# Patient Record
Sex: MALE | Race: WHITE | NOT HISPANIC OR LATINO | Employment: OTHER | URBAN - METROPOLITAN AREA
[De-identification: names, ages, dates, MRNs, and addresses within clinical notes are randomized per-mention and may not be internally consistent; named-entity substitution may affect disease eponyms.]

---

## 2017-02-09 ENCOUNTER — OFFICE VISIT (OUTPATIENT)
Dept: DERMATOLOGY | Facility: CLINIC | Age: 77
End: 2017-02-09
Payer: MEDICARE

## 2017-02-09 VITALS — HEIGHT: 70 IN | BODY MASS INDEX: 27.2 KG/M2 | WEIGHT: 190 LBS

## 2017-02-09 DIAGNOSIS — L57.0 ACTINIC KERATOSES: ICD-10-CM

## 2017-02-09 DIAGNOSIS — A49.8 PSEUDOMONAS INFECTION: ICD-10-CM

## 2017-02-09 DIAGNOSIS — L03.019 CHRONIC PARONYCHIA OF FINGER, UNSPECIFIED LATERALITY: Primary | ICD-10-CM

## 2017-02-09 PROCEDURE — 17003 DESTRUCT PREMALG LES 2-14: CPT | Mod: S$GLB,,, | Performed by: DERMATOLOGY

## 2017-02-09 PROCEDURE — 17000 DESTRUCT PREMALG LESION: CPT | Mod: S$GLB,,, | Performed by: DERMATOLOGY

## 2017-02-09 PROCEDURE — 99999 PR PBB SHADOW E&M-EST. PATIENT-LVL III: CPT | Mod: PBBFAC,,, | Performed by: DERMATOLOGY

## 2017-02-09 PROCEDURE — 1126F AMNT PAIN NOTED NONE PRSNT: CPT | Mod: S$GLB,,, | Performed by: DERMATOLOGY

## 2017-02-09 PROCEDURE — 1160F RVW MEDS BY RX/DR IN RCRD: CPT | Mod: S$GLB,,, | Performed by: DERMATOLOGY

## 2017-02-09 PROCEDURE — 1159F MED LIST DOCD IN RCRD: CPT | Mod: S$GLB,,, | Performed by: DERMATOLOGY

## 2017-02-09 PROCEDURE — 99213 OFFICE O/P EST LOW 20 MIN: CPT | Mod: 25,S$GLB,, | Performed by: DERMATOLOGY

## 2017-02-09 PROCEDURE — 1157F ADVNC CARE PLAN IN RCRD: CPT | Mod: S$GLB,,, | Performed by: DERMATOLOGY

## 2017-02-09 RX ORDER — CICLOPIROX OLAMINE 7.7 MG/100ML
SUSPENSION TOPICAL
Qty: 60 ML | Refills: 1 | Status: SHIPPED | OUTPATIENT
Start: 2017-02-09 | End: 2017-02-27

## 2017-02-09 RX ORDER — CIPROFLOXACIN HYDROCHLORIDE 3 MG/ML
SOLUTION/ DROPS OPHTHALMIC
Qty: 10 ML | Refills: 0 | Status: SHIPPED | OUTPATIENT
Start: 2017-02-09 | End: 2017-02-27

## 2017-02-09 NOTE — PROGRESS NOTES
"  Subjective:       Patient ID:  Cedric Gupta Jr. is a 76 y.o. 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   Skin: Positive for wears hat. Negative for daily sunscreen use, activity-related sunscreen use and recent sunburn.        Objective:    Physical Exam   Constitutional: He appears well-developed and well-nourished. No distress.   Neurological: He is alert and oriented to person, place, and time. He is not disoriented.   Psychiatric: He has a normal mood and affect.   Skin:   Areas Examined (abnormalities noted in diagram):   Scalp / Hair Palpated and Inspected  Head / Face Inspection Performed  Neck Inspection Performed  Chest / Axilla Inspection Performed  Abdomen Inspection Performed  Back Inspection Performed  RUE Inspected  LUE Inspection Performed  Nails and Digits Inspection Performed                       Diagram Legend     Erythematous scaling macule/papule c/w actinic keratosis       Vascular papule c/w angioma      Pigmented verrucoid papule/plaque c/w seborrheic keratosis      Yellow umbilicated papule c/w sebaceous hyperplasia      Irregularly shaped tan macule c/w lentigo     1-2 mm smooth white papules consistent with Milia      Movable subcutaneous cyst with punctum c/w epidermal inclusion cyst      Subcutaneous movable cyst c/w pilar cyst      Firm pink to brown papule c/w dermatofibroma      Pedunculated fleshy papule(s) c/w skin tag(s)      Evenly pigmented macule c/w junctional nevus     Mildly variegated pigmented, slightly " irregular-bordered macule c/w mildly atypical nevus      Flesh colored to evenly pigmented papule c/w intradermal nevus       Pink pearly papule/plaque c/w basal cell carcinoma      Erythematous hyperkeratotic cursted plaque c/w SCC      Surgical scar with no sign of skin cancer recurrence      Open and closed comedones      Inflammatory papules and pustules      Verrucoid papule consistent consistent with wart     Erythematous eczematous patches and plaques     Dystrophic onycholytic nail with subungual debris c/w onychomycosis     Umbilicated papule    Erythematous-base heme-crusted tan verrucoid plaque consistent with inflamed seborrheic keratosis     Erythematous Silvery Scaling Plaque c/w Psoriasis     See annotation            NOV 2016:        Assessment / Plan:        Chronic paronychia of finger, unspecified laterality  Improved with topicals; continue to cover for both Pseudomonas and non dermtophyte molds  Gloves for all gardening    -     ciclopirox (LOPROX) 0.77 % Susp; Thin film to affected nails daily  Dispense: 60 mL; Refill: 1    Pseudomonas infection  -     ciprofloxacin HCl (CILOXAN) 0.3 % ophthalmic solution; 1-2 drops under affected nails BID  Dispense: 10 mL; Refill: 0    Actinic keratoses  Cryosurgery Procedure Note    Verbal consent from the patient is obtained and the patient is aware of the precancerous quality and need for treatment of these lesions. Liquid nitrogen cryosurgery is applied to the 2 actinic keratoses, as detailed in the physical exam, to produce a freeze injury. The patient is aware that blisters may form and is instructed on wound care with gentle cleansing and use of vaseline ointment to keep moist until healed. The patient is supplied a handout on cryosurgery and is instructed to call if lesions do not completely resolve.    Patient instructed in importance in daily sun protection of at least spf 30. Sun avoidance and topical protection discussed.   Recommend Elta MD  (physician office) COTZ sensitive (available online) for daily use on face and neck.  Patient encouraged to wear hat for all outdoor exposure.   Also discussed sun protective clothing.           Return in about 1 year (around 2/9/2018).

## 2017-02-09 NOTE — PATIENT INSTRUCTIONS

## 2017-02-27 ENCOUNTER — OFFICE VISIT (OUTPATIENT)
Dept: FAMILY MEDICINE | Facility: CLINIC | Age: 77
End: 2017-02-27
Payer: MEDICARE

## 2017-02-27 VITALS
HEART RATE: 72 BPM | OXYGEN SATURATION: 97 % | SYSTOLIC BLOOD PRESSURE: 110 MMHG | DIASTOLIC BLOOD PRESSURE: 62 MMHG | TEMPERATURE: 98 F | BODY MASS INDEX: 27.4 KG/M2 | RESPIRATION RATE: 17 BRPM | HEIGHT: 70 IN | WEIGHT: 191.38 LBS

## 2017-02-27 DIAGNOSIS — F32.A DEPRESSION, UNSPECIFIED DEPRESSION TYPE: Primary | ICD-10-CM

## 2017-02-27 PROCEDURE — 1157F ADVNC CARE PLAN IN RCRD: CPT | Mod: S$GLB,,, | Performed by: FAMILY MEDICINE

## 2017-02-27 PROCEDURE — 1159F MED LIST DOCD IN RCRD: CPT | Mod: S$GLB,,, | Performed by: FAMILY MEDICINE

## 2017-02-27 PROCEDURE — 90662 IIV NO PRSV INCREASED AG IM: CPT | Mod: S$GLB,,, | Performed by: FAMILY MEDICINE

## 2017-02-27 PROCEDURE — 99499 UNLISTED E&M SERVICE: CPT | Mod: S$GLB,,, | Performed by: FAMILY MEDICINE

## 2017-02-27 PROCEDURE — G0008 ADMIN INFLUENZA VIRUS VAC: HCPCS | Mod: S$GLB,,, | Performed by: FAMILY MEDICINE

## 2017-02-27 PROCEDURE — 1126F AMNT PAIN NOTED NONE PRSNT: CPT | Mod: S$GLB,,, | Performed by: FAMILY MEDICINE

## 2017-02-27 PROCEDURE — 1160F RVW MEDS BY RX/DR IN RCRD: CPT | Mod: S$GLB,,, | Performed by: FAMILY MEDICINE

## 2017-02-27 PROCEDURE — 99214 OFFICE O/P EST MOD 30 MIN: CPT | Mod: 25,S$GLB,, | Performed by: FAMILY MEDICINE

## 2017-02-27 RX ORDER — PAROXETINE HYDROCHLORIDE 20 MG/1
20 TABLET, FILM COATED ORAL EVERY MORNING
Qty: 30 TABLET | Refills: 1 | Status: SHIPPED | OUTPATIENT
Start: 2017-02-27 | End: 2017-04-07

## 2017-02-27 NOTE — PATIENT INSTRUCTIONS
[facility]   [place], [place]  [phone]     HERMILO Mccann  Psychiatric Nurse Practitioner  [address] [place]  [address] [phone]

## 2017-02-27 NOTE — MR AVS SNAPSHOT
[facility]  [address]  [facility] [address]  Phone: [phone]  Fax: [phone]                  Cedric Gupta Jr.   2017 7:30 AM   Office Visit    Description:  Male : 1940   Provider:  Tracy Stein MD   Department:  [facility]           Reason for Visit     Depression           Diagnoses this Visit        Comments    Depression, unspecified depression type    -  Primary            To Do List           Future Appointments        Provider Department Dept Phone    3/3/2017 10:35 AM LAB, [place] [facility] [phone]    3/20/2017 10:30 AM DAPHNIE Juarez OD [place] [facility] [phone]    2017 9:50 AM Tracy Stein MD [facility] [phone]      Goals (5 Years of Data)              8/11/15    BMI is less than 25   Not on track       These Medications        Disp Refills Start End    paroxetine (PAXIL) 20 MG tablet 30 tablet 1 2017    Take 1 tablet (20 mg total) by mouth every morning. - Oral    Pharmacy: [address] #: [phone]         [facility] On Call     [facility] On [facility] Nurse Care Line -  Assistance  Registered nurses in the [facility] Center provide clinical advisement, health education, appointment booking, and other advisory services.  Call for this free service at [phone].             Medications           Message regarding Medications     Verify the changes and/or additions to your medication regime listed below are the same as discussed with your clinician today.  If any of these changes or additions are incorrect, please notify your healthcare provider.        START taking these NEW medications        Refills    paroxetine (PAXIL) 20 MG tablet 1    Sig: Take 1 tablet (20 mg total) by mouth every morning.    Class: Normal    Route: Oral      STOP taking these  medications     ciprofloxacin HCl (CILOXAN) 0.3 % ophthalmic solution 1-2 drops under affected nails BID    ciclopirox (LOPROX) 0.77 % Susp Thin film to affected nails daily    aspirin (ECOTRIN) 81 MG EC tablet Take 81 mg by mouth once daily.           Verify that the below list of medications is an accurate representation of the medications you are currently taking.  If none reported, the list may be blank. If incorrect, please contact your healthcare provider. Carry this list with you in case of emergency.           Current Medications     paroxetine (PAXIL) 20 MG tablet Take 1 tablet (20 mg total) by mouth every morning.           Clinical Reference Information           Your Vitals Were     BP                   110/62 (BP Location: Left arm, Patient Position: Sitting, BP Method: Manual)           Blood Pressure          Most Recent Value    BP  110/62      Allergies as of 2/27/2017     Amantadine      Immunizations Administered on Date of Encounter - 2/27/2017     Name Date Dose VIS Date Route    Influenza - High Dose 2/27/2017 0.5 mL 8/7/2015 Intramuscular      Orders Placed During Today's Visit      Normal Orders This Visit    Influenza - High Dose (65+) (PF) (IM)       Instructions    [facility] [place]  [phone]     HERMILO Mccann  Psychiatric Nurse Practitioner  [address] [place] [address]  [address]  [phone]               Language Assistance Services     ATTENTION: Language assistance services are available, free of charge. Please call [phone].      ATENCIÓN: Si habla español, tiene a graham disposición servicios gratuitos de asistencia lingüística. Llame al [phone].     CHÚ Ý: N?u b?n nói Ti?ng Vi?t, có các d?ch v? h? tr? ngôn ng? mi?n phí dành cho b?n. G?i s? [phone].         [facility] complies with applicable Federal civil rights laws and does not discriminate on the basis of race, color, national origin,  age, disability, or sex.

## 2017-02-28 NOTE — PROGRESS NOTES
"Subjective:       Patient ID: Cedric Gupta Jr. is a 76 y.o. male.    Chief Complaint: Depression (periodically )    HPI   The patient is a 76-year-old who is here today to talk about depression.  He has had bouts of depression since 2000 but he is having a particularly bad spell for the past month or so.  He has been feeling sad and blue.  He has been tearful.  He has been excessively worrying about things in the past and current stressors (his divorce years ago, his daughter's situation, his financial situation etc.).  His mind continues to go over and over certain events.  He has been doing a lot of excessive worrying.  He has been isolating himself although he knows he needs to get out and be social.  He has not been sleeping as well as he normally does.  His appetite has been good and unchanged.  He does occasionally use marijuana and realizes he should stop using not        Review of Systems   Psychiatric/Behavioral: Positive for sleep disturbance. Negative for dysphoric mood, self-injury and suicidal ideas. The patient is nervous/anxious.        Objective:      Physical Exam   Constitutional: He appears well-developed and well-nourished.   Psychiatric: His speech is normal. Judgment and thought content normal. Cognition and memory are normal. He exhibits a depressed mood.   He is tearful today.     Blood pressure 110/62, pulse 72, temperature 98.4 °F (36.9 °C), temperature source Oral, resp. rate 17, height 5' 10" (1.778 m), weight 86.8 kg (191 lb 5.8 oz), SpO2 97 %.Body mass index is 27.46 kg/(m^2).        A/P:  1)  major depression.  Recurrent but new to me.  I'm going to start him on Paxil 20 mg once a day.  He is also going to pursue counseling.  He is going to stop his marijuana use.  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  He is also considering a roommate.  We did discuss being socially engaged which he will make sure he is doing.  If she develops any new or worsening symptoms, he will let " me know.  I will otherwise see him back in 6 weeks    Total visit time was 25 minutes  Greater than 50% of this time was spent couseling the patient or coordinating their care for his depressions

## 2017-03-01 ENCOUNTER — TELEPHONE (OUTPATIENT)
Dept: FAMILY MEDICINE | Facility: CLINIC | Age: 77
End: 2017-03-01

## 2017-03-01 NOTE — TELEPHONE ENCOUNTER
Spoke with patient he stated that he wants to try to stop taking Paxil. Patient stated that he is trying meditation that is working right now. So he wants to know if it will be ok if he stop taking it. Please advise

## 2017-03-01 NOTE — TELEPHONE ENCOUNTER
----- Message from Fatoumata Melton sent at 3/1/2017  9:16 AM CST -----  Contact: self  Patient wants to speak with a nurse regarding the Rx for PAXIL. Please call back at [phone] ([email or web address])

## 2017-03-01 NOTE — TELEPHONE ENCOUNTER
----- Message from Cassandra Farr sent at 3/1/2017  3:12 PM CST -----  Returning your call.  Please call patient at [phone].

## 2017-03-03 ENCOUNTER — LAB VISIT (OUTPATIENT)
Dept: LAB | Facility: HOSPITAL | Age: 77
End: 2017-03-03
Attending: UROLOGY
Payer: MEDICARE

## 2017-03-03 DIAGNOSIS — R97.20 ELEVATED PSA: ICD-10-CM

## 2017-03-03 LAB — COMPLEXED PSA SERPL-MCNC: 5.6 NG/ML

## 2017-03-03 PROCEDURE — 36415 COLL VENOUS BLD VENIPUNCTURE: CPT | Mod: PO

## 2017-03-03 PROCEDURE — 84153 ASSAY OF PSA TOTAL: CPT

## 2017-03-06 ENCOUNTER — TELEPHONE (OUTPATIENT)
Dept: FAMILY MEDICINE | Facility: CLINIC | Age: 77
End: 2017-03-06

## 2017-03-20 ENCOUNTER — OFFICE VISIT (OUTPATIENT)
Dept: OPTOMETRY | Facility: CLINIC | Age: 77
End: 2017-03-20
Payer: MEDICARE

## 2017-03-20 DIAGNOSIS — H52.13 MYOPIA WITH ASTIGMATISM, BILATERAL: ICD-10-CM

## 2017-03-20 DIAGNOSIS — Z13.5 GLAUCOMA SCREENING: ICD-10-CM

## 2017-03-20 DIAGNOSIS — H35.3130 BILATERAL NONEXUDATIVE AGE-RELATED MACULAR DEGENERATION: Primary | ICD-10-CM

## 2017-03-20 DIAGNOSIS — H25.13 NUCLEAR SCLEROSIS, BILATERAL: ICD-10-CM

## 2017-03-20 DIAGNOSIS — H43.813 POSTERIOR VITREOUS DETACHMENT, BILATERAL: ICD-10-CM

## 2017-03-20 DIAGNOSIS — H52.203 MYOPIA WITH ASTIGMATISM, BILATERAL: ICD-10-CM

## 2017-03-20 PROCEDURE — 99999 PR PBB SHADOW E&M-EST. PATIENT-LVL III: CPT | Mod: PBBFAC,,, | Performed by: OPTOMETRIST

## 2017-03-20 PROCEDURE — 92134 CPTRZ OPH DX IMG PST SGM RTA: CPT | Mod: S$GLB,,, | Performed by: OPTOMETRIST

## 2017-03-20 PROCEDURE — 92014 COMPRE OPH EXAM EST PT 1/>: CPT | Mod: S$GLB,,, | Performed by: OPTOMETRIST

## 2017-03-20 NOTE — MR AVS SNAPSHOT
[facility]  [address] [facility]  [facility] [address]  Phone: [phone]  Fax: [phone]                  Cedric Gupta Jr.   3/20/2017 10:30 AM   Office Visit    Description:  Male : 1940   Provider:  DAPHNIE Juarez, OD   Department:  [facility]           Reason for Visit     Annual Exam     Macular Degeneration           Diagnoses this Visit        Comments    Bilateral nonexudative age-related macular degeneration    -  Primary     Nuclear sclerosis, bilateral         Posterior vitreous detachment, bilateral         Glaucoma screening         Myopia with astigmatism, bilateral                To Do List           Future Appointments        Provider Department Dept Phone    2017 9:50 AM Tracy Stein MD [facility] [phone]      Goals (5 Years of Data)              8/11/15    BMI is less than 25   Not on track      Follow-Up and Disposition     Return for Retina consultation.      [facility] On Call     [facility] On Call Nurse Care Line - / Assistance  Registered nurses in the [facility] On Call Center provide clinical advisement, health education, appointment booking, and other advisory services.  Call for this free service at [phone].             Medications           Message regarding Medications     Verify the changes and/or additions to your medication regime listed below are the same as discussed with your clinician today.  If any of these changes or additions are incorrect, please notify your healthcare provider.             Verify that the below list of medications is an accurate representation of the medications you are currently taking.  If none reported, the list may be blank. If incorrect, please contact your healthcare provider. Carry this list with you in case of emergency.           Current Medications     paroxetine (PAXIL) 20 MG tablet Take 1 tablet (20 mg total) by mouth every morning.           Clinical Reference  Information           Allergies as of 3/20/2017     Amantadine      Immunizations Administered on Date of Encounter - 3/20/2017     None      Orders Placed During Today's Visit      Normal Orders This Visit    Ambulatory Referral to Ophthalmology     Posterior Segment OCT Retina-Both eyes       Instructions    FLASHES / FLOATERS / POSTERIOR VITREOUS DETACHMENT    Call the clinic if you have any further changes in symptoms.  Including:  Increased numbers of floaters or flashing lights, dimness or darkness that moves through or stays constant in your vision, or any pain in the eye (s).            Early Cataracts--not visually significant for surgery consultation.    What Are Cataracts?  A clear lens in the eye focuses light. This lets the eye see images sharply. With age, the lens slowly becomes cloudy. The cloudy lens is a cataract. A cataract scatters light and makes it hard for the eye to focus. Cataracts often form in both eyes. But one lens may cloud faster than the other.      The Aging of Your Lens    Your lens may cloud so slowly that you don`t notice any vision changes at first. But as the cataract gets worse, the eye has a harder time focusing. In early stages, glasses may help you see better. As the lens gets cloudier, your doctor may recommend surgery to restore your vision.  Using the Amsler Grid  If you are at risk for vision loss, you may be told to check your eyesight regularly using the Amsler grid. Below is the grid and instructions for using it.         The Amsler grid helps you track changes in your vision.    How to Use the Amsler Grid  1. Use the grid in a well-lighted area.  2. Wear glasses or contact lenses if you usually wear them.  3. Hold the grid at your normal reading distance (about 16 inches).  4. Cover your left eye.  5. With your right eye, look at the dot in the center of the grid.  6. While looking at the dot, notice if any of the lines look wavy, if any lines disappear, or if the  boxes change shape.  7. Write down on a piece of paper any vision changes from the last time you used the grid.  8. Now repeat the exercise, this time covering your right eye.  9. Call your doctor right away if you notice any vision changes.  How Often Should I Check My Vision?  Use the Amsler grid as often as your eye doctor suggests. Keep the grid where youll remember to use it. Call your eye doctor right away if you notice any changes with your eyesight. This includes if your vision improves.  © 1412-5796 [facility], [address] [place] [address], [place] [address]. All rights reserved. This information is not intended as a substitute for professional medical care. Always follow your healthcare professional's instructions.       Language Assistance Services     ATTENTION: Language assistance services are available, free of charge. Please call [phone].      ATENCIÓN: Si gerardo adams, tiene a graham disposición servicios gratuitos de asistencia lingüística. Llame al [phone].     CHÚ Ý: N?u b?n nói Ti?ng Vi?t, có các d?ch v? h? tr? ngôn ng? mi?n phí dành cho b?n. G?i s? [phone].         [facility] complies with applicable Federal civil rights laws and does not discriminate on the basis of race, color, national origin, age, disability, or sex.

## 2017-03-20 NOTE — PATIENT INSTRUCTIONS
FLASHES / FLOATERS / POSTERIOR VITREOUS DETACHMENT    Call the clinic if you have any further changes in symptoms.  Including:  Increased numbers of floaters or flashing lights, dimness or darkness that moves through or stays constant in your vision, or any pain in the eye (s).            Early Cataracts--not visually significant for surgery consultation.    What Are Cataracts?  A clear lens in the eye focuses light. This lets the eye see images sharply. With age, the lens slowly becomes cloudy. The cloudy lens is a cataract. A cataract scatters light and makes it hard for the eye to focus. Cataracts often form in both eyes. But one lens may cloud faster than the other.      The Aging of Your Lens    Your lens may cloud so slowly that you don`t notice any vision changes at first. But as the cataract gets worse, the eye has a harder time focusing. In early stages, glasses may help you see better. As the lens gets cloudier, your doctor may recommend surgery to restore your vision.  Using the Amsler Grid  If you are at risk for vision loss, you may be told to check your eyesight regularly using the Amsler grid. Below is the grid and instructions for using it.         The Amsler grid helps you track changes in your vision.    How to Use the Amsler Grid  1. Use the grid in a well-lighted area.  2. Wear glasses or contact lenses if you usually wear them.  3. Hold the grid at your normal reading distance (about 16 inches).  4. Cover your left eye.  5. With your right eye, look at the dot in the center of the grid.  6. While looking at the dot, notice if any of the lines look wavy, if any lines disappear, or if the boxes change shape.  7. Write down on a piece of paper any vision changes from the last time you used the grid.  8. Now repeat the exercise, this time covering your right eye.  9. Call your doctor right away if you notice any vision changes.  How Often Should I Check My Vision?  Use the Amsler grid as often as  your eye doctor suggests. Keep the grid where youll remember to use it. Call your eye doctor right away if you notice any changes with your eyesight. This includes if your vision improves.  © 3295-3675 [employer], [address] [place] [address], [place] [address]. All rights reserved. This information is not intended as a substitute for professional medical care. Always follow your healthcare professional's instructions.

## 2017-03-20 NOTE — PROGRESS NOTES
HPI     Annual Exam    Additional comments: DLE 9-15 (yuly)    ocular health exam            Macular Degeneration    Additional comments: dry OU            Comments   Agree above   Notes VA stable  Broken specs  No other new issues         Last edited by DAPHNIE Juarez, OD on 3/20/2017 11:09 AM. (History)            Assessment /Plan     For exam results, see Encounter Report.    Bilateral nonexudative age-related macular degeneration  -     Posterior Segment OCT Retina-Both eyes  -     Ambulatory Referral to Ophthalmology    Nuclear sclerosis, bilateral    Posterior vitreous detachment, bilateral    Glaucoma screening    Myopia with astigmatism, bilateral        1. Increased moderate drusen changes with some VA reducution  OCT today--drusen ou with probable PED--OD>OS  Home yi  Advised to resume regular areds vits, gave info sheet with suggestions  Retina consult  2. Vis sig, borderline ready for CE--eval following retina  3. RD precautions given, reviewed  4. Not suspect  5. Updated specs, hold until further consult    Discussed and educated patient on current findings /plan.  RTC retina eval, prn if any changes / issues

## 2017-03-24 ENCOUNTER — PATIENT OUTREACH (OUTPATIENT)
Dept: ADMINISTRATIVE | Facility: HOSPITAL | Age: 77
End: 2017-03-24

## 2017-03-24 NOTE — LETTER
March 30, 2017    Cedric Gupta Jr.  [address] [place] [address]             [facility]  [address]  [facility] [address]  Phone: [phone] Dear Mr. Gupta:    We have tried to reach you by mychart unsuccessfully.    [facility] is committed to your overall health.  To help you get the most out of each of your visits, we will review your information to make sure you are up to date on all of your recommended tests and/or procedures.       Dr. Tracy Stein has found that you may be due for your fasting cholesterol labs.     If you have had any of the above done at another facility, please bring the records or information with you so that your record at [facility] will be complete.     If you are currently taking medication, please bring it with you to your appointment for review.     Also, if you have any type of Advanced Directives, please bring them with you to your office visit so we may scan them into your chart.     If you have any questions or concerns, please don't hesitate to call.    Thank you for letting us care for you,  Vicki Melton LPN Clinical Care Coordinator  [facility]  [phone]

## 2017-03-24 NOTE — PROGRESS NOTES
PRE-VISIT CHART REVIEW    Appointment Scheduled on 4/7/17    Department stratifications & guidelines reviewed:yes    Target Chronic Diagnosis: None    Chronic Diagnosis Intervention Due: no    Goals Updated:N/A    Health Maintenance Due   Topic Date Due    Lipid Panel  09/22/2016       Advanced Directives:   65 years of age or older?  Yes  Directive on file?  no                                      Pre-visit patient communication: 03/24/2017 MyChart/Letter    Studies or screenings scheduled pre-visit: no

## 2017-04-07 ENCOUNTER — TELEPHONE (OUTPATIENT)
Dept: FAMILY MEDICINE | Facility: CLINIC | Age: 77
End: 2017-04-07

## 2017-04-07 ENCOUNTER — OFFICE VISIT (OUTPATIENT)
Dept: FAMILY MEDICINE | Facility: CLINIC | Age: 77
End: 2017-04-07
Payer: MEDICARE

## 2017-04-07 VITALS
BODY MASS INDEX: 26.64 KG/M2 | RESPIRATION RATE: 16 BRPM | WEIGHT: 186.06 LBS | TEMPERATURE: 98 F | DIASTOLIC BLOOD PRESSURE: 76 MMHG | HEART RATE: 60 BPM | HEIGHT: 70 IN | SYSTOLIC BLOOD PRESSURE: 120 MMHG

## 2017-04-07 DIAGNOSIS — M79.646 THUMB PAIN, UNSPECIFIED LATERALITY: Primary | ICD-10-CM

## 2017-04-07 DIAGNOSIS — F34.1 DYSTHYMIA: ICD-10-CM

## 2017-04-07 PROCEDURE — 99213 OFFICE O/P EST LOW 20 MIN: CPT | Mod: S$GLB,,, | Performed by: FAMILY MEDICINE

## 2017-04-07 PROCEDURE — 1160F RVW MEDS BY RX/DR IN RCRD: CPT | Mod: S$GLB,,, | Performed by: FAMILY MEDICINE

## 2017-04-07 PROCEDURE — 1125F AMNT PAIN NOTED PAIN PRSNT: CPT | Mod: S$GLB,,, | Performed by: FAMILY MEDICINE

## 2017-04-07 PROCEDURE — 99499 UNLISTED E&M SERVICE: CPT | Mod: S$GLB,,, | Performed by: FAMILY MEDICINE

## 2017-04-07 PROCEDURE — 1157F ADVNC CARE PLAN IN RCRD: CPT | Mod: S$GLB,,, | Performed by: FAMILY MEDICINE

## 2017-04-07 PROCEDURE — 1159F MED LIST DOCD IN RCRD: CPT | Mod: S$GLB,,, | Performed by: FAMILY MEDICINE

## 2017-04-07 RX ORDER — ASPIRIN 81 MG/1
81 TABLET ORAL DAILY
COMMUNITY
End: 2019-03-22

## 2017-04-07 RX ORDER — TRAMADOL HYDROCHLORIDE 50 MG/1
50 TABLET ORAL 2 TIMES DAILY PRN
Qty: 60 TABLET | Refills: 1 | Status: SHIPPED | OUTPATIENT
Start: 2017-04-07 | End: 2017-04-17

## 2017-04-07 RX ORDER — CICLOPIROX OLAMINE 7.7 MG/100ML
SUSPENSION TOPICAL
COMMUNITY
Start: 2017-04-04 | End: 2017-05-03

## 2017-04-07 NOTE — TELEPHONE ENCOUNTER
Hi!  I see that you ordered a PSA for fu  I discussed results with pt today  Where you planning a fu visit?

## 2017-04-07 NOTE — TELEPHONE ENCOUNTER
----- Message from Meena Cruz sent at 4/7/2017 11:19 AM CDT -----  Contact: self   Patient want tramadol refill send to [facility],any question please call back at [phone]    [address] KASEY RAMIREZ - [address]  [address]  [place] [address]  Phone: [phone] Fax: [phone]

## 2017-04-07 NOTE — TELEPHONE ENCOUNTER
Hi!  I saw pt today for fu  He has not had improvement in the nails or the paronychia with the treatment so far  Do you have anything further to recommend?

## 2017-04-07 NOTE — MR AVS SNAPSHOT
[facility]  [address]  [facility] [address]  Phone: [phone]  Fax: [phone]                  Cedric Gupta Jr.   2017 9:50 AM   Office Visit    Description:  Male : 1940   Provider:  Tracy Stein MD   Department:  [facility]           Reason for Visit     Follow-up     Arthritis           Diagnoses this Visit        Comments    Thumb pain, unspecified laterality    -  Primary            To Do List           Future Appointments        Provider Department Dept Phone    2017 1:30 PM MD [facility] [place] - Orth[facility]ics [phone]    2017 10:30 AM NANCIE Fry MD [place] - Ophthalmology [phone]    2017 10:10 AM Tracy Stein MD [facility] [phone]      Goals (5 Years of Data)              8/11/15    BMI is less than 25   Not on track      Follow-Up and Disposition     Return in about 4 months (around 2017).       These Medications        Disp Refills Start End    tramadol (ULTRAM) 50 mg tablet 60 tablet 1 2017    Take 1 tablet (50 mg total) by mouth 2 (two) times daily as needed for Pain. - Oral    Pharmacy: [address] #: [phone]         [facility]     [facility] -  Assistance  Unless otherwise directed by your provider, please contact [facility], our nurse care line that is available for  assistance.     Registered nurses in the [facility] provide: appointment scheduling, clinical advisement, health education, and other advisory services.  Call: [phone] (toll free)               Medications           Message regarding Medications     Verify the changes and/or additions to your medication regime listed below are the same as discussed with your clinician today.  If any of these changes or additions are incorrect,  "please notify your healthcare provider.        START taking these NEW medications        Refills    tramadol (ULTRAM) 50 mg tablet 1    Sig: Take 1 tablet (50 mg total) by mouth 2 (two) times daily as needed for Pain.    Class: Print    Route: Oral      STOP taking these medications     paroxetine (PAXIL) 20 MG tablet Take 1 tablet (20 mg total) by mouth every morning.           Verify that the below list of medications is an accurate representation of the medications you are currently taking.  If none reported, the list may be blank. If incorrect, please contact your healthcare provider. Carry this list with you in case of emergency.           Current Medications     aspirin (ECOTRIN) 81 MG EC tablet Take 81 mg by mouth as needed for Pain.    ciclopirox (LOPROX) 0.77 % Susp as needed.     tramadol (ULTRAM) 50 mg tablet Take 1 tablet (50 mg total) by mouth 2 (two) times daily as needed for Pain.           Clinical Reference Information           Your Vitals Were     BP Pulse Temp Resp Height Weight    120/76 60 97.9 °F (36.6 °C) (Oral) 16 5' 10" (1.778 m) 84.4 kg (186 lb 1.1 oz)    BMI                26.7 kg/m2          Blood Pressure          Most Recent Value    BP  120/76      Allergies as of 4/7/2017     Amantadine      Immunizations Administered on Date of Encounter - 4/7/2017     None      Orders Placed During Today's Visit      Normal Orders This Visit    Ambulatory referral to Orthopedics       Language Assistance Services     ATTENTION: Language assistance services are available, free of charge. Please call [phone].      ATENCIÓN: Si habla bryan, tiene a graham disposición servicios gratuitos de asistencia lingüística. Llame al [phone].     [facility] Ý: N?u b?n nói Ti?ng Vi?t, có các d?ch v? h? tr? ngôn ng? mi?n phí dành cho b?n. G?i s? [phone].         [facility] complies with applicable Federal civil rights laws and does not discriminate on the basis of race, color, " national origin, age, disability, or sex.

## 2017-04-09 NOTE — PROGRESS NOTES
Subjective:       Patient ID: Cedric Gupta Jr. is a 77 y.o. male.    Chief Complaint: Follow-up and Arthritis (hands)    HPI   The patient is a 77-year-old who is here today for follow-up.  Today we discussed the followin)  depression.  After his last visit, he decided that he did not want to start any medication for his depression.  He decided to start working on his depression himself.  He has been doing mediation and finds that to be helpful.  He has been doing more activities around his house and with his group of friends.  He has a friend that he has had for several years that is going to become his roommate soon.  In the process of getting a roommate, he has been busy picking up and in doing projects around the house.  He does not feel significantly depressed at this time.  He denies any SI or HI  2)  arthritis pains.  He does have some arthritis pains.  He is particularly bothered by arthritis in the base of his thumbs.  The arthritis is an irritation instead of a true pain.  He rates his arthritis as a 3 or 4 on a scale of 1-10.  His arthritis is there all the time but is worse with manual activities.  He has occasionally used ibuprofen which has helped.  Given his history of peptic ulcer disease, we did discuss the need to avoid NSAIDs which he will do in the future  3)  elevated PSA.  We did discuss his recent elevated PSA.  He was not aware of this result.  He does not have an appointment with his urologist  4)  nail abnormalities with surrounding skin irritation.  He continues to have trouble with his nails and the surrounding skin.  This has been present since [place].  He did meet with the dermatologist and tried a few treatment options which have not helped    Review of Systems   Constitutional: Negative for appetite change, chills, diaphoresis, fatigue, fever and unexpected weight change.   HENT: Negative for congestion, ear pain, postnasal drip, rhinorrhea, sinus pressure, sneezing, sore  throat and trouble swallowing.    Eyes: Negative for pain, discharge and visual disturbance.   Respiratory: Negative for cough, chest tightness, shortness of breath and wheezing.    Cardiovascular: Negative for chest pain, palpitations and leg swelling.   Gastrointestinal: Negative for abdominal distention, abdominal pain, blood in stool, constipation, diarrhea, nausea and vomiting.   Skin: Negative for rash.       Objective:      Physical Exam   Constitutional: He is oriented to person, place, and time. He appears well-developed and well-nourished. No distress.   HENT:   Head: Normocephalic and atraumatic.   Right Ear: Hearing, tympanic membrane, external ear and ear canal normal.   Left Ear: Hearing, tympanic membrane, external ear and ear canal normal.   Nose: Nose normal.   Mouth/Throat: Oropharynx is clear and moist and mucous membranes are normal. No oral lesions. No oropharyngeal exudate, posterior oropharyngeal edema or posterior oropharyngeal erythema.   Eyes: Conjunctivae, EOM and lids are normal. Pupils are equal, round, and reactive to light. No scleral icterus.   Neck: Normal range of motion. Neck supple. Carotid bruit is not present. No thyroid mass and no thyromegaly present.   Cardiovascular: Normal rate, regular rhythm and normal heart sounds.   No extrasystoles are present. PMI is not displaced.  Exam reveals no gallop.    No murmur heard.  Pulmonary/Chest: Effort normal and breath sounds normal. No accessory muscle usage. No respiratory distress.   Clear to auscultation bilaterally.   Abdominal: Soft. Normal appearance and bowel sounds are normal. He exhibits no abdominal bruit. There is no hepatosplenomegaly. There is no tenderness. There is no rebound.   Lymphadenopathy:        Head (right side): No submental and no submandibular adenopathy present.        Head (left side): No submental and no submandibular adenopathy present.        Right cervical: No superficial cervical, no deep cervical and  "no posterior cervical adenopathy present.       Left cervical: No superficial cervical, no deep cervical and no posterior cervical adenopathy present.        Right: No supraclavicular adenopathy present.        Left: No supraclavicular adenopathy present.   Neurological: He is alert and oriented to person, place, and time.   Skin: Skin is warm, dry and intact.   Psychiatric: He has a normal mood and affect.     Blood pressure 120/76, pulse 60, temperature 97.9 °F (36.6 °C), temperature source Oral, resp. rate 16, height 5' 10" (1.778 m), weight 84.4 kg (186 lb 1.1 oz).Body mass index is 26.7 kg/(m^2).        A/P:  1) dysthymia.  Improved.  He will continue with his mediation and activities keeping himself socially, mentally and physically engaged.  If he develops any new or worsening symptoms, he will let me know  2)  arthritis pain particularly in the first carpometacarpal joints.  Persistent but new to me.  We will refer to orthopedist to consider steroid injections.  I did give him a prescription for Ultram for sparing use as he needs to avoid NSAIDs given his age and prior history of peptic ulcer disease  3)  elevated PSA.  We will contact urology regarding a follow-up appointment  4)  persistent nail dystrophy with chronic paronychia.  Unchanged.  We will contact his dermatologist    "

## 2017-04-11 NOTE — TELEPHONE ENCOUNTER
Please notify pt:    I have discussed his case with the dermatologist     Treatment of this condition is slow and frustrating.   Could try repeat diflucan 200mg weekly for 8 weeks.  Are you interested in trying this or do you want to continue with the topical treatments?  Gardening and exposure to soil and trauma compounds issue.

## 2017-04-11 NOTE — TELEPHONE ENCOUNTER
----- Message from Micah Ontiveros sent at 4/11/2017  3:46 PM CDT -----  Contact: pt  Pt is returning call, call placed to pod no answer  Call Back#[phone]  Thanks

## 2017-04-11 NOTE — TELEPHONE ENCOUNTER
Pt decided that he wanted to discuss this more before starting anything. appt scheduled so pt can discuss.

## 2017-04-17 DIAGNOSIS — M79.646 THUMB PAIN, UNSPECIFIED LATERALITY: Primary | ICD-10-CM

## 2017-04-18 ENCOUNTER — HOSPITAL ENCOUNTER (OUTPATIENT)
Dept: RADIOLOGY | Facility: HOSPITAL | Age: 77
Discharge: HOME | End: 2017-04-18
Attending: ORTHOPAEDIC SURGERY
Payer: MEDICARE

## 2017-04-18 ENCOUNTER — OFFICE VISIT (OUTPATIENT)
Dept: ORTHOPEDICS | Facility: CLINIC | Age: 77
End: 2017-04-18
Payer: MEDICARE

## 2017-04-18 VITALS
DIASTOLIC BLOOD PRESSURE: 69 MMHG | HEART RATE: 59 BPM | WEIGHT: 186.06 LBS | HEIGHT: 70 IN | SYSTOLIC BLOOD PRESSURE: 123 MMHG | BODY MASS INDEX: 26.64 KG/M2

## 2017-04-18 DIAGNOSIS — M79.646 THUMB PAIN, UNSPECIFIED LATERALITY: ICD-10-CM

## 2017-04-18 DIAGNOSIS — M85.40 BONE CYST, SOLITARY: ICD-10-CM

## 2017-04-18 DIAGNOSIS — M18.0 ARTHRITIS OF CARPOMETACARPAL (CMC) JOINTS OF BOTH THUMBS: Primary | ICD-10-CM

## 2017-04-18 PROCEDURE — 99999 PR PBB SHADOW E&M-EST. PATIENT-LVL II: CPT | Mod: PBBFAC,,, | Performed by: ORTHOPAEDIC SURGERY

## 2017-04-18 PROCEDURE — 73140 X-RAY EXAM OF FINGER(S): CPT | Mod: 26,59,LT, | Performed by: RADIOLOGY

## 2017-04-18 PROCEDURE — 1160F RVW MEDS BY RX/DR IN RCRD: CPT | Mod: S$GLB,,, | Performed by: ORTHOPAEDIC SURGERY

## 2017-04-18 PROCEDURE — 73130 X-RAY EXAM OF HAND: CPT | Mod: 26,LT,, | Performed by: RADIOLOGY

## 2017-04-18 PROCEDURE — 1159F MED LIST DOCD IN RCRD: CPT | Mod: S$GLB,,, | Performed by: ORTHOPAEDIC SURGERY

## 2017-04-18 PROCEDURE — 99204 OFFICE O/P NEW MOD 45 MIN: CPT | Mod: S$GLB,,, | Performed by: ORTHOPAEDIC SURGERY

## 2017-04-18 PROCEDURE — 1126F AMNT PAIN NOTED NONE PRSNT: CPT | Mod: S$GLB,,, | Performed by: ORTHOPAEDIC SURGERY

## 2017-04-18 RX ORDER — NAPROXEN 500 MG/1
500 TABLET ORAL 2 TIMES DAILY WITH MEALS
Qty: 60 TABLET | Refills: 1 | Status: SHIPPED | OUTPATIENT
Start: 2017-04-18 | End: 2017-08-31

## 2017-04-18 RX ORDER — DICLOFENAC SODIUM 30 MG/G
GEL TOPICAL
Qty: 100 G | Refills: 0 | Status: SHIPPED | OUTPATIENT
Start: 2017-04-18 | End: 2017-04-27 | Stop reason: SDUPTHER

## 2017-04-18 NOTE — LETTER
April 21, 2017      Tracy Stein MD  [address] [address] [address]           [facility] [address] [facility] [address] [place] [address]  Phone: [phone]          Patient: Cedric Gupta Jr.   MR Number: [id number]   YOB: 1940   Date of Visit: 4/18/2017       Dear Dr. Tracy Stein:    Thank you for referring Cedric Gupta to me for evaluation. Attached you will find relevant portions of my assessment and plan of care.    If you have questions, please do not hesitate to call me. I look forward to following Cedric Gupta along with you.    Sincerely,    Fortunato Mead MD    Enclosure  CC:  No Recipients    If you would like to receive this communication electronically, please contact [email or web address] or [phone] to request more information on [employer] [facility] access.    For providers and/or their staff who would like to refer a patient to [facility], please contact us through our one-stop-shop provider referral line, [facility] , at [phone].    If you feel you have received this communication in error or would no longer like to receive these types of communications, please e-mail [email or web address]

## 2017-04-21 NOTE — PROGRESS NOTES
Subjective:          Chief Complaint: Cedric Gupta Jr. is a 77 y.o. male who had concerns including Pain of the Left Hand.    HPI Comments: Mr. Steele has bilateral thumb pain and left hand pain that has been present for some time now however has increased. He notes that the pain increases after activities, however he is able to all of what he needs and wants to do.    Pain: 0/10    Pain   This is a chronic problem. Associated symptoms include joint swelling.       Past Medical History:   Diagnosis Date    BPH (benign prostatic hypertrophy)     Cataract     OU    Elevated PSA     Epiretinal membrane     OS    Fatty liver     noted on usg    History of colon polyps     History of duodenal ulcer     x 2 8/13 and 8/14    History of nephrolithiasis 2008    passed stone    Hyperlipidemia     No current medications    Macular degeneration     dry -- OU    Osteoarthritis     Prediabetes     S/P colonoscopy     8/15; 8/20    Squamous cell carcinoma 11/08/2016    Right forearm       Past Surgical History:   Procedure Laterality Date    CHOLECYSTECTOMY      FRACTURE SURGERY      Right ankle and leg    ORCHIECTOMY      due to undescended testicle/ Left    PROSTATE BIOPSY  2014    TONSILLECTOMY         Family History   Problem Relation Age of Onset    Cataracts Mother     Glaucoma Mother     Lupus Mother     Cancer Father      kidney and lung cancer (shipyard worker)    Diabetes Maternal Aunt     Diabetes Maternal Uncle     Melanoma Neg Hx     Psoriasis Neg Hx     Eczema Neg Hx          Current Outpatient Prescriptions:     aspirin (ECOTRIN) 81 MG EC tablet, Take 81 mg by mouth as needed for Pain., Disp: , Rfl:     ciclopirox (LOPROX) 0.77 % Susp, as needed. , Disp: , Rfl:     diclofenac sodium (SOLARAZE) 3 % gel, Apply to bilateral hands up to QID; history of duodenal ulcers, Disp: 100 g, Rfl: 0    naproxen (EC NAPROSYN) 500 MG EC tablet, Take 1 tablet (500 mg total) by mouth 2 (two) times  daily with meals., Disp: 60 tablet, Rfl: 1    Review of patient's allergies indicates:   Allergen Reactions    Amantadine Other (See Comments)     Depression       Vitals:    04/18/17 1320   BP: 123/69   Pulse: (!) 59       Review of Systems   Musculoskeletal: Positive for joint pain, joint swelling and stiffness.   All other systems reviewed and are negative.                  Objective:        General: Cedric is well-developed, well-nourished, appears stated age, in no acute distress, alert and oriented to time, place and person.     General    Vitals reviewed.  Constitutional: He is oriented to person, place, and time. He appears well-developed and well-nourished. No distress.   HENT:   Head: Normocephalic and atraumatic.   Nose: Nose normal.   Eyes: Pupils are equal, round, and reactive to light.   Cardiovascular: Normal rate.    Pulmonary/Chest: Effort normal.   Neurological: He is alert and oriented to person, place, and time.   Psychiatric: He has a normal mood and affect. His behavior is normal. Judgment and thought content normal.             Right Hand/Wrist Exam     Inspection   Scars: Wrist - absent Hand -  absent  Effusion: Wrist - absent Hand -  absent  Bruising: Wrist - absent   Deformity: Wrist - deformity Hand -  deformity    Pain   Wrist - The patient exhibits pain of the CMC.    Tenderness   The patient is tender to palpation of the dorsal area and radial area.    Tests     Atrophy   Thenar:  negative  Hypothenar:  negative  Intrinsic:  negative  1st Dorsal Interosseous: negative    Other     Neuorologic Exam    Median Distribution: normal  Ulnar Distribution: normal  Radial Distribution: normal    Comments:  Mild CMC grind test on right ; negative on left  Deformity of right index finger DIP from previous injury; no pain      Left Hand/Wrist Exam     Inspection   Scars: Wrist - absent Hand -  absent  Effusion: Wrist - absent Hand -  absent  Bruising: Wrist - absent Hand -  absent  Deformity:  Wrist - absent Hand -  absent    Pain   Wrist - The patient exhibits pain of the CMC.    Tenderness   The patient is tender to palpation of the dorsal area.     Tests     Atrophy  Thenar:  Negative  Hypothenar:  negative  Intrinsic: negative  1st Dorsal Interosseous:  negative    Other     Sensory Exam  Median Distribution: normal  Ulnar Distribution: normal  Radial Distribution: normal          Muscle Strength   Right Upper Extremity   Wrist Extension: 5/5/5   Wrist Flexion: 5/5/5   : 5/5/5   Index Finger: 5/5  Middle Finger: 5/5  Ring Finger: 5/5  Little Finger: 5/5  Thumb - APB: 5/5  Thumb - FPL: 5/5  Pinch Mechanism: 5/5  Left Upper Extremity  Wrist Extension: 5/5/5   Wrist Flexion: 5/5/5   :  5/5/5   Index Finger: 5/5  Middle Finger: 5/5  Ring Finger: 5/5  Little Finger: 5/5  Thumb - APB: 5/5  Thumb - FPL: 5/5  Pinch Mechanism: 5/5    Vascular Exam       Capillary Refill  Right Hand: normal capillary refill  Left Hand: normal capillary refill        Current and previous radiographic studies and results were reviewed with the patient:     Radiocarpal joint space loss noted suggesting degenerative change. First metacarpal carpal joint space loss noted suggesting degenerative change. Interphalangeal joint space loss is noted diffusely.    A lucency is noted in the base of the proximal phalange of the third digit laterally near the metacarpal carpal articulation of 8 mm. This does not obviously have a sclerotic margin is      This could relate to subchondral geode formation, aneurysmal bone cyst, enchondroma, giant cell tumor or less entirely specific, and a second opinion was obtained. Followup for stability is suggested. No priors are available confirm long-term stability   Impression        1. Circular lucent lesion laterally at the base of the proximal phalange of the third digit possibly relating to a bone cyst, geode formation, enchondroma or giant cell tumor . Long-term followup for size stability is  suggested along with clinical correlation.    2. Interphalangeal joint space loss diffusely suggesting degenerative change.     3 views left thumb    No obvious fracture or dislocation is noted. First metacarpal carpal joint space loss is noted with osseous hypertrophy suggesting degenerative change.        Assessment:       Encounter Diagnoses   Name Primary?   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

## 2017-04-27 DIAGNOSIS — M18.0 ARTHRITIS OF CARPOMETACARPAL (CMC) JOINTS OF BOTH THUMBS: ICD-10-CM

## 2017-04-27 DIAGNOSIS — M79.646 THUMB PAIN, UNSPECIFIED LATERALITY: ICD-10-CM

## 2017-04-27 RX ORDER — DICLOFENAC SODIUM 30 MG/G
GEL TOPICAL
Qty: 100 G | Refills: 0 | Status: SHIPPED | OUTPATIENT
Start: 2017-04-27 | End: 2017-05-03

## 2017-05-01 ENCOUNTER — INITIAL CONSULT (OUTPATIENT)
Dept: OPHTHALMOLOGY | Facility: CLINIC | Age: 77
End: 2017-05-01
Payer: MEDICARE

## 2017-05-01 DIAGNOSIS — H35.3132 NONEXUDATIVE AGE-RELATED MACULAR DEGENERATION, BILATERAL, INTERMEDIATE DRY STAGE: ICD-10-CM

## 2017-05-01 DIAGNOSIS — H25.13 NS (NUCLEAR SCLEROSIS), BILATERAL: ICD-10-CM

## 2017-05-01 DIAGNOSIS — H35.3130 BILATERAL NONEXUDATIVE AGE-RELATED MACULAR DEGENERATION: Primary | ICD-10-CM

## 2017-05-01 PROBLEM — H25.10 NS (NUCLEAR SCLEROSIS): Status: ACTIVE | Noted: 2017-05-01

## 2017-05-01 PROCEDURE — 92014 COMPRE OPH EXAM EST PT 1/>: CPT | Mod: S$GLB,,, | Performed by: OPHTHALMOLOGY

## 2017-05-01 PROCEDURE — 92134 CPTRZ OPH DX IMG PST SGM RTA: CPT | Mod: S$GLB,,, | Performed by: OPHTHALMOLOGY

## 2017-05-01 PROCEDURE — 99999 PR PBB SHADOW E&M-EST. PATIENT-LVL II: CPT | Mod: PBBFAC,,, | Performed by: OPHTHALMOLOGY

## 2017-05-01 PROCEDURE — 92225 PR SPECIAL EYE EXAM, INITIAL: CPT | Mod: 50,S$GLB,, | Performed by: OPHTHALMOLOGY

## 2017-05-01 NOTE — MR AVS SNAPSHOT
[facility]  [address] [facility] [address]  [place] [address]  Phone: [phone]  Fax: [phone]                  Cedric Gupta Jr.   2017 10:30 AM   Initial consult    Description:  Male : 1940   Provider:  NANCIE Fry MD   Department:  [place] [facility]           Reason for Visit     Macular Degeneration           Diagnoses this Visit        Comments    Bilateral nonexudative age-related macular degeneration    -  Primary     Nonexudative age-related macular degeneration, bilateral, intermediate dry stage         NS (nuclear sclerosis), bilateral                To Do List           Future Appointments        Provider Department Dept Phone    5/3/2017 9:15 AM Joselo Green MD [facility] [phone]    2017 10:10 AM Tracy Stein MD [facility] [phone]      Goals (5 Years of Data)              8/11/15    BMI is less than 25   Not on track      Follow-Up and Disposition     Return in about 1 year (around 2018).      [facility]     [facility] Nurse Care Line -  Assistance  Unless otherwise directed by your provider, please contact [facility], our nurse care line that is available for  assistance.     Registered nurses in the [facility] provide: appointment scheduling, clinical advisement, health education, and other advisory services.  Call: [phone] (toll free)               Medications           Message regarding Medications     Verify the changes and/or additions to your medication regime listed below are the same as discussed with your clinician today.  If any of these changes or additions are incorrect, please notify your healthcare provider.             Verify that the below list of medications is an accurate representation of the medications you are currently taking.  If none reported, the list may be blank. If incorrect, please contact your healthcare provider. Carry this  list with you in case of emergency.           Current Medications     aspirin (ECOTRIN) 81 MG EC tablet Take 81 mg by mouth as needed for Pain.    ciclopirox (LOPROX) 0.77 % Susp as needed.     diclofenac sodium (SOLARAZE) 3 % gel Apply to bilateral hands up to QID; history of duodenal ulcers    naproxen (EC NAPROSYN) 500 MG EC tablet Take 1 tablet (500 mg total) by mouth 2 (two) times daily with meals.           Clinical Reference Information           Allergies as of 5/1/2017     Amantadine      Immunizations Administered on Date of Encounter - 5/1/2017     None      Orders Placed During Today's Visit      Normal Orders This Visit    OCT- Retina       Language Assistance Services     ATTENTION: Language assistance services are available, free of charge. Please call [phone].      ATENCIÓN: Si brucela bryan, tiene a graham disposición servicios gratuitos de asistencia lingüística. Llame al [phone].     [facility] Ý: N?u b?n nói Ti?ng Vi?t, có các d?ch v? h? tr? ngôn ng? mi?n phí dành cho b?n. G?i s? [phone].         [facility] complies with applicable Federal civil rights laws and does not discriminate on the basis of race, color, national origin, age, disability, or sex.

## 2017-05-01 NOTE — PROGRESS NOTES
HPI     Macular Degeneration    Additional comments: Referred by Dr. Juarez           Comments   DLS  3/20/17 Dr. Juarez    Pt states at last visit he was told he should consult a retinal   specialist. Denies visual distortion, no f/f at present, but has had them in the past.  Feels VA Ou is fairly good, a little sharper with   correction.  Takes glasses off to read, does not wear bifocals.      OCT - [place] OU    A/P    1. Dry AMD OU  AREDS/AG    2. NS OU  Ok for CE    3. PVD OU      1 yr OCT

## 2017-05-01 NOTE — LETTER
May 1, 2017      DAPHNIE Juarez, OD  [address] [facility] [address] [place] [address]           [facility]  [address] [facility] [address] [place] [address]  Phone: [phone]  Fax: [phone]          Patient: Cedric Gupta Jr.   MR Number: [id number]   YOB: 1940   Date of Visit: 5/1/2017       Dear Dr. DAPHNIE Juarez:    Thank you for referring Cedric Gupta to me for evaluation. Attached you will find relevant portions of my assessment and plan of care.    If you have questions, please do not hesitate to call me. I look forward to following Cedric Gupta along with you.    Sincerely,    NANCIE Fry MD    Enclosure  CC:  No Recipients    If you would like to receive this communication electronically, please contact [email or web address] or [phone] to request more information on [employer] Link access.    For providers and/or their staff who would like to refer a patient to [facility], please contact us through our one-stop-shop provider referral line, [facility], at [phone].    If you feel you have received this communication in error or would no longer like to receive these types of communications, please e-mail [email or web address]

## 2017-05-03 ENCOUNTER — OFFICE VISIT (OUTPATIENT)
Dept: UROLOGY | Facility: CLINIC | Age: 77
End: 2017-05-03
Payer: MEDICARE

## 2017-05-03 VITALS
HEART RATE: 70 BPM | HEIGHT: 70 IN | WEIGHT: 184.06 LBS | SYSTOLIC BLOOD PRESSURE: 130 MMHG | DIASTOLIC BLOOD PRESSURE: 72 MMHG | BODY MASS INDEX: 26.35 KG/M2

## 2017-05-03 DIAGNOSIS — N40.1 BENIGN NODULAR PROSTATIC HYPERPLASIA WITH LOWER URINARY TRACT SYMPTOMS: ICD-10-CM

## 2017-05-03 DIAGNOSIS — R97.20 ELEVATED PSA: Primary | ICD-10-CM

## 2017-05-03 DIAGNOSIS — N42.89 ASYMMETRIC PROSTATE: ICD-10-CM

## 2017-05-03 LAB
BILIRUB SERPL-MCNC: NORMAL MG/DL
BLOOD URINE, POC: NORMAL
COLOR, POC UA: YELLOW
GLUCOSE UR QL STRIP: NORMAL
KETONES UR QL STRIP: NORMAL
LEUKOCYTE ESTERASE URINE, POC: NORMAL
NITRITE, POC UA: NORMAL
PH, POC UA: 5
PROTEIN, POC: NORMAL
SPECIFIC GRAVITY, POC UA: 1.03
UROBILINOGEN, POC UA: NORMAL

## 2017-05-03 PROCEDURE — 99999 PR PBB SHADOW E&M-EST. PATIENT-LVL III: CPT | Mod: PBBFAC,,, | Performed by: UROLOGY

## 2017-05-03 PROCEDURE — 81002 URINALYSIS NONAUTO W/O SCOPE: CPT | Mod: S$GLB,,, | Performed by: UROLOGY

## 2017-05-03 PROCEDURE — 1159F MED LIST DOCD IN RCRD: CPT | Mod: S$GLB,,, | Performed by: UROLOGY

## 2017-05-03 PROCEDURE — 1160F RVW MEDS BY RX/DR IN RCRD: CPT | Mod: S$GLB,,, | Performed by: UROLOGY

## 2017-05-03 PROCEDURE — 99215 OFFICE O/P EST HI 40 MIN: CPT | Mod: 25,S$GLB,, | Performed by: UROLOGY

## 2017-05-03 PROCEDURE — 1126F AMNT PAIN NOTED NONE PRSNT: CPT | Mod: S$GLB,,, | Performed by: UROLOGY

## 2017-05-03 NOTE — PROGRESS NOTES
UROLOGY [place]  5 3 17    c-c elevated psa    Age 77, comes in to follow up on elevated psa.   His psa was 4.6 two months ago, and it was the same value one year ago. It was 5.9 before that, and 4.8 three years ago.    Over two years ago pt had a prostate biopsy and it was benign. His prostate volume was 90 cm3, no suspicious echographic findings.    Generally voiding well, has nocturia x 1-2. No intermittency or need to strain to void. No pains or burning.      Has hx of Undescended left testicle, removed at age 15. Passed kidney stones 5 yrs ago.     PMH    Surgical:  has a past surgical history that includes Tonsillectomy; Orchiectomy; Fracture surgery; ostate biopsy (2014); and Cholecystectomy.    Medical:  has a past medical history of BPH (benign prostatic hypertrophy); Cataract; Elevated PSA; Epiretinal membrane; Fatty liver; History of colon polyps; History of duodenal ulcer; History of nephrolithiasis; Hyperlipidemia; Macular degeneration; Osteoarthritis; Prediabetes; S/P colonoscopy; and Squamous cell carcinoma.    Familial: no fh of renal disease. 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       naproxen (EC NAPROSYN) 500 MG EC tablet Take 1 tablet (500 mg total) by mouth 2 (two) times daily with meals. 60 tablet 1       REVIEW OF SYSTEMS  GENERAL: No complaints of fatigue. No headaches or dizzy spells.   HEENT: vision preserved. Sinuses: No complaints.   CARDIOPULMONARY: No swelling of the legs; no chest pain. No shortness of breath, no wheezing.   GASTROINTESTINAL: No heartburn. Denies diarrhea; denies constipation, no blood or mucus in stools.   GENITOURINARY: Denies dysuria, bleeding or incontinence.   MUSCULOSKELETAL: occasional arthritic complaints such as joint  discomfort  PSYCHIATRIC: No history of depression or anxiety.   ENDOCRINOLOGIC: No reports of sweating, cold or heat intolerance. No polyuria or polydipsia.   NEUROLOGICAL: No headache, dizziness, syncope, paralysis, ataxia, numbness or tingling in the extremities.  LYMPHATICS: No enlarged nodes. No history of splenectomy.      Pt alert, oriented, cooperative, no distress  HEENT: wnl.  Neck: supple, no JVD, no lymphadenopathy  Chest: CV NSR, no murmurs  Lungs: normal auscultation  ABDOMEN: Flat, no masses.    CV: Negative.    Penis circumcised, right testicle normal. Left side absent.    Prostate 60-80 g, slightly asymmetric, with L lobe feeling markedly more prominent  Extremities: no edema, peripheral pulses nl  Neuro: preserved        IMPRESSION:     Elevated psa, relatively stable  bph on observation.   Asymmetric prostate, with L lobe more prominent than R, and this deformity seems to me more pronounced than it was last year  We discussed these findings with pt and he agreed that we will keep an eye on them.   RTC 6 mo for repeat ZEE and PSA  left undescended testicle status post left orchiectomy. Solitary R testicle  Hx of urolithiasis      Will consider rebiopsy upon return, if findings are same as now

## 2017-06-28 ENCOUNTER — OFFICE VISIT (OUTPATIENT)
Dept: OPHTHALMOLOGY | Facility: CLINIC | Age: 77
End: 2017-06-28
Payer: MEDICARE

## 2017-06-28 DIAGNOSIS — H25.11 NUCLEAR SCLEROTIC CATARACT OF RIGHT EYE: Primary | ICD-10-CM

## 2017-06-28 DIAGNOSIS — H25.12 NUCLEAR SCLEROTIC CATARACT OF LEFT EYE: ICD-10-CM

## 2017-06-28 DIAGNOSIS — H35.3130 BILATERAL NONEXUDATIVE AGE-RELATED MACULAR DEGENERATION: ICD-10-CM

## 2017-06-28 PROCEDURE — 99999 PR PBB SHADOW E&M-EST. PATIENT-LVL II: CPT | Mod: PBBFAC,,, | Performed by: OPHTHALMOLOGY

## 2017-06-28 PROCEDURE — 92014 COMPRE OPH EXAM EST PT 1/>: CPT | Mod: S$GLB,,, | Performed by: OPHTHALMOLOGY

## 2017-06-28 PROCEDURE — 92136 OPHTHALMIC BIOMETRY: CPT | Mod: RT,S$GLB,, | Performed by: OPHTHALMOLOGY

## 2017-06-28 NOTE — PROGRESS NOTES
HPI     Blurred Vision    Additional comments: Pt states no blurred va , that he can see fine.           Comments   DLS  Lisandra for cat eval    Pt states  feels VA Ou is fairly good, a little sharper with correction.    Takes glasses off to read, does not wear bifocals.  Pt not sure why he is here.  He can see fine.    1. Drusen OU  2, Dry AMD OU  Using AREDS/AG  3. NS OU OK for CE  4. PVD OU        Last edited by Katia Wilson MD on 6/28/2017  9:55 AM. (History)            Assessment /Plan     For exam results, see Encounter Report.    Nuclear sclerotic cataract of right eye    Nuclear sclerotic cataract of left eye    Bilateral nonexudative age-related macular degeneration      Visually significant nuclear sclerotic cataract   - Interfering with activities of daily living.  Pt desires cataract surgery for Va rehabilitation.   - R/B/A discussed and pt agrees to proceed with surgery.   - IOL options discussed according to patient's goals and concomitant ocular pathology; and pt content with monofocal lens.    - Target: -1.75 - 2.0    OD>OS    pcboo 21.5 OD (check ruel)    Pt to consider sx, if/when ready - discussed keeping near VA, okay with wearing glasses for driving, etc.    Dry AMD OU  - areds, amsler    F/up dr. Fry/yuly as scheduled.

## 2017-07-26 ENCOUNTER — PATIENT OUTREACH (OUTPATIENT)
Dept: ADMINISTRATIVE | Facility: HOSPITAL | Age: 77
End: 2017-07-26

## 2017-08-17 ENCOUNTER — PATIENT OUTREACH (OUTPATIENT)
Dept: ADMINISTRATIVE | Facility: HOSPITAL | Age: 77
End: 2017-08-17

## 2017-08-17 NOTE — LETTER
August 23, 2017    Cedric Gupta Jr.  [address] [facility] [address]             [facility]  [address]  [facility] [address]  Phone: [phone] Dear Mr. Gupta:    We have tried to reach you by mychart unsuccessfully.    [facility] is committed to your overall health.  To help you get the most out of each of your visits, we will review your information to make sure you are up to date on all of your recommended tests and/or procedures.       Dr. Tracy Stein has found that you may be due for your fasting cholesterol labs and a pneumonia immunization.     Medicare does not cover all immunizations to be given in the clinic.  Check your benefits to ensure that you do not need to receive your immunizations at the pharmacy.     If you have had any of the above done at another facility, please bring the records or information with you so that your record at [facility] will be complete.  If you would like to schedule any of these, please contact me.     If you are currently taking medication, please bring it with you to your appointment for review.     Also, if you have any type of Advanced Directives, please bring them with you to your office visit so we may scan them into your chart.     If you have any questions or concerns, please don't hesitate to call.    Thank you for letting us care for you,  Vicki Melton LPN Clinical Care Coordinator  [facility] [place] [facility] [place]  [phone]

## 2017-08-31 ENCOUNTER — OFFICE VISIT (OUTPATIENT)
Dept: FAMILY MEDICINE | Facility: CLINIC | Age: 77
End: 2017-08-31
Payer: MEDICARE

## 2017-08-31 ENCOUNTER — TELEPHONE (OUTPATIENT)
Dept: FAMILY MEDICINE | Facility: CLINIC | Age: 77
End: 2017-08-31

## 2017-08-31 VITALS
HEIGHT: 70 IN | BODY MASS INDEX: 27.15 KG/M2 | WEIGHT: 189.63 LBS | DIASTOLIC BLOOD PRESSURE: 68 MMHG | RESPIRATION RATE: 17 BRPM | TEMPERATURE: 99 F | SYSTOLIC BLOOD PRESSURE: 120 MMHG | OXYGEN SATURATION: 97 % | HEART RATE: 61 BPM

## 2017-08-31 DIAGNOSIS — R73.03 PREDIABETES: ICD-10-CM

## 2017-08-31 DIAGNOSIS — R79.9 ABNORMAL FINDING OF BLOOD CHEMISTRY: ICD-10-CM

## 2017-08-31 DIAGNOSIS — Z00.00 ANNUAL PHYSICAL EXAM: ICD-10-CM

## 2017-08-31 DIAGNOSIS — M19.90 ARTHRITIS: Primary | ICD-10-CM

## 2017-08-31 DIAGNOSIS — R97.20 ELEVATED PSA: ICD-10-CM

## 2017-08-31 PROCEDURE — 99499 UNLISTED E&M SERVICE: CPT | Mod: S$GLB,,, | Performed by: FAMILY MEDICINE

## 2017-08-31 PROCEDURE — 1126F AMNT PAIN NOTED NONE PRSNT: CPT | Mod: S$GLB,,, | Performed by: FAMILY MEDICINE

## 2017-08-31 PROCEDURE — 3008F BODY MASS INDEX DOCD: CPT | Mod: S$GLB,,, | Performed by: FAMILY MEDICINE

## 2017-08-31 PROCEDURE — 1159F MED LIST DOCD IN RCRD: CPT | Mod: S$GLB,,, | Performed by: FAMILY MEDICINE

## 2017-08-31 PROCEDURE — 99214 OFFICE O/P EST MOD 30 MIN: CPT | Mod: S$GLB,,, | Performed by: FAMILY MEDICINE

## 2017-08-31 RX ORDER — DICLOFENAC SODIUM 10 MG/G
2 GEL TOPICAL 3 TIMES DAILY PRN
Qty: 100 G | Refills: 1 | Status: SHIPPED | OUTPATIENT
Start: 2017-08-31 | End: 2018-07-18

## 2017-08-31 NOTE — TELEPHONE ENCOUNTER
----- Message from Karen Ribera sent at 8/31/2017  9:20 AM CDT -----  Contact: patient  Patient calling to speak with the Nurse. He just left from his appt and is now at the pharmacy. He wants to know when the prescription will be called in. Please advise. Call to pod. Call connected to pod. Spoke with Nurse Flynn and it was sent over at 8:46 am and the Pharmacy just received it. Informed the patient.  Call back   Thanks!

## 2017-09-02 NOTE — PROGRESS NOTES
Subjective:       Patient ID: Cedric Gupta Jr. is a 77 y.o. male.    Chief Complaint: Follow-up (4 month fu)    HPI   The patient is a 77-year-old who is here today for follow-up.  Overall, he is well.  Today we discussed the followin)  osteoarthritis.  He is having significant issues with arthritis particularly in his hands.  He did see the orthopedist and was prescribed Naprosyn but he has not used this much.  He is aware of the long-term risks of NSAIDs especially given his prior history of PUD.  He wonders if there something else he could use for her arthritis pains  2)  BPH with elevated PSA.  We did review his May visit with the urologist.  He is due for follow-up with the urologist in November but this is not yet scheduled  3)  prediabetes.  He denies any polyuria, polydipsia or polyphagia.  He is due for labs and would be willing to get this scheduled.  4)  hyperlipidemia.  He is due for labs and would be willing to have this scheduled soon     Review of Systems   Constitutional: Negative for appetite change, chills, diaphoresis, fatigue, fever and unexpected weight change.   HENT: Negative for congestion, ear pain, postnasal drip, rhinorrhea, sinus pressure, sneezing, sore throat and trouble swallowing.    Eyes: Negative for pain, discharge and visual disturbance.   Respiratory: Negative for cough, chest tightness, shortness of breath and wheezing.    Cardiovascular: Negative for chest pain, palpitations and leg swelling.   Gastrointestinal: Negative for abdominal distention, abdominal pain, blood in stool, constipation, diarrhea, nausea and vomiting.   Musculoskeletal: Positive for arthralgias.   Skin: Negative for rash.       Objective:      Physical Exam   Constitutional: He is oriented to person, place, and time. He appears well-developed and well-nourished. No distress.   HENT:   Head: Normocephalic and atraumatic.   Right Ear: Hearing, tympanic membrane, external ear and ear canal normal.  "  Left Ear: Hearing, tympanic membrane, external ear and ear canal normal.   Nose: Nose normal.   Mouth/Throat: Oropharynx is clear and moist and mucous membranes are normal. No oral lesions. No oropharyngeal exudate, posterior oropharyngeal edema or posterior oropharyngeal erythema.   Eyes: Conjunctivae, EOM and lids are normal. Pupils are equal, round, and reactive to light. No scleral icterus.   Neck: Normal range of motion. Neck supple. Carotid bruit is not present. No thyroid mass and no thyromegaly present.   Cardiovascular: Normal rate, regular rhythm and normal heart sounds.   No extrasystoles are present. PMI is not displaced.  Exam reveals no gallop.    No murmur heard.  Pulmonary/Chest: Effort normal and breath sounds normal. No accessory muscle usage. No respiratory distress.   Clear to auscultation bilaterally.   Abdominal: Soft. Normal appearance and bowel sounds are normal. He exhibits no abdominal bruit. There is no hepatosplenomegaly. There is no tenderness. There is no rebound.   Musculoskeletal:   Hands with arthritic changes including Heberden's and Farzana's nodes consistent with osteoarthritis   Lymphadenopathy:        Head (right side): No submental and no submandibular adenopathy present.        Head (left side): No submental and no submandibular adenopathy present.        Right cervical: No superficial cervical, no deep cervical and no posterior cervical adenopathy present.       Left cervical: No superficial cervical, no deep cervical and no posterior cervical adenopathy present.        Right: No supraclavicular adenopathy present.        Left: No supraclavicular adenopathy present.   Neurological: He is alert and oriented to person, place, and time.   Skin: Skin is warm, dry and intact.   Psychiatric: He has a normal mood and affect.     Blood pressure 120/68, pulse 61, temperature 98.5 °F (36.9 °C), temperature source Oral, resp. rate 17, height 5' 10" (1.778 m), weight 86 kg (189 lb 9.5 " oz), SpO2 97 %.Body mass index is 27.2 kg/m².          A/P:  1)  osteoarthritis particularly in the hands.  Persistent.  We will try Voltaren gel.  We did discuss considering Ultram to be used as needed.  We also discussed meeting with the hand specialist in the future if needed   2)  BPH with elevated PSA.  Stable.  He will follow up with urology as planned in November.  We will schedule his PSA and his follow-up appointment  3)  prediabetes.  Status unknown.  We will check an A1c with upcoming labs  4)  hyperlipidemia.  Status unknown.  We will check a fasting lipid profile with upcoming labs

## 2017-10-03 ENCOUNTER — TELEPHONE (OUTPATIENT)
Dept: DERMATOLOGY | Facility: CLINIC | Age: 77
End: 2017-10-03

## 2017-10-03 NOTE — TELEPHONE ENCOUNTER
----- Message from Cody Calzada sent at 10/3/2017  8:59 AM CDT -----  Contact: Patient  Patient states that he previously came in for a procedure to burn off, cauterized, some areas on his body and he is requesting to do that again.  He found another spot on his right hip and would like to come in sooner than in December.  Can you please call him back at [phone].  Thank you

## 2017-10-23 ENCOUNTER — OFFICE VISIT (OUTPATIENT)
Dept: FAMILY MEDICINE | Facility: CLINIC | Age: 77
End: 2017-10-23
Payer: MEDICARE

## 2017-10-23 VITALS
WEIGHT: 187.81 LBS | BODY MASS INDEX: 26.89 KG/M2 | DIASTOLIC BLOOD PRESSURE: 76 MMHG | TEMPERATURE: 99 F | HEIGHT: 70 IN | HEART RATE: 59 BPM | SYSTOLIC BLOOD PRESSURE: 124 MMHG | OXYGEN SATURATION: 96 %

## 2017-10-23 DIAGNOSIS — F12.90 MARIJUANA SMOKER: ICD-10-CM

## 2017-10-23 DIAGNOSIS — R97.20 ELEVATED PSA: ICD-10-CM

## 2017-10-23 DIAGNOSIS — Z86.010 HX OF COLONIC POLYP: ICD-10-CM

## 2017-10-23 DIAGNOSIS — H35.3132 NONEXUDATIVE AGE-RELATED MACULAR DEGENERATION, BILATERAL, INTERMEDIATE DRY STAGE: ICD-10-CM

## 2017-10-23 DIAGNOSIS — M18.0 ARTHRITIS OF CARPOMETACARPAL (CMC) JOINTS OF BOTH THUMBS: ICD-10-CM

## 2017-10-23 DIAGNOSIS — Z85.89 HISTORY OF SQUAMOUS CELL CARCINOMA: ICD-10-CM

## 2017-10-23 DIAGNOSIS — H25.13 NUCLEAR SCLEROSIS OF BOTH EYES: ICD-10-CM

## 2017-10-23 DIAGNOSIS — Z00.00 ENCOUNTER FOR PREVENTIVE HEALTH EXAMINATION: Primary | ICD-10-CM

## 2017-10-23 PROCEDURE — 90732 PPSV23 VACC 2 YRS+ SUBQ/IM: CPT | Mod: S$GLB,,, | Performed by: FAMILY MEDICINE

## 2017-10-23 PROCEDURE — G0439 PPPS, SUBSEQ VISIT: HCPCS | Mod: S$GLB,,, | Performed by: NURSE PRACTITIONER

## 2017-10-23 PROCEDURE — G0009 ADMIN PNEUMOCOCCAL VACCINE: HCPCS | Mod: S$GLB,,, | Performed by: FAMILY MEDICINE

## 2017-10-23 PROCEDURE — 99999 PR PBB SHADOW E&M-EST. PATIENT-LVL IV: CPT | Mod: PBBFAC,,, | Performed by: NURSE PRACTITIONER

## 2017-10-23 PROCEDURE — 90662 IIV NO PRSV INCREASED AG IM: CPT | Mod: S$GLB,,, | Performed by: FAMILY MEDICINE

## 2017-10-23 PROCEDURE — G0008 ADMIN INFLUENZA VIRUS VAC: HCPCS | Mod: S$GLB,,, | Performed by: FAMILY MEDICINE

## 2017-10-23 NOTE — PATIENT INSTRUCTIONS
Counseling and Referral of Other Preventative  (Italic type indicates deductible and co-insurance are waived)    Patient Name: Cedric Gupta  Today's Date: 10/23/2017      SERVICE LIMITATIONS RECOMMENDATION    Vaccines    · Pneumococcal (once after 65)    · Influenza (annually)    · Hepatitis B (if medium/high risk)    · Prevnar 13      Hepatitis B medium/high risk factors:       - End-stage renal disease       - Hemophiliacs who received Factor VII or         IX concentrates       - Clients of institutions for the mentally             retarded       - Persons who live in the same house as          a HepB carrier       - Homosexual men       - Illicit injectable drug abusers     Pneumococcal: Scheduled - see appointments     Influenza: Scheduled - see appointments     Hepatitis B: N/A     Prevnar 13: Done, no repeat necessary    Prostate cancer screening (annually to age 75)     Prostate specific antigen (PSA) Shared decision making with Provider. Sometimes a co-pay may be required if the patient decides to have this test. The USPSTF no longer recommends prostate cancer screening routinely in medicine: not to follow    Colorectal cancer screening (to age 75)    · Fecal occult blood test (annual)  · Flexible sigmoidoscopy (5y)  · Screening colonoscopy (10y)  · Barium enema   Last done 2015, recommend to repeat every 5  years    Diabetes self-management training (no USPSTF recommendations)  Requires referral by treating physician for patient with diabetes or renal disease. 10 hours of initial DSMT sessions of no less than 30 minutes each in a continuous 12-month period. 2 hours of follow-up DSMT in subsequent years.  N/A    Glaucoma screening (no USPSTF recommendation)  Diabetes mellitus, family history   , age 50 or over    American, age 65 or over  Recommend follow up with eye care professional regularly    Medical nutrition therapy for diabetes or renal disease (no recommended schedule)   Requires referral by treating physician for patient with diabetes or renal disease or kidney transplant within the past 3 years.  Can be provided in same year as diabetes self-management training (DSMT), and CMS recommends medical nutrition therapy take place after DSMT. Up to 3 hours for initial year and 2 hours in subsequent years.  N/A    Cardiovascular screening blood tests (every 5 years)  · Fasting lipid panel  Order as a panel if possible  Scheduled, see appointments    Diabetes screening tests (at least every 3 years, Medicare covers annually or at 6-month intervals for prediabetic patients)  · Fasting blood sugar (FBS) or glucose tolerance test (GTT)  Patient must be diagnosed with one of the following:       - Hypertension       - Dyslipidemia       - Obesity (BMI 30kg/m2)       - Previous elevated impaired FBS or GTT       ... or any two of the following:       - Overweight (BMI 25 but <30)       - Family history of diabetes       - Age 65 or older       - History of gestational diabetes or birth of baby weighing more than 9 pounds  scheduled      Abdominal aortic aneurysm screening (once)  · Sonogram   Limited to patients who meet one of the following criteria:       - Men who are 65-75 years old and have smoked more than 100 cigarette in their lifetime       - Anyone with a family history of abdominal aortic aneurysm       - Anyone recommended for screening by the USPSTF  Done, no repeat necessary    HIV screening (annually for increased risk patients)  · HIV-1 and HIV-2 by EIA, or UTE, rapid antibody test or oral mucosa transudate  Patients must be at increased risk for HIV infection per USPSTF guidelines or pregnant. Tests covered annually for patient at increased risk or as requested by the patient. Pregnant patients may receive up to 3 tests during pregnancy.  Risks discussed, screening is not recommended    Smoking cessation counseling (up to 8 sessions per year)  Patients must be asymptomatic of  tobacco-related conditions to receive as a preventative service.  Non-smoker    Subsequent annual wellness visit  At least 12 months since last AWV  Return in one year     The following information is provided to all patients.  This information is to help you find resources for any of the problems found today that may be affecting your health:                Living healthy guide: www.[facility].[place].[facility]      Understanding Diabetes: www.diabetes.org      Eating healthy: www.cdc.gov/healthyweight      CDC home safety checklist: www.cdc.gov/steadi/patient.html      Agency on Aging: www.goea.[place].[facility]      Alcoholics anonymous (AA): www.aa.org      Physical Activity: www.humaira.nih.gov/xx7zqlu      Tobacco use: www.quitwithusla.org

## 2017-10-23 NOTE — PROGRESS NOTES
"Cedric Gupta presented for a  Medicare AWV and comprehensive Health Risk Assessment today. The following components were reviewed and updated:    · Medical history  · Family History  · Social history  · Allergies and Current Medications  · Health Risk Assessment  · Health Maintenance  · Care Team     ** See Completed Assessments for Annual Wellness Visit within the encounter summary.**       The following assessments were completed:  · Living Situation  · CAGE  · Depression Screening  · Timed Get Up and Go  · Whisper Test  · Cognitive Function Screening          · Nutrition Screening  · ADL Screening  · PAQ Screening    Vitals:    10/23/17 1018   BP: 124/76   Pulse: (!) 59   Temp: 98.5 °F (36.9 °C)   TempSrc: Oral   SpO2: 96%   Weight: 85.2 kg (187 lb 13.3 oz)   Height: 5' 10" (1.778 m)     Body mass index is 26.95 kg/m².  Physical Exam   Constitutional: He is oriented to person, place, and time. He appears well-developed and well-nourished. No distress.   HENT:   Head: Normocephalic and atraumatic.   Right Ear: Hearing, tympanic membrane, external ear and ear canal normal.   Left Ear: Hearing, tympanic membrane, external ear and ear canal normal.   Mouth/Throat: Mucous membranes are normal. No oral lesions. No posterior oropharyngeal edema or posterior oropharyngeal erythema.   Eyes: Conjunctivae and lids are normal. Pupils are equal, round, and reactive to light. No scleral icterus.   Neck: Carotid bruit is not present. No thyroid mass present.   Cardiovascular: Normal heart sounds.   No extrasystoles are present. Bradycardia present.  PMI is not displaced.  Exam reveals no gallop.    No murmur heard.  Pulmonary/Chest: Effort normal and breath sounds normal. No respiratory distress.   Abdominal: Soft. Normal appearance and bowel sounds are normal. He exhibits no abdominal bruit. There is no hepatosplenomegaly. There is no tenderness. There is no rebound.   Neurological: He is alert and oriented to person, " place, and time.   Skin: Skin is warm, dry and intact.   Psychiatric: He has a normal mood and affect.         Diagnoses and health risks identified today and associated recommendations/orders:    1. Encounter for preventive health examination  Reviewed health maintenance and provided recommendations    Writtenr x for flu and [id number]    2. Nonexudative age-related macular degeneration, bilateral, intermediate dry stage  Stable.   Follow ophthalmology recs  Followed by Steve.       3. Nuclear sclerosis of both eyes  Stable.     Followed by Steve.       4. Elevated PSA  Continue to monitor   Followed by Peter.       5. Hx of colonic polyp  Stable.   Last c-scope 2015, recommend 5 year repeat  Followed by Tracy Stein MD .       6. Arthritis of carpometacarpal (CMC) joints of both thumbs  Stable.     Followed by Tracy Stein MD .       7. History of squamous cell carcinoma  Stable.   Continue yearly skin checks  Followed by Tracy Stein MD .       8. Marijuana smoker  unchanged  Followed by Tracy Stein MD .         Provided Halphen with a 5-10 year written screening schedule and personal prevention plan. Recommendations were developed using the USPSTF age appropriate recommendations. Education, counseling, and referrals were provided as needed. After Visit Summary printed and given to patient which includes a list of additional screenings\tests needed.    Return in about 1 year (around 10/23/2018).    Noy Lees NP

## 2017-11-13 ENCOUNTER — LAB VISIT (OUTPATIENT)
Dept: LAB | Facility: HOSPITAL | Age: 77
End: 2017-11-13
Attending: FAMILY MEDICINE
Payer: MEDICARE

## 2017-11-13 DIAGNOSIS — Z00.00 ANNUAL PHYSICAL EXAM: ICD-10-CM

## 2017-11-13 DIAGNOSIS — R97.20 ELEVATED PSA: ICD-10-CM

## 2017-11-13 DIAGNOSIS — R73.03 PREDIABETES: ICD-10-CM

## 2017-11-13 DIAGNOSIS — R79.9 ABNORMAL FINDING OF BLOOD CHEMISTRY: ICD-10-CM

## 2017-11-13 LAB
ALBUMIN SERPL BCP-MCNC: 4 G/DL
ALP SERPL-CCNC: 66 U/L
ALT SERPL W/O P-5'-P-CCNC: 16 U/L
ANION GAP SERPL CALC-SCNC: 6 MMOL/L
AST SERPL-CCNC: 24 U/L
BASOPHILS # BLD AUTO: 0.05 K/UL
BASOPHILS NFR BLD: 0.9 %
BILIRUB SERPL-MCNC: 0.9 MG/DL
BUN SERPL-MCNC: 15 MG/DL
CALCIUM SERPL-MCNC: 9.9 MG/DL
CHLORIDE SERPL-SCNC: 105 MMOL/L
CHOLEST SERPL-MCNC: 234 MG/DL
CHOLEST/HDLC SERPL: 4 {RATIO}
CO2 SERPL-SCNC: 32 MMOL/L
COMPLEXED PSA SERPL-MCNC: 6.2 NG/ML
CREAT SERPL-MCNC: 1 MG/DL
DIFFERENTIAL METHOD: NORMAL
EOSINOPHIL # BLD AUTO: 0.4 K/UL
EOSINOPHIL NFR BLD: 7.1 %
ERYTHROCYTE [DISTWIDTH] IN BLOOD BY AUTOMATED COUNT: 13.2 %
EST. GFR  (AFRICAN AMERICAN): >60 ML/MIN/1.73 M^2
EST. GFR  (NON AFRICAN AMERICAN): >60 ML/MIN/1.73 M^2
ESTIMATED AVG GLUCOSE: 105 MG/DL
GLUCOSE SERPL-MCNC: 101 MG/DL
HBA1C MFR BLD HPLC: 5.3 %
HCT VFR BLD AUTO: 42.4 %
HDLC SERPL-MCNC: 58 MG/DL
HDLC SERPL: 24.8 %
HGB BLD-MCNC: 14.2 G/DL
IMM GRANULOCYTES # BLD AUTO: 0.01 K/UL
IMM GRANULOCYTES NFR BLD AUTO: 0.2 %
LDLC SERPL CALC-MCNC: 163.2 MG/DL
LYMPHOCYTES # BLD AUTO: 1.9 K/UL
LYMPHOCYTES NFR BLD: 33.4 %
MCH RBC QN AUTO: 30.9 PG
MCHC RBC AUTO-ENTMCNC: 33.5 G/DL
MCV RBC AUTO: 92 FL
MONOCYTES # BLD AUTO: 0.5 K/UL
MONOCYTES NFR BLD: 9.4 %
NEUTROPHILS # BLD AUTO: 2.8 K/UL
NEUTROPHILS NFR BLD: 49 %
NONHDLC SERPL-MCNC: 176 MG/DL
NRBC BLD-RTO: 0 /100 WBC
PLATELET # BLD AUTO: 256 K/UL
PMV BLD AUTO: 10.5 FL
POTASSIUM SERPL-SCNC: 4.8 MMOL/L
PROT SERPL-MCNC: 7.4 G/DL
RBC # BLD AUTO: 4.6 M/UL
SODIUM SERPL-SCNC: 143 MMOL/L
TRIGL SERPL-MCNC: 64 MG/DL
TSH SERPL DL<=0.005 MIU/L-ACNC: 0.44 UIU/ML
WBC # BLD AUTO: 5.63 K/UL

## 2017-11-13 PROCEDURE — 83036 HEMOGLOBIN GLYCOSYLATED A1C: CPT

## 2017-11-13 PROCEDURE — 80053 COMPREHEN METABOLIC PANEL: CPT

## 2017-11-13 PROCEDURE — 84153 ASSAY OF PSA TOTAL: CPT

## 2017-11-13 PROCEDURE — 85025 COMPLETE CBC W/AUTO DIFF WBC: CPT

## 2017-11-13 PROCEDURE — 84443 ASSAY THYROID STIM HORMONE: CPT

## 2017-11-13 PROCEDURE — 36415 COLL VENOUS BLD VENIPUNCTURE: CPT | Mod: PO

## 2017-11-13 PROCEDURE — 80061 LIPID PANEL: CPT

## 2017-11-15 ENCOUNTER — OFFICE VISIT (OUTPATIENT)
Dept: UROLOGY | Facility: CLINIC | Age: 77
End: 2017-11-15
Payer: MEDICARE

## 2017-11-15 VITALS
DIASTOLIC BLOOD PRESSURE: 72 MMHG | SYSTOLIC BLOOD PRESSURE: 125 MMHG | BODY MASS INDEX: 26.14 KG/M2 | WEIGHT: 182.56 LBS | HEART RATE: 55 BPM | HEIGHT: 70 IN

## 2017-11-15 DIAGNOSIS — R97.20 ELEVATED PSA: Primary | ICD-10-CM

## 2017-11-15 DIAGNOSIS — N13.8 BENIGN PROSTATIC HYPERPLASIA WITH URINARY OBSTRUCTION: ICD-10-CM

## 2017-11-15 DIAGNOSIS — N40.1 BENIGN PROSTATIC HYPERPLASIA WITH URINARY OBSTRUCTION: ICD-10-CM

## 2017-11-15 LAB
BILIRUB SERPL-MCNC: NORMAL MG/DL
BLOOD URINE, POC: NORMAL
COLOR, POC UA: YELLOW
GLUCOSE UR QL STRIP: NORMAL
KETONES UR QL STRIP: NORMAL
LEUKOCYTE ESTERASE URINE, POC: NORMAL
NITRITE, POC UA: NORMAL
PH, POC UA: 6
PROTEIN, POC: NORMAL
SPECIFIC GRAVITY, POC UA: 1.01
UROBILINOGEN, POC UA: NORMAL

## 2017-11-15 PROCEDURE — 99214 OFFICE O/P EST MOD 30 MIN: CPT | Mod: 25,S$GLB,, | Performed by: UROLOGY

## 2017-11-15 PROCEDURE — 99999 PR PBB SHADOW E&M-EST. PATIENT-LVL III: CPT | Mod: PBBFAC,,, | Performed by: UROLOGY

## 2017-11-15 PROCEDURE — 81002 URINALYSIS NONAUTO W/O SCOPE: CPT | Mod: S$GLB,,, | Performed by: UROLOGY

## 2017-11-15 NOTE — PROGRESS NOTES
UROLOGY [place]  11 16 17     c-c elevated psa     Age 77, comes in to follow up on elevated psa. Reading was 6.2 this week, 5.6 six months ago. 4.8 three years ago.     In apr2014 pt had a prostate biopsy and it was benign. His prostate volume was 90 cm3, no suspicious echographic findings.     Generally voiding well, has nocturia x 1-2. No intermittency or need to strain to void. No pains or burning.      Has hx of Undescended left testicle, removed at age 15. Passed kidney stones 5 yrs ago.      PMH     Surgical:  has a past surgical history that includes Tonsillectomy; Orchiectomy; Fracture surgery; ostate biopsy (2014); and Cholecystectomy.     Medical:  has a past medical history of BPH (benign prostatic hypertrophy); Cataract; Elevated PSA; Epiretinal membrane; Fatty liver; History of colon polyps; History of duodenal ulcer; History of nephrolithiasis; Hyperlipidemia; Macular degeneration; Osteoarthritis; Prediabetes; S/P colonoscopy; and Squamous cell carcinoma.     Familial: no fh of renal disease. 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         naproxen (EC NAPROSYN) 500 MG EC tablet Take 1 tablet (500 mg total) by mouth 2  60 tablet 1         REVIEW OF SYSTEMS  GENERAL: No complaints of fatigue. No headaches or dizzy spells.   HEENT: vision preserved. Sinuses: No complaints.   CARDIOPULMONARY: No swelling of the legs; no chest pain. No shortness of breath, no wheezing.   GASTROINTESTINAL: No heartburn. Denies diarrhea; denies constipation, no blood or mucus in stools.   GENITOURINARY: Denies dysuria, bleeding or incontinence.   MUSCULOSKELETAL: occasional arthritic complaints such as joint discomfort  PSYCHIATRIC: No history of depression or anxiety.   ENDOCRINOLOGIC: No reports  of sweating, cold or heat intolerance. No polyuria or polydipsia.   NEUROLOGICAL: No headache, dizziness, syncope, paralysis, ataxia, numbness or tingling in the extremities.  LYMPHATICS: No enlarged nodes. No history of splenectomy.        Pt alert, oriented  HEENT: wnl.  Neck: supple, no JVD, no lymphadenopathy  Chest: CV NSR  Lungs: normal auscultation  ABDOMEN: Flat, no masses.    CV: Negative.    Penis circumcised, right testicle normal. Left side absent.    Prostate 60-80 g, slightly asymmetric, with L lobe feeling markedly more prominent  Extremities: no edema, peripheral pulses nl  Neuro: preserved        IMPRESSION:      Elevated psa  bph on observation.   Asymmetric prostate, with L lobe more prominent than R  Recommend trusp with biopsy  left undescended testicle status post left orchiectomy. Solitary R testicle  Hx of urolithiasis

## 2017-11-16 ENCOUNTER — PATIENT MESSAGE (OUTPATIENT)
Dept: FAMILY MEDICINE | Facility: CLINIC | Age: 77
End: 2017-11-16

## 2017-11-17 RX ORDER — CIPROFLOXACIN 500 MG/1
500 TABLET ORAL 2 TIMES DAILY
Qty: 6 TABLET | Refills: 0 | Status: SHIPPED | OUTPATIENT
Start: 2018-01-14 | End: 2017-11-28 | Stop reason: ALTCHOICE

## 2017-11-28 ENCOUNTER — OFFICE VISIT (OUTPATIENT)
Dept: FAMILY MEDICINE | Facility: CLINIC | Age: 77
End: 2017-11-28
Payer: MEDICARE

## 2017-11-28 VITALS
RESPIRATION RATE: 18 BRPM | HEART RATE: 64 BPM | DIASTOLIC BLOOD PRESSURE: 56 MMHG | HEIGHT: 70 IN | BODY MASS INDEX: 26.45 KG/M2 | SYSTOLIC BLOOD PRESSURE: 122 MMHG | TEMPERATURE: 98 F | WEIGHT: 184.75 LBS

## 2017-11-28 DIAGNOSIS — R97.20 ELEVATED PSA: ICD-10-CM

## 2017-11-28 DIAGNOSIS — E78.5 HYPERLIPIDEMIA, UNSPECIFIED HYPERLIPIDEMIA TYPE: ICD-10-CM

## 2017-11-28 DIAGNOSIS — Z00.00 ANNUAL PHYSICAL EXAM: Primary | ICD-10-CM

## 2017-11-28 PROCEDURE — 99499 UNLISTED E&M SERVICE: CPT | Mod: S$GLB,,, | Performed by: FAMILY MEDICINE

## 2017-11-28 PROCEDURE — 99397 PER PM REEVAL EST PAT 65+ YR: CPT | Mod: S$GLB,,, | Performed by: FAMILY MEDICINE

## 2017-11-28 RX ORDER — ROSUVASTATIN CALCIUM 10 MG/1
10 TABLET, COATED ORAL DAILY
Qty: 90 TABLET | Refills: 0 | Status: SHIPPED | OUTPATIENT
Start: 2017-11-28 | End: 2018-07-18 | Stop reason: SDUPTHER

## 2017-12-01 NOTE — PROGRESS NOTES
Subjective:       Patient ID: Cedric Gupta Jr. is a 77 y.o. male.    Chief Complaint: Annual Exam (Physical with labs prior)    HPI     The patient is a 77-year-old who is here today for his annual exam.  Overall, he is doing well.  He has no acute questions or concerns regarding his health.  He recently had labs which we discussed today    We did discuss his recent cholesterol panel and his 10 year cardiovascular risk score of 24%.  He would be willing to consider a cholesterol medicine    We did discuss his elevated PSA value.  He is scheduled for a biopsy in early January    Review of Systems   Constitutional: Negative for appetite change, chills, diaphoresis, fatigue, fever and unexpected weight change.   HENT: Negative for congestion, dental problem, ear pain, postnasal drip, rhinorrhea, sinus pressure, sneezing, sore throat and trouble swallowing.    Eyes: Negative for photophobia, pain, discharge and visual disturbance.   Respiratory: Negative for cough, chest tightness, shortness of breath and wheezing.    Cardiovascular: Negative for chest pain, palpitations and leg swelling.   Gastrointestinal: Negative for abdominal distention, abdominal pain, blood in stool, constipation, diarrhea, nausea and vomiting.   Endocrine: Negative for polydipsia and polyuria.   Genitourinary: Negative for discharge, dysuria, flank pain, frequency, genital sores, hematuria and testicular pain.   Musculoskeletal: Negative for arthralgias, joint swelling and myalgias.   Skin: Negative for rash.   Neurological: Negative for dizziness, syncope, weakness, light-headedness and headaches.   Hematological: Negative for adenopathy. Does not bruise/bleed easily.   Psychiatric/Behavioral: Negative for dysphoric mood, self-injury, sleep disturbance and suicidal ideas. The patient is not nervous/anxious.        Objective:      Physical Exam   Constitutional: He is oriented to person, place, and time. He appears well-developed and  well-nourished. No distress.   HENT:   Head: Normocephalic and atraumatic.   Right Ear: Hearing, tympanic membrane, external ear and ear canal normal.   Left Ear: Hearing, tympanic membrane, external ear and ear canal normal.   Nose: Nose normal.   Mouth/Throat: Oropharynx is clear and moist and mucous membranes are normal. No oral lesions. No oropharyngeal exudate, posterior oropharyngeal edema or posterior oropharyngeal erythema.   Eyes: Conjunctivae, EOM and lids are normal. Pupils are equal, round, and reactive to light. No scleral icterus.   Neck: Normal range of motion. Neck supple. Carotid bruit is not present. No thyroid mass and no thyromegaly present.   Cardiovascular: Normal rate, regular rhythm and normal heart sounds.   No extrasystoles are present. PMI is not displaced.  Exam reveals no gallop.    No murmur heard.  Pulmonary/Chest: Effort normal and breath sounds normal. No accessory muscle usage. No respiratory distress.   Clear to auscultation bilaterally.   Abdominal: Soft. Normal appearance and bowel sounds are normal. He exhibits no abdominal bruit. There is no hepatosplenomegaly. There is no tenderness. There is no rebound.   Lymphadenopathy:        Head (right side): No submental and no submandibular adenopathy present.        Head (left side): No submental and no submandibular adenopathy present.        Right cervical: No superficial cervical, no deep cervical and no posterior cervical adenopathy present.       Left cervical: No superficial cervical, no deep cervical and no posterior cervical adenopathy present.        Right: No supraclavicular adenopathy present.        Left: No supraclavicular adenopathy present.   Neurological: He is alert and oriented to person, place, and time. He has normal strength. No cranial nerve deficit or sensory deficit.   Skin: Skin is warm, dry and intact.   Psychiatric: He has a normal mood and affect. His speech is normal and behavior is normal. Thought content  "normal. Cognition and memory are normal.     Blood pressure (!) 122/56, pulse 64, temperature 98.2 °F (36.8 °C), temperature source Oral, resp. rate 18, height 5' 10" (1.778 m), weight 83.8 kg (184 lb 11.9 oz).Body mass index is 26.51 kg/m².        The 10-year ASCVD risk score (Vasybil ROSADO Jr., et al., 2013) is: 24.4%    Values used to calculate the score:      Age: 77 years      Sex: Male      Is Non- : No      Diabetic: No      Tobacco smoker: No      Systolic Blood Pressure: 122 mmHg      Is BP treated: No      HDL Cholesterol: 58 mg/dL      Total Cholesterol: 234 mg/dL      A/P:  1)  annual exam.  Health maintenance issues and anticipatory guidance issues were discussed.  He will continue to consume a healthy diet and be physically active on a regular basis  2)  hyperlipidemia.  Uncontrolled.  We are going start Crestor.  We did discuss the pros and cons of this medication.  We will recheck fasting labs in 3 months.  3)  elevated PSA.  Workup in progress.  Proceed with biopsy is recommended  "

## 2018-01-12 ENCOUNTER — ANESTHESIA EVENT (OUTPATIENT)
Dept: SURGERY | Facility: HOSPITAL | Age: 78
End: 2018-01-12
Payer: MEDICARE

## 2018-01-12 DIAGNOSIS — R97.20 ELEVATED PSA: Primary | ICD-10-CM

## 2018-01-12 RX ORDER — CIPROFLOXACIN 500 MG/1
500 TABLET ORAL 2 TIMES DAILY
Qty: 6 TABLET | Refills: 0 | Status: SHIPPED | OUTPATIENT
Start: 2018-01-14 | End: 2018-01-17

## 2018-01-12 NOTE — TELEPHONE ENCOUNTER
----- Message from Kasey Becerra sent at 1/12/2018  9:23 AM CST -----  Contact: [phone]  Patient is requesting a call back from the nurse stated the antibiotic isn't called in.    Please call the patient upon request at phone number [phone].    [facility] using   [facility] [address] KASEY RAMIREZ - [address] [facility] [address]   [address] [facility] [place] [address]  Phone: [phone] Fax: [phone]

## 2018-01-15 ENCOUNTER — ANESTHESIA (OUTPATIENT)
Dept: SURGERY | Facility: HOSPITAL | Age: 78
End: 2018-01-15
Payer: MEDICARE

## 2018-01-15 ENCOUNTER — SURGERY (OUTPATIENT)
Age: 78
End: 2018-01-15

## 2018-01-15 ENCOUNTER — HOSPITAL ENCOUNTER (OUTPATIENT)
Facility: HOSPITAL | Age: 78
Discharge: HOME | End: 2018-01-15
Attending: UROLOGY | Admitting: UROLOGY
Payer: MEDICARE

## 2018-01-15 DIAGNOSIS — R97.20 ELEVATED PSA, LESS THAN 10 NG/ML: Primary | ICD-10-CM

## 2018-01-15 PROCEDURE — 25000003 PHARM REV CODE 250: Mod: PO | Performed by: UROLOGY

## 2018-01-15 PROCEDURE — 88305 TISSUE EXAM BY PATHOLOGIST: CPT | Mod: 59 | Performed by: PATHOLOGY

## 2018-01-15 PROCEDURE — 76872 US TRANSRECTAL: CPT | Mod: 26,,, | Performed by: UROLOGY

## 2018-01-15 PROCEDURE — 55700 PR BIOPSY OF PROSTATE,NEEDLE/PUNCH: CPT | Mod: ,,, | Performed by: UROLOGY

## 2018-01-15 PROCEDURE — 36000705 HC OR TIME LEV I EA ADD 15 MIN: Mod: PO | Performed by: UROLOGY

## 2018-01-15 PROCEDURE — 37000009 HC ANESTHESIA EA ADD 15 MINS: Mod: PO | Performed by: UROLOGY

## 2018-01-15 PROCEDURE — D9220A PRA ANESTHESIA: Mod: ANES,,, | Performed by: ANESTHESIOLOGY

## 2018-01-15 PROCEDURE — 76942 ECHO GUIDE FOR BIOPSY: CPT | Mod: 26,59,, | Performed by: UROLOGY

## 2018-01-15 PROCEDURE — 71000039 HC RECOVERY, EACH ADD'L HOUR: Mod: PO | Performed by: UROLOGY

## 2018-01-15 PROCEDURE — 88305 TISSUE EXAM BY PATHOLOGIST: CPT | Mod: 26,,, | Performed by: PATHOLOGY

## 2018-01-15 PROCEDURE — 63600175 PHARM REV CODE 636 W HCPCS: Mod: PO | Performed by: NURSE ANESTHETIST, CERTIFIED REGISTERED

## 2018-01-15 PROCEDURE — 37000008 HC ANESTHESIA 1ST 15 MINUTES: Mod: PO | Performed by: UROLOGY

## 2018-01-15 PROCEDURE — 36000704 HC OR TIME LEV I 1ST 15 MIN: Mod: PO | Performed by: UROLOGY

## 2018-01-15 PROCEDURE — 71000033 HC RECOVERY, INTIAL HOUR: Mod: PO | Performed by: UROLOGY

## 2018-01-15 PROCEDURE — D9220A PRA ANESTHESIA: Mod: CRNA,,, | Performed by: NURSE ANESTHETIST, CERTIFIED REGISTERED

## 2018-01-15 RX ORDER — DIPHENHYDRAMINE HYDROCHLORIDE 50 MG/ML
25 INJECTION INTRAMUSCULAR; INTRAVENOUS EVERY 6 HOURS PRN
Status: DISCONTINUED | OUTPATIENT
Start: 2018-01-15 | End: 2018-01-15 | Stop reason: HOSPADM

## 2018-01-15 RX ORDER — FENTANYL CITRATE 50 UG/ML
25 INJECTION, SOLUTION INTRAMUSCULAR; INTRAVENOUS EVERY 5 MIN PRN
Status: DISCONTINUED | OUTPATIENT
Start: 2018-01-15 | End: 2018-01-15 | Stop reason: HOSPADM

## 2018-01-15 RX ORDER — OXYCODONE HYDROCHLORIDE 5 MG/1
5 TABLET ORAL ONCE AS NEEDED
Status: DISCONTINUED | OUTPATIENT
Start: 2018-01-16 | End: 2018-01-15 | Stop reason: HOSPADM

## 2018-01-15 RX ORDER — LIDOCAINE HYDROCHLORIDE 10 MG/ML
INJECTION INFILTRATION; PERINEURAL
Status: DISCONTINUED | OUTPATIENT
Start: 2018-01-15 | End: 2018-01-15 | Stop reason: HOSPADM

## 2018-01-15 RX ORDER — PROPOFOL 10 MG/ML
VIAL (ML) INTRAVENOUS
Status: DISCONTINUED | OUTPATIENT
Start: 2018-01-15 | End: 2018-01-15

## 2018-01-15 RX ORDER — HYDROCODONE BITARTRATE AND ACETAMINOPHEN 5; 325 MG/1; MG/1
1 TABLET ORAL EVERY 4 HOURS PRN
Status: DISCONTINUED | OUTPATIENT
Start: 2018-01-15 | End: 2018-01-15 | Stop reason: HOSPADM

## 2018-01-15 RX ORDER — SODIUM CHLORIDE, SODIUM LACTATE, POTASSIUM CHLORIDE, CALCIUM CHLORIDE 600; 310; 30; 20 MG/100ML; MG/100ML; MG/100ML; MG/100ML
INJECTION, SOLUTION INTRAVENOUS CONTINUOUS
Status: DISCONTINUED | OUTPATIENT
Start: 2018-01-15 | End: 2018-01-15 | Stop reason: HOSPADM

## 2018-01-15 RX ORDER — MEPERIDINE HYDROCHLORIDE 50 MG/ML
12.5 INJECTION INTRAMUSCULAR; INTRAVENOUS; SUBCUTANEOUS ONCE AS NEEDED
Status: DISCONTINUED | OUTPATIENT
Start: 2018-01-15 | End: 2018-01-15 | Stop reason: HOSPADM

## 2018-01-15 RX ORDER — LIDOCAINE HCL/PF 100 MG/5ML
SYRINGE (ML) INTRAVENOUS
Status: DISCONTINUED | OUTPATIENT
Start: 2018-01-15 | End: 2018-01-15

## 2018-01-15 RX ORDER — MIDAZOLAM HYDROCHLORIDE 1 MG/ML
INJECTION, SOLUTION INTRAMUSCULAR; INTRAVENOUS
Status: DISCONTINUED | OUTPATIENT
Start: 2018-01-15 | End: 2018-01-15

## 2018-01-15 RX ORDER — LIDOCAINE HYDROCHLORIDE 10 MG/ML
1 INJECTION, SOLUTION EPIDURAL; INFILTRATION; INTRACAUDAL; PERINEURAL ONCE
Status: DISCONTINUED | OUTPATIENT
Start: 2018-01-15 | End: 2018-01-15 | Stop reason: HOSPADM

## 2018-01-15 RX ORDER — CIPROFLOXACIN 2 MG/ML
INJECTION, SOLUTION INTRAVENOUS
Status: DISCONTINUED | OUTPATIENT
Start: 2018-01-15 | End: 2018-01-15

## 2018-01-15 RX ADMIN — PROPOFOL 20 MG: 10 INJECTION, EMULSION INTRAVENOUS at 09:01

## 2018-01-15 RX ADMIN — MIDAZOLAM HYDROCHLORIDE 2 MG: 1 INJECTION, SOLUTION INTRAMUSCULAR; INTRAVENOUS at 09:01

## 2018-01-15 RX ADMIN — SODIUM CHLORIDE, SODIUM LACTATE, POTASSIUM CHLORIDE, CALCIUM CHLORIDE: 600; 310; 30; 20 INJECTION, SOLUTION INTRAVENOUS at 08:01

## 2018-01-15 RX ADMIN — PROPOFOL 100 MG: 10 INJECTION, EMULSION INTRAVENOUS at 09:01

## 2018-01-15 RX ADMIN — LIDOCAINE HYDROCHLORIDE 100 MG: 20 INJECTION PARENTERAL at 09:01

## 2018-01-15 RX ADMIN — CIPROFLOXACIN 400 MG: 2 INJECTION, SOLUTION INTRAVENOUS at 09:01

## 2018-01-15 RX ADMIN — PROPOFOL 25 MG: 10 INJECTION, EMULSION INTRAVENOUS at 09:01

## 2018-01-15 RX ADMIN — LIDOCAINE HYDROCHLORIDE 10 ML: 10 INJECTION, SOLUTION INFILTRATION; PERINEURAL at 10:01

## 2018-01-15 NOTE — H&P
ASC for procedure  1 15 18    c-c elevated psa     Age 77, comes in with elevated psa of 6.2 two months ago, it was 5.6 eight months ago. 4.8 three years ago.     In apr2014 pt had a prostate biopsy and it was benign. His prostate volume was 90 cm3, no suspicious echographic findings.     Generally voiding well, has nocturia x 1-2. No intermittency or need to strain to void. No pains or burning.      Has hx of Undescended left testicle, removed at age 15. Passed kidney stones 5 yrs ago.      PMH     Surgical:  has a past surgical history that includes Tonsillectomy; Orchiectomy; Fracture surgery; ostate biopsy (2014); and Cholecystectomy.     Medical:  has a past medical history of BPH (benign prostatic hypertrophy); Cataract; Elevated PSA; Epiretinal membrane; Fatty liver; History of colon polyps; History of duodenal ulcer; History of nephrolithiasis; Hyperlipidemia; Macular degeneration; Osteoarthritis; Prediabetes; S/P colonoscopy; and Squamous cell carcinoma.     Familial: no fh of renal disease. 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         naproxen (EC NAPROSYN) 500 MG EC tablet Take 1 tablet (500 mg total) by mouth 2  60 tablet 1         REVIEW OF SYSTEMS  GENERAL: No complaints of fatigue. No headaches or dizzy spells.   HEENT: vision preserved. Sinuses: No complaints.   CARDIOPULMONARY: No swelling of the legs; no chest pain. No shortness of breath, no wheezing.   GASTROINTESTINAL: No heartburn. Denies diarrhea; denies constipation, no blood or mucus in stools.   GENITOURINARY: Denies dysuria, bleeding or incontinence.   MUSCULOSKELETAL: occasional arthritic complaints such as joint discomfort  PSYCHIATRIC: No history of depression or anxiety.   ENDOCRINOLOGIC: No reports of  sweating, cold or heat intolerance. No polyuria or polydipsia.   NEUROLOGICAL: No headache, dizziness, syncope, paralysis, ataxia, numbness or tingling in the extremities.  LYMPHATICS: No enlarged nodes. No history of splenectomy.        Pt alert, oriented  HEENT: wnl.  Neck: supple, no JVD, no lymphadenopathy  Chest: CV NSR  Lungs: normal auscultation  ABDOMEN: Flat, no masses.    CV: Negative.    Penis circumcised, right testicle normal. Left side absent.    Prostate 60-80 g, slightly asymmetric, with L lobe feeling markedly more prominent  Extremities: no edema, peripheral pulses nl  Neuro: preserved        IMPRESSION:      Elevated psa and asymmetric prostate, with L lobe more prominent than R  Recommended trusp with biopsy on last clinic visit and pt wishes to proceed  Left undescended testicle status post left orchiectomy. Solitary R testicle  Hx of urolithiasis   BPH on observation

## 2018-01-15 NOTE — DISCHARGE INSTRUCTIONS
Recovery After Procedural Sedation (Adult)  You have been given medicine by vein to make you sleep during your surgery. This may have included both a pain medicine and sleeping medicine. Most of the effects have worn off. But you may still have some drowsiness for the next 6 to 8 hours.  Home care  Follow these guidelines when you get home:  · For the next 8 hours, you should be watched by a responsible adult. This person should make sure your condition is not getting worse.  · Don't drink any alcohol for the next 24 hours.  · Don't drive, operate dangerous machinery, or make important business or personal decisions during the next 24 hours.  Note: Your healthcare provider may tell you not to take any medicine by mouth for pain or sleep in the next 4 hours. These medicines may react with the medicines you were given in the hospital. This could cause a much stronger response than usual.  Follow-up care  Follow up with your healthcare provider if you are not alert and back to your usual level of activity within 12 hours.  When to seek medical advice  Call your healthcare provider right away if any of these occur:  · Drowsiness gets worse  · Weakness or dizziness gets worse  · Repeated vomiting  · You can't be awakened   Date Last Reviewed: 10/18/2016  © 4551-9275 [employer]. [address] [place] [address], [place] [address]. All rights reserved. This information is not intended as a substitute for professional medical care. Always follow your healthcare professional's instructions.      PROSTATE BIOPSY      DOS:   Minimal activity for 24 hours.   May shower or bathe today.   Advance diet as tolerated.   Drink a lot of liquids until you see your doctor.   Expect blood in urine/stool for up to 3 weeks.   Expect blood in semen for up to 8 weeks.   Resume home medications as prescribed   Biopsy results may not be available for 10-14 days    DONT:   No driving for 24 hours or while taking narcotic pain  medication   No aspirin, NSAIDS or blood thinners for 7 days   No sexual intercourse, heavy lifting, or strenuous activity for 7 days.   DO NOT TAKE ADDITIONAL TYLENOL/ACETAMINOPHEN WHILE TAKING NARCOTIC PAIN MEDICATION THAT CONTAINS TYLENOL/ACETAMINOPHEN.    CALL PHYSICIAN FOR:   Unable to urinate within 6 hours after surgery.   Excessive bleeding.    Fever>101   Persistent pain not relieved by pain medication.    Contact your physician for emergencies at [phone]

## 2018-01-15 NOTE — ANESTHESIA PREPROCEDURE EVALUATION
01/15/2018  Cedric Gupta Jr. is a 77 y.o., male.    Pre-op Assessment    I have reviewed the Patient Summary Reports.     I have reviewed the Nursing Notes.   I have reviewed the Medications.     Review of Systems  Anesthesia Hx:  No problems with previous Anesthesia    Social:  Former Smoker    Hematology/Oncology:  Hematology Normal        EENT/Dental:EENT/Dental Normal   Cardiovascular:   Denies Hypertension.  Denies MI.  Denies CAD.    Denies CABG/stent.  hyperlipidemia    Pulmonary:  Pulmonary Normal    Hepatic/GI:   PUD, Liver Disease,    Musculoskeletal:   Arthritis     Neurological:  Neurology Normal    Endocrine:   Diabetes, type 2        Physical Exam  General:  Well nourished    Airway/Jaw/Neck:  Airway Findings: Mouth Opening: Normal Tongue: Normal  General Airway Assessment: Adult, Good  Mallampati: II  Improves to II with phonation.  TM Distance: 4-6 cm      Dental:  Dental Findings: In tact   Chest/Lungs:  Chest/Lungs Findings: Clear to auscultation, Normal Respiratory Rate     Heart/Vascular:  Heart Findings: Rate: Normal  Rhythm: Regular Rhythm  Sounds: Normal  Heart murmur: negative       Mental Status:  Mental Status Findings:  Cooperative, Alert and Oriented         Anesthesia Plan  Type of Anesthesia, risks & benefits discussed:  Anesthesia Type:  general  Patient's Preference:   Intra-op Monitoring Plan: standard ASA monitors  Intra-op Monitoring Plan Comments:   Post Op Pain Control Plan:   Post Op Pain Control Plan Comments:   Induction:   IV  Beta Blocker:  Patient is not currently on a Beta-Blocker (No further documentation required).       Informed Consent: Patient understands risks and agrees with Anesthesia plan.  Questions answered. Anesthesia consent signed with patient.  ASA Score: 3     Day of Surgery Review of History & Physical: I have interviewed and examined the  patient. I have reviewed the patient's H&P dated:  There are no significant changes.  H&P update referred to the surgeon.         Ready For Surgery From Anesthesia Perspective.

## 2018-01-15 NOTE — OP NOTE
Site: [facility], [place]  Date 1 15 2018  Surgeon: Peter  Anesthesia: iv sedation and local infiltration xylocaine  Procedure: TRANSRECTAL ULTRASOUND OF PROSTATE WITH NEEDLE BIOPSIES. ULTRASOUND GUIDANCE FOR NEEDLE BIOPSIES  Complications: none  EBL: None    Description of the procedure:  Patient took a prophylactic antibiotic starting last night. He also gave himself a Fleet enema 2 hours prior to the procedure. Pt was taken to the operating room. After satisfactory sedation anesthesia, pt received iv cipro 400 mg iv. He was placed in the L lateral decubitus position. We instilled a 10-ml enema of pure Betadine solution in the rectal ampulla and waited a few minutes.   The rectal probe of the [employer] S-nerve ultrasound machine with 5-to-8 Hz frequency was inserted and multiple transverse and longitudinal scans were done.     Prostate measurements:  Gland volume: 80.6    cm3  Gland width (transverse): 7.01 cm  Gland height (anteroposterior): 3.98 cm  Gland length (cephalocaudal): 5.52 cm      The general shape of the prostate was symmetric. Several small calcific densities were seen, as well as various small cystic lesions. No definite hypoechoic areas were seen.   We performed a prostate block (pudendal block) with a total of 10 ml of xylocaine applied at the prostate base and prostate apex on both sides. Multiple biopsies were obtained using the DocASAP Magnum Biopsy Needle in the 22 mm length setting. We used the sextant topographic technique to distribute and send the biopsy samples. The patient tolerated the procedure well. He was transferred to Recovery Room.    Patient was warned about possible complications, such as persistent hematuria, hematochezia, hematospermia, infection and sepsis. He is to continue with the antibiotic given until finished. Drink abundant fluids. Call in one week for pathology results

## 2018-01-15 NOTE — DISCHARGE SUMMARY
DISCHARGE SUMMARY:    Reason for hospitalization: elevated psa  Hospital course: pt was stable and comfortable  Final dx: elevated psa  Procedure performed: transrectal ultrasound of prostate, ultrasound guidance for needle biopsies, multiple transrectal needle biopsies  Condition on discharge: satisfactory  Discharge disposition: home  Follow up: Return to urology clinic in 1 week or call us for results  Medication changes: no change with home meds  Pt instructions: drink abundant fluids  Continue with prescribed antibiotic prophylaxis until finished  Diet: regular  Activity: no restrictions

## 2018-01-15 NOTE — ANESTHESIA POSTPROCEDURE EVALUATION
"Anesthesia Post Evaluation    Patient: Cedric Gupta Jr.    Procedure(s) Performed: Procedure(s) (LRB):  TRANSRECTAL ULTRASOUND GUIDED PROSTATE BIOPSY (N/A)    Final Anesthesia Type: general  Patient location during evaluation: PACU  Patient participation: Yes- Able to Participate  Level of consciousness: awake and alert and oriented  Post-procedure vital signs: reviewed and stable  Pain management: adequate  Airway patency: patent  PONV status at discharge: No PONV  Anesthetic complications: no      Cardiovascular status: blood pressure returned to baseline  Respiratory status: unassisted, spontaneous ventilation and room air  Hydration status: euvolemic  Follow-up not needed.        Visit Vitals  BP (!) 160/68 (BP Location: Right arm, Patient Position: Sitting)   Pulse (!) 52   Temp 36.5 °C (97.7 °F) (Skin)   Resp 15   Ht 5' 10.5" (1.791 m)   Wt 83.9 kg (185 lb)   SpO2 99%   BMI 26.17 kg/m²       Pain/Derek Score: Pain Assessment Performed: Yes (1/15/2018 10:07 AM)  Presence of Pain: non-verbal indicators absent (1/15/2018 10:07 AM)  Derek Score: 10 (1/15/2018 10:32 AM)      "

## 2018-01-15 NOTE — TRANSFER OF CARE
"Anesthesia Transfer of Care Note    Patient: Cedric Gupta Jr.    Procedure(s) Performed: Procedure(s) (LRB):  TRANSRECTAL ULTRASOUND GUIDED PROSTATE BIOPSY (N/A)    Patient location: PACU    Anesthesia Type: MAC    Transport from OR: Transported from OR on room air with adequate spontaneous ventilation    Post pain: adequate analgesia    Post assessment: no apparent anesthetic complications    Post vital signs: stable    Level of consciousness: responds to stimulation    Nausea/Vomiting: no nausea/vomiting    Complications: none    Transfer of care protocol was followed      Last vitals:   Visit Vitals  /65 (BP Location: Right arm, Patient Position: Lying)   Pulse (!) 52   Temp 36.5 °C (97.7 °F) (Skin)   Resp 16   Ht 5' 10.5" (1.791 m)   Wt 83.9 kg (185 lb)   SpO2 97%   BMI 26.17 kg/m²     "

## 2018-01-16 ENCOUNTER — TELEPHONE (OUTPATIENT)
Dept: UROLOGY | Facility: CLINIC | Age: 78
End: 2018-01-16

## 2018-01-16 VITALS
RESPIRATION RATE: 14 BRPM | DIASTOLIC BLOOD PRESSURE: 82 MMHG | HEART RATE: 50 BPM | WEIGHT: 185 LBS | HEIGHT: 71 IN | BODY MASS INDEX: 25.9 KG/M2 | TEMPERATURE: 98 F | SYSTOLIC BLOOD PRESSURE: 144 MMHG | OXYGEN SATURATION: 100 %

## 2018-01-16 NOTE — TELEPHONE ENCOUNTER
----- Message from Katherin Gallo sent at 1/16/2018 11:00 AM CST -----  Contact: patient  Patient called requesting post op appointment within a week,check no availability call back at [phone]. Thanks,

## 2018-02-05 ENCOUNTER — OFFICE VISIT (OUTPATIENT)
Dept: DERMATOLOGY | Facility: CLINIC | Age: 78
End: 2018-02-05
Payer: MEDICARE

## 2018-02-05 VITALS — WEIGHT: 185 LBS | HEIGHT: 71 IN | BODY MASS INDEX: 25.9 KG/M2

## 2018-02-05 DIAGNOSIS — L82.1 SEBORRHEIC KERATOSES: ICD-10-CM

## 2018-02-05 DIAGNOSIS — Z85.828 HISTORY OF SKIN CANCER: ICD-10-CM

## 2018-02-05 DIAGNOSIS — D48.9 NEOPLASM OF UNCERTAIN BEHAVIOR: Primary | ICD-10-CM

## 2018-02-05 DIAGNOSIS — L81.4 SOLAR LENTIGO: ICD-10-CM

## 2018-02-05 DIAGNOSIS — L72.0 EIC (EPIDERMAL INCLUSION CYST): ICD-10-CM

## 2018-02-05 DIAGNOSIS — D22.9 MULTIPLE BENIGN NEVI: ICD-10-CM

## 2018-02-05 PROCEDURE — 1126F AMNT PAIN NOTED NONE PRSNT: CPT | Mod: S$GLB,,, | Performed by: DERMATOLOGY

## 2018-02-05 PROCEDURE — 3008F BODY MASS INDEX DOCD: CPT | Mod: S$GLB,,, | Performed by: DERMATOLOGY

## 2018-02-05 PROCEDURE — 11100 PR BIOPSY OF SKIN LESION: CPT | Mod: S$GLB,,, | Performed by: DERMATOLOGY

## 2018-02-05 PROCEDURE — 99214 OFFICE O/P EST MOD 30 MIN: CPT | Mod: 25,S$GLB,, | Performed by: DERMATOLOGY

## 2018-02-05 PROCEDURE — 1159F MED LIST DOCD IN RCRD: CPT | Mod: S$GLB,,, | Performed by: DERMATOLOGY

## 2018-02-05 PROCEDURE — 88305 TISSUE EXAM BY PATHOLOGIST: CPT | Performed by: PATHOLOGY

## 2018-02-05 PROCEDURE — 99999 PR PBB SHADOW E&M-EST. PATIENT-LVL III: CPT | Mod: PBBFAC,,, | Performed by: DERMATOLOGY

## 2018-02-05 PROCEDURE — 88305 TISSUE EXAM BY PATHOLOGIST: CPT | Mod: 26,,, | Performed by: PATHOLOGY

## 2018-02-05 NOTE — PATIENT INSTRUCTIONS
Shave Biopsy Wound Care    Your doctor has performed a shave biopsy today.  A band aid and vaseline ointment has been placed over the site.  This should remain in place for 24 hours.  It is recommended that you keep the area dry for the first 24 hours.  After 24 hours, you may remove the band aid and wash the area with warm soap and water and apply Vaseline jelly.  Many patients prefer to use Neosporin or Bacitracin ointment.  This is acceptable; however, know that you can develop an allergy to this medication even if you have used it safely for years.  It is important to keep the area moist.  Letting it dry out and get air slows healing time, and will worsen the scar.  Band aid is optional after first 24 hours.      If you notice increasing redness, tenderness, pain, or yellow drainage at the biopsy site, please notify your doctor.  These are signs of an infection.    If your biopsy site is bleeding, apply firm pressure for 15 minutes straight.  Repeat for another 15 minutes, if it is still bleeding.   If the surgical site continues to bleed, then please contact your doctor.       [facility]  [facility] DERMATOLOGY  [address] [place] [id number]  Dept: [phone]

## 2018-02-05 NOTE — PROGRESS NOTES
"  Subjective:       Patient ID:  Cedric Gupta Jr. is a 77 y.o. 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  TBSE for today          Review of Systems   Constitutional: Negative for fever, chills, weight loss, weight gain, fatigue, night sweats and malaise.   Skin: Positive for activity-related sunscreen use and wears hat.   Hematologic/Lymphatic: Does not bruise/bleed easily.        Objective:    Physical Exam   Constitutional: He appears well-developed and well-nourished. No distress.   Neurological: He is alert and oriented to person, place, and time. He is not disoriented.   Psychiatric: He has a normal mood and affect.   Skin:   Areas Examined (abnormalities noted in diagram):   Scalp / Hair Palpated and Inspected  Head / Face Inspection Performed  Neck Inspection Performed  Chest / Axilla Inspection Performed  Abdomen Inspection Performed  Genitals / Buttocks / Groin Inspection Performed  Back Inspection Performed  RUE Inspected  LUE Inspection Performed  RLE Inspected  LLE Inspection Performed  Nails and Digits Inspection Performed                       Diagram Legend     Erythematous scaling macule/papule c/w actinic keratosis       Vascular papule c/w angioma      Pigmented verrucoid papule/plaque c/w seborrheic keratosis      Yellow umbilicated papule c/w sebaceous hyperplasia      Irregularly shaped tan macule c/w lentigo     1-2 mm smooth white papules consistent with Milia      Movable subcutaneous cyst with punctum c/w epidermal inclusion cyst      Subcutaneous movable cyst c/w pilar " cyst      Firm pink to brown papule c/w dermatofibroma      Pedunculated fleshy papule(s) c/w skin tag(s)      Evenly pigmented macule c/w junctional nevus     Mildly variegated pigmented, slightly irregular-bordered macule c/w mildly atypical nevus      Flesh colored to evenly pigmented papule c/w intradermal nevus       Pink pearly papule/plaque c/w basal cell carcinoma      Erythematous hyperkeratotic cursted plaque c/w SCC      Surgical scar with no sign of skin cancer recurrence      Open and closed comedones      Inflammatory papules and pustules      Verrucoid papule consistent consistent with wart     Erythematous eczematous patches and plaques     Dystrophic onycholytic nail with subungual debris c/w onychomycosis     Umbilicated papule    Erythematous-base heme-crusted tan verrucoid plaque consistent with inflamed seborrheic keratosis     Erythematous Silvery Scaling Plaque c/w Psoriasis     See annotation              Assessment / Plan:      Pathology Orders:     Normal Orders This Visit    Tissue Specimen To Pathology, Dermatology     Questions:    Directional Terms:  Other(comment)    Clinical information:  Pearly pigmented changes adjacent to SK like lesion, r/o BCC / sk collision    Specific Site:  mildine lower back        Neoplasm of uncertain behavior  -     Tissue Specimen To Pathology, Dermatology  Shave biopsy procedure note:    Shave biopsy performed after verbal consent including risk of infection, scar, recurrence, need for additional treatment of site. Area prepped with alcohol, anesthetized with approximately 1.0cc of 1% lidocaine with epinephrine. Lesional tissue shaved with razor blade. Hemostasis achieved with application of aluminum chloride followed by hyfrecation. No complications. Dressing applied. Wound care explained.    History of skin cancer  Area of previous SCC (R forearm) examined. Site well healed with no signs of recurrence.  Total body skin examination performed today  including at least 12 points as noted in physical examination.     Seborrheic keratoses  These are benign inherited growths without a malignant potential. Reassurance given to patient. No treatment is necessary.     Solar lentigo  This is a benign hyperpigmented sun induced lesion. Daily sun protection will reduce the number of new lesions. Treatment of these benign lesions are considered cosmetic.    Multiple benign nevi  Monitor for new mole or moles that are becoming bigger, darker, irritated, or developing irregular borders. Brochure provided.    EIC (epidermal inclusion cyst), central chest, back  May elect to excise    Patient instructed in importance in daily sun protection of at least spf 30. Sun avoidance and topical protection discussed.   Recommend Elta MD (physician office) COTZ sensitive (available online) for daily use on face and neck.  Patient encouraged to wear hat for all outdoor exposure.   Also discussed sun protective clothing.               Follow-up in about 1 year (around 2/5/2019).

## 2018-02-28 ENCOUNTER — LAB VISIT (OUTPATIENT)
Dept: LAB | Facility: HOSPITAL | Age: 78
End: 2018-02-28
Attending: RADIOLOGY
Payer: MEDICARE

## 2018-02-28 DIAGNOSIS — E78.5 HYPERLIPIDEMIA, UNSPECIFIED HYPERLIPIDEMIA TYPE: ICD-10-CM

## 2018-02-28 LAB
ALBUMIN SERPL BCP-MCNC: 4.2 G/DL
ALP SERPL-CCNC: 68 U/L
ALT SERPL W/O P-5'-P-CCNC: 17 U/L
AST SERPL-CCNC: 25 U/L
BILIRUB DIRECT SERPL-MCNC: 0.3 MG/DL
BILIRUB SERPL-MCNC: 0.9 MG/DL
CHOLEST SERPL-MCNC: 176 MG/DL
CHOLEST/HDLC SERPL: 2.8 {RATIO}
HDLC SERPL-MCNC: 64 MG/DL
HDLC SERPL: 36.4 %
LDLC SERPL CALC-MCNC: 102.8 MG/DL
NONHDLC SERPL-MCNC: 112 MG/DL
PROT SERPL-MCNC: 7.5 G/DL
TRIGL SERPL-MCNC: 46 MG/DL

## 2018-02-28 PROCEDURE — 80076 HEPATIC FUNCTION PANEL: CPT

## 2018-02-28 PROCEDURE — 80061 LIPID PANEL: CPT

## 2018-02-28 PROCEDURE — 36415 COLL VENOUS BLD VENIPUNCTURE: CPT | Mod: PO

## 2018-03-01 ENCOUNTER — PATIENT MESSAGE (OUTPATIENT)
Dept: FAMILY MEDICINE | Facility: CLINIC | Age: 78
End: 2018-03-01

## 2018-04-25 ENCOUNTER — TELEPHONE (OUTPATIENT)
Dept: OPTOMETRY | Facility: CLINIC | Age: 78
End: 2018-04-25

## 2018-04-25 NOTE — TELEPHONE ENCOUNTER
Returned patient call to sched appt for updated glasses with . No [facility].

## 2018-06-22 ENCOUNTER — PES CALL (OUTPATIENT)
Dept: ADMINISTRATIVE | Facility: CLINIC | Age: 78
End: 2018-06-22

## 2018-07-06 ENCOUNTER — DOCUMENTATION ONLY (OUTPATIENT)
Dept: FAMILY MEDICINE | Facility: CLINIC | Age: 78
End: 2018-07-06

## 2018-07-06 NOTE — PROGRESS NOTES
Pre-Visit Chart Review  For Appointment Scheduled on 7/9/18    Health Maintenance Due   Topic Date Due    TETANUS VACCINE  05/13/2018

## 2018-07-09 ENCOUNTER — OFFICE VISIT (OUTPATIENT)
Dept: FAMILY MEDICINE | Facility: CLINIC | Age: 78
End: 2018-07-09
Payer: MEDICARE

## 2018-07-09 VITALS
TEMPERATURE: 98 F | BODY MASS INDEX: 25.56 KG/M2 | WEIGHT: 182.56 LBS | DIASTOLIC BLOOD PRESSURE: 71 MMHG | HEART RATE: 59 BPM | HEIGHT: 71 IN | SYSTOLIC BLOOD PRESSURE: 114 MMHG

## 2018-07-09 DIAGNOSIS — M18.0 ARTHRITIS OF CARPOMETACARPAL (CMC) JOINTS OF BOTH THUMBS: ICD-10-CM

## 2018-07-09 DIAGNOSIS — Z00.00 ENCOUNTER FOR PREVENTIVE HEALTH EXAMINATION: Primary | ICD-10-CM

## 2018-07-09 DIAGNOSIS — Z85.828 HISTORY OF BASAL CELL CARCINOMA (BCC): ICD-10-CM

## 2018-07-09 DIAGNOSIS — R97.20 ELEVATED PSA: ICD-10-CM

## 2018-07-09 DIAGNOSIS — Z86.010 HX OF COLONIC POLYP: ICD-10-CM

## 2018-07-09 DIAGNOSIS — Z85.89 HISTORY OF SQUAMOUS CELL CARCINOMA: ICD-10-CM

## 2018-07-09 DIAGNOSIS — E78.5 HYPERLIPIDEMIA, UNSPECIFIED HYPERLIPIDEMIA TYPE: ICD-10-CM

## 2018-07-09 DIAGNOSIS — H35.3132 NONEXUDATIVE AGE-RELATED MACULAR DEGENERATION, BILATERAL, INTERMEDIATE DRY STAGE: ICD-10-CM

## 2018-07-09 PROCEDURE — 99999 PR PBB SHADOW E&M-EST. PATIENT-LVL IV: CPT | Mod: PBBFAC,,, | Performed by: PHYSICIAN ASSISTANT

## 2018-07-09 PROCEDURE — G0439 PPPS, SUBSEQ VISIT: HCPCS | Mod: S$GLB,,, | Performed by: PHYSICIAN ASSISTANT

## 2018-07-09 NOTE — PATIENT INSTRUCTIONS
Counseling and Referral of Other Preventative  (Italic type indicates deductible and co-insurance are waived)    Patient Name: Cedric Gupta  Today's Date: 7/9/2018    Health Maintenance       Date Due Completion Date    TETANUS VACCINE 05/13/2018 5/13/2008    Influenza Vaccine 08/01/2018 10/23/2017    Override on 10/1/2013: Done    Lipid Panel 02/28/2019 2/28/2018    Colonoscopy 08/18/2020 8/18/2015    Override on 9/7/2011: Done (by dr hopson repeat in 3-5 years)        Encouraged pt to obtain tetanus vaccine at local insurance approved pharmacy.  The following information is provided to all patients.  This information is to help you find resources for any of the problems found today that may be affecting your health:                Living healthy guide: www.[facility].[place].gov      Understanding Diabetes: www.diabetes.org      Eating healthy: www.cdc.gov/healthyweight      [facility] home safety checklist: www.cdc.gov/steadi/patient.html      Agency on Aging: www.goea.[place].gov      Alcoholics anonymous (AA): www.aa.org      Physical Activity: www.humaira.nih.gov/le9polj      Tobacco use: www.quitwithusla.org

## 2018-07-09 NOTE — Clinical Note
Primary Care Providers: Tracy Stein MD, MD (General)  Your patient was seen today for a HRA visit. Gap(s) in care (HEDIS gaps) have been identified during this visit that require additional testing and possible follow up. Orders Placed This Encounter     Ambulatory referral to Urology-- Due for follow up with Dr. Green     Ambulatory referral to Ophthalmology--Due for follow up  These orders were placed using [facility] approved protocol and any results will be forwarded to your office for appropriate follow up. I have included a copy of my visit note; please review the note and feel free to contact me with any questions.   Thank you for allowing me to participate in the care of your patients. Anaid Dempsey PA-C

## 2018-07-09 NOTE — PROGRESS NOTES
"Cedric Gupta presented for a  Medicare AWV and comprehensive Health Risk Assessment today. The following components were reviewed and updated:    · Medical history  · Family History  · Social history  · Allergies and Current Medications  · Health Risk Assessment  · Health Maintenance  · Care Team     ** See Completed Assessments for Annual Wellness Visit within the encounter summary.**       The following assessments were completed:  · Living Situation  · CAGE  · Depression Screening  · Timed Get Up and Go  · Whisper Test  · Cognitive Function Screening  · Nutrition Screening  · ADL Screening  · PAQ Screening    I offered to discuss end of life issues, including information on how to make advance directives that the patient could use to name someone who would make medical decisions on their behalf if they became too ill to make themselves.    ___Patient declined  _X_Patient is interested, I provided paper work and offered to discuss.    Vitals:    07/09/18 1244   BP: 114/71   BP Location: Right arm   Patient Position: Sitting   BP Method: Medium (Automatic)   Pulse: (!) 59   Temp: 98.4 °F (36.9 °C)   TempSrc: Oral   Weight: 82.8 kg (182 lb 8.7 oz)   Height: 5' 10.5" (1.791 m)     Body mass index is 25.82 kg/m².  Physical Exam   Constitutional: He is oriented to person, place, and time. He appears well-developed and well-nourished.   HENT:   Head: Normocephalic and atraumatic.   Right Ear: Hearing and external ear normal.   Left Ear: Hearing and external ear normal.   Eyes: Conjunctivae and EOM are normal. Pupils are equal, round, and reactive to light.   Cardiovascular: Normal rate, regular rhythm, normal heart sounds and intact distal pulses.    Pulmonary/Chest: Effort normal and breath sounds normal.   Musculoskeletal:   OA changes noted on the hands.   Neurological: He is alert and oriented to person, place, and time.   Skin: Skin is warm and dry.   Psychiatric: He has a normal mood and affect. His behavior is " normal.             Diagnoses and health risks identified today and associated recommendations/orders:    Encounter for preventive health examination    Elevated PSA  Comments:  Stable, s/p prostate biopsy 1/2018 --negative, due for Urology follow up 6/2018  Orders:  -     Ambulatory referral to Urology    History of squamous cell carcinoma  Comments:  Resolved, hx of SCC s/p excision, continue to follow with Dermatology for skin surveillance    History of basal cell carcinoma (BCC)  Comments:  Resolved, s/p excision, continue to follow with Dermatology, 6 month f/u scheduled for 8/2018 with Dr. Vila    Hx of colonic polyp  Comments:  Resolved, s/p removal, follow up for repeat colonoscopy 8/2020    Arthritis of carpometacarpal (CMC) joints of both thumbs  Comments:  Chronic, stable, continue to using voltaren gel per PCP/Ortho    Hyperlipidemia, unspecified hyperlipidemia type  Comments:  Improving control on crestor, pt has not been taking medication, but plans to resume, follow up with PCP    Nonexudative age-related macular degeneration, bilateral, intermediate dry stage  Comments:  Stable, continue to follow with Ophthalmology  Orders:  -     Ambulatory referral to Ophthalmology        Provided Halolaf with a 5-10 year written screening schedule and personal prevention plan. Recommendations were developed using the USPSTF age appropriate recommendations. Education, counseling, and referrals were provided as needed. After Visit Summary printed and given to patient which includes a list of additional screenings\tests needed.    Follow up with PCP in 3-4 months    Anaid Dempsey PA-C

## 2018-07-18 ENCOUNTER — PATIENT MESSAGE (OUTPATIENT)
Dept: FAMILY MEDICINE | Facility: CLINIC | Age: 78
End: 2018-07-18

## 2018-07-18 ENCOUNTER — LAB VISIT (OUTPATIENT)
Dept: LAB | Facility: HOSPITAL | Age: 78
End: 2018-07-18
Attending: UROLOGY
Payer: MEDICARE

## 2018-07-18 ENCOUNTER — OFFICE VISIT (OUTPATIENT)
Dept: UROLOGY | Facility: CLINIC | Age: 78
End: 2018-07-18
Payer: MEDICARE

## 2018-07-18 VITALS
HEIGHT: 71 IN | BODY MASS INDEX: 25.99 KG/M2 | WEIGHT: 185.63 LBS | HEART RATE: 57 BPM | DIASTOLIC BLOOD PRESSURE: 68 MMHG | SYSTOLIC BLOOD PRESSURE: 119 MMHG

## 2018-07-18 DIAGNOSIS — R97.20 ELEVATED PSA: ICD-10-CM

## 2018-07-18 DIAGNOSIS — N42.89 ASYMMETRIC PROSTATE: ICD-10-CM

## 2018-07-18 DIAGNOSIS — N40.1 BENIGN PROSTATIC HYPERPLASIA WITH URINARY OBSTRUCTION: ICD-10-CM

## 2018-07-18 DIAGNOSIS — R97.20 ELEVATED PSA: Primary | ICD-10-CM

## 2018-07-18 DIAGNOSIS — N13.8 BENIGN PROSTATIC HYPERPLASIA WITH URINARY OBSTRUCTION: ICD-10-CM

## 2018-07-18 LAB
BILIRUB SERPL-MCNC: NORMAL MG/DL
BLOOD URINE, POC: NORMAL
COLOR, POC UA: YELLOW
COMPLEXED PSA SERPL-MCNC: 6.7 NG/ML
GLUCOSE UR QL STRIP: NORMAL
KETONES UR QL STRIP: NORMAL
LEUKOCYTE ESTERASE URINE, POC: NORMAL
NITRITE, POC UA: NORMAL
PH, POC UA: 5.5
PROTEIN, POC: NORMAL
SPECIFIC GRAVITY, POC UA: 1.02
UROBILINOGEN, POC UA: NORMAL

## 2018-07-18 PROCEDURE — 84153 ASSAY OF PSA TOTAL: CPT

## 2018-07-18 PROCEDURE — 99999 PR PBB SHADOW E&M-EST. PATIENT-LVL III: CPT | Mod: PBBFAC,,, | Performed by: UROLOGY

## 2018-07-18 PROCEDURE — 36415 COLL VENOUS BLD VENIPUNCTURE: CPT | Mod: PO

## 2018-07-18 PROCEDURE — 81002 URINALYSIS NONAUTO W/O SCOPE: CPT | Mod: S$GLB,,, | Performed by: UROLOGY

## 2018-07-18 PROCEDURE — 99214 OFFICE O/P EST MOD 30 MIN: CPT | Mod: 25,S$GLB,, | Performed by: UROLOGY

## 2018-07-18 RX ORDER — ROSUVASTATIN CALCIUM 10 MG/1
TABLET, COATED ORAL
Qty: 90 TABLET | Refills: 1 | Status: SHIPPED | OUTPATIENT
Start: 2018-07-18 | End: 2018-12-04

## 2018-07-18 NOTE — PROGRESS NOTES
UROLOGY [place]  7 17 18    Cc elevated psa follow up    Age 78, comes in to follow up on levels of psa. Level was 6.2 six months ago, and 5.6 a year ago.      In apr2014 pt had a prostate biopsy and it was benign. His prostate volume was 90 cm3, no suspicious echographic findings.     Generally voiding well, has nocturia x 1-2. No intermittency or need to strain to void. No pains or burning.      Has hx of Undescended left testicle, removed at age 15. Passed kidney stones 5 yrs ago.      PMH     Surgical:  has a past surgical history that includes Tonsillectomy; Orchiectomy; Fracture surgery; ostate biopsy (2014); and Cholecystectomy.     Medical:  has a past medical history of BPH (benign prostatic hypertrophy); Cataract; Elevated PSA; Epiretinal membrane; Fatty liver; History of colon polyps; History of duodenal ulcer; History of nephrolithiasis; Hyperlipidemia; Macular degeneration; Osteoarthritis; Prediabetes; S/P colonoscopy; and Squamous cell carcinoma.     Familial: no fh of renal disease. 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         naproxen (EC NAPROSYN) 500 MG EC tablet Take 1 tablet (500 mg total) by mouth 2  60 tablet 1         REVIEW OF SYSTEMS  GENERAL: No complaints of fatigue. No headaches or dizzy spells.   HEENT: vision preserved. Sinuses: No complaints.   CARDIOPULMONARY: No swelling of the legs; no chest pain. No shortness of breath, no wheezing.   GASTROINTESTINAL: No heartburn. Denies diarrhea; denies constipation, no blood or mucus in stools.   GENITOURINARY: Denies dysuria, bleeding or incontinence.   MUSCULOSKELETAL: occasional arthritic complaints such as joint discomfort  PSYCHIATRIC: No history of depression or anxiety.   ENDOCRINOLOGIC: No reports of  sweating, cold or heat intolerance. No polyuria or polydipsia.   NEUROLOGICAL: No headache, dizziness, syncope, paralysis, ataxia, numbness or tingling in the extremities.  LYMPHATICS: No enlarged nodes. No history of splenectomy.        Pt alert, oriented  HEENT: wnl.  Neck: supple, no JVD, no lymphadenopathy  Chest: CV NSR  Lungs: normal auscultation  ABDOMEN: Flat, no masses.    CV: Negative.    Penis circumcised, right testicle normal. Left side absent.    Prostate 60-80 g, asymmetric, L lobe feeling more prominent (no change from prior check)  Extremities: no edema, peripheral pulses nl  Neuro: preserved        IMPRESSION:      Elevated psa  bph on observation.   Asymmetric prostate, with L lobe more prominent  Will update psa, might need another biopsy  left undescended testicle status post left orchiectomy. Solitary R testicle  Hx of urolithiasis

## 2018-07-18 NOTE — LETTER
July 18, 2018      Anaid Dempsey PA-C  [address] [facility] [address]           [facility]  [address] [facility] [address] [place] [address]  Phone: [phone]          Patient: Cedric Gupta Jr.   MR Number: [id number]   YOB: 1940   Date of Visit: 7/18/2018       Dear Anaid Dempsey:    Thank you for referring Cedric Gupta to me for evaluation. Attached you will find relevant portions of my assessment and plan of care.    If you have questions, please do not hesitate to call me. I look forward to following Cedric Gupta along with you.    Sincerely,    Joselo Green MD    Enclosure  CC:  No Recipients    If you would like to receive this communication electronically, please contact [email or web address] or [phone] to request more information on [employer] access.    For providers and/or their staff who would like to refer a patient to [facility], please contact us through our one-stop-shop provider referral line, [facility], at [phone].    If you feel you have received this communication in error or would no longer like to receive these types of communications, please e-mail [email or web address]

## 2018-07-20 ENCOUNTER — TELEPHONE (OUTPATIENT)
Dept: FAMILY MEDICINE | Facility: CLINIC | Age: 78
End: 2018-07-20

## 2018-07-20 NOTE — TELEPHONE ENCOUNTER
Left message on recorder for patient to please call the clinic at [phone] to reschedule his appointment with Dr. Jensen on 8/3/18.

## 2018-07-23 DIAGNOSIS — Z79.2 PROPHYLACTIC ANTIBIOTIC: Primary | ICD-10-CM

## 2018-07-23 DIAGNOSIS — R97.20 ELEVATED PSA: Primary | ICD-10-CM

## 2018-07-23 RX ORDER — CIPROFLOXACIN 500 MG/1
500 TABLET ORAL 2 TIMES DAILY
Qty: 6 TABLET | Refills: 0 | Status: SHIPPED | OUTPATIENT
Start: 2018-08-05 | End: 2018-07-30

## 2018-07-27 ENCOUNTER — TELEPHONE (OUTPATIENT)
Dept: UROLOGY | Facility: CLINIC | Age: 78
End: 2018-07-27

## 2018-07-27 NOTE — TELEPHONE ENCOUNTER
Patient called to cancel his surgery with Dr. Green for August 6th. Please call him back to reschedule.

## 2018-07-30 ENCOUNTER — OFFICE VISIT (OUTPATIENT)
Dept: OPHTHALMOLOGY | Facility: CLINIC | Age: 78
End: 2018-07-30
Payer: MEDICARE

## 2018-07-30 DIAGNOSIS — H35.3132 NONEXUDATIVE AGE-RELATED MACULAR DEGENERATION, BILATERAL, INTERMEDIATE DRY STAGE: ICD-10-CM

## 2018-07-30 DIAGNOSIS — H25.13 NUCLEAR SCLEROSIS OF BOTH EYES: ICD-10-CM

## 2018-07-30 DIAGNOSIS — H35.3132 INTERMEDIATE STAGE NONEXUDATIVE AGE-RELATED MACULAR DEGENERATION OF BOTH EYES: Primary | ICD-10-CM

## 2018-07-30 PROCEDURE — 92226 PR SPECIAL EYE EXAM, SUBSEQUENT: CPT | Mod: 50,S$GLB,, | Performed by: OPHTHALMOLOGY

## 2018-07-30 PROCEDURE — 92014 COMPRE OPH EXAM EST PT 1/>: CPT | Mod: S$GLB,,, | Performed by: OPHTHALMOLOGY

## 2018-07-30 PROCEDURE — 92134 CPTRZ OPH DX IMG PST SGM RTA: CPT | Mod: S$GLB,,, | Performed by: OPHTHALMOLOGY

## 2018-07-30 PROCEDURE — 99999 PR PBB SHADOW E&M-EST. PATIENT-LVL III: CPT | Mod: PBBFAC,,, | Performed by: OPHTHALMOLOGY

## 2018-07-30 NOTE — PROGRESS NOTES
HPI     Eye Problem    Additional comments: yearly check           Comments   DLS 5/1/17- Vision may have decreased a little x last visit. He has more   trouble seeing the clock when he gets up in the morning        OCT - Drusen OU    A/P    1. Dry AMD OU  AREDS/AG    2. NS OU  Ok for CE    3. PVD OU      1 yr OCT

## 2018-08-10 ENCOUNTER — OFFICE VISIT (OUTPATIENT)
Dept: FAMILY MEDICINE | Facility: CLINIC | Age: 78
End: 2018-08-10
Payer: MEDICARE

## 2018-08-10 VITALS
RESPIRATION RATE: 18 BRPM | TEMPERATURE: 98 F | DIASTOLIC BLOOD PRESSURE: 60 MMHG | HEART RATE: 60 BPM | HEIGHT: 71 IN | WEIGHT: 187.81 LBS | SYSTOLIC BLOOD PRESSURE: 122 MMHG | BODY MASS INDEX: 26.29 KG/M2

## 2018-08-10 DIAGNOSIS — E78.5 HYPERLIPIDEMIA, UNSPECIFIED HYPERLIPIDEMIA TYPE: Primary | ICD-10-CM

## 2018-08-10 PROCEDURE — 99213 OFFICE O/P EST LOW 20 MIN: CPT | Mod: S$GLB,,, | Performed by: FAMILY MEDICINE

## 2018-08-11 NOTE — PROGRESS NOTES
Subjective:       Patient ID: Cedric Gupta Jr. is a 78 y.o. male.    Chief Complaint: Elevated PSA (health follow up )    HPI   The patient is a 78-year-old who is here today for follow-up.  Today we discussed the followin)   Elevated PSA.  He is scheduled for prostate biopsy later this month.    2)   Hyperlipidemia.  He has had nausea issues with the Crestor.  Because of the nausea issues, he stopped the Crestor and his nausea improved.  He would like to discuss whether not I think he should resume the Crestor    He is otherwise doing well and has no acute questions or concerns regarding his health      Review of Systems   Constitutional: Negative for appetite change, chills, diaphoresis, fatigue, fever and unexpected weight change.   HENT: Negative for congestion, ear pain, postnasal drip, rhinorrhea, sinus pressure, sneezing, sore throat and trouble swallowing.    Eyes: Negative for pain, discharge and visual disturbance.   Respiratory: Negative for cough, chest tightness, shortness of breath and wheezing.    Cardiovascular: Negative for chest pain, palpitations and leg swelling.   Gastrointestinal: Negative for abdominal distention, abdominal pain, blood in stool, constipation, diarrhea, nausea and vomiting.   Skin: Negative for rash.       Objective:      Physical Exam   Constitutional: He is oriented to person, place, and time. He appears well-developed and well-nourished. No distress.   HENT:   Head: Normocephalic and atraumatic.   Right Ear: Hearing, tympanic membrane, external ear and ear canal normal.   Left Ear: Hearing, tympanic membrane, external ear and ear canal normal.   Nose: Nose normal.   Mouth/Throat: Oropharynx is clear and moist and mucous membranes are normal. No oral lesions. No oropharyngeal exudate, posterior oropharyngeal edema or posterior oropharyngeal erythema.   Eyes: Conjunctivae, EOM and lids are normal. Pupils are equal, round, and reactive to light. No scleral icterus.  "  Neck: Normal range of motion. Neck supple. Carotid bruit is not present. No thyroid mass and no thyromegaly present.   Cardiovascular: Normal rate, regular rhythm and normal heart sounds.   No extrasystoles are present. PMI is not displaced.  Exam reveals no gallop.    No murmur heard.  Pulmonary/Chest: Effort normal and breath sounds normal. No accessory muscle usage. No respiratory distress.   Clear to auscultation bilaterally.   Abdominal: Soft. Normal appearance and bowel sounds are normal. He exhibits no abdominal bruit. There is no hepatosplenomegaly. There is no tenderness. There is no rebound.   Lymphadenopathy:        Head (right side): No submental and no submandibular adenopathy present.        Head (left side): No submental and no submandibular adenopathy present.        Right cervical: No superficial cervical, no deep cervical and no posterior cervical adenopathy present.       Left cervical: No superficial cervical, no deep cervical and no posterior cervical adenopathy present.        Right: No supraclavicular adenopathy present.        Left: No supraclavicular adenopathy present.   Neurological: He is alert and oriented to person, place, and time.   Skin: Skin is warm, dry and intact.   Psychiatric: He has a normal mood and affect.     Blood pressure 122/60, pulse 60, temperature 98 °F (36.7 °C), temperature source Oral, resp. rate 18, height 5' 10.5" (1.791 m), weight 85.2 kg (187 lb 12.8 oz).Body mass index is 26.57 kg/m².            A/P:  1) elevated PSA.  Workup in progress.  Once his biopsy results back, we will let me know  2) hyperlipidemia.  Chronic.  We did discuss trying Crestor 10 mg every Monday Wednesday and Friday.  If his nausea continues, we discussed trying Crestor 5 mg .  He will let me know which dose works best for him.  We will plan to recheck fasting labs again in December    As long as he does well, I will see him back next summer or sooner needed  "

## 2018-08-14 ENCOUNTER — OFFICE VISIT (OUTPATIENT)
Dept: DERMATOLOGY | Facility: CLINIC | Age: 78
End: 2018-08-14
Payer: MEDICARE

## 2018-08-14 VITALS — HEIGHT: 71 IN | BODY MASS INDEX: 26.18 KG/M2 | WEIGHT: 187 LBS

## 2018-08-14 DIAGNOSIS — L81.4 SOLAR LENTIGO: ICD-10-CM

## 2018-08-14 DIAGNOSIS — L82.0 INFLAMED SEBORRHEIC KERATOSIS: ICD-10-CM

## 2018-08-14 DIAGNOSIS — L72.0 EIC (EPIDERMAL INCLUSION CYST): Primary | ICD-10-CM

## 2018-08-14 DIAGNOSIS — Z85.828 HISTORY OF SKIN CANCER: ICD-10-CM

## 2018-08-14 DIAGNOSIS — L82.1 SEBORRHEIC KERATOSES: ICD-10-CM

## 2018-08-14 DIAGNOSIS — D22.9 MULTIPLE BENIGN NEVI: ICD-10-CM

## 2018-08-14 PROCEDURE — 99214 OFFICE O/P EST MOD 30 MIN: CPT | Mod: S$GLB,,, | Performed by: DERMATOLOGY

## 2018-08-14 PROCEDURE — 99999 PR PBB SHADOW E&M-EST. PATIENT-LVL III: CPT | Mod: PBBFAC,,, | Performed by: DERMATOLOGY

## 2018-08-14 NOTE — PROGRESS NOTES
Subjective:       Patient ID:  Cedric Gupta Jr. is a 78 y.o. male who presents for   Chief Complaint   Patient presents with    Skin Check     TBSE    Cyst     Back     Patient last seen 02/2018, lesion biopsied on midline lower back, superficial BCC with neg bx margins, monitoring    H/o Chronic paronychia of 3 fingernails (since 2005)  Treated with cipro drop for Pseudomonas component, ciclopirox for non dermatophyte mold  Marked improvement of green discoloration    + h/o SCC R forearm, s/p E&S 2016  This is a high risk patient here to check for the development of new lesions.  TBSE today      Cyst  - Initial  Affected locations: back  Duration: years.  Signs / symptoms: itching  Severity: mild  Timing: constant  Aggravated by: nothing  Relieving factors/Treatments tried: nothing        Review of Systems   Constitutional: Negative for fever, chills, weight loss, weight gain, fatigue, night sweats and malaise.   Skin: Positive for activity-related sunscreen use and wears hat. Negative for daily sunscreen use.   Hematologic/Lymphatic: Does not bruise/bleed easily.        Objective:    Physical Exam   Constitutional: He appears well-developed and well-nourished. No distress.   Neurological: He is alert and oriented to person, place, and time. He is not disoriented.   Psychiatric: He has a normal mood and affect.   Skin:   Areas Examined (abnormalities noted in diagram):   Scalp / Hair Palpated and Inspected  Head / Face Inspection Performed  Neck Inspection Performed  Chest / Axilla Inspection Performed  Abdomen Inspection Performed  Genitals / Buttocks / Groin Inspection Performed  Back Inspection Performed  RUE Inspected  LUE Inspection Performed  RLE Inspected  LLE Inspection Performed  Nails and Digits Inspection Performed                       Diagram Legend     Erythematous scaling macule/papule c/w actinic keratosis       Vascular papule c/w angioma      Pigmented verrucoid papule/plaque c/w  seborrheic keratosis      Yellow umbilicated papule c/w sebaceous hyperplasia      Irregularly shaped tan macule c/w lentigo     1-2 mm smooth white papules consistent with Milia      Movable subcutaneous cyst with punctum c/w epidermal inclusion cyst      Subcutaneous movable cyst c/w pilar cyst      Firm pink to brown papule c/w dermatofibroma      Pedunculated fleshy papule(s) c/w skin tag(s)      Evenly pigmented macule c/w junctional nevus     Mildly variegated pigmented, slightly irregular-bordered macule c/w mildly atypical nevus      Flesh colored to evenly pigmented papule c/w intradermal nevus       Pink pearly papule/plaque c/w basal cell carcinoma      Erythematous hyperkeratotic cursted plaque c/w SCC      Surgical scar with no sign of skin cancer recurrence      Open and closed comedones      Inflammatory papules and pustules      Verrucoid papule consistent consistent with wart     Erythematous eczematous patches and plaques     Dystrophic onycholytic nail with subungual debris c/w onychomycosis     Umbilicated papule    Erythematous-base heme-crusted tan verrucoid plaque consistent with inflamed seborrheic keratosis     Erythematous Silvery Scaling Plaque c/w Psoriasis     See annotation      Assessment / Plan:        EIC (epidermal inclusion cyst)  X3, left chest, back x2, patient wishes to excise, growing, drain foul smelling fluid  Schedule E&S    Seborrheic keratoses  These are benign inherited growths without a malignant potential. Reassurance given to patient. No treatment is necessary.     Inflamed seborrheic keratosis  Left chest    History of skin cancer  Area of previous NMSCs examined. Site well healed with no signs of recurrence.  Total body skin examination performed today including at least 12 points as noted in physical examination. No lesions suspicious for malignancy noted.    Solar lentigo  This is a benign hyperpigmented sun induced lesion. Daily sun protection will reduce the  number of new lesions. Treatment of these benign lesions are considered cosmetic.    Multiple benign nevi  Monitor for new mole or moles that are becoming bigger, darker, irritated, or developing irregular borders.     Patient instructed in importance in daily sun protection of at least spf 30. Sun avoidance and topical protection discussed.   Recommend Elta MD (physician office) COTZ sensitive (available online) for daily use on face and neck.  Patient encouraged to wear hat for all outdoor exposure.   Also discussed sun protective clothing.             Follow-up in about 1 year (around 8/14/2019).

## 2018-08-17 ENCOUNTER — ANESTHESIA EVENT (OUTPATIENT)
Dept: SURGERY | Facility: HOSPITAL | Age: 78
End: 2018-08-17
Payer: MEDICARE

## 2018-08-17 RX ORDER — CIPROFLOXACIN 500 MG/1
500 TABLET ORAL 2 TIMES DAILY
COMMUNITY
End: 2018-12-04

## 2018-08-20 ENCOUNTER — ANESTHESIA (OUTPATIENT)
Dept: SURGERY | Facility: HOSPITAL | Age: 78
End: 2018-08-20
Payer: MEDICARE

## 2018-08-20 ENCOUNTER — HOSPITAL ENCOUNTER (OUTPATIENT)
Facility: HOSPITAL | Age: 78
Discharge: HOME | End: 2018-08-20
Attending: UROLOGY | Admitting: UROLOGY
Payer: MEDICARE

## 2018-08-20 DIAGNOSIS — R97.20 ELEVATED PSA: Primary | ICD-10-CM

## 2018-08-20 PROCEDURE — 37000008 HC ANESTHESIA 1ST 15 MINUTES: Mod: PO | Performed by: UROLOGY

## 2018-08-20 PROCEDURE — 37000009 HC ANESTHESIA EA ADD 15 MINS: Mod: PO | Performed by: UROLOGY

## 2018-08-20 PROCEDURE — 36000705 HC OR TIME LEV I EA ADD 15 MIN: Mod: PO | Performed by: UROLOGY

## 2018-08-20 PROCEDURE — 25000003 PHARM REV CODE 250: Mod: PO | Performed by: ANESTHESIOLOGY

## 2018-08-20 PROCEDURE — D9220A PRA ANESTHESIA: Mod: ANES,,, | Performed by: ANESTHESIOLOGY

## 2018-08-20 PROCEDURE — 36000704 HC OR TIME LEV I 1ST 15 MIN: Mod: PO | Performed by: UROLOGY

## 2018-08-20 PROCEDURE — 71000033 HC RECOVERY, INTIAL HOUR: Mod: PO | Performed by: UROLOGY

## 2018-08-20 PROCEDURE — 63600175 PHARM REV CODE 636 W HCPCS: Mod: PO | Performed by: NURSE ANESTHETIST, CERTIFIED REGISTERED

## 2018-08-20 PROCEDURE — 88305 TISSUE EXAM BY PATHOLOGIST: CPT | Mod: 59 | Performed by: PATHOLOGY

## 2018-08-20 PROCEDURE — 88305 TISSUE EXAM BY PATHOLOGIST: CPT | Mod: 26,,, | Performed by: PATHOLOGY

## 2018-08-20 PROCEDURE — 25000003 PHARM REV CODE 250: Mod: PO | Performed by: UROLOGY

## 2018-08-20 PROCEDURE — D9220A PRA ANESTHESIA: Mod: CRNA,,, | Performed by: NURSE ANESTHETIST, CERTIFIED REGISTERED

## 2018-08-20 PROCEDURE — 55700 PR BIOPSY OF PROSTATE,NEEDLE/PUNCH: CPT | Mod: ,,, | Performed by: UROLOGY

## 2018-08-20 PROCEDURE — 76942 ECHO GUIDE FOR BIOPSY: CPT | Mod: 26,59,, | Performed by: UROLOGY

## 2018-08-20 PROCEDURE — 76872 US TRANSRECTAL: CPT | Mod: 26,,, | Performed by: UROLOGY

## 2018-08-20 RX ORDER — LIDOCAINE HYDROCHLORIDE 10 MG/ML
5 INJECTION, SOLUTION EPIDURAL; INFILTRATION; INTRACAUDAL; PERINEURAL ONCE
Status: DISCONTINUED | OUTPATIENT
Start: 2018-08-20 | End: 2018-08-20 | Stop reason: HOSPADM

## 2018-08-20 RX ORDER — LIDOCAINE HYDROCHLORIDE 10 MG/ML
INJECTION, SOLUTION EPIDURAL; INFILTRATION; INTRACAUDAL; PERINEURAL
Status: DISCONTINUED | OUTPATIENT
Start: 2018-08-20 | End: 2018-08-20 | Stop reason: HOSPADM

## 2018-08-20 RX ORDER — HYDROCODONE BITARTRATE AND ACETAMINOPHEN 5; 325 MG/1; MG/1
1 TABLET ORAL EVERY 4 HOURS PRN
Status: DISCONTINUED | OUTPATIENT
Start: 2018-08-20 | End: 2018-08-20 | Stop reason: HOSPADM

## 2018-08-20 RX ORDER — ONDANSETRON 8 MG/1
8 TABLET, ORALLY DISINTEGRATING ORAL EVERY 8 HOURS PRN
Status: DISCONTINUED | OUTPATIENT
Start: 2018-08-20 | End: 2018-08-20 | Stop reason: HOSPADM

## 2018-08-20 RX ORDER — MIDAZOLAM HYDROCHLORIDE 1 MG/ML
INJECTION, SOLUTION INTRAMUSCULAR; INTRAVENOUS
Status: DISCONTINUED | OUTPATIENT
Start: 2018-08-20 | End: 2018-08-20

## 2018-08-20 RX ORDER — SODIUM CHLORIDE, SODIUM LACTATE, POTASSIUM CHLORIDE, CALCIUM CHLORIDE 600; 310; 30; 20 MG/100ML; MG/100ML; MG/100ML; MG/100ML
INJECTION, SOLUTION INTRAVENOUS CONTINUOUS
Status: DISCONTINUED | OUTPATIENT
Start: 2018-08-20 | End: 2018-08-20 | Stop reason: HOSPADM

## 2018-08-20 RX ORDER — PROPOFOL 10 MG/ML
VIAL (ML) INTRAVENOUS
Status: DISCONTINUED | OUTPATIENT
Start: 2018-08-20 | End: 2018-08-20

## 2018-08-20 RX ORDER — PROPOFOL 10 MG/ML
VIAL (ML) INTRAVENOUS CONTINUOUS PRN
Status: DISCONTINUED | OUTPATIENT
Start: 2018-08-20 | End: 2018-08-20

## 2018-08-20 RX ORDER — ACETAMINOPHEN 325 MG/1
325 TABLET ORAL EVERY 4 HOURS PRN
Status: DISCONTINUED | OUTPATIENT
Start: 2018-08-20 | End: 2018-08-20 | Stop reason: HOSPADM

## 2018-08-20 RX ORDER — LIDOCAINE HCL/PF 100 MG/5ML
SYRINGE (ML) INTRAVENOUS
Status: DISCONTINUED | OUTPATIENT
Start: 2018-08-20 | End: 2018-08-20

## 2018-08-20 RX ADMIN — PROPOFOL 50 MG: 10 INJECTION, EMULSION INTRAVENOUS at 11:08

## 2018-08-20 RX ADMIN — PROPOFOL 25 MG: 10 INJECTION, EMULSION INTRAVENOUS at 11:08

## 2018-08-20 RX ADMIN — LIDOCAINE HYDROCHLORIDE 75 MG: 20 INJECTION PARENTERAL at 11:08

## 2018-08-20 RX ADMIN — PROPOFOL 150 MCG/KG/MIN: 10 INJECTION, EMULSION INTRAVENOUS at 11:08

## 2018-08-20 RX ADMIN — MIDAZOLAM HYDROCHLORIDE 2 MG: 1 INJECTION, SOLUTION INTRAMUSCULAR; INTRAVENOUS at 11:08

## 2018-08-20 RX ADMIN — SODIUM CHLORIDE, SODIUM LACTATE, POTASSIUM CHLORIDE, AND CALCIUM CHLORIDE: .6; .31; .03; .02 INJECTION, SOLUTION INTRAVENOUS at 10:08

## 2018-08-20 NOTE — OP NOTE
Site: [facility] [place]  Date 8 20 18  Surgeon: Peter  Anesthesia: iv sedation and local infiltration xylocaine  Preop diagnosis: elevated psa  Postop diagnosis: elevated psa  Procedure: TRANSRECTAL ULTRASOUND OF PROSTATE WITH NEEDLE BIOPSIES. ULTRASOUND GUIDANCE FOR NEEDLE BIOPSIES  Complications: none  EBL: None    Description of the procedure:  Patient took a prophylactic antibiotic starting last night. He also gave himself a Fleet enema 2 hours prior to the procedure. Pt was taken to the operating room. After satisfactory sedation anesthesia, he was placed in the L lateral decubitus position. We instilled a 10-ml enema of pure Betadine solution in the rectal ampulla and waited a few minutes.   The rectal probe of the [employer] S-nerve ultrasound machine with 5-to-8 Hz frequency was inserted and multiple transverse and longitudinal scans were done.     Prostate measurements:  Gland volume: 103.9  cm3  Gland width (transverse): 6.79 cm  Gland height (anteroposterior): 4.87 cm  Gland length (cephalocaudal): 6 cm      The general shape of the prostate was symmetric. Several small calcific densities were seen, as well as various small and medium sized cystic lesions. No definite hypoechoic areas were seen.   We performed a prostate block (pudendal block) with a total of 10 ml of xylocaine applied at the prostate base and prostate apex on both sides. Multiple biopsies were obtained using the Bard Magnum Biopsy Needle in the 22 mm length setting. We used the sextant topographic technique to distribute and send the biopsy samples. The patient tolerated the procedure well. He was transferred to Recovery Room.    Patient was warned about possible complications, such as persistent hematuria, hematochezia, hematospermia, infection and sepsis. He is to continue with the antibiotic given until finished. Drink abundant fluids. Call in one week for pathology results

## 2018-08-20 NOTE — DISCHARGE INSTRUCTIONS
Discharge Instructions: After Your Surgery  Youve just had surgery. During surgery, you were given medicine called anesthesia to keep you relaxed and free of pain. After surgery, you may have some pain or nausea. This is common. Here are some tips for feeling better and getting well after surgery.     Stay on schedule with your medicine.   Going home  Your healthcare provider will show you how to take care of yourself when you go home. He or she will also answer your questions. Have an adult family member or friend drive you home. For the first 24 hours after your surgery:  · Do not drive or use heavy equipment.  · Do not make important decisions or sign legal papers.  · Do not drink alcohol.  · Have someone stay with you, if needed. He or she can watch for problems and help keep you safe.  Be sure to go to all follow-up visits with your healthcare provider. And rest after your surgery for as long as your healthcare provider tells you to.  Coping with pain  If you have pain after surgery, pain medicine will help you feel better. Take it as told, before pain becomes severe. Also, ask your healthcare provider or pharmacist about other ways to control pain. This might be with heat, ice, or relaxation. And follow any other instructions your surgeon or nurse gives you.  Tips for taking pain medicine  To get the best relief possible, remember these points:  · Pain medicines can upset your stomach. Taking them with a little food may help.  · Most pain relievers taken by mouth need at least 20 to 30 minutes to start to work.  · Taking medicine on a schedule can help you remember to take it. Try to time your medicine so that you can take it before starting an activity. This might be before you get dressed, go for a walk, or sit down for dinner.  · Constipation is a common side effect of pain medicines. Call your healthcare provider before taking any medicines such as laxatives or stool softeners to help ease constipation.  Also ask if you should skip any foods. Drinking lots of fluids and eating foods such as fruits and vegetables that are high in fiber can also help. Remember, do not take laxatives unless your surgeon has prescribed them.  · Drinking alcohol and taking pain medicine can cause dizziness and slow your breathing. It can even be deadly. Do not drink alcohol while taking pain medicine.  · Pain medicine can make you react more slowly to things. Do not drive or run machinery while taking pain medicine.  Your healthcare provider may tell you to take acetaminophen to help ease your pain. Ask him or her how much you are supposed to take each day. Acetaminophen or other pain relievers may interact with your prescription medicines or other over-the-counter (OTC) medicines. Some prescription medicines have acetaminophen and other ingredients. Using both prescription and OTC acetaminophen for pain can cause you to overdose. Read the labels on your OTC medicines with care. This will help you to clearly know the list of ingredients, how much to take, and any warnings. It may also help you not take too much acetaminophen. If you have questions or do not understand the information, ask your pharmacist or healthcare provider to explain it to you before you take the OTC medicine.  Managing nausea  Some people have an upset stomach after surgery. This is often because of anesthesia, pain, or pain medicine, or the stress of surgery. These tips will help you handle nausea and eat healthy foods as you get better. If you were on a special food plan before surgery, ask your healthcare provider if you should follow it while you get better. These tips may help:  · Do not push yourself to eat. Your body will tell you when to eat and how much.  · Start off with clear liquids and soup. They are easier to digest.  · Next try semi-solid foods, such as mashed potatoes, applesauce, and gelatin, as you feel ready.  · Slowly move to solid foods. Dont  eat fatty, rich, or spicy foods at first.  · Do not force yourself to have 3 large meals a day. Instead eat smaller amounts more often.  · Take pain medicines with a small amount of solid food, such as crackers or toast, to avoid nausea.     Call your surgeon if  · You still have pain an hour after taking medicine. The medicine may not be strong enough.  · You feel too sleepy, dizzy, or groggy. The medicine may be too strong.  · You have side effects like nausea, vomiting, or skin changes, such as rash, itching, or hives.       If you have obstructive sleep apnea  You were given anesthesia medicine during surgery to keep you comfortable and free of pain. After surgery, you may have more apnea spells because of this medicine and other medicines you were given. The spells may last longer than usual.   At home:  · Keep using the continuous positive airway pressure (CPAP) device when you sleep. Unless your healthcare provider tells you not to, use it when you sleep, day or night. CPAP is a common device used to treat obstructive sleep apnea.  · Talk with your provider before taking any pain medicine, muscle relaxants, or sedatives. Your provider will tell you about the possible dangers of taking these medicines.  Date Last Reviewed: 12/1/2016 © 2000-2017 [employer]. [address] [place] [address], [place] [address]. All rights reserved. This information is not intended as a substitute for professional medical care. Always follow your healthcare professional's instructions.      PROSTATE BIOPSY      DOS:   Minimal activity for 24 hours.   May shower or bathe today.   Advance diet as tolerated.   Drink a lot of liquids until you see your doctor.   Expect blood in urine/stool for up to 3 weeks.   Expect blood in semen for up to 8 weeks.   Resume home medications as prescribed   Biopsy results may not be available for 10-14 days    DONT:   No driving for 24 hours or while taking narcotic pain  medication   No aspirin, NSAIDS or blood thinners for 7 days   No sexual intercourse, heavy lifting, or strenuous activity for 7 days.   DO  TAKE  TYLENOL/ACETAMINOPHEN     CALL PHYSICIAN FOR:   Unable to urinate within 6 hours after surgery.   Excessive bleeding.    Fever>101   Persistent pain not relieved by pain medication.    Contact your physician for emergencies at [phone]

## 2018-08-20 NOTE — TRANSFER OF CARE
"Anesthesia Transfer of Care Note    Patient: Cedric Gupta Jr.    Procedure(s) Performed: Procedure(s) (LRB):  BIOPSY, PROSTATE, TRANSRECTAL APPROACH, WITH US GUIDANCE (N/A)    Anesthesia PACU Handoff    Last vitals:   Visit Vitals  BP (!) 146/84 (BP Location: Left arm, Patient Position: Lying)   Pulse (!) 46   Temp 36.4 °C (97.6 °F) (Skin)   Resp 18   Ht 5' 11" (1.803 m)   Wt 83.9 kg (185 lb)   SpO2 98%   BMI 25.80 kg/m²     "

## 2018-08-20 NOTE — ANESTHESIA POSTPROCEDURE EVALUATION
"Anesthesia Post Evaluation    Patient: Cedric Gupta Jr.    Procedure(s) Performed: Procedure(s) (LRB):  BIOPSY, PROSTATE, TRANSRECTAL APPROACH, WITH US GUIDANCE (N/A)    Final Anesthesia Type: MAC  Patient location during evaluation: PACU  Patient participation: Yes- Able to Participate  Level of consciousness: awake and alert and oriented  Post-procedure vital signs: reviewed and stable  Pain management: adequate  Airway patency: patent  PONV status at discharge: No PONV  Anesthetic complications: no      Cardiovascular status: blood pressure returned to baseline  Respiratory status: unassisted, spontaneous ventilation and room air  Hydration status: euvolemic  Follow-up not needed.        Visit Vitals  BP (!) 113/55 (BP Location: Left arm, Patient Position: Lying)   Pulse (!) 50   Temp 36.4 °C (97.6 °F) (Skin)   Resp 18   Ht 5' 11" (1.803 m)   Wt 83.9 kg (185 lb)   SpO2 96%   BMI 25.80 kg/m²       Pain/Derek Score: Pain Assessment Performed: Yes (8/20/2018 10:12 AM)  Presence of Pain: denies (8/20/2018 10:12 AM)        "

## 2018-08-20 NOTE — H&P
[facility] for elective procedure  Urology 8 20 18    Cc elevated psa      Age 78, psa was 6.7 last month, it was 6.2 six months ago, and 5.6 a year ago. It was recommended to to proceed with prostate biopsy.     In apr2014 pt had a prostate biopsy and it was benign. His prostate volume was 90 cm3, no suspicious echographic findings.     Generally voiding well, has nocturia x 1-2. No intermittency or need to strain to void. No pains or burning.      Has hx of Undescended left testicle, removed at age 15. Passed kidney stones 5 yrs ago.      PMH     Surgical:  has a past surgical history that includes Tonsillectomy; Orchiectomy; Fracture surgery; ostate biopsy (2014); and Cholecystectomy.     Medical:  has a past medical history of BPH (benign prostatic hypertrophy); Cataract; Elevated PSA; Epiretinal membrane; Fatty liver; History of colon polyps; History of duodenal ulcer; History of nephrolithiasis; Hyperlipidemia; Macular degeneration; Osteoarthritis; Prediabetes; S/P colonoscopy; and Squamous cell carcinoma.     Familial: no fh of renal disease. 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         naproxen (EC NAPROSYN) 500 MG EC tablet Take 1 tablet (500 mg total) by mouth 2  60 tablet 1         REVIEW OF SYSTEMS  GENERAL: No complaints of fatigue. No headaches or dizzy spells.   HEENT: vision preserved. Sinuses: No complaints.   CARDIOPULMONARY: No swelling of the legs; no chest pain. No shortness of breath, no wheezing.   GASTROINTESTINAL: No heartburn. Denies diarrhea; denies constipation, no blood or mucus in stools.   GENITOURINARY: Denies dysuria, bleeding or incontinence.   MUSCULOSKELETAL: occasional arthritic complaints such as joint discomfort  PSYCHIATRIC: No history of  depression or anxiety.   ENDOCRINOLOGIC: No reports of sweating, cold or heat intolerance. No polyuria or polydipsia.   NEUROLOGICAL: No headache, dizziness, syncope, paralysis, ataxia, numbness or tingling in the extremities.  LYMPHATICS: No enlarged nodes. No history of splenectomy.        Pt alert, oriented  HEENT: wnl.  Neck: supple, no JVD, no lymphadenopathy  Chest: CV NSR  Lungs: normal auscultation  ABDOMEN: Flat, no masses.    CV: Negative.    Penis circumcised, right testicle normal. Left side absent.    Prostate 60-80 g, asymmetric, L lobe feeling more prominent (no change from prior check)  Extremities: no edema, peripheral pulses nl  Neuro: preserved        IMPRESSION:      Elevated psa  bph on observation.   Asymmetric prostate, with L lobe more prominent, will do repeat biopsy today  left undescended testicle status post left orchiectomy. Solitary R testicle  Hx of urolithiasis

## 2018-08-20 NOTE — ANESTHESIA PREPROCEDURE EVALUATION
2018  Cedric Gupta Jr. is a 78 y.o., male.    Anesthesia Evaluation    I have reviewed the Patient Summary Reports.    I have reviewed the Nursing Notes.   I have reviewed the Medications.     Review of Systems  Social:  Former Smoker Smoking Status: Former Smoker  Quit Smokin65  Smokeless Tobacco Status: Never Used  Alcohol use: Yes; 1.2 oz per week  Drug use: Marijuana; Times per week: 7       Hepatic/GI:   Liver Disease,    Musculoskeletal:   Arthritis            Anesthesia Plan  Type of Anesthesia, risks & benefits discussed:  Anesthesia Type:  MAC  Patient's Preference:   Intra-op Monitoring Plan: standard ASA monitors  Intra-op Monitoring Plan Comments:   Post Op Pain Control Plan:   Post Op Pain Control Plan Comments:   Induction:   IV  Beta Blocker:  Patient is not currently on a Beta-Blocker (No further documentation required).       Informed Consent: Patient understands risks and agrees with Anesthesia plan.  Questions answered. Anesthesia consent signed with patient.  ASA Score: 2     Day of Surgery Review of History & Physical: I have interviewed and examined the patient. I have reviewed the patient's H&P dated:  There are no significant changes.          Ready For Surgery From Anesthesia Perspective.

## 2018-08-21 VITALS
SYSTOLIC BLOOD PRESSURE: 146 MMHG | HEART RATE: 46 BPM | HEIGHT: 71 IN | WEIGHT: 185 LBS | BODY MASS INDEX: 25.9 KG/M2 | DIASTOLIC BLOOD PRESSURE: 84 MMHG | TEMPERATURE: 98 F | RESPIRATION RATE: 18 BRPM | OXYGEN SATURATION: 98 %

## 2018-08-22 ENCOUNTER — TELEPHONE (OUTPATIENT)
Dept: UROLOGY | Facility: CLINIC | Age: 78
End: 2018-08-22

## 2018-08-22 NOTE — TELEPHONE ENCOUNTER
S/P Prostate Biopsy: Patient is doing well, NO fever, pain or discomfort, no blood in stool or urine.

## 2018-09-04 ENCOUNTER — PROCEDURE VISIT (OUTPATIENT)
Dept: DERMATOLOGY | Facility: CLINIC | Age: 78
End: 2018-09-04
Payer: MEDICARE

## 2018-09-04 DIAGNOSIS — L72.0 EIC (EPIDERMAL INCLUSION CYST): Primary | ICD-10-CM

## 2018-09-04 PROCEDURE — 11403 EXC TR-EXT B9+MARG 2.1-3CM: CPT | Mod: S$PBB,,, | Performed by: DERMATOLOGY

## 2018-09-04 PROCEDURE — 99499 UNLISTED E&M SERVICE: CPT | Mod: S$PBB,,, | Performed by: DERMATOLOGY

## 2018-09-04 PROCEDURE — 11402 EXC TR-EXT B9+MARG 1.1-2 CM: CPT | Mod: S$PBB,,, | Performed by: DERMATOLOGY

## 2018-09-04 PROCEDURE — 12032 INTMD RPR S/A/T/EXT 2.6-7.5: CPT | Mod: PBBFAC,PO | Performed by: DERMATOLOGY

## 2018-09-04 PROCEDURE — 88304 TISSUE EXAM BY PATHOLOGIST: CPT | Performed by: PATHOLOGY

## 2018-09-04 PROCEDURE — 12032 INTMD RPR S/A/T/EXT 2.6-7.5: CPT | Mod: S$PBB,59,, | Performed by: DERMATOLOGY

## 2018-09-04 PROCEDURE — 11401 EXC TR-EXT B9+MARG 0.6-1 CM: CPT | Mod: PBBFAC,PO | Performed by: DERMATOLOGY

## 2018-09-04 PROCEDURE — 11402 EXC TR-EXT B9+MARG 1.1-2 CM: CPT | Mod: PBBFAC,PO | Performed by: DERMATOLOGY

## 2018-09-04 PROCEDURE — 11401 EXC TR-EXT B9+MARG 0.6-1 CM: CPT | Mod: S$PBB,51,59, | Performed by: DERMATOLOGY

## 2018-09-04 PROCEDURE — 11403 EXC TR-EXT B9+MARG 2.1-3CM: CPT | Mod: PBBFAC,PO | Performed by: DERMATOLOGY

## 2018-09-04 NOTE — PATIENT INSTRUCTIONS
Surgery Wound Care    Your doctor has performed an excision today.  A bandage and vaseline ointment has been placed over the site.  This should remain in place for 24 hours.  It is recommended that you keep the area dry for the first 24 hours.  After 24 hours, you may remove the band aid and wash the area with warm soap and water and apply Vaseline jelly or aquaphore.  Many patients prefer to use Neosporin or Bacitracin ointment.  This is acceptable; however know that you can develop an allergy to this medication even if you have used it safely for years.  It is important to keep the area moist.  Letting it dry out and get air slows healing time, will worsen the scar, and make it more difficult to remove the stitches if they were placed.        If you notice increasing redness, tenderness, pain, or yellow drainage at the biopsy or surgical site, please notify your doctor.  These are signs of an infection.    If your biopsy/surgical site is bleeding, apply firm pressure for 15 minutes straight.  Repeat for another 15 minutes, if it is still bleeding.   If the surgical site continues to bleed, then please contact your doctor.     [facility]  [facility]  [address] [place] [id number]  Dept: [phone]

## 2018-09-04 NOTE — PROGRESS NOTES
Subjective:       Patient ID:  Cedric Gupta Jr. is a 78 y.o. male who presents for   Chief Complaint   Patient presents with    Cyst     2 on the back    Seborrheic Keratosis     chest      Patient here for excision of symptomatic cysts x3          Review of Systems   Constitutional: Negative for fever and chills.        Objective:    Physical Exam   Constitutional: He appears well-developed and well-nourished. No distress.   Neurological: He is alert and oriented to person, place, and time. He is not disoriented.   Psychiatric: He has a normal mood and affect.   Skin:   Areas Examined (abnormalities noted in diagram):   Chest / Axilla Inspection Performed  Back Inspection Performed              Diagram Legend     Erythematous scaling macule/papule c/w actinic keratosis       Vascular papule c/w angioma      Pigmented verrucoid papule/plaque c/w seborrheic keratosis      Yellow umbilicated papule c/w sebaceous hyperplasia      Irregularly shaped tan macule c/w lentigo     1-2 mm smooth white papules consistent with Milia      Movable subcutaneous cyst with punctum c/w epidermal inclusion cyst      Subcutaneous movable cyst c/w pilar cyst      Firm pink to brown papule c/w dermatofibroma      Pedunculated fleshy papule(s) c/w skin tag(s)      Evenly pigmented macule c/w junctional nevus     Mildly variegated pigmented, slightly irregular-bordered macule c/w mildly atypical nevus      Flesh colored to evenly pigmented papule c/w intradermal nevus       Pink pearly papule/plaque c/w basal cell carcinoma      Erythematous hyperkeratotic cursted plaque c/w SCC      Surgical scar with no sign of skin cancer recurrence      Open and closed comedones      Inflammatory papules and pustules      Verrucoid papule consistent consistent with wart     Erythematous eczematous patches and plaques     Dystrophic onycholytic nail with subungual debris c/w onychomycosis     Umbilicated papule    Erythematous-base heme-crusted  tan verrucoid plaque consistent with inflamed seborrheic keratosis     Erythematous Silvery Scaling Plaque c/w Psoriasis     See annotation      Assessment / Plan:      Pathology Orders:     Normal Orders This Visit    Tissue Specimen To Pathology, Dermatology     Questions:    Directional Terms:  Other(comment)    Clinical information:  subcutaneous nodules with overlying punctum, EIC    Specific Site:  1/3-R back 2/3-left upper back 3/3- left medial chest        EIC (epidermal inclusion cyst)  -     Tissue Specimen To Pathology, Dermatology      PREPARATION: The diagnosis, procedure, alternatives, benefits and risks, including but not limited to: infection, bleeding/bruising, drug reactions, pain, scar or cosmetic defect, local sensation disturbances, wound dehiscence (separation of wound edges after sutures removed) and/or recurrence of present condition were explained to the patient. The patient elected to proceed.  Patient's identity was verified using 2 patient identifiers and the side and site was verified.  Time out period with surgeon, assistant and patient in surgical suite was taken.  1/3- R back  PROCEDURE: The location noted above was prepped and draped in the usual sterile fashion. The area was anesthetized with intradermal buffered xylocaine. A fusiform elliptical excision was done with #15 blade carried down to cyst wall, and dissection was carried out around the cyst wall.  Electrocoagulation was used to obtain hemostasis. Blood loss was minimal. The wound was then approximated in a layered fashion with subcutaneous and intradermal sutures of 3.0 Monocryl, approximately 2 in number, and the wound was then superficially closed with simple interrupted sutures of 4.0 Prolene.   Lesion size: 1.5cm  Final incision length: 2cm    2/3- left upper back  PROCEDURE: The location noted above was prepped and draped in the usual sterile fashion. The area was anesthetized with intradermal buffered xylocaine. A  fusiform elliptical excision was done with #15 blade carried down to cyst wall, and dissection was carried out around the cyst wall.  Electrocoagulation was used to obtain hemostasis. Blood loss was minimal. The wound was then superficially closed with simple interrupted sutures of 4.0 Prolene.   Lesion size: 1cm  Final incision length: 1cm    3/3- left medial breast  PROCEDURE: The location noted above was prepped and draped in the usual sterile fashion. The area was anesthetized with intradermal buffered xylocaine. A fusiform elliptical excision was done with #15 blade carried down to cyst wall, and dissection was carried out around the cyst wall.  Electrocoagulation was used to obtain hemostasis. Blood loss was minimal. The wound was then approximated in a layered fashion with subcutaneous and intradermal sutures of 3.0 Monocryl, approximately 2 in number, and the wound was then superficially closed with simple interrupted sutures of 4.0 Prolene.   Lesion size: 2.5cm  Final incision length: 2.5cm         Follow-up in about 10 days (around 9/14/2018), or if symptoms worsen or fail to improve, for suture removal.

## 2018-09-14 ENCOUNTER — TELEPHONE (OUTPATIENT)
Dept: DERMATOLOGY | Facility: CLINIC | Age: 78
End: 2018-09-14

## 2018-09-14 ENCOUNTER — CLINICAL SUPPORT (OUTPATIENT)
Dept: DERMATOLOGY | Facility: CLINIC | Age: 78
End: 2018-09-14
Payer: MEDICARE

## 2018-09-14 DIAGNOSIS — Z48.02 VISIT FOR SUTURE REMOVAL: Primary | ICD-10-CM

## 2018-09-14 PROCEDURE — 99024 POSTOP FOLLOW-UP VISIT: CPT | Mod: ,,, | Performed by: DERMATOLOGY

## 2018-09-14 PROCEDURE — 99999 PR PBB SHADOW E&M-EST. PATIENT-LVL I: CPT | Mod: PBBFAC,,,

## 2018-09-14 PROCEDURE — 99211 OFF/OP EST MAY X REQ PHY/QHP: CPT | Mod: PBBFAC,PO

## 2018-12-04 ENCOUNTER — OFFICE VISIT (OUTPATIENT)
Dept: FAMILY MEDICINE | Facility: CLINIC | Age: 78
End: 2018-12-04
Payer: MEDICARE

## 2018-12-04 ENCOUNTER — HOSPITAL ENCOUNTER (OUTPATIENT)
Dept: RADIOLOGY | Facility: HOSPITAL | Age: 78
Discharge: HOME | End: 2018-12-04
Attending: NURSE PRACTITIONER
Payer: MEDICARE

## 2018-12-04 VITALS
BODY MASS INDEX: 25.2 KG/M2 | DIASTOLIC BLOOD PRESSURE: 68 MMHG | TEMPERATURE: 98 F | SYSTOLIC BLOOD PRESSURE: 140 MMHG | WEIGHT: 180 LBS | HEART RATE: 68 BPM | RESPIRATION RATE: 20 BRPM | HEIGHT: 71 IN

## 2018-12-04 DIAGNOSIS — M25.551 PAIN OF BOTH HIP JOINTS: Primary | ICD-10-CM

## 2018-12-04 DIAGNOSIS — M70.72 BURSITIS OF BOTH HIPS, UNSPECIFIED BURSA: ICD-10-CM

## 2018-12-04 DIAGNOSIS — M70.71 BURSITIS OF BOTH HIPS, UNSPECIFIED BURSA: ICD-10-CM

## 2018-12-04 DIAGNOSIS — M25.551 PAIN OF BOTH HIP JOINTS: ICD-10-CM

## 2018-12-04 DIAGNOSIS — M25.552 PAIN OF BOTH HIP JOINTS: ICD-10-CM

## 2018-12-04 DIAGNOSIS — M25.552 PAIN OF BOTH HIP JOINTS: Primary | ICD-10-CM

## 2018-12-04 PROCEDURE — 99214 OFFICE O/P EST MOD 30 MIN: CPT | Mod: S$GLB,,, | Performed by: NURSE PRACTITIONER

## 2018-12-04 PROCEDURE — 1101F PT FALLS ASSESS-DOCD LE1/YR: CPT | Mod: CPTII,S$GLB,, | Performed by: NURSE PRACTITIONER

## 2018-12-04 PROCEDURE — 73521 X-RAY EXAM HIPS BI 2 VIEWS: CPT | Mod: TC,HCWC,PN

## 2018-12-04 PROCEDURE — 73521 X-RAY EXAM HIPS BI 2 VIEWS: CPT | Mod: 26,HCWC,, | Performed by: RADIOLOGY

## 2018-12-04 RX ORDER — TRAMADOL HYDROCHLORIDE 50 MG/1
50 TABLET ORAL EVERY 6 HOURS PRN
Qty: 28 TABLET | Refills: 0 | Status: CANCELLED | OUTPATIENT
Start: 2018-12-04

## 2018-12-04 RX ORDER — TRAMADOL HYDROCHLORIDE 50 MG/1
50 TABLET ORAL EVERY 6 HOURS PRN
Qty: 28 TABLET | Refills: 0 | Status: SHIPPED | OUTPATIENT
Start: 2018-12-04 | End: 2018-12-21 | Stop reason: SDUPTHER

## 2018-12-04 NOTE — PROGRESS NOTES
"Subjective:       Patient ID: Cedric Gupta Jr. is a 78 y.o. male.    Chief Complaint: Hip Pain (bilateral hip pain)    HPI Here with concerns regarding bilateral hip pain. States it started after sleeping on a small mattress on the floor during the Thanksgiving holiday. Pain is worse on the right side. States discomfort at night when laying on his side. Has discomfort with walking. Rates that pain fluctuates from 4/10 to 7/10. He denies any injury or falls in the past. Has taken tylenol without much relief. See ROS.    The following portion of the patients history was reviewed and updated as appropriate: allergies, current medications, past medical and surgical history. Past social history and problem list reviewed. Family PMH and Past social history reviewed. Tobacco, Illicit drug use reviewed.     Review of Systems  Constitution: Negative for chills, fever, and sweats. Negative for unexplained weight loss.  HENT: Negative for headaches and blurry vision.   Cardiovascular: Negative for chest pain, irregular heartbeat, leg swelling and palpitations.   Respiratory: Negative for cough and shortness of breath.   Gastrointestinal: Negative for abdominal pain, heartburn, nausea and vomiting.   Genitourinary: Negative for bladder incontinence and dysuria.   Musculoskeletal: Negative for systemic arthritis, joint swelling, muscle weakness and myalgias.   Neurological: Negative for numbness.   Psychiatric/Behavioral: Negative for depression.   Endocrine: Negative for polyuria.   Hematologic/Lymphatic: Negative for bleeding disorders.  Skin: Negative for poor wound healing.      Objective:     BP (!) 140/68   Pulse 68   Temp 98.4 °F (36.9 °C) (Oral)   Resp 20   Ht 5' 11" (1.803 m)   Wt 81.6 kg (180 lb)   BMI 25.10 kg/m²      Physical Exam     Constitutional: oriented to person, place, and time. well-developed and well-nourished.   Neck: Normal range of motion. Neck supple. No tracheal deviation present. No " thyromegaly present.   Cardiovascular: Normal rate, regular rhythm and normal heart sounds.    Pulmonary/Chest: Effort normal and breath sounds normal. No respiratory distress. No wheezes.   Abdominal: Soft. Bowel sounds are normal. No distension. There is no tenderness.   Musculoskeletal: Normal range of motion. Hip pain bilaterally with external and internal rotation. Normal lower extremity strength. Negative straight leg raises bilaterally. Pedal pulses strong.   Neurological: oriented to person, place, and time.   Skin: Skin is warm and dry. No rashes or lesions  Psychiatric: Normal mood and affect.Behavior is normal. Judgment and thought content normal.   Assessment:       1. Pain of both hip joints    2. Bursitis of both hips, unspecified bursa        Plan:         Cedric was seen today for hip pain.    Diagnoses and all orders for this visit:    Pain of both hip joints: will schedule for xrays and visit with Orthopedics for evaluation.   -     X-Ray Hips Bilateral 2 View Incl AP Pelvis; Future  -     Ambulatory referral to Orthopedics    Bursitis of both hips, unspecified bursa: will give short term ultram to help with pain at bedtime. Take only as directed. Do not take with ETOH, Sedatives or other narcotics. Do not take and drive due to potential for sedation. Do not take more than the prescribed amount.    No abuse noted per the [employer] website.    Other orders  -     traMADol (ULTRAM) 50 mg tablet; Take 1 tablet (50 mg total) by mouth every 6 (six) hours as needed for Pain.  -        Continue current medication  Take medications only as prescribed  Healthy diet, exercise  Adequate rest  Adequate hydration  Avoid allergens  Avoid excessive caffeine

## 2018-12-05 DIAGNOSIS — M25.551 BILATERAL HIP PAIN: Primary | ICD-10-CM

## 2018-12-05 DIAGNOSIS — M25.552 BILATERAL HIP PAIN: Primary | ICD-10-CM

## 2018-12-06 ENCOUNTER — HOSPITAL ENCOUNTER (OUTPATIENT)
Dept: RADIOLOGY | Facility: HOSPITAL | Age: 78
Discharge: HOME | End: 2018-12-06
Attending: NURSE PRACTITIONER
Payer: MEDICARE

## 2018-12-06 ENCOUNTER — OFFICE VISIT (OUTPATIENT)
Dept: ORTHOPEDICS | Facility: CLINIC | Age: 78
End: 2018-12-06
Payer: MEDICARE

## 2018-12-06 ENCOUNTER — TELEPHONE (OUTPATIENT)
Dept: ORTHOPEDICS | Facility: CLINIC | Age: 78
End: 2018-12-06

## 2018-12-06 VITALS — WEIGHT: 179.88 LBS | BODY MASS INDEX: 25.18 KG/M2 | HEIGHT: 71 IN

## 2018-12-06 DIAGNOSIS — M25.551 BILATERAL HIP PAIN: ICD-10-CM

## 2018-12-06 DIAGNOSIS — M16.2 BILATERAL OSTEOARTHRITIS RESULTING FROM HIP DYSPLASIA: Primary | ICD-10-CM

## 2018-12-06 DIAGNOSIS — M25.552 BILATERAL HIP PAIN: ICD-10-CM

## 2018-12-06 PROCEDURE — 72100 X-RAY EXAM L-S SPINE 2/3 VWS: CPT | Mod: 26,HCWC,, | Performed by: RADIOLOGY

## 2018-12-06 PROCEDURE — 99214 OFFICE O/P EST MOD 30 MIN: CPT | Mod: HCWC,S$GLB,, | Performed by: NURSE PRACTITIONER

## 2018-12-06 PROCEDURE — 99999 PR PBB SHADOW E&M-EST. PATIENT-LVL II: CPT | Mod: PBBFAC,HCWC,, | Performed by: NURSE PRACTITIONER

## 2018-12-06 PROCEDURE — 72100 X-RAY EXAM L-S SPINE 2/3 VWS: CPT | Mod: TC,HCWC,PO

## 2018-12-06 RX ORDER — METHYLPREDNISOLONE 4 MG/1
TABLET ORAL
Qty: 1 PACKAGE | Refills: 0 | Status: SHIPPED | OUTPATIENT
Start: 2018-12-06 | End: 2018-12-14

## 2018-12-06 NOTE — TELEPHONE ENCOUNTER
----- Message from Lynda Gimenez sent at 12/6/2018  4:01 PM CST -----  Contact: Self  Patient is calling to ask when his steroid path will be sent to his pharmacy. [facility] in [place].  Please call patient.

## 2018-12-06 NOTE — PROGRESS NOTES
DATE: 12/5/2018  PATIENT: Cedric Gupta Jr.  REFERRING MD:   CHIEF COMPLAINT:   Chief Complaint   Patient presents with    Left Hip - Pain    Right Hip - Pain       HISTORY:  Cedric Gupta Jr. is a 78 y.o. male  who presents for initial evaluation of his bilateral hip pain. His pain rating today is 4/10 located in his groins.  He reports he slept on a matress on the floor at [facility] and has had pain since.  He denies fall or injury.  His pain increases at night to at least 6/10.  He was given tramadol and report some relief.  He complains his shoulders are now bothering him as he is using more upper body strength to stand from sitting since the hip pain began.      PAST MEDICAL/SURGICAL HISTORY:  Past Medical History:   Diagnosis Date    Basal cell carcinoma     BPH (benign prostatic hypertrophy)     Cataract     OU    Elevated PSA     Epiretinal membrane     OS    Fatty liver     noted on usg    History of colon polyps     History of duodenal ulcer     x 2 8/13 and 8/14    History of nephrolithiasis 2008    passed stone    History of prediabetes     Hyperlipidemia     No current medications    Macular degeneration     dry -- OU    Osteoarthritis     S/P colonoscopy     8/15; 8/20    Squamous cell carcinoma 11/08/2016    Right forearm     Past Surgical History:   Procedure Laterality Date    BIOPSY, PROSTATE, RECTAL APPROACH, WITH US GUIDANCE N/A 4/7/2014    [facility]    BIOPSY, PROSTATE, TRANSRECTAL APPROACH, WITH US GUIDANCE N/A 8/20/2018    [facility] at [facility] OR    CHOLECYSTECTOMY      COLONOSCOPY N/A 8/18/2015    Per[facility] by Kasi [facility]ckoy [facility] at [facility] ENDO    EGD (ESOPHAGOGASTRODUODENOSCOPY) N/A 2/12/2014    [facility] at [facility] ENDO    EGD (ESOPHAGOGASTRODUODENOSCOPY) N/A 8/23/2013    [facility] at [facility] ENDO    FRACTURE SURGERY      Right ankle and leg     ORCHIECTOMY      due to undescended testicle/ Left    PROSTATE BIOPSY  2014, -negative    TONSILLECTOMY      TRANSRECTAL BIOPSY OF PROSTATE WITH ULTRASOUND GUIDANCE N/A 2018    Procedure: BIOPSY, PROSTATE, TRANSRECTAL APPROACH, WITH US GUIDANCE;  Surgeon: Joselo Green MD;  Location: [facility] OR;  Service: Urology;  Laterality: N/A;  SITE PROSTATE    TRANSRECTAL ULTRASOUND GUIDED PROSTATE BIOPSY N/A 1/15/2018    Performed by Joselo Green MD at [facility] OR       Current Medications:   Current Outpatient Medications:     ANTIOX #11/OM3/DHA/EPA/LUT/NESHA (OCUVITE ADULT 50+ ORAL), Take by mouth once daily., Disp: , Rfl:     aspirin (ECOTRIN) 81 MG EC tablet, Take 81 mg by mouth once daily. , Disp: , Rfl:     traMADol (ULTRAM) 50 mg tablet, Take 1 tablet (50 mg total) by mouth every 6 (six) hours as needed for Pain., Disp: 28 tablet, Rfl: 0    Family History: family history was reviewed and is noncontributory  Social History:   Social History     Socioeconomic History    Marital status:      Spouse name: Not on file    Number of children: Not on file    Years of education: Not on file    Highest education level: Not on file   Social Needs    Financial resource strain: Not on file    Food insecurity - worry: Not on file    Food insecurity - inability: Not on file    Transportation needs - medical: Not on file    Transportation needs - non-medical: Not on file   Occupational History    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[employer])   Tobacco Use    Smoking status: Former Smoker     Years: 0.50     Last attempt to quit: 1965     Years since quittin.3    Smokeless tobacco: Never Used   Substance and Sexual Activity    Alcohol use: Yes     Alcohol/week: 1.2 - 2.4 oz     Types: 2 - 4 Cans of beer per week     Comment: twice a week    Drug use: Yes     Frequency: 7.0 times per week     Types: Marijuana     Comment: marijuana    Sexual activity: Yes     Partners: Female      "Birth control/protection: None   Other Topics Concern    Not on file   Social History Narrative    Not on file       ROS:  Constitution: Negative for chills, fever, and sweats. Negative for unexplained weight loss.  HENT: Negative for headaches and blurry vision.   Cardiovascular: Negative for chest pain, irregular heartbeat, leg swelling and palpitations.   Respiratory: Negative for cough and shortness of breath.   Gastrointestinal: Negative for abdominal pain, heartburn, nausea and vomiting.   Genitourinary: Negative for bladder incontinence and dysuria.   Musculoskeletal: Negative for systemic arthritis, joint swelling, muscle weakness and myalgias.   Neurological: Negative for numbness.   Psychiatric/Behavioral: Negative for depression.   Endocrine: Negative for polyuria.   Hematologic/Lymphatic: Negative for bleeding disorders.  Skin: Negative for poor wound healing.       PHYSICAL EXAM:  Ht 5' 11" (1.803 m)   Wt 81.6 kg (179 lb 14.3 oz)   BMI 25.09 kg/m²   Cedric Gupta Jr. is a well developed, well nourished male in no acute distress. Physical examination of the bilateral hip and lumbar spine evaluated the following:    Gait and Alignment  Lumbar spine range of motion  Inspection for ecchymosis, swelling, atrophy, or deformity  Tenderness to palpation over the bony and soft tissue structures around the lower back/hip  Inspection for intra-articular and/or bursal effusions  Range of Motion and presence of contractures  Sensation and motor strength to the lower extremity  Pain/pop/click with logrolling or range of motion  Straight leg raise testing  Vascular exam to include skin temperature/color/capillary refill    Remarkable findings included:  ROM full with groin pain bilaterally with external and internal rotation  Negative straight leg raises, resisted  5/5 strength  Sensation intact  Skin warm, dry, intact    IMAGING:   X-ray obtained Bilateral hip performed 12/04/18 personally reviewed with " patient. Radiologist report as follows:    There is no fracture or dislocation.  There is mild right marginal femoral head osteophyte formation.    ASSESSMENT:   Bilateral hip osteoarthritis    PLAN:  The nature of the diagnosis, using models and diagrams when appropriate, was explained to the patient in detail.Treatment option discussed included non-operative measures of rest, modification of activities, application of ice, over the counter pain/antiinflammatory relief, physical therapy, cortisone injection, or medrol dosepack.  All questions answered and the patient wishes to proceed today with a medrol dosepack.  He will call if no improvement or worsening for a referral to physical therapy.

## 2018-12-14 ENCOUNTER — OFFICE VISIT (OUTPATIENT)
Dept: FAMILY MEDICINE | Facility: CLINIC | Age: 78
End: 2018-12-14
Payer: MEDICARE

## 2018-12-14 ENCOUNTER — LAB VISIT (OUTPATIENT)
Dept: LAB | Facility: HOSPITAL | Age: 78
End: 2018-12-14
Attending: FAMILY MEDICINE
Payer: MEDICARE

## 2018-12-14 VITALS
DIASTOLIC BLOOD PRESSURE: 70 MMHG | HEIGHT: 71 IN | WEIGHT: 178.81 LBS | OXYGEN SATURATION: 97 % | HEART RATE: 66 BPM | SYSTOLIC BLOOD PRESSURE: 137 MMHG | BODY MASS INDEX: 25.03 KG/M2

## 2018-12-14 DIAGNOSIS — Z85.89 HISTORY OF SQUAMOUS CELL CARCINOMA: ICD-10-CM

## 2018-12-14 DIAGNOSIS — R97.20 ELEVATED PSA: ICD-10-CM

## 2018-12-14 DIAGNOSIS — H35.3132 NONEXUDATIVE AGE-RELATED MACULAR DEGENERATION, BILATERAL, INTERMEDIATE DRY STAGE: ICD-10-CM

## 2018-12-14 DIAGNOSIS — Z85.828 HISTORY OF BASAL CELL CARCINOMA (BCC): ICD-10-CM

## 2018-12-14 DIAGNOSIS — E78.5 HYPERLIPIDEMIA, UNSPECIFIED HYPERLIPIDEMIA TYPE: ICD-10-CM

## 2018-12-14 DIAGNOSIS — M16.0 OSTEOARTHRITIS OF BOTH HIPS, UNSPECIFIED OSTEOARTHRITIS TYPE: ICD-10-CM

## 2018-12-14 DIAGNOSIS — F12.90 MARIJUANA SMOKER: ICD-10-CM

## 2018-12-14 DIAGNOSIS — H25.13 NUCLEAR SCLEROSIS OF BOTH EYES: ICD-10-CM

## 2018-12-14 DIAGNOSIS — M18.0 ARTHRITIS OF CARPOMETACARPAL (CMC) JOINTS OF BOTH THUMBS: ICD-10-CM

## 2018-12-14 DIAGNOSIS — Z86.010 HX OF COLONIC POLYP: ICD-10-CM

## 2018-12-14 DIAGNOSIS — Z00.00 ENCOUNTER FOR PREVENTIVE HEALTH EXAMINATION: Primary | ICD-10-CM

## 2018-12-14 LAB
ALBUMIN SERPL BCP-MCNC: 3.4 G/DL
ALP SERPL-CCNC: 72 U/L
ALT SERPL W/O P-5'-P-CCNC: 12 U/L
ANION GAP SERPL CALC-SCNC: 9 MMOL/L
AST SERPL-CCNC: 14 U/L
BASOPHILS # BLD AUTO: 0.05 K/UL
BASOPHILS NFR BLD: 0.4 %
BILIRUB SERPL-MCNC: 0.5 MG/DL
BUN SERPL-MCNC: 13 MG/DL
CALCIUM SERPL-MCNC: 9.7 MG/DL
CHLORIDE SERPL-SCNC: 102 MMOL/L
CHOLEST SERPL-MCNC: 188 MG/DL
CHOLEST/HDLC SERPL: 3.5 {RATIO}
CO2 SERPL-SCNC: 32 MMOL/L
CREAT SERPL-MCNC: 0.9 MG/DL
DIFFERENTIAL METHOD: ABNORMAL
EOSINOPHIL # BLD AUTO: 0.6 K/UL
EOSINOPHIL NFR BLD: 4.9 %
ERYTHROCYTE [DISTWIDTH] IN BLOOD BY AUTOMATED COUNT: 12.5 %
EST. GFR  (AFRICAN AMERICAN): >60 ML/MIN/1.73 M^2
EST. GFR  (NON AFRICAN AMERICAN): >60 ML/MIN/1.73 M^2
GLUCOSE SERPL-MCNC: 102 MG/DL
HCT VFR BLD AUTO: 40.7 %
HDLC SERPL-MCNC: 54 MG/DL
HDLC SERPL: 28.7 %
HGB BLD-MCNC: 13.4 G/DL
IMM GRANULOCYTES # BLD AUTO: 0.04 K/UL
IMM GRANULOCYTES NFR BLD AUTO: 0.3 %
LDLC SERPL CALC-MCNC: 118.8 MG/DL
LYMPHOCYTES # BLD AUTO: 2.4 K/UL
LYMPHOCYTES NFR BLD: 20.8 %
MCH RBC QN AUTO: 30.5 PG
MCHC RBC AUTO-ENTMCNC: 32.9 G/DL
MCV RBC AUTO: 93 FL
MONOCYTES # BLD AUTO: 1 K/UL
MONOCYTES NFR BLD: 8.9 %
NEUTROPHILS # BLD AUTO: 7.4 K/UL
NEUTROPHILS NFR BLD: 64.7 %
NONHDLC SERPL-MCNC: 134 MG/DL
NRBC BLD-RTO: 0 /100 WBC
PLATELET # BLD AUTO: 373 K/UL
PMV BLD AUTO: 9.6 FL
POTASSIUM SERPL-SCNC: 4.1 MMOL/L
PROT SERPL-MCNC: 7.4 G/DL
RBC # BLD AUTO: 4.4 M/UL
SODIUM SERPL-SCNC: 143 MMOL/L
TRIGL SERPL-MCNC: 76 MG/DL
WBC # BLD AUTO: 11.43 K/UL

## 2018-12-14 PROCEDURE — 80061 LIPID PANEL: CPT

## 2018-12-14 PROCEDURE — 99999 PR PBB SHADOW E&M-EST. PATIENT-LVL IV: CPT | Mod: PBBFAC,,, | Performed by: NURSE PRACTITIONER

## 2018-12-14 PROCEDURE — 80053 COMPREHEN METABOLIC PANEL: CPT

## 2018-12-14 PROCEDURE — 85025 COMPLETE CBC W/AUTO DIFF WBC: CPT

## 2018-12-14 PROCEDURE — G0439 PPPS, SUBSEQ VISIT: HCPCS | Mod: S$GLB,,, | Performed by: NURSE PRACTITIONER

## 2018-12-14 PROCEDURE — 36415 COLL VENOUS BLD VENIPUNCTURE: CPT | Mod: PO

## 2018-12-14 NOTE — PROGRESS NOTES
"Cedric Gupta presented for a  Medicare AWV and comprehensive Health Risk Assessment today. The following components were reviewed and updated:    · Medical history  · Family History  · Social history  · Allergies and Current Medications  · Health Risk Assessment  · Health Maintenance  · Care Team     ** See Completed Assessments for Annual Wellness Visit within the encounter summary.**       The following assessments were completed:  · Living Situation  · CAGE  · Depression Screening  · Timed Get Up and Go  · Whisper Test  · Cognitive Function Screening          · Nutrition Screening  · ADL Screening  · PAQ Screening    Vitals:    12/14/18 0755   BP: 137/70   BP Location: Left arm   Patient Position: Sitting   BP Method: Medium (Automatic)   Pulse: 66   SpO2: 97%   Weight: 81.1 kg (178 lb 12.7 oz)   Height: 5' 11" (1.803 m)     Body mass index is 24.94 kg/m².  Physical Exam   Constitutional: No distress.   HENT:   Head: Normocephalic and atraumatic.   Cardiovascular: Normal rate and regular rhythm.   No murmur heard.  Pulmonary/Chest: Effort normal and breath sounds normal. He has no wheezes.   Neurological: He is alert. No cranial nerve deficit.   Skin: Skin is warm.   Vitals reviewed.        Diagnoses and health risks identified today and associated recommendations/orders:    1. Encounter for preventive health examination  Reviewed health maintenance and provided recommendations   Recommend flu vaccine     2. Osteoarthritis of both hips, unspecified osteoarthritis type  Continue to monitor   Followed by Fernanda.    - Ambulatory Referral to Physical/Occupational Therapy    3. Nonexudative age-related macular degeneration, bilateral, intermediate dry stage  Follow ophthalmology recommendations   Followed by Lisandra.      4. History of squamous cell carcinoma  Continue to monitor   Followed by Julito.      5. Arthritis of carpometacarpal (CMC) joints of both thumbs  Continue to monitor   Followed by Tracy " MD Sarita .      6. Marijuana smoker  Continue to monitor   Followed by Tracy Stein MD .      7. Nuclear sclerosis of both eyes  Follow ophthalmology recommendations   Followed by Lisandra.      8. Hyperlipidemia, unspecified hyperlipidemia type  Continue to monitor   Followed by Tracy Stein MD .      9. Elevated PSA  Continue to monitor   Followed by jaimee.      10. Hx of colonic polyp  Continue to monitor   Followed by Tracy Stein MD .      11. History of basal cell carcinoma (BCC)  Continue skin checks as directed by derm  Followed by Julito.        Provided Halphen with a 5-10 year written screening schedule and personal prevention plan. Recommendations were developed using the USPSTF age appropriate recommendations. Education, counseling, and referrals were provided as needed. After Visit Summary printed and given to patient which includes a list of additional screenings\tests needed.    Follow-up in about 1 year (around 12/14/2019).    Noy Lees NP

## 2018-12-14 NOTE — PATIENT INSTRUCTIONS
Counseling and Referral of Other Preventative  (Italic type indicates deductible and co-insurance are waived)    Patient Name: Cedric Gupta  Today's Date: 12/14/2018    Health Maintenance       Date Due Completion Date    TETANUS VACCINE 05/13/2018 5/13/2008    Influenza Vaccine 08/01/2018 10/23/2017    Override on 10/1/2013: Done    Lipid Panel 02/28/2019 2/28/2018    Colonoscopy 08/18/2020 8/18/2015        Orders Placed This Encounter   Procedures    Ambulatory Referral to Physical/Occupational Therapy     The following information is provided to all patients.  This information is to help you find resources for any of the problems found today that may be affecting your health:                Living healthy guide: www.[facility].[place].gov      Understanding Diabetes: www.diabetes.org      Eating healthy: www.cdc.gov/healthyweight      CDC home safety checklist: www.cdc.gov/steadi/patient.html      Agency on Aging: www.goea.[place].[facility]      Alcoholics anonymous (AA): www.aa.org      Physical Activity: www.humaira.nih.gov/za0baui      Tobacco use: www.quitwithusla.org

## 2018-12-18 ENCOUNTER — PATIENT MESSAGE (OUTPATIENT)
Dept: FAMILY MEDICINE | Facility: CLINIC | Age: 78
End: 2018-12-18

## 2018-12-18 DIAGNOSIS — R79.89 ABNORMAL CBC: Primary | ICD-10-CM

## 2018-12-18 NOTE — TELEPHONE ENCOUNTER
Spoke to patient regarding results, verbalized understanding. Lab recheck scheduled for 2 weeks, patient aware of date/time.

## 2018-12-20 ENCOUNTER — CLINICAL SUPPORT (OUTPATIENT)
Dept: REHABILITATION | Facility: HOSPITAL | Age: 78
End: 2018-12-20
Payer: MEDICARE

## 2018-12-20 DIAGNOSIS — M25.552 PAIN OF BOTH HIP JOINTS: Primary | ICD-10-CM

## 2018-12-20 DIAGNOSIS — M25.652 STIFFNESS OF BOTH HIP JOINTS: ICD-10-CM

## 2018-12-20 DIAGNOSIS — M25.551 PAIN OF BOTH HIP JOINTS: Primary | ICD-10-CM

## 2018-12-20 DIAGNOSIS — M25.651 STIFFNESS OF BOTH HIP JOINTS: ICD-10-CM

## 2018-12-20 DIAGNOSIS — M62.81 MUSCLE WEAKNESS: ICD-10-CM

## 2018-12-20 PROCEDURE — G8978 MOBILITY CURRENT STATUS: HCPCS | Mod: CK,PN | Performed by: PHYSICAL THERAPIST

## 2018-12-20 PROCEDURE — 97162 PT EVAL MOD COMPLEX 30 MIN: CPT | Mod: PN | Performed by: PHYSICAL THERAPIST

## 2018-12-20 PROCEDURE — 97110 THERAPEUTIC EXERCISES: CPT | Mod: PN | Performed by: PHYSICAL THERAPIST

## 2018-12-20 PROCEDURE — G8979 MOBILITY GOAL STATUS: HCPCS | Mod: CJ,PN | Performed by: PHYSICAL THERAPIST

## 2018-12-20 NOTE — PLAN OF CARE
PHYSICAL THERAPY INITIAL EVALUATION    Name:Cedric Westonbobo Baron  Physician:Noy Lees,*  Date of eval:12/20/2018  Orders:  Physical Therapy evaluate and treat  Clinic: [id number]  Diagnosis:  1. Pain of both hip joints     2. Muscle weakness     3. Stiffness of both hip joints         Precautions: OA B hips, lumbar DDD  Evaluation date: 12/20/2018  Visit # authorized: 1/20  Authorization period: 12-31-18  Plan of care expiration: 2-14-19  MD Follow up appt: none scheduled    Time In:  11:05  Time Out:  12:10  Treatment time 15 min    Subjective     Chief complaint: B hip pain.  Pt states he also is having B shoulder pain  Onset of pain : Thanksgiving 2018   Mechanism of onset :  Had slept on a mattress on the floor and that AM and had difficulty getting up.  Pt states he took Aleve which helped, but then got progressively worse. Pt states pain was in area of ASIS B Pt went to MD and started on Tramadol.  Pt states sleep was disturbed trying to lie on either side.  Pt then had x ray and got Medrol dose pack and pain subsided.  Pt states shoulders started to bother him for he was using his arms a lot to help get up.  Pt states he finished Medrol dose pack 3 days ago and pain in hips starting to come back and shoulders still hurting.      Radicular symptoms:neg   Bowel and Bladder incontinence:neg  Aggravating factors: sit to stand, little with walking, and slightly moving in bed from one side to the other  Easing factors: sitting   Sleep is disturbed now more because of arms. 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 Pt has stairs and no problem now in hips with stairs    Allergies:    Review of patient's allergies indicates:   Allergen  "Reactions    Amantadine Other (See Comments)     Depression       Medical history: 2 pins R ankle 1965 no problems with ankle generally, unless walking for 2 hours or so on concrete  Past Medical History:   Diagnosis Date    Basal cell carcinoma     BPH (benign prostatic hypertrophy)     Cataract     OU    Elevated PSA     Epiretinal membrane     OS    Fatty liver     noted on usg    History of colon polyps     History of duodenal ulcer     x 2 8/13 and 8/14    History of nephrolithiasis 2008    passed stone    History of prediabetes     Hyperlipidemia     No current medications    Macular degeneration     dry -- OU    Osteoarthritis     S/P colonoscopy     8/15; 8/20    Squamous cell carcinoma 11/08/2016    Right forearm       Medication:   Current Outpatient Medications on File Prior to Visit   Medication Sig Dispense Refill    ANTIOX #11/OM3/DHA/EPA/LUT/NESHA (OCUVITE ADULT 50+ ORAL) Take by mouth once daily.      aspirin (ECOTRIN) 81 MG EC tablet Take 81 mg by mouth once daily.       traMADol (ULTRAM) 50 mg tablet Take 1 tablet (50 mg total) by mouth every 6 (six) hours as needed for Pain. 28 tablet 0     No current facility-administered medications on file prior to visit.               Xray: 12-5-18 Mild degenerative change of the right hip.  There is multilevel facet joint arthropathy.  Degenerative disc disease is most apparent at the L4-5 and L5-S1 levels.  Additionally, the spinous processes are all over grown and there is contact with 1 another from the L2 through L5 levels.    Pain level with 0 being the lowest and 10 being the highest presently: 2-3  Pain level with 0 being the lowest and 10 being the highest at worst: 8  Pain level with 0 being the lowest and 10 being the highest at best: 1     Patient Goals: "strengthen legs so pain stops to get healthy again"    Objective     Postural examination in standing:  - normal lumbar lordosis  - forward head  - forward shoulders  - R hip " high  - L shoulder high  - Increased ER R LE  - FB trunk   - slight genu varus     Postural examination in sitting:   - normal lumbar lordosis  - forward head  - forward shoulders      Functional assessment:   - walking: FB trunk no deficit noted   - sit to stand: significant use of hands  - sit to supine: min difficulty       - supine to sit: min difficulty  - supine to prone: mod difficulty    Pelvic positioning: AR R     Hip ROM: (measured in degrees)   Hip RLE LLE   Flexion 120 120   Abduction  20 20   Adduction 20 20   Internal rotation 20 10   External rotation 50 40        Flexibility testing:  - hamstrings:     90/90 test R 20 L 25           - gastrocnemius:   DF ankle R 5 degrees L 5 degrees             - hip flexors: + tightness B R >L     Muscle Strength  MMT R L   Hip flexion 4/5 4/5   Hip abduction 4/5 4/5   Hip extension 4-/5 4-/5   Glut max 3-/5 3-/5        Knee extension 5/5 5/5   Knee flexion 5/5 5/5       Endurance is  fair     Palpation: mild TTP to psoas B    Sensation: Intact    Outcome measures:   Impairment: mobility  FOTO lumbar disability index: 60  PT reviewed FOTO scores for Cedric Gupta Jr. on 12/20/2018.   FOTO scores were entered into [employer] - see media section.    · G-code current: CK at least 40% but < 60% impaired, limited or restricted  · G-code goal: CJ at least 20%, but < 40% impaired, limited or restricted  Severity modifier selections based on the FOTO scoring, objective findings, and co-morbidity assessment    TREATMENT:  Therapeutic exercise: Cedric received therapeutic exercises to develop strength, stabilization and endurance; flexibility and range of motion for 15 minutes including:see HEP sheet.     Hip flexor stretch x 30 sec x 2  HS stretch x 10  Piriformis stretch x 5 due to pinching in groin  Bridge x 10  Hip abduction sidelying x 10  Push/pull x 3    Pt. Education: Instructed pt. regarding:proper technique with all exercises. Pt. to demonstrate good understanding  of the education provided. Cedric demonstrated good return demonstration of activities. No cultural, environmental, or spiritual barriers identified to treatment or learning.  Assessment   This is a 78 y.o. male referred to outpatient physical therapy and presents with a medical diagnosis of OA B hips and PT diagnosis/findings of pain in B hips with pelvic dysfunction with loss of flexibility, ROM and strength demonstrating limitations as described in the problem list. Patient was in agreement with set goals and plan of care. Pt was given a written HEP along with posture education, instruction on body mechanics, activity modification/avoidance, and core/lumbar/LE strengthening regimen. Pt. verbally understood instructions and demonstrated proper form/technique. Pt was advised to perform these exercises free of pain, and discontinue use if symptoms persist/worsen. Pt will benefit from physical therapy services in order to maximize pain free functional independence. Rehab potential is good.    History  Co-morbidities and personal factors that may impact the plan of care Examination  Body Structures and Functions, activity limitations and participation restrictions that may impact the plan of care    Clinical Presentation   Co-morbidities:   no deficits        Personal Factors:   age Body Regions:   lower extremities  trunk    Body Systems:    ROM  strength  gait            Participation Restrictions:   ADL     Activity limitations:   Learning and applying knowledge  no deficits    General Tasks and Commands  no deficits    Communication  no deficits    Mobility  walking    Self care  no deficits    Domestic Life  shopping  cooking  doing house work (cleaning house, washing dishes, laundry)  assisting others    Interactions/Relationships  no deficits    Life Areas  no deficits    Community and Social Life  no deficits         evolving clinical presentation with changing clinical  characteristics                      moderate   moderate  moderate Decision Making/ Complexity Score:  moderate     Medical necessity is demonstrated by the following IMPAIRMENTS/PROBLEM LIST:  Decreased range of motion  Decreased strength  Pelvic dysfunction  Increased pain with walking  Gait dysfunction  Increased pain with sit to stand transfer  Disturbed sleep  ADL and household activities lead to increased pain and are limited  Functional impairment rating of: 60, CK at least 40% but < 60% impaired, limited or restricted    GOALS:   Short Term Goals:  4 weeks  Increase range of motion 25%  Increase strength 1/2 muscle grade  Improve postural awareness of pelvis to independently identify dysfunction with min assist from PT  Be able to perform HEP with minimal cueing required  Improve functional impairment of mobility to 62    Long Term Goals: 8 weeks  Increase range of motion to 75% to 100% full   Improve muscle strength 1 muscle grade  Improve and stabilize proper pelvic positioning  Restore ability to ambulate with normal pain free gait  Walking for ADL will be restored without increased pain  Restore ability to perform sit to stand transfer without increased pain  Restore normal sleep habits without disturbances due to pain  Restore ability to perform ADL's and household activities independently and without increased pain  Improve functional impairment of mobility to CJ at least 20%, but < 40% impaired, limited or restricted    Plan     Pt will be treated by physical therapy 1-3 times a week for 8 weeks to include: Therapeutic exercises to increase ROM, strength and stabilization; joint and soft tissue mobilization with manual therapy techniques to decrease muscle tightness, pain and improve joint mobility; neuromuscular re-education to improve balance, coordination, kinesthetic sense and proprioception, therapeutic activities to improve coordination, strength and function, therapeutic taping to decrease  pain, provide support and improve function; modalities such as moist heat, ice, ultrasound and electrical stimulation to increase circulation, decrease pain and inflammation; dry needling with manual therapy techniques to decrease pain, inflammation and swelling, increase circulation and promote healing process will be considered and utilized as needed; temporary orthotics will be considered and utilized as needed to further decrease pain in WB.  Pt may be seen by PTA to carry out plan of care as part of the Rehab team.    I certify the need for these services furnished under this plan of treatment and while under my care.    ____________________________________ Physician/Referring Practitioner                                Date of Signature

## 2018-12-21 ENCOUNTER — TELEPHONE (OUTPATIENT)
Dept: FAMILY MEDICINE | Facility: CLINIC | Age: 78
End: 2018-12-21

## 2018-12-21 DIAGNOSIS — M16.2 BILATERAL OSTEOARTHRITIS RESULTING FROM HIP DYSPLASIA: ICD-10-CM

## 2018-12-21 RX ORDER — TRAMADOL HYDROCHLORIDE 50 MG/1
TABLET ORAL
Qty: 30 TABLET | Refills: 0 | Status: SHIPPED | OUTPATIENT
Start: 2018-12-21 | End: 2019-01-16 | Stop reason: SDUPTHER

## 2018-12-21 NOTE — TELEPHONE ENCOUNTER
----- Message from Katherin Gallo sent at 12/21/2018  9:02 AM CST -----  Contact: patient  Type: Needs Medical Advice    Who Called:  Patient  Symptoms (please be specific):    How long has patient had these symptoms:    Pharmacy name and phone #:    Best Call Back Number: [phone]  Additional Information: requesting a call back to discuss medication

## 2018-12-21 NOTE — TELEPHONE ENCOUNTER
----- Message from Kelsey Schmidt sent at 12/21/2018  4:21 PM CST -----  Contact: Patient  Type: Needs Medical Advice    Who Called:  Alonso    Pharmacy name and phone #:    [id number] - [place] - [address]  [address]  [place] [address]  Phone: [phone] Fax: [phone]    Best Call Back Number: [phone]  Additional Information: Stating that he is would like to add a steroid medication along with the Tramadol that was sent in today. Also stating that he will be out of town this Saturday and would like this done before the. Call was placed to POD no answer. Please call back and advise.

## 2018-12-21 NOTE — TELEPHONE ENCOUNTER
I cannot give him anything stronger for the pain. The strongest thing I can write for is the tramadol. Dr. Stein is not here to give him anything stronger. I can send in a few more steroids to get him through the holiday then get him in to follow up with  Orthopedics. Let me know if he wants to take more steroids.

## 2018-12-21 NOTE — TELEPHONE ENCOUNTER
Patient is requesting to speak directly to provider, states he has questions regarding steroid prescription.

## 2018-12-21 NOTE — TELEPHONE ENCOUNTER
I spoke to him. He does not feel that doing more steroids is needed. He would like a few more tramadol for his trip, then he will follow up with Dr. Stein and Orthopedics when he returns. He is dong PT and that is helping.

## 2018-12-21 NOTE — TELEPHONE ENCOUNTER
Patient states he saw NP on 12/4/18 and received steroids for hip and shoulder pain. Patient has started PT which has provided some relief. Has completed steroid pack and pain has returned to bilateral shoulders. Patient is concerned because he is unable to sleep due to the pain and will be travelling to [place] for 1 week. Patient states he currently has tramadol which does not provide relief, and has tried to take two tramadol as suggested which also provides no relief. Patient is requesting suggestions for pain relief, please advise.

## 2018-12-24 ENCOUNTER — TELEPHONE (OUTPATIENT)
Dept: FAMILY MEDICINE | Facility: CLINIC | Age: 78
End: 2018-12-24

## 2018-12-24 RX ORDER — METHYLPREDNISOLONE 4 MG/1
TABLET ORAL
Qty: 1 PACKAGE | Refills: 0 | Status: SHIPPED | OUTPATIENT
Start: 2018-12-24 | End: 2019-01-11

## 2018-12-24 RX ORDER — PREDNISONE 20 MG/1
TABLET ORAL
Qty: 18 TABLET | Refills: 0 | Status: SHIPPED | OUTPATIENT
Start: 2018-12-24 | End: 2019-03-22 | Stop reason: ALTCHOICE

## 2018-12-24 NOTE — TELEPHONE ENCOUNTER
Patient is requesting a steroid be added.  On Friday patient requested something stronger for pain but did not wish to resume a steroid at that time.  Now requesting a refill on the steroid.   Please advise.

## 2018-12-24 NOTE — TELEPHONE ENCOUNTER
----- Message from Camille Vira sent at 12/24/2018  8:29 AM CST -----  Contact: pt  ..Type: Needs Medical Advice    Who Called:  pt  Symptoms (please be specific): Still having severe pain in shoulders and hips.  Pharmacy name and phone #: ..  [facility] - [place] - [address]  [address] [facility] [place] [address]  Phone: [phone] Fax: [phone]  Best Call Back Number:[phone]  Additional Information: Pt would like to speak with a nurse in  Regards to getting a refill on his medication(MethylPrediSolone). Pt stated (Tramadol) is not helping with the pain  Please call to advise if able to fill.  Thanks

## 2018-12-24 NOTE — TELEPHONE ENCOUNTER
----- Message from Nina Alonso sent at 12/24/2018  9:46 AM CST -----  Contact: Self  Patient states that the tramadol is not working and that Danni suggested that she would put him back on a round of steriod pk, if the pain med didn't work when doubled.  He has been taking the two tramadol's like she suggested, patient is still hurting.  Would like Danni to order him another round of the steriods, like she suggested.  Call back at [phone] (home).  Thanks    [address] [place] - [address]  [address]  [place] [address]  Phone: [phone] Fax: [phone]

## 2018-12-24 NOTE — TELEPHONE ENCOUNTER
----- Message from RT Mita sent at 12/22/2018  9:25 AM CST -----  Contact: pt    pt , requesting medication refill: Z Prednisone Pack since the pain medication did not help, please call to confirm, thanks.

## 2019-01-03 ENCOUNTER — TELEPHONE (OUTPATIENT)
Dept: FAMILY MEDICINE | Facility: CLINIC | Age: 79
End: 2019-01-03

## 2019-01-03 ENCOUNTER — LAB VISIT (OUTPATIENT)
Dept: LAB | Facility: HOSPITAL | Age: 79
End: 2019-01-03
Attending: FAMILY MEDICINE
Payer: MEDICARE

## 2019-01-03 DIAGNOSIS — R79.89 ABNORMAL CBC: ICD-10-CM

## 2019-01-03 LAB
BASOPHILS # BLD AUTO: 0.06 K/UL
BASOPHILS NFR BLD: 0.5 %
DIFFERENTIAL METHOD: ABNORMAL
EOSINOPHIL # BLD AUTO: 0.5 K/UL
EOSINOPHIL NFR BLD: 4.1 %
ERYTHROCYTE [DISTWIDTH] IN BLOOD BY AUTOMATED COUNT: 13.2 %
HCT VFR BLD AUTO: 40.5 %
HGB BLD-MCNC: 12.7 G/DL
IMM GRANULOCYTES # BLD AUTO: 0.14 K/UL
IMM GRANULOCYTES NFR BLD AUTO: 1.1 %
LYMPHOCYTES # BLD AUTO: 3.3 K/UL
LYMPHOCYTES NFR BLD: 25.6 %
MCH RBC QN AUTO: 30.3 PG
MCHC RBC AUTO-ENTMCNC: 31.4 G/DL
MCV RBC AUTO: 97 FL
MONOCYTES # BLD AUTO: 1.2 K/UL
MONOCYTES NFR BLD: 9.4 %
NEUTROPHILS # BLD AUTO: 7.6 K/UL
NEUTROPHILS NFR BLD: 59.3 %
NRBC BLD-RTO: 0 /100 WBC
PLATELET # BLD AUTO: 357 K/UL
PMV BLD AUTO: 10.1 FL
RBC # BLD AUTO: 4.19 M/UL
WBC # BLD AUTO: 12.78 K/UL

## 2019-01-03 PROCEDURE — 36415 COLL VENOUS BLD VENIPUNCTURE: CPT | Mod: PO

## 2019-01-03 PROCEDURE — 85025 COMPLETE CBC W/AUTO DIFF WBC: CPT

## 2019-01-03 NOTE — TELEPHONE ENCOUNTER
----- Message from Karen Ribera sent at 1/2/2019 11:56 AM CST -----  Contact: Patient  Type: Needs Medical Advice    Who Called:  Patient  Best Call Back Number:   Additional Information: Calling to speak with the Nurse about steroids. Please advise.

## 2019-01-04 ENCOUNTER — OFFICE VISIT (OUTPATIENT)
Dept: FAMILY MEDICINE | Facility: CLINIC | Age: 79
End: 2019-01-04
Payer: MEDICARE

## 2019-01-04 ENCOUNTER — PATIENT MESSAGE (OUTPATIENT)
Dept: FAMILY MEDICINE | Facility: CLINIC | Age: 79
End: 2019-01-04

## 2019-01-04 VITALS
SYSTOLIC BLOOD PRESSURE: 128 MMHG | RESPIRATION RATE: 18 BRPM | HEART RATE: 78 BPM | BODY MASS INDEX: 24.83 KG/M2 | WEIGHT: 177.38 LBS | TEMPERATURE: 98 F | DIASTOLIC BLOOD PRESSURE: 60 MMHG | HEIGHT: 71 IN

## 2019-01-04 DIAGNOSIS — M25.519 SHOULDER PAIN, UNSPECIFIED CHRONICITY, UNSPECIFIED LATERALITY: ICD-10-CM

## 2019-01-04 DIAGNOSIS — R79.89 ABNORMAL CBC: Primary | ICD-10-CM

## 2019-01-04 PROCEDURE — 99213 PR OFFICE/OUTPT VISIT, EST, LEVL III, 20-29 MIN: ICD-10-PCS | Mod: 25,S$GLB,, | Performed by: NURSE PRACTITIONER

## 2019-01-04 PROCEDURE — G0008 ADMIN INFLUENZA VIRUS VAC: HCPCS | Mod: S$GLB,,, | Performed by: NURSE PRACTITIONER

## 2019-01-04 PROCEDURE — 1101F PR PT FALLS ASSESS DOC 0-1 FALLS W/OUT INJ PAST YR: ICD-10-PCS | Mod: CPTII,S$GLB,, | Performed by: NURSE PRACTITIONER

## 2019-01-04 PROCEDURE — 1101F PT FALLS ASSESS-DOCD LE1/YR: CPT | Mod: CPTII,S$GLB,, | Performed by: NURSE PRACTITIONER

## 2019-01-04 PROCEDURE — G0008 FLU VACCINE - HIGH DOSE (65+) PRESERVATIVE FREE IM: ICD-10-PCS | Mod: S$GLB,,, | Performed by: NURSE PRACTITIONER

## 2019-01-04 PROCEDURE — 90662 FLU VACCINE - HIGH DOSE (65+) PRESERVATIVE FREE IM: ICD-10-PCS | Mod: S$GLB,,, | Performed by: NURSE PRACTITIONER

## 2019-01-04 PROCEDURE — 99213 OFFICE O/P EST LOW 20 MIN: CPT | Mod: 25,S$GLB,, | Performed by: NURSE PRACTITIONER

## 2019-01-04 PROCEDURE — 90662 IIV NO PRSV INCREASED AG IM: CPT | Mod: S$GLB,,, | Performed by: NURSE PRACTITIONER

## 2019-01-04 NOTE — TELEPHONE ENCOUNTER
"Discussed results with patient, states he had a slight cold week after thanksgiving but "it wasn't bad" and has been taking multiple steroid packs lately due to inflammation/shoulder pain. Advised patient regarding contact from hem/onc for scheduling, patient aware.   "

## 2019-01-04 NOTE — PROGRESS NOTES
"Subjective:       Patient ID: Cedric Gupta Jr. is a 78 y.o. male.    Chief Complaint: Shoulder Pain (Bilateral shoulder pain, started Thanksgiving; Flu shot today)    HPI here with concerns regarding bilateral shoulder pain. Pain with raising his arm up. Worse on the right side. Hip pain is improved. He has done two rounds of steroids. He saw Orthopedics. He is starting physical therapy next week. Add shoulders to PT.  No shoulder injuries. Mild  weakness. States the pain is achy type. He denies any other concerns. Using the tramadol only for intense discomfort, states he has not needed it much lately. See ROS.    The following portion of the patients history was reviewed and updated as appropriate: allergies, current medications, past medical and surgical history. Past social history and problem list reviewed. Family PMH and Past social history reviewed. Tobacco, Illicit drug use reviewed.     Review of Systems   HENT: Negative.      Constitutional: No fatigue or fever    Respiratory:   No SOB, Wheezing, cough  Cardiovascular:   No CP, Palpitations  Musculoskeletal:   bilateral shoulder joint pain, right is the worst. See HPI  No change in gait or coordination. .  Neurological:   No dizziness. No headaches  Hematological: No abnormal bruising or bleeding      Objective:     /60   Pulse 78   Temp 98.3 °F (36.8 °C) (Oral)   Resp 18   Ht 5' 11" (1.803 m)   Wt 80.5 kg (177 lb 6.4 oz)   BMI 24.74 kg/m²      Physical Exam     Constitutional: oriented to person, place, and time. well-developed and well-nourished.   Cardiovascular: Normal rate, regular rhythm and normal heart sounds.    Pulmonary/Chest: Effort normal and breath sounds normal. No respiratory distress. No wheezes.   Abdominal: Soft. Bowel sounds are normal. No distension. There is no tenderness.   Musculoskeletal: Normal range of motion.  strong, equal. He is able to raise his arms, but states it hurts. Normal shoulder and upper " extremity strength.   Neurological: oriented to person, place, and time.   Skin: Skin is warm and dry. No rashes or lesions    Assessment:       1. Bursitis/tendonitis, shoulder        Plan:         Cedric was seen today for shoulder pain.    Diagnoses and all orders for this visit:    Bursitis/tendonitis, shoulder: will add shoulder evaluation and treatment plan to his current physical therapy.     Other orders  -     Influenza - High Dose (65+) (PF) (IM)    Continue current medication  Take medications only as prescribed  Healthy diet, exercise  Adequate rest  Adequate hydration  Avoid allergens  Avoid excessive caffeine

## 2019-01-07 ENCOUNTER — CLINICAL SUPPORT (OUTPATIENT)
Dept: REHABILITATION | Facility: HOSPITAL | Age: 79
End: 2019-01-07
Attending: NURSE PRACTITIONER
Payer: MEDICARE

## 2019-01-07 DIAGNOSIS — M25.551 PAIN OF BOTH HIP JOINTS: Primary | ICD-10-CM

## 2019-01-07 DIAGNOSIS — M62.81 MUSCLE WEAKNESS: ICD-10-CM

## 2019-01-07 DIAGNOSIS — M25.552 PAIN OF BOTH HIP JOINTS: Primary | ICD-10-CM

## 2019-01-07 DIAGNOSIS — M25.652 STIFFNESS OF BOTH HIP JOINTS: ICD-10-CM

## 2019-01-07 DIAGNOSIS — M25.651 STIFFNESS OF BOTH HIP JOINTS: ICD-10-CM

## 2019-01-07 PROCEDURE — 97110 THERAPEUTIC EXERCISES: CPT | Mod: PN | Performed by: PHYSICAL THERAPIST

## 2019-01-07 NOTE — PROGRESS NOTES
Physical Therapy Daily Note     Name: Cedric Gupta Jr.  Clinic Number: [id number]  Diagnosis:   Encounter Diagnoses   Name Primary?    Pain of both hip joints Yes    Muscle weakness     Stiffness of both hip joints      Physician: Noy Lees,*  Treatment Orders: PT Eval and Treat  Past Medical History:   Diagnosis Date    Basal cell carcinoma     BPH (benign prostatic hypertrophy)     Cataract     OU    Elevated PSA     Epiretinal membrane     OS    Fatty liver     noted on usg    History of colon polyps     History of duodenal ulcer     x 2 8/13 and 8/14    History of nephrolithiasis 2008    passed stone    History of prediabetes     Hyperlipidemia     No current medications    Macular degeneration     dry -- OU    Osteoarthritis     S/P colonoscopy     8/15; 8/20    Squamous cell carcinoma 11/08/2016    Right forearm     Precautions: OA B hips, lumbar DDD  Evaluation date: 12/20/2018  Visit # authorized: 2/20  Authorization period: 12-31-18  Plan of care expiration: 2-14-19  MD Follow up appt: none scheduled    Time In:  9:05  Time Out:  9:55  Billable Time:  45 min    Subjective     Pt reports: hips are feeling better.  Pt states having some shoulder pain.  Pt states he last did ex Friday he has been busy  Pt states pain in hips has not gone to an 8 as previously.      Pain Scale: before treatment: 1 currently; after treatment: 0-1    Objective     AR R     TREATMENT  Therapeutic exercise: Cedric received therapeutic exercises to develop strength, endurance, ROM, flexibility and core stabilization for 45 minutes including:   Pt with pelvic dysfunction.  Pt had not recently performed push/pull ex.  Pt performed push/pull exercise and unable to note positive change in symptoms.      Instructed pt in increased awareness of symptoms.  Instructed pt in need to perform push/pull at onset of increased symptoms.    Instructed pt in need to increase compliance with HEP. Instructed pt in  need to perform push/pull at least BID even if not time for other ex.     Instructed pt in SI and hip anatomy and biomechanics and effect of posture on SI joint and hips     Hip flexor stretch x 30 sec x 2  HS stretch x 10 after HS hip pain bending R LE< did push/pull and relieved symptoms  Piriformis stretch x 5 due to pinching in groin  Bridge x 20   Pelvic tilt x 10   Trunk rotation x 10  Hip abduction sidelying x 20    Hip ext prone x 10  Push/pull x 3        Written Home Exercises Provided: indented ex noted above  Pt demo good understanding of the education provided. Halphen demonstrated good return demonstration of activities.     Pt. education:  · Posture reeducation, body mechanics, HEP,   · No spiritual or educational barriers to learning provided  · Pt has no cultural, educational or language barriers to learning provided.    Assessment     Pt with decreased symptoms Patient appears to understand increased awareness of symptoms and to perform push/pull at onset of increased symptoms.  Pt appears to understand importance of HEP Edin progression well  Pt will continue to benefit from skilled outpatient physical therapy to address the remaining functional deficits, provide pt/family education, and to maximize pt's level of independence in the home and community environment. .     GOALS:   Short Term Goals:  4 weeks  Increase range of motion 25%  Increase strength 1/2 muscle grade  Improve postural awareness of pelvis to independently identify dysfunction with min assist from PT  Be able to perform HEP with minimal cueing required  Improve functional impairment of mobility to 62     Long Term Goals: 8 weeks  Increase range of motion to 75% to 100% full   Improve muscle strength 1 muscle grade  Improve and stabilize proper pelvic positioning  Restore ability to ambulate with normal pain free gait  Walking for ADL will be restored without increased pain  Restore ability to perform sit to stand transfer without  increased pain  Restore normal sleep habits without disturbances due to pain  Restore ability to perform ADL's and household activities independently and without increased pain  Improve functional impairment of mobility to CJ at least 20%, but < 40% impaired, limited or restricted    Anticipated barriers to physical therapy: none  Pt's spiritual, cultural and educational needs considered and pt agreeable to plan of care and goals        Plan   Continue with established Plan of Care towards PT goals.

## 2019-01-09 ENCOUNTER — TELEPHONE (OUTPATIENT)
Dept: FAMILY MEDICINE | Facility: CLINIC | Age: 79
End: 2019-01-09

## 2019-01-09 ENCOUNTER — CLINICAL SUPPORT (OUTPATIENT)
Dept: REHABILITATION | Facility: HOSPITAL | Age: 79
End: 2019-01-09
Payer: MEDICARE

## 2019-01-09 DIAGNOSIS — M25.551 PAIN OF BOTH HIP JOINTS: Primary | ICD-10-CM

## 2019-01-09 DIAGNOSIS — M25.511 BILATERAL SHOULDER PAIN, UNSPECIFIED CHRONICITY: Primary | ICD-10-CM

## 2019-01-09 DIAGNOSIS — M62.81 MUSCLE WEAKNESS: ICD-10-CM

## 2019-01-09 DIAGNOSIS — M25.552 PAIN OF BOTH HIP JOINTS: Primary | ICD-10-CM

## 2019-01-09 DIAGNOSIS — M25.652 STIFFNESS OF BOTH HIP JOINTS: ICD-10-CM

## 2019-01-09 DIAGNOSIS — M25.651 STIFFNESS OF BOTH HIP JOINTS: ICD-10-CM

## 2019-01-09 DIAGNOSIS — M25.512 BILATERAL SHOULDER PAIN, UNSPECIFIED CHRONICITY: Primary | ICD-10-CM

## 2019-01-09 PROCEDURE — 97110 THERAPEUTIC EXERCISES: CPT | Mod: PN | Performed by: PHYSICAL THERAPIST

## 2019-01-09 NOTE — PROGRESS NOTES
Physical Therapy Daily Note     Name: Cedric Gupta Jr.  Clinic Number: [id number]  Diagnosis:   Encounter Diagnoses   Name Primary?    Pain of both hip joints Yes    Muscle weakness     Stiffness of both hip joints      Physician: Noy Lees,*  Treatment Orders: PT Eval and Treat  Past Medical History:   Diagnosis Date    Basal cell carcinoma     BPH (benign prostatic hypertrophy)     Cataract     OU    Elevated PSA     Epiretinal membrane     OS    Fatty liver     noted on usg    History of colon polyps     History of duodenal ulcer     x 2 8/13 and 8/14    History of nephrolithiasis 2008    passed stone    History of prediabetes     Hyperlipidemia     No current medications    Macular degeneration     dry -- OU    Osteoarthritis     S/P colonoscopy     8/15; 8/20    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  Last night he had a little hip pain and did push/pull and felt better  Pt states he has been more compliant with HEP.  Pt states shoulders are still a problem    Pain Scale: before treatment: 0 currently; after treatment: 0    Objective     Level pelvis    TREATMENT  Therapeutic exercise: Cedric received therapeutic exercises to develop strength, endurance, ROM, flexibility and core stabilization for 55 minutes including:   Bridge x 20  Pelvic tilt x 20  Trunk rotation x 20    Hip flexor stretch x 30 sec x 2  HS stretch x 10 after HS hip pain bending R LE< did push/pull and relieved symptoms  Piriformis stretch x 5 due to slight pinching in groin      Partial sit up x 10    SLR X 10  Hip abduction sidelying x 20  Hip ext prone with pillow under stomach x 20    Hip  ext prone with bent knee with pillow under stomach x 10  Push/pull x 3     Pt does a better job of ex on L LE so instructed pt to do L first to feel normal and then R to help improve performance and recruitment    Verbal and tactile cueing provided for proper performance and recruitment.     Written Home Exercises Provided: indented ex noted above  Pt demo good understanding of the education provided. Halphen demonstrated good return demonstration of activities.     Pt. education:  · Posture reeducation, body mechanics, HEP,   · No spiritual or educational barriers to learning provided  · Pt has no cultural, educational or language barriers to learning provided.    Assessment   Pt with decreased symptoms Pt still requires a lot of verbal and tactile cueing for proper performance of ex.  Required push/pull often during ex and would get relief of symptoms Pt with less endurance R with ex   Pt will continue to benefit from skilled outpatient physical therapy to address the remaining functional deficits, provide pt/family education, and to maximize pt's level of independence in the home and community environment. .     GOALS:   Short Term Goals:  4 weeks  Increase range of motion 25%  Increase strength 1/2 muscle grade  Improve postural awareness of pelvis to independently identify dysfunction with min assist from PT  Be able to perform HEP with minimal cueing required  Improve functional impairment of mobility to 62     Long Term Goals: 8 weeks  Increase range of motion to 75% to 100% full   Improve muscle strength 1 muscle grade  Improve and stabilize proper pelvic positioning  Restore ability to ambulate with normal pain free gait  Walking for ADL will be restored without increased pain  Restore ability to perform sit to stand transfer without increased pain  Restore normal sleep habits without disturbances due to pain  Restore ability to perform ADL's and household activities independently and without increased  pain  Improve functional impairment of mobility to CJ at least 20%, but < 40% impaired, limited or restricted    Anticipated barriers to physical therapy: none  Pt's spiritual, cultural and educational needs considered and pt agreeable to plan of care and goals        Plan   Continue with established Plan of Care towards PT goals.

## 2019-01-11 ENCOUNTER — TELEPHONE (OUTPATIENT)
Dept: HEMATOLOGY/ONCOLOGY | Facility: CLINIC | Age: 79
End: 2019-01-11

## 2019-01-11 NOTE — TELEPHONE ENCOUNTER
Called and spoke to pt to schedule referral apt from Tracy Stein for Dr. Zavala. Pt confirmed apt scheduled and verbalized understanding.

## 2019-01-11 NOTE — TELEPHONE ENCOUNTER
Please see the following phone message and advise    ----- Message from Cynthia Miller sent at 1/11/2019 11:19 AM CST -----  Contact: Patient  Type: Needs Medical Advice    Who Called:  Patient  Symptoms (please be specific):  Patient is still having shoulder pain; and he stopped taking the second steroid pack  How long has patient had these symptoms:  About 3-4 days  Pharmacy name and phone #:    [place] [id number] - [place] - [address]  [address] [facility] [address] [place] [address]  Phone: [phone] Fax: [phone]  Best Call Back Number: [phone]  Additional Information:

## 2019-01-11 NOTE — TELEPHONE ENCOUNTER
Called and left message for pt to schedule referral apt from Dr. Tracy Stein. Instructed pt to call

## 2019-01-14 ENCOUNTER — CLINICAL SUPPORT (OUTPATIENT)
Dept: REHABILITATION | Facility: HOSPITAL | Age: 79
End: 2019-01-14
Attending: NURSE PRACTITIONER
Payer: MEDICARE

## 2019-01-14 ENCOUNTER — TELEPHONE (OUTPATIENT)
Dept: FAMILY MEDICINE | Facility: CLINIC | Age: 79
End: 2019-01-14

## 2019-01-14 DIAGNOSIS — M62.81 MUSCLE WEAKNESS: ICD-10-CM

## 2019-01-14 DIAGNOSIS — M25.611 STIFFNESS OF RIGHT SHOULDER JOINT: ICD-10-CM

## 2019-01-14 DIAGNOSIS — M25.512 CHRONIC LEFT SHOULDER PAIN: ICD-10-CM

## 2019-01-14 DIAGNOSIS — G89.29 CHRONIC RIGHT SHOULDER PAIN: Primary | ICD-10-CM

## 2019-01-14 DIAGNOSIS — G89.29 CHRONIC LEFT SHOULDER PAIN: ICD-10-CM

## 2019-01-14 DIAGNOSIS — M25.511 CHRONIC RIGHT SHOULDER PAIN: Primary | ICD-10-CM

## 2019-01-14 DIAGNOSIS — M25.612 STIFFNESS OF LEFT SHOULDER JOINT: ICD-10-CM

## 2019-01-14 PROCEDURE — 97140 MANUAL THERAPY 1/> REGIONS: CPT | Mod: PN | Performed by: PHYSICAL THERAPIST

## 2019-01-14 PROCEDURE — 97164 PT RE-EVAL EST PLAN CARE: CPT | Mod: PN | Performed by: PHYSICAL THERAPIST

## 2019-01-14 PROCEDURE — 97110 THERAPEUTIC EXERCISES: CPT | Mod: PN | Performed by: PHYSICAL THERAPIST

## 2019-01-14 NOTE — TELEPHONE ENCOUNTER
----- Message from Karen Ribera sent at 1/14/2019  8:19 AM CST -----  Contact: Patient  Type: Needs Medical Advice    Who Called:  Patient  Symptoms (please be specific): pain in shoulders/ slight pain in hip  How long has patient had these symptoms: a little over a week  Pharmacy name and phone #:    [facility] [place] [facility] - [place] - [address]  [address]  [place] [address]  Phone: [phone] Fax: [phone]  Best Call Back Number: [phone]  Additional Information: Calling to speak with the Nurse. He has finished both steroid packs and is still having pain. He has a 3rd pack but is holding off until he speaks with the Nurse. Please advise.

## 2019-01-14 NOTE — TELEPHONE ENCOUNTER
Type: Pain    Location:  Shoulder pain bilaterally and hip pain increased after therapy today   Pain level 8/10 with raising arms  How long? month  Were you seen previously for this pain? yes   Who, when, and where? Treated previously with steroid and ultran  Any other symptoms? Yes, No  Any medications? tylenol  Any drug allergies? Amantadine  Preferred pharmacy? [facility]  Additional information:  Patient states that since he has completed the second steroid pack, the pain has come back.  He would like to know if he should begin the other steroid pack that he has or if he can get something for pain.  Advised that he is currently taking tylenol and was prescribed ultram but found little relief from either.  Patient wanted to advise also of his Hematology appt.  Please advise.

## 2019-01-14 NOTE — PLAN OF CARE
PHYSICAL THERAPY RE- EVALUATION    Name:Cedric Gupta Jr.  Physician:Noy Lees,*  Date of eval:1/14/2019  Orders:  Physical Therapy evaluate and treat  Clinic: [id number]  Diagnosis:  1. Chronic right shoulder pain     2. Chronic left shoulder pain     3. Muscle weakness     4. Stiffness of right shoulder joint     5. Stiffness of left shoulder joint         Precautions: pt with hip pain also, standard  Evaluation date: 1/14/2019  Visit # authorized: 1/1  Authorization period: 1-9-19  Plan of care expiration: 3-11-18  MD Follow up appt: none scheduled    Time In: 9:00  Time Out:  10:03  Treatment Time:  25 min    Subjective     Chief complaint: B shoulders R was worse, but today R and L is same pain  Onset of pain : beginning of 2nd week of December 2018   Mechanism of onset :  Was using arms more to get up out of chair when hip pain was bad     Radicular symptoms:to mid upper arm B   Aggravating factors: lying on shoulders pulling covers up, reaching all planes  Easing factors: pain pills, rest  Sleep is disturbed. 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 Pt has stairs and no problem now in hips with stairs    Allergies:    Review of patient's allergies indicates:   Allergen Reactions    Amantadine Other (See Comments)     Depression       Medical history:   Past Medical History:   Diagnosis Date    Basal cell carcinoma     BPH (benign prostatic hypertrophy)     Cataract     OU    Elevated PSA     Epiretinal membrane     OS    Fatty liver     noted on usg    History of colon polyps     History of duodenal ulcer     x 2 8/13 and 8/14    History of  "nephrolithiasis 2008    passed stone    History of prediabetes     Hyperlipidemia     No current medications    Macular degeneration     dry -- OU    Osteoarthritis     S/P colonoscopy     8/15; 8/20    Squamous cell carcinoma 11/08/2016    Right forearm       Medication:   Current Outpatient Medications on File Prior to Visit   Medication Sig Dispense Refill    ANTIOX #11/OM3/DHA/EPA/LUT/NESHA (OCUVITE ADULT 50+ ORAL) Take by mouth once daily.      aspirin (ECOTRIN) 81 MG EC tablet Take 81 mg by mouth once daily.       predniSONE (DELTASONE) 20 MG tablet Take 3 tablets daily for 3 days, then 2 tablets daily for 3 days, then 1 tablet daily for 3 days 18 tablet 0    traMADol (ULTRAM) 50 mg tablet Take 1-2 tablets every 4-6 hours as needed for pain 30 tablet 0     No current facility-administered medications on file prior to visit.        Xray: none of shoulder    Pain level with 0 being the lowest and 10 being the highest presently: 5  Pain level with 0 being the lowest and 10 being the highest at worst: 8  Pain level with 0 being the lowest and 10 being the highest at best: 1     Patient Goals: "moving arms with less pain"    Objective     Postural examination in standing:  - forward head  - forward shoulder  - L shoulder high  - R scapula elevated  - winging scapulae B    Postural examination in sitting:   - forward head  - forward shoulder  - increased  thoracic kyphosis  - normal lumbar lordosis      Functional assessment:   - walking/gait: FB trunk slightly, no deficit  - sit to stand: min-mod use of hands  - sit to supine: min to 0 difficutly        - supine to sit: min to 0  - supine to prone: N/T    Cervical ROM: (measured in degrees sitting)  - flexion -  40                    - extension -  55                        - right side bending -  30        - left side bending -  30      - right rotation - 70  - left rotation - 65    Shoulder ROM: (measured in degrees standing)  Shoulder  RUE LUE   Active " Flexion 120 140   Active Abduction 45 60     Scapular instability noted with elevation and abduction in standing with increased upper trap recruitment winging, lateral rotation and protraction of B scapulae R>L.      Pt with slightly decreased  R as compared to L and pt reports he deals with this issue occasionally even before this incident    Shoulder ROM: (measured in degrees supine)   Shoulder  RUE LUE   Flexion 130 130   Abduction 60 95   External Rotation neutral 60 70   External Rotation with scaption 50 90   Internal Rotation 40 45       Muscle Strength  MMT R L   Elbow flexion 4/5 4/5   Elbow extension 5/5 5/5   Shoulder flexion 3/5 3/5   Shoulder abduction 3-/5 3-/5   Shoulder external rotation 3/5 3/5   Shoulder internal rotation 4/5 4/5        Upper trap 5/5 5/5       Endurance is  poor.    Palpation: mod TTP subscapularis and pects    Joint mobility: decreased A/P and inf glide GH    Sensation: Intact    Outcome measures:    FOTO shoulder disability score: 46  PT reviewed FOTO scores for Cedric Gupta JrChamp on 01/14/2019.   FOTO scores were entered into [employer] - see media section.    TREATMENT:  Therapeutic exercise: Cedric received therapeutic exercises to develop strength, stabilization and endurance; flexibility and range of motion for 10 minutes including:see HEP sheet.   Chin tuck x 10  Shoulder shrugs with retraction x 10  Depressions x 10  Wand flexion x 10 supine  Wand ER neutral x 10  Pect stretch over 1/2 foam 2 min, instructed pt to use rolled towel at home    Manual therapy: Cedric  received the following manual therapy techniques x 15 min. to include soft tissue and joint mobilization were applied to the: B shoulder girdles to include: STM pect major and minor, subscap/lat dorsi region, scapula mob and shoulder GH mob emphasis on inf glide and A/P mob     Modalities: Pt. received cold pack x 10 min. to B shoulders sitting on mat following treatment.    Pt. Education: Instructed pt.  regarding:proper technique with all exercises. Pt. to demonstrate good understanding of the education provided. Cedric demonstrated good return demonstration of activities. No cultural, environmental, or spiritual barriers identified to treatment or learning.  Assessment   This is a 78 y.o. male referred to outpatient physical therapy and presents with a medical diagnosis of B shoulder pain and PT diagnosis/findings of shoulder pain B with decreased ROM, strength and scapula dyskinesia demonstrating limitations as described in the problem list. Patient was in agreement with set goals and plan of care. Pt was given a written HEP along with posture education, instruction on body mechanics, activity modification/avoidance.  Pt. verbally understood instructions and demonstrated proper form/technique. Pt was advised to perform these exercises free of pain, and discontinue use if symptoms persist/worsen. Pt will benefit from physical therapy services in order to maximize pain free functional independence. Rehab potential is good      Medical necessity is demonstrated by the following IMPAIRMENTS/PROBLEM LIST:  Decreased range of motion  Decreased strength  Poor posture  Decreased scapular stabilization  Disturbed sleep, unable to lie on side  Increased pain with pulling up covers in bed  Increased pain with overhead reach  Increased pain with reaching in all planes  Increased pain with ADL and household activities    GOALS:   Short Term Goals:  4 weeks    Increase range of motion 25%  Increase strength 1/2 muscle grade  Increase postural awareness to normal  Be able to perform HEP with minimal cueing required    Long Term Goals: 8 weeks  Increase range of motion to 75%to 100% of full  Increase strength 1 muscle grade  Improve scapular stabilization to normal  Restore normal sleep habits without disturbances due to pain  Restore ability to pull up covers in bed without increased pain  Restore ability to reach fully  overhead without increased pain  Restore ability to reach in all planes without increased pain or difficulty  Restore ability to perform ADL's and household activities independently and without increased pain  Pt to be independent with HEP to improve ROM and independence with ADL's        Plan     Pt will be treated by physical therapy 1-3 times a week for 8 weeks to include:   Therapeutic exercises to increase ROM, strength and stabilization; joint and soft tissue mobilization with manual therapy techniques to decrease muscle tightness, pain and improve joint mobility; neuromuscular re-education to improve posture, coordination, kinesthetic sense and proprioception, therapeutic activities to improve coordination, strength and function, therapeutic taping to decrease pain, provide support and improve function of UE(s); modalities such as moist heat, ice, ultrasound and electrical stimulation to increase circulation, decrease pain and inflammation; dry needling with manual therapy techniques to decrease pain, inflammation and swelling, increase circulation and promote healing process will be considered and utilized as needed.  Pt may be seen by PTA to carry out plan of care as part of Rehab team.      I certify the need for these services furnished under this plan of treatment and while under my care.    ____________________________________ Physician/Referring Practitioner                                Date of Signature

## 2019-01-14 NOTE — TELEPHONE ENCOUNTER
He needs to come see Dr. Stein or go back to Orthopedics. I cannot give him anything strong for pain and I cannot treat chronic pain. He does not need to take steroids this often. Needs to continue with the tylenol and ultram until he can be seen by suzette or ortho.

## 2019-01-15 NOTE — TELEPHONE ENCOUNTER
Spoke to pt. He would like a referral to ortho. Order pended.     Scheduling preference is late morning (around 11) on Monday or Wednesday.    Pt also states that his shoulder pain increases to an 8/10 at night and prevents him from getting adequate sleep. He is asking if he can be given anything for the shoulder pain to take just at bedtime. Please advise.

## 2019-01-16 ENCOUNTER — TELEPHONE (OUTPATIENT)
Dept: FAMILY MEDICINE | Facility: CLINIC | Age: 79
End: 2019-01-16

## 2019-01-16 ENCOUNTER — CLINICAL SUPPORT (OUTPATIENT)
Dept: REHABILITATION | Facility: HOSPITAL | Age: 79
End: 2019-01-16
Payer: MEDICARE

## 2019-01-16 DIAGNOSIS — M62.81 MUSCLE WEAKNESS: ICD-10-CM

## 2019-01-16 DIAGNOSIS — M25.511 BILATERAL SHOULDER PAIN, UNSPECIFIED CHRONICITY: Primary | ICD-10-CM

## 2019-01-16 DIAGNOSIS — G89.29 CHRONIC RIGHT SHOULDER PAIN: Primary | ICD-10-CM

## 2019-01-16 DIAGNOSIS — M25.612 STIFFNESS OF LEFT SHOULDER JOINT: ICD-10-CM

## 2019-01-16 DIAGNOSIS — M25.511 CHRONIC RIGHT SHOULDER PAIN: Primary | ICD-10-CM

## 2019-01-16 DIAGNOSIS — G89.29 CHRONIC LEFT SHOULDER PAIN: ICD-10-CM

## 2019-01-16 DIAGNOSIS — M25.512 BILATERAL SHOULDER PAIN, UNSPECIFIED CHRONICITY: Primary | ICD-10-CM

## 2019-01-16 DIAGNOSIS — M25.512 CHRONIC LEFT SHOULDER PAIN: ICD-10-CM

## 2019-01-16 DIAGNOSIS — M25.611 STIFFNESS OF RIGHT SHOULDER JOINT: ICD-10-CM

## 2019-01-16 PROCEDURE — 97140 MANUAL THERAPY 1/> REGIONS: CPT | Mod: PN | Performed by: PHYSICAL THERAPIST

## 2019-01-16 PROCEDURE — 97110 THERAPEUTIC EXERCISES: CPT | Mod: PN | Performed by: PHYSICAL THERAPIST

## 2019-01-16 RX ORDER — TRAMADOL HYDROCHLORIDE 50 MG/1
TABLET ORAL
Qty: 45 TABLET | Refills: 0 | Status: SHIPPED | OUTPATIENT
Start: 2019-01-16 | End: 2019-03-22 | Stop reason: SDUPTHER

## 2019-01-16 NOTE — TELEPHONE ENCOUNTER
Pt completed Ultram a couple of weeks ago. Pt was taking 1 tablet at night . Pt asking if he can take 2 tablets at night because 1 tablet wasn't really helping . Pls advise.--lp

## 2019-01-16 NOTE — TELEPHONE ENCOUNTER
----- Message from Hilda Diane sent at 1/16/2019  8:26 AM CST -----  Type:  Patient Returning Call    Who Called:  Patient  Who Left Message for Patient:  Ese  Does the patient know what this is regarding?:  Medication for pain  Best Call Back Number:  [phone]  Additional Information:

## 2019-01-16 NOTE — PROGRESS NOTES
Physical Therapy Daily Note     Name: Cedric Gupta Jr.  Clinic Number: [id number]  Diagnosis:   Encounter Diagnoses   Name Primary?    Chronic right shoulder pain Yes    Chronic left shoulder pain     Stiffness of right shoulder joint     Stiffness of left shoulder joint     Muscle weakness      Physician: Noy Lees,*  Treatment Orders: PT Eval and Treat  Past Medical History:   Diagnosis Date    Basal cell carcinoma     BPH (benign prostatic hypertrophy)     Cataract     OU    Elevated PSA     Epiretinal membrane     OS    Fatty liver     noted on usg    History of colon polyps     History of duodenal ulcer     x 2 8/13 and 8/14    History of nephrolithiasis 2008    passed stone    History of prediabetes     Hyperlipidemia     No current medications    Macular degeneration     dry -- OU    Osteoarthritis     S/P colonoscopy     8/15; 8/20    Squamous cell carcinoma 11/08/2016    Right forearm     Precautions: pt with hip pain also, standard  Evaluation date: 1/14/2019  Visit # authorized: 2/1  Authorization period: 1-9-19  Plan of care expiration: 3-11-18  MD Follow up appt: none scheduled       Time In:  9:07  Time Out:  10:20  Billable Time:  55 min    Subjective     Pt reports: shoulder is hurting     Pain Scale: before treatment: 6 with movement 2 at rest currently; after treatment: 1-2 at rest 5-6 with movement    Objective       Shoulder ROM: (measured in degrees) 1-16-19 after mob and stretching  Shoulder  RUE LUE   Flexion 135 145   Abduction 90 95   External Rotation neutral 70 75   External Rotation with abd  65 85   Internal Rotation 45 50   Active standing flexion 150 140   Active standing abduction 60 60       Shoulder ROM: (measured in degrees supine) initial eval  Shoulder  RUE LUE   Flexion 130 130   Abduction 60 95   External Rotation neutral 60 70   External Rotation with scaption 50 90   Internal Rotation 40 45      Shoulder ROM: (measured in degrees standing)  initial eval  Shoulder  RUE LUE   Active Flexion 120 140   Active Abduction 45 60          TREATMENT  Therapeutic exercise: Cedric received therapeutic exercises to develop strength, endurance, ROM, flexibility, posture and core stabilization for 40 minutes including:     Chin tuck x 10  Shoulder shrugs with retraction x 10  Depressions x 20  Wand flexion x 10 supine  Wand ER neutral x 10   Wand ER with abd x 10   Protraction x 20 x 2#  Pect stretch over 1/2 foam 3 min, instructed pt to use rolled towel at home     Shoulder ext prone neutral x 10   Horizontal abd prone neutral x 10   ER sidelying x 20     Manual therapy: Cedric  received the following manual therapy techniques x 15 min. to include soft tissue and joint mobilization were applied to the: B shoulder girdles to include: STM pect major and minor, subscap/lat dorsi region, scapula mob and shoulder GH mob emphasis on inf glide and A/P mob    Modalities: Pt. received cold pack x 10 min. to B shoulders sitting on mat following treatment.     Written Home Exercises Provided: indented ex noted above  Pt demo good understanding of the education provided. Cedric demonstrated good return demonstration of activities.     Pt. education:  · Posture reeducation, body mechanics, HEP,   · No spiritual or educational barriers to learning provided  · Pt has no cultural, educational or language barriers to learning provided.    Assessment     Pt with improved ROM except for ER R and able to progress with scap stab work    Pt will continue to benefit from skilled outpatient physical therapy to address the remaining functional deficits, provide pt/family education, and to maximize pt's level of independence in the home and community environment. .     GOALS:   Short Term Goals:  4 weeks    Increase range of motion 25%  Increase strength 1/2 muscle grade  Increase postural awareness to normal  Be able to perform HEP with minimal cueing required     Long Term Goals: 8  weeks  Increase range of motion to 75%to 100% of full  Increase strength 1 muscle grade  Improve scapular stabilization to normal  Restore normal sleep habits without disturbances due to pain  Restore ability to pull up covers in bed without increased pain  Restore ability to reach fully overhead without increased pain  Restore ability to reach in all planes without increased pain or difficulty  Restore ability to perform ADL's and household activities independently and without increased pain  Pt to be independent with HEP to improve ROM and independence with ADL's    Anticipated barriers to physical therapy: none  Pt's spiritual, cultural and educational needs considered and pt agreeable to plan of care and goals        Plan   Continue with established Plan of Care towards PT goals.

## 2019-01-16 NOTE — TELEPHONE ENCOUNTER
Is he using the ultram nilesh prescribed?  If so, that's the strongest pain meds we can rx at this point  If he's out, I can reorder

## 2019-01-17 ENCOUNTER — OFFICE VISIT (OUTPATIENT)
Dept: ORTHOPEDICS | Facility: CLINIC | Age: 79
End: 2019-01-17
Payer: MEDICARE

## 2019-01-17 ENCOUNTER — HOSPITAL ENCOUNTER (OUTPATIENT)
Dept: RADIOLOGY | Facility: HOSPITAL | Age: 79
Discharge: HOME | End: 2019-01-17
Attending: NURSE PRACTITIONER
Payer: MEDICARE

## 2019-01-17 VITALS — BODY MASS INDEX: 24.78 KG/M2 | HEIGHT: 71 IN | WEIGHT: 177 LBS

## 2019-01-17 DIAGNOSIS — M25.511 BILATERAL SHOULDER PAIN, UNSPECIFIED CHRONICITY: ICD-10-CM

## 2019-01-17 DIAGNOSIS — M25.512 BILATERAL SHOULDER PAIN, UNSPECIFIED CHRONICITY: ICD-10-CM

## 2019-01-17 DIAGNOSIS — M19.012 PRIMARY OSTEOARTHRITIS OF SHOULDERS, BILATERAL: Primary | ICD-10-CM

## 2019-01-17 DIAGNOSIS — M19.011 PRIMARY OSTEOARTHRITIS OF SHOULDERS, BILATERAL: Primary | ICD-10-CM

## 2019-01-17 PROCEDURE — 99999 PR PBB SHADOW E&M-EST. PATIENT-LVL II: CPT | Mod: PBBFAC,,, | Performed by: NURSE PRACTITIONER

## 2019-01-17 PROCEDURE — 20610 DRAIN/INJ JOINT/BURSA W/O US: CPT | Mod: 50,S$GLB,, | Performed by: NURSE PRACTITIONER

## 2019-01-17 PROCEDURE — 73030 X-RAY EXAM OF SHOULDER: CPT | Mod: TC,50,PO

## 2019-01-17 PROCEDURE — 99214 OFFICE O/P EST MOD 30 MIN: CPT | Mod: 25,S$GLB,, | Performed by: NURSE PRACTITIONER

## 2019-01-17 PROCEDURE — 99214 PR OFFICE/OUTPT VISIT, EST, LEVL IV, 30-39 MIN: ICD-10-PCS | Mod: 25,S$GLB,, | Performed by: NURSE PRACTITIONER

## 2019-01-17 PROCEDURE — 73030 X-RAY EXAM OF SHOULDER: CPT | Mod: 26,50,, | Performed by: RADIOLOGY

## 2019-01-17 PROCEDURE — 20610 LARGE JOINT ASPIRATION/INJECTION: R SUBACROMIAL BURSA, L SUBACROMIAL BURSA: ICD-10-PCS | Mod: 50,S$GLB,, | Performed by: NURSE PRACTITIONER

## 2019-01-17 PROCEDURE — 99999 PR PBB SHADOW E&M-EST. PATIENT-LVL II: ICD-10-PCS | Mod: PBBFAC,,, | Performed by: NURSE PRACTITIONER

## 2019-01-17 PROCEDURE — 73030 XR SHOULDER TRAUMA 3 VIEW BILATERAL: ICD-10-PCS | Mod: 26,50,, | Performed by: RADIOLOGY

## 2019-01-17 RX ORDER — TRIAMCINOLONE ACETONIDE 40 MG/ML
40 INJECTION, SUSPENSION INTRA-ARTICULAR; INTRAMUSCULAR
Status: DISCONTINUED | OUTPATIENT
Start: 2019-01-17 | End: 2019-01-17 | Stop reason: HOSPADM

## 2019-01-17 RX ADMIN — TRIAMCINOLONE ACETONIDE 40 MG: 40 INJECTION, SUSPENSION INTRA-ARTICULAR; INTRAMUSCULAR at 07:01

## 2019-01-17 NOTE — LETTER
January 17, 2019      Tracy Stein MD  [id number] [address] [address]           [facility] [address] [facility] [address] [place] [address]  Phone: [phone]          Patient: Cedric Gupta Jr.   MR Number: [id number]   YOB: 1940   Date of Visit: 1/17/2019       Dear Dr. Tracy Stein:    Thank you for referring Cedric Gupta to me for evaluation. Attached you will find relevant portions of my assessment and plan of care.    If you have questions, please do not hesitate to call me. I look forward to following Cedric Gupta along with you.    Sincerely,    Rosibel Quiroz, APRN    Enclosure  CC:  No Recipients    If you would like to receive this communication electronically, please contact [email or web address] or [phone] to request more information on [employer] access.    For providers and/or their staff who would like to refer a patient to [facility], please contact us through our one-stop-shop provider referral line, [facility], at [phone].    If you feel you have received this communication in error or would no longer like to receive these types of communications, please e-mail [email or web address]

## 2019-01-17 NOTE — PROGRESS NOTES
DATE: 1/17/2019  PATIENT: Cedric Gupta Jr.  REFERRING MD:   CHIEF COMPLAINT:   Chief Complaint   Patient presents with    Right Shoulder - Pain    Left Shoulder - Pain       HISTORY:  Cedric Gupta Jr. is a 78 y.o. male  who presents for initial evaluation of his bilateral shoulder pain, he is right hand dominant.  His pain rating today is 1/10 but increases to 4/10 when lifting his arms over head.  The pain began after Thanksgiving when he slept on the floor, he notes the pain when using his arms to raise out of a chair.  I saw him for hip pain in December and he reports the pain resolved.  The pain is worse at night. He denies numbness, tingling or radiation but he states he does have wrist and finger pain now and then.  He is going to physical therapy for his hips and has added shoulder therapy as well. He is requesting bilateral cortisone injection today.    PAST MEDICAL/SURGICAL HISTORY:  Past Medical History:   Diagnosis Date    Basal cell carcinoma     BPH (benign prostatic hypertrophy)     Cataract     OU    Elevated PSA     Epiretinal membrane     OS    Fatty liver     noted on usg    History of colon polyps     History of duodenal ulcer     x 2 8/13 and 8/14    History of nephrolithiasis 2008    passed stone    History of prediabetes     Hyperlipidemia     No current medications    Macular degeneration     dry -- OU    Osteoarthritis     S/P colonoscopy     8/15; 8/20    Squamous cell carcinoma 11/08/2016    Right forearm     Past Surgical History:   Procedure Laterality Date    BIOPSY, PROSTATE, RECTAL APPROACH, WITH US GUIDANCE N/A 4/7/2014    [facility]    BIOPSY, PROSTATE, TRANSRECTAL APPROACH, WITH US GUIDANCE N/A 8/20/2018    [facility]    CHOLECYSTECTOMY      COLONOSCOPY N/A 8/18/2015    [facility] ENDO    EGD (ESOPHAGOGASTRODUODENOSCOPY) N/A 2/12/2014    [facility]    EGD (ESOPHAGOGASTRODUODENOSCOPY) N/A 2013    [facility]    FRACTURE SURGERY      Right ankle and leg    ORCHIECTOMY      due to undescended testicle/ Left    PROSTATE BIOPSY  2014, -negative    TONSILLECTOMY      TRANSRECTAL ULTRASOUND GUIDED PROSTATE BIOPSY N/A 1/15/2018    [facility]       Current Medications:   Current Outpatient Medications:     ANTIOX #11/OM3/DHA/EPA/LUT/NESHA (OCUVITE ADULT 50+ ORAL), Take by mouth once daily., Disp: , Rfl:     aspirin (ECOTRIN) 81 MG EC tablet, Take 81 mg by mouth once daily. , Disp: , Rfl:     predniSONE (DELTASONE) 20 MG tablet, Take 3 tablets daily for 3 days, then 2 tablets daily for 3 days, then 1 tablet daily for 3 days, Disp: 18 tablet, Rfl: 0    traMADol (ULTRAM) 50 mg tablet, 1 or 2 tablets bid prn, Disp: 45 tablet, Rfl: 0    Family History: family history was reviewed and is noncontributory  Social History:   Social History     Socioeconomic History    Marital status:      Spouse name: Not on file    Number of children: Not on file    Years of education: Not on file    Highest education level: Not on file   Social Needs    Financial resource strain: Not on file    Food insecurity - worry: Not on file    Food insecurity - inability: Not on file    Transportation needs - medical: Not on file    Transportation needs - non-medical: Not on file   Occupational History    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[employer])   Tobacco Use    Smoking status: Former Smoker     Years: 0.50     Last attempt to quit: 1965     Years since quittin.4    Smokeless tobacco: Never Used   Substance and Sexual Activity    Alcohol use: Yes     Alcohol/week: 1.2 - 2.4 oz     Types: 2 - 4 Cans of beer per week     Comment: twice a week    Drug use: Yes     Frequency: 7.0 times per week     Types: Marijuana     Comment: marijuana     "Sexual activity: Yes     Partners: Female     Birth control/protection: None   Other Topics Concern    Not on file   Social History Narrative    Not on file       ROS:  Constitution: Negative for chills, fever, and sweats. Negative for unexplained weight loss.  HENT: Negative for headaches and blurry vision.   Cardiovascular: Negative for chest pain, irregular heartbeat, leg swelling and palpitations.   Respiratory: Negative for cough and shortness of breath.   Gastrointestinal: Negative for abdominal pain, heartburn, nausea and vomiting.   Genitourinary: Negative for bladder incontinence and dysuria.   Musculoskeletal: Negative for systemic arthritis, joint swelling, muscle weakness and myalgias.   Neurological: Negative for numbness.   Psychiatric/Behavioral: Negative for depression.   Endocrine: Negative for polyuria.   Hematologic/Lymphatic: Negative for bleeding disorders.  Skin: Negative for poor wound healing.       PHYSICAL EXAM:  Ht 5' 11" (1.803 m)   Wt 80.3 kg (177 lb)   BMI 24.69 kg/m²   Cedric Gupta Jr. is a well developed, well nourished male in no acute distress. Physical examination of the bilateral shoulder evaluated the following:    Inspection, palpation and ROM of the cervical spine  Disc compression testing bilaterally  Inspection for swelling, ecchymosis, erythema, deformity and atrophy  Tenderness to palpation of the soft tissue and bony structures  Active and passive range of motion  Sensation of the shoulder and upper extremity  Motor strength in the deltoid, supraspinatus, internal rotators and external rotators  Impingement, apprehension, relocation and Speed's tests  Upper extremity vascular exam (skin temp,color, capillary refill)  Inspection for pseudomotor signs    Remarkable findings included:  No edema or ecchymosis or deformity  nontender to palpation  ROM full actively but with pain  Strength 5/5  Sensation intact  Skin warm, dry, intact      IMAGING:   X-ray obtained " Bilateral shoulder performed today personally reviewed with patient. Radiologist report as follows:   No fracture or subluxation are identified.  There is degenerative arthrosis of the bilateral acromioclavicular joints with periarticular osteophyte formation.  There is also degenerative arthrosis of the right glenohumeral joint with inferior periarticular osteophytes.  The left glenohumeral joint is well maintained and well aligned.  Visualized soft tissues are unremarkable.    ASSESSMENT:   Bilateral shoulder osteoarthritis    PLAN:  The nature of the diagnosis, using models and diagrams when appropriate, was explained to the patient in detail.Treatment option discussed included non-operative measures of modification of activities, application of ice, over the counter pain/antiinflammatory relief, physical therapy, cortisone injection.  More aggressive treatment options include MRI and referral to orthopedic surgeon.  All questions answered and the patient wishes to proceed today with bilateral shoulder injections (see procedure documentation).  Instructed to ice and monitor injection site for inflammation/infection.  He will call if no improvement or worsening.

## 2019-01-18 NOTE — PROCEDURES
Large Joint Aspiration/Injection: R subacromial bursa, L subacromial bursa  Date/Time: 1/17/2019 7:00 PM  Performed by: BRIANNA Johansen  Authorized by: BRIANNA Johansen     Consent Done?:  Yes (Verbal)  Indications:  Pain  Timeout: Prior to procedure the correct patient, procedure, and site was verified      Location:  Shoulder  Site:  R subacromial bursa and L subacromial bursa  Prep: Patient was prepped and draped in usual sterile fashion    Ultrasonic Guidance for needle placement: No  Needle size:  25 G  Approach:  Posterior  Medications:  40 mg triamcinolone acetonide 40 mg/mL; 40 mg triamcinolone acetonide 40 mg/mL  Patient tolerance:  Patient tolerated the procedure well with no immediate complications

## 2019-01-21 ENCOUNTER — CLINICAL SUPPORT (OUTPATIENT)
Dept: REHABILITATION | Facility: HOSPITAL | Age: 79
End: 2019-01-21
Attending: NURSE PRACTITIONER
Payer: MEDICARE

## 2019-01-21 DIAGNOSIS — M25.612 STIFFNESS OF LEFT SHOULDER JOINT: ICD-10-CM

## 2019-01-21 DIAGNOSIS — G89.29 CHRONIC LEFT SHOULDER PAIN: ICD-10-CM

## 2019-01-21 DIAGNOSIS — M25.611 STIFFNESS OF RIGHT SHOULDER JOINT: ICD-10-CM

## 2019-01-21 DIAGNOSIS — M25.512 CHRONIC LEFT SHOULDER PAIN: ICD-10-CM

## 2019-01-21 DIAGNOSIS — M62.81 MUSCLE WEAKNESS: ICD-10-CM

## 2019-01-21 DIAGNOSIS — M25.511 CHRONIC RIGHT SHOULDER PAIN: Primary | ICD-10-CM

## 2019-01-21 DIAGNOSIS — G89.29 CHRONIC RIGHT SHOULDER PAIN: Primary | ICD-10-CM

## 2019-01-21 PROCEDURE — 97140 MANUAL THERAPY 1/> REGIONS: CPT | Mod: PN | Performed by: PHYSICAL THERAPIST

## 2019-01-21 PROCEDURE — 97110 THERAPEUTIC EXERCISES: CPT | Mod: PN | Performed by: PHYSICAL THERAPIST
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
## 2019-01-23 ENCOUNTER — CLINICAL SUPPORT (OUTPATIENT)
Dept: REHABILITATION | Facility: HOSPITAL | Age: 79
End: 2019-01-23
Payer: MEDICARE

## 2019-01-23 DIAGNOSIS — G89.29 CHRONIC RIGHT SHOULDER PAIN: Primary | ICD-10-CM

## 2019-01-23 DIAGNOSIS — M25.511 CHRONIC RIGHT SHOULDER PAIN: Primary | ICD-10-CM

## 2019-01-23 DIAGNOSIS — M25.611 STIFFNESS OF RIGHT SHOULDER JOINT: ICD-10-CM

## 2019-01-23 DIAGNOSIS — M25.512 CHRONIC LEFT SHOULDER PAIN: ICD-10-CM

## 2019-01-23 DIAGNOSIS — G89.29 CHRONIC LEFT SHOULDER PAIN: ICD-10-CM

## 2019-01-23 DIAGNOSIS — M25.612 STIFFNESS OF LEFT SHOULDER JOINT: ICD-10-CM

## 2019-01-23 DIAGNOSIS — M62.81 MUSCLE WEAKNESS: ICD-10-CM

## 2019-01-23 PROCEDURE — 97140 MANUAL THERAPY 1/> REGIONS: CPT | Mod: PN | Performed by: PHYSICAL THERAPIST

## 2019-01-23 PROCEDURE — 97110 THERAPEUTIC EXERCISES: CPT | Mod: PN | Performed by: PHYSICAL THERAPIST

## 2019-01-23 NOTE — PROGRESS NOTES
Physical Therapy Daily Note     Name: Cedric Gupta Jr.  Clinic Number: [id number]  Diagnosis:   Encounter Diagnoses   Name Primary?    Chronic right shoulder pain Yes    Chronic left shoulder pain     Stiffness of right shoulder joint     Stiffness of left shoulder joint     Muscle weakness      Physician: Noy Lees,*  Treatment Orders: PT Eval and Treat  Past Medical History:   Diagnosis Date    Basal cell carcinoma     BPH (benign prostatic hypertrophy)     Cataract     OU    Elevated PSA     Epiretinal membrane     OS    Fatty liver     noted on usg    History of colon polyps     History of duodenal ulcer     x 2 8/13 and 8/14    History of nephrolithiasis 2008    passed stone    History of prediabetes     Hyperlipidemia     No current medications    Macular degeneration     dry -- OU    Osteoarthritis     S/P colonoscopy     8/15; 8/20    Squamous cell carcinoma 11/08/2016    Right forearm     Precautions: pt with hip pain also, standard  Evaluation date: 1/14/2019  Visit # authorized: 3/20  Authorization period: 12-31-19  Plan of care expiration: 3-11-18  MD Follow up appt: none scheduled       Time In:  8:47  Time Out:  10:00  Billable Time:  55 min    Subjective     Pt reports: shoulder still feeling good 0 pain in L shoulder R 1/10    Pain Scale: before treatment: 1 with movement 0 at rest currently; after treatment: 0 at rest     Objective       Shoulder ROM: (measured in degrees) 1-21-19 after mob and stretching  Shoulder  RUE LUE   Flexion 155 160   Abduction 100 120   External Rotation neutral 75 75   External Rotation with abd  90 90   Internal Rotation 45 65   Active standing flexion 170 170   Active standing abduction 170 170       Shoulder ROM: (measured in degrees supine) initial eval  Shoulder  RUE LUE   Flexion 130 130   Abduction 60 95   External Rotation neutral 60 70   External Rotation with scaption 50 90   Internal Rotation 40 45      Shoulder ROM: (measured  in degrees standing) initial eval  Shoulder  RUE LUE   Active Flexion 120 140   Active Abduction 45 60          TREATMENT  Therapeutic exercise: Cedric received therapeutic exercises to develop strength, endurance, ROM, flexibility, posture and core stabilization for 40 minutes assisted by Tanya Yo DPT today including:     Chin tuck x 10  Shoulder shrugs with retraction x 10  Depressions x 20  Wand flexion x 10 supine  Wand ER neutral x 10  Wand ER with abd x 10  Protraction x 20 x 3#  Pect stretch over 1/2 foam 3 min, instructed pt to use rolled towel at home    Shoulder ext prone with ER x 20 with 1#  Horizontal abd prone with ER x 20  ER sidelying x 20     IR stretch with towel for R UE x 10     Retraction with pulldown x 10 with YTB   Retraction with YTB x 10   ER with YTB x 10  IR with YTB x 10    Verbal and tactile cueing provided for proper performance and recruitment.       Manual therapy: Cedric  received the following manual therapy techniques x 15 min. to include soft tissue and joint mobilization were applied to the: B shoulder girdles to include: STM pect major and minor, subscap/lat dorsi region, scapula mob and shoulder GH mob emphasis on inf glide and A/P mob    Modalities: Pt. received cold pack x 10 min. to B shoulders sitting on mat following treatment.     Written Home Exercises Provided: indented ex noted above  Pt demo good understanding of the education provided. Cedric demonstrated good return demonstration of activities.     Pt. education:  · Posture reeducation, body mechanics, HEP,   · No spiritual or educational barriers to learning provided  · Pt has no cultural, educational or language barriers to learning provided.    Assessment     Pt still doing well s/p injection Pt continues to require significant verbal and tactile cueing for proper performance and recruitment and needs further work on proper posture   Pt will continue to benefit from skilled outpatient physical therapy  to address the remaining functional deficits, provide pt/family education, and to maximize pt's level of independence in the home and community environment. .     GOALS:   Short Term Goals:  4 weeks    Increase range of motion 25%  Increase strength 1/2 muscle grade  Increase postural awareness to normal  Be able to perform HEP with minimal cueing required     Long Term Goals: 8 weeks  Increase range of motion to 75%to 100% of full  Increase strength 1 muscle grade  Improve scapular stabilization to normal  Restore normal sleep habits without disturbances due to pain  Restore ability to pull up covers in bed without increased pain  Restore ability to reach fully overhead without increased pain  Restore ability to reach in all planes without increased pain or difficulty  Restore ability to perform ADL's and household activities independently and without increased pain  Pt to be independent with HEP to improve ROM and independence with ADL's    Anticipated barriers to physical therapy: none  Pt's spiritual, cultural and educational needs considered and pt agreeable to plan of care and goals        Plan   Continue with established Plan of Care towards PT goals.

## 2019-01-28 ENCOUNTER — CLINICAL SUPPORT (OUTPATIENT)
Dept: REHABILITATION | Facility: HOSPITAL | Age: 79
End: 2019-01-28
Attending: NURSE PRACTITIONER
Payer: MEDICARE

## 2019-01-28 DIAGNOSIS — M25.511 CHRONIC RIGHT SHOULDER PAIN: Primary | ICD-10-CM

## 2019-01-28 DIAGNOSIS — M62.81 MUSCLE WEAKNESS: ICD-10-CM

## 2019-01-28 DIAGNOSIS — M25.612 STIFFNESS OF LEFT SHOULDER JOINT: ICD-10-CM

## 2019-01-28 DIAGNOSIS — G89.29 CHRONIC LEFT SHOULDER PAIN: ICD-10-CM

## 2019-01-28 DIAGNOSIS — M25.611 STIFFNESS OF RIGHT SHOULDER JOINT: ICD-10-CM

## 2019-01-28 DIAGNOSIS — G89.29 CHRONIC RIGHT SHOULDER PAIN: Primary | ICD-10-CM

## 2019-01-28 DIAGNOSIS — M25.512 CHRONIC LEFT SHOULDER PAIN: ICD-10-CM

## 2019-01-28 PROCEDURE — 97140 MANUAL THERAPY 1/> REGIONS: CPT | Mod: PN | Performed by: PHYSICAL THERAPIST

## 2019-01-28 PROCEDURE — 97110 THERAPEUTIC EXERCISES: CPT | Mod: PN | Performed by: PHYSICAL THERAPIST

## 2019-01-28 NOTE — PROGRESS NOTES
Physical Therapy Daily Note     Name: Cedric Gupta Jr.  Clinic Number: [id number]  Diagnosis:   Encounter Diagnoses   Name Primary?    Chronic right shoulder pain Yes    Chronic left shoulder pain     Stiffness of right shoulder joint     Stiffness of left shoulder joint     Muscle weakness      Physician: Tracy Stein MD  Treatment Orders: PT Eval and Treat  Past Medical History:   Diagnosis Date    Basal cell carcinoma     BPH (benign prostatic hypertrophy)     Cataract     OU    Elevated PSA     Epiretinal membrane     OS    Fatty liver     noted on usg    History of colon polyps     History of duodenal ulcer     x 2 8/13 and 8/14    History of nephrolithiasis 2008    passed stone    History of prediabetes     Hyperlipidemia     No current medications    Macular degeneration     dry -- OU    Osteoarthritis     S/P colonoscopy     8/15; 8/20    Squamous cell carcinoma 11/08/2016    Right forearm     Precautions: pt with hip pain also, standard  Evaluation date: 1/14/2019  Visit # authorized: 4/20  Authorization period: 12-31-19  Plan of care expiration: 3-11-18  MD Follow up appt: none scheduled       Time In:  9:02  Time Out:  10:15  Billable Time:  30 min    Subjective     Pt reports: slight pain with movement at rest nothing still  Pt states using arms well Pt states he did a lot of lifting this weekend and some items were heavy    Pain Scale: before treatment: 1 with movement 0 at rest currently; after treatment: 0 at rest     Objective         Shoulder ROM: (measured in degrees) 1-21-19 after mob and stretching  Shoulder  RUE LUE   Flexion 155 160   Abduction 100 120   External Rotation neutral 75 75   External Rotation with abd  90 90   Internal Rotation 45 65   Active standing flexion 170 170   Active standing abduction 170 170       Shoulder ROM: (measured in degrees supine) initial eval  Shoulder  RUE LUE   Flexion 130 130   Abduction 60 95   External Rotation neutral 60 70    External Rotation with scaption 50 90   Internal Rotation 40 45      Shoulder ROM: (measured in degrees standing) initial eval  Shoulder  RUE LUE   Active Flexion 120 140   Active Abduction 45 60      Biceps tendon and subscap a little more irritated today with STM    TREATMENT  Therapeutic exercise: Cedric received therapeutic exercises to develop strength, endurance, ROM, flexibility, posture and core stabilization for 15 direct minutes including:   precautioned pt about overdoing with lifting activities to avoid further inflammation and irritation to shoulders     Chin tuck x 10  Shoulder shrugs with retraction x 10  Depressions x 20  Wand flexion x 10 supine  Wand ER neutral x 10  Wand ER with abd x 10  Protraction x 20 x 3#  Pect stretch over 1/2 foam 3 min, instructed pt to use rolled towel at home    Shoulder ext prone with ER x 20 with 1#  Horizontal abd prone with ER x 20  ER sidelying x 20    IR stretch with towel for R UE x 10     Retraction with pulldown x 20 with YTB   Retraction with YTB x 20   ER with YTB x 20  IR with YTB x 20    Verbal and tactile cueing provided for proper performance and recruitment.       Manual therapy: Cedric  received the following manual therapy techniques x 15 min. to include soft tissue and joint mobilization were applied to the: B shoulder girdles to include: STM pect major and minor, subscap/lat dorsi region, scapula mob and shoulder GH mob emphasis on inf glide and A/P mob    Modalities: Pt. received cold pack x 10 min. to B shoulders sitting on mat following treatment.     Written Home Exercises Provided: indented ex noted above  Pt demo good understanding of the education provided. Cedric demonstrated good return demonstration of activities.     Pt. education:  · Posture reeducation, body mechanics, HEP,   · No spiritual or educational barriers to learning provided  · Pt has no cultural, educational or language barriers to learning provided.    Assessment   Pt  with slight increase in tenderness to palpation and increased tightness subscap and biceps tendon. Pt understands need to make sure he does not overdo with lifting with reps or weight.     Pt will continue to benefit from skilled outpatient physical therapy to address the remaining functional deficits, provide pt/family education, and to maximize pt's level of independence in the home and community environment. .     GOALS:   Short Term Goals:  4 weeks    Increase range of motion 25%  Increase strength 1/2 muscle grade  Increase postural awareness to normal  Be able to perform HEP with minimal cueing required     Long Term Goals: 8 weeks  Increase range of motion to 75%to 100% of full  Increase strength 1 muscle grade  Improve scapular stabilization to normal  Restore normal sleep habits without disturbances due to pain  Restore ability to pull up covers in bed without increased pain  Restore ability to reach fully overhead without increased pain  Restore ability to reach in all planes without increased pain or difficulty  Restore ability to perform ADL's and household activities independently and without increased pain  Pt to be independent with HEP to improve ROM and independence with ADL's    Anticipated barriers to physical therapy: none  Pt's spiritual, cultural and educational needs considered and pt agreeable to plan of care and goals        Plan   Continue with established Plan of Care towards PT goals.

## 2019-01-30 ENCOUNTER — LAB VISIT (OUTPATIENT)
Dept: LAB | Facility: HOSPITAL | Age: 79
End: 2019-01-30
Attending: INTERNAL MEDICINE
Payer: MEDICARE

## 2019-01-30 ENCOUNTER — OFFICE VISIT (OUTPATIENT)
Dept: HEMATOLOGY/ONCOLOGY | Facility: CLINIC | Age: 79
End: 2019-01-30
Payer: MEDICARE

## 2019-01-30 VITALS
WEIGHT: 175.69 LBS | TEMPERATURE: 99 F | DIASTOLIC BLOOD PRESSURE: 71 MMHG | BODY MASS INDEX: 24.6 KG/M2 | HEART RATE: 70 BPM | SYSTOLIC BLOOD PRESSURE: 149 MMHG | RESPIRATION RATE: 20 BRPM | HEIGHT: 71 IN

## 2019-01-30 DIAGNOSIS — E53.8 DISORDER OF VITAMIN B12: ICD-10-CM

## 2019-01-30 DIAGNOSIS — D50.0 ANEMIA DUE TO CHRONIC BLOOD LOSS: Primary | ICD-10-CM

## 2019-01-30 DIAGNOSIS — R97.20 ELEVATED PSA: ICD-10-CM

## 2019-01-30 DIAGNOSIS — E78.00 PURE HYPERCHOLESTEROLEMIA: ICD-10-CM

## 2019-01-30 DIAGNOSIS — D50.0 ANEMIA DUE TO CHRONIC BLOOD LOSS: ICD-10-CM

## 2019-01-30 LAB
BASOPHILS # BLD AUTO: 0 K/UL
BASOPHILS NFR BLD: 0.4 %
DAT IGG-SP REAG RBC-IMP: NORMAL
DIFFERENTIAL METHOD: ABNORMAL
EOSINOPHIL # BLD AUTO: 0.2 K/UL
EOSINOPHIL NFR BLD: 1.9 %
ERYTHROCYTE [DISTWIDTH] IN BLOOD BY AUTOMATED COUNT: 14.2 %
FERRITIN SERPL-MCNC: 228 NG/ML
FOLATE SERPL-MCNC: 8.2 NG/ML
HAPTOGLOB SERPL-MCNC: 203 MG/DL
HCT VFR BLD AUTO: 39.9 %
HGB BLD-MCNC: 13 G/DL
IRON SERPL-MCNC: 61 UG/DL
LYMPHOCYTES # BLD AUTO: 2.8 K/UL
LYMPHOCYTES NFR BLD: 20.9 %
MCH RBC QN AUTO: 30.1 PG
MCHC RBC AUTO-ENTMCNC: 32.5 G/DL
MCV RBC AUTO: 93 FL
MONOCYTES # BLD AUTO: 1 K/UL
MONOCYTES NFR BLD: 7.7 %
NEUTROPHILS # BLD AUTO: 9.1 K/UL
NEUTROPHILS NFR BLD: 69.1 %
PATH REV BLD -IMP: NORMAL
PLATELET # BLD AUTO: 372 K/UL
PMV BLD AUTO: 8.1 FL
RBC # BLD AUTO: 4.31 M/UL
RETICS/RBC NFR AUTO: 1.2 %
SATURATED IRON: 17 %
TOTAL IRON BINDING CAPACITY: 357 UG/DL
TRANSFERRIN SERPL-MCNC: 241 MG/DL
VIT B12 SERPL-MCNC: 617 PG/ML
WBC # BLD AUTO: 13.2 K/UL

## 2019-01-30 PROCEDURE — 84165 PROTEIN E-PHORESIS SERUM: CPT | Mod: HCNC

## 2019-01-30 PROCEDURE — 85060 PATHOLOGIST REVIEW: ICD-10-PCS | Mod: HCNC,,, | Performed by: PATHOLOGY

## 2019-01-30 PROCEDURE — 82728 ASSAY OF FERRITIN: CPT | Mod: HCNC

## 2019-01-30 PROCEDURE — 86880 COOMBS TEST DIRECT: CPT | Mod: HCNC

## 2019-01-30 PROCEDURE — 99499 UNLISTED E&M SERVICE: CPT | Mod: HCNC,S$GLB,, | Performed by: INTERNAL MEDICINE

## 2019-01-30 PROCEDURE — 83010 ASSAY OF HAPTOGLOBIN QUANT: CPT | Mod: HCNC

## 2019-01-30 PROCEDURE — 85060 BLOOD SMEAR INTERPRETATION: CPT | Mod: HCNC,,, | Performed by: PATHOLOGY

## 2019-01-30 PROCEDURE — 86334 IMMUNOFIX E-PHORESIS SERUM: CPT | Mod: 26,HCNC,, | Performed by: PATHOLOGY

## 2019-01-30 PROCEDURE — 99204 PR OFFICE/OUTPT VISIT, NEW, LEVL IV, 45-59 MIN: ICD-10-PCS | Mod: HCNC,S$GLB,, | Performed by: INTERNAL MEDICINE

## 2019-01-30 PROCEDURE — 85025 COMPLETE CBC W/AUTO DIFF WBC: CPT | Mod: HCNC

## 2019-01-30 PROCEDURE — 1101F PT FALLS ASSESS-DOCD LE1/YR: CPT | Mod: HCNC,CPTII,S$GLB, | Performed by: INTERNAL MEDICINE

## 2019-01-30 PROCEDURE — 86334 IMMUNOFIX E-PHORESIS SERUM: CPT | Mod: HCNC

## 2019-01-30 PROCEDURE — 82607 VITAMIN B-12: CPT | Mod: HCNC

## 2019-01-30 PROCEDURE — 85045 AUTOMATED RETICULOCYTE COUNT: CPT | Mod: HCNC

## 2019-01-30 PROCEDURE — 84165 PROTEIN E-PHORESIS SERUM: CPT | Mod: 26,HCNC,, | Performed by: PATHOLOGY

## 2019-01-30 PROCEDURE — 86334 PATHOLOGIST INTERPRETATION IFE: ICD-10-PCS | Mod: 26,HCNC,, | Performed by: PATHOLOGY

## 2019-01-30 PROCEDURE — 99999 PR PBB SHADOW E&M-EST. PATIENT-LVL III: ICD-10-PCS | Mod: PBBFAC,HCNC,, | Performed by: INTERNAL MEDICINE

## 2019-01-30 PROCEDURE — 84165 PATHOLOGIST INTERPRETATION SPE: ICD-10-PCS | Mod: 26,HCNC,, | Performed by: PATHOLOGY

## 2019-01-30 PROCEDURE — 99204 OFFICE O/P NEW MOD 45 MIN: CPT | Mod: HCNC,S$GLB,, | Performed by: INTERNAL MEDICINE

## 2019-01-30 PROCEDURE — 84238 ASSAY NONENDOCRINE RECEPTOR: CPT | Mod: HCNC

## 2019-01-30 PROCEDURE — 99999 PR PBB SHADOW E&M-EST. PATIENT-LVL III: CPT | Mod: PBBFAC,HCNC,, | Performed by: INTERNAL MEDICINE

## 2019-01-30 PROCEDURE — 1101F PR PT FALLS ASSESS DOC 0-1 FALLS W/OUT INJ PAST YR: ICD-10-PCS | Mod: HCNC,CPTII,S$GLB, | Performed by: INTERNAL MEDICINE

## 2019-01-30 PROCEDURE — 83540 ASSAY OF IRON: CPT | Mod: HCNC

## 2019-01-30 PROCEDURE — 99499 RISK ADDL DX/OHS AUDIT: ICD-10-PCS | Mod: HCNC,S$GLB,, | Performed by: INTERNAL MEDICINE

## 2019-01-30 PROCEDURE — 36415 COLL VENOUS BLD VENIPUNCTURE: CPT | Mod: HCNC

## 2019-01-30 PROCEDURE — 82746 ASSAY OF FOLIC ACID SERUM: CPT | Mod: HCNC

## 2019-01-30 NOTE — LETTER
January 31, 2019      Tracy Mireles MD  [phone]  [facility]  [facility] [address]           [facility]  [address], [address]  [place] [address]  Phone: [phone]          Patient: Cedric Gupta Jr.   MR Number: [id number]   YOB: 1940   Date of Visit: 1/30/2019       Dear Dr. Tracy Mireles:    Thank you for referring Cedric Gupta to me for evaluation. Attached you will find relevant portions of my assessment and plan of care.    If you have questions, please do not hesitate to call me. I look forward to following Cedric Gupta along with you.    Sincerely,    Vickie Zavala MD    Enclosure  CC:  No Recipients    If you would like to receive this communication electronically, please contact [email or web address] or [phone] to request more information on [employer] [facility] access.    For providers and/or their staff who would like to refer a patient to [facility], please contact us through our one-stop-shop provider referral line, [facility] , at [phone].    If you feel you have received this communication in error or would no longer like to receive these types of communications, please e-mail [email or web address]

## 2019-01-31 LAB
ALBUMIN SERPL ELPH-MCNC: 4 G/DL
ALPHA1 GLOB SERPL ELPH-MCNC: 0.32 G/DL
ALPHA2 GLOB SERPL ELPH-MCNC: 0.86 G/DL
B-GLOBULIN SERPL ELPH-MCNC: 0.85 G/DL
GAMMA GLOB SERPL ELPH-MCNC: 0.97 G/DL
INTERPRETATION SERPL IFE-IMP: NORMAL
PROT SERPL-MCNC: 7 G/DL
STFR SERPL-MCNC: 2.9 MG/L

## 2019-02-01 LAB — PATHOLOGIST INTERPRETATION IFE: NORMAL

## 2019-02-01 NOTE — PROGRESS NOTES
Subjective:       Patient ID: Cedric Gupta Jr. is a 78 y.o. male.    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[employer])   Tobacco Use    Smoking status: Former Smoker     Years: 0.50     Last attempt to quit: 1965     Years since quittin.4    Smokeless tobacco: Never Used   Substance and Sexual Activity    Alcohol use: Yes     Alcohol/week: 1.2 - 2.4 oz     Types: 2 - 4 Cans of beer per week     Comment: twice a week    Drug use: Yes     Frequency: 7.0 times per week     Types: Marijuana     Comment: marijuana    Sexual activity: Yes     Partners: Female     Birth control/protection: None   Other Topics Concern    None   Social History Narrative    None     Family History   Problem Relation Age of Onset    Cataracts Mother     Glaucoma Mother     Lupus Mother     Cancer Father         kidney and lung cancer ([employer] worker)    Diabetes Maternal Aunt     Diabetes Maternal Uncle     No Known Problems Sister     No Known Problems Daughter     Cancer Sister         breast cancer    Melanoma Neg Hx     Psoriasis Neg Hx     Eczema Neg Hx     Amblyopia Neg Hx     Hypertension Neg Hx     Macular degeneration Neg Hx     Retinal detachment Neg Hx     Stroke Neg Hx     Strabismus Neg Hx     Thyroid disease Neg Hx      Past Surgical History:   Procedure Laterality Date    BIOPSY, PROSTATE, RECTAL APPROACH, WITH US GUIDANCE N/A 2014    [facility]    BIOPSY, PROSTATE,  TRANSRECTAL APPROACH, WITH US GUIDANCE N/A 8/20/2018    Performed by [facility] at [facility] OR    CHOLECYSTECTOMY      COLONOSCOPY N/A 8/18/2015    Performed by Kasi Mckoy MD at [facility] ENDO    EGD (ESOPHAGOGASTRODUODENOSCOPY) N/A 2/12/2014    Performed by [facility]oy [facility] at [facility] ENDO    EGD (ESOPHAGOGASTRODUODENOSCOPY) N/A 8/23/2013    Performed by Kasi Mckoy MD at [facility] ENDO    FRACTURE SURGERY      Right ankle and leg    ORCHIECTOMY      due to undescended testicle/ Left    PROSTATE BIOPSY  2014 2014, 2018-negative    TONSILLECTOMY      TRANSRECTAL ULTRASOUND GUIDED PROSTATE BIOPSY N/A 1/15/2018    Performed by Joselo Green [facility] at [facility] OR     Past Medical History:   Diagnosis Date    Basal cell carcinoma     BPH (benign prostatic hypertrophy)     Cataract     OU    Elevated PSA     Epiretinal membrane     OS    Fatty liver     noted on usg    History of colon polyps     History of duodenal ulcer     x 2 8/13 and 8/14    History of nephrolithiasis 2008    passed stone    History of prediabetes     Hyperlipidemia     No current medications    Macular degeneration     dry -- OU    Osteoarthritis     S/P colonoscopy     8/15; 8/20    Squamous cell carcinoma 11/08/2016    Right forearm       Current Outpatient Medications:     ANTIOX #11/OM3/DHA/EPA/LUT/NESHA (OCUVITE ADULT 50+ ORAL), Take by mouth once daily., Disp: , Rfl:     aspirin (ECOTRIN) 81 MG EC tablet, Take 81 mg by mouth once daily. , Disp: , Rfl:     traMADol (ULTRAM) 50 mg tablet, 1 or 2 tablets bid prn, Disp: 45 tablet, Rfl: 0    predniSONE (DELTASONE) 20 MG tablet, Take 3 tablets daily for 3 days, then 2 tablets daily for 3 days, then 1 tablet daily for 3 days, Disp: 18 tablet, Rfl: 0  Review of patient's allergies indicates:   Allergen Reactions    Amantadine Other (See Comments)     Depression         REVIEW OF SYSTEMS:     CONSTITUTIONAL: The patient denies any weight change. There is  no apparent    change in appetite, fever, night sweats, headaches, fatigue, dizziness, or    weakness.      SKIN: Denies rash, issues with nails, non-healing sores, bleeding, blotching    skin or abnormal bruising. Denies new moles or changes to existing moles.      BREASTS: There is no swelling around breasts or nipple discharge.    EYES: Denies eye pain, blurred vision, swelling, redness or discharge.      ENT AND MOUTH: Denies runny nose, stuffiness, sinus trouble or sores. Denies    nosebleeds. Denies, hoarseness, change in voice or swelling in front of the    neck.      CARDIOVASCULAR: Denies chest pain, discomfort or palpitations. Denies neck    swelling or episodes of passing out.      RESPIRATORY: Denies cough, sputum production, blood in sputum, and denies    shortness of breath.      GI: Denies trouble swallowing, indigestion, heartburn, abdominal pain, nausea,    vomiting, diarrhea, altered bowel habits, blood in stool, discoloration of    stools, change in nature of stool, bloating, increased abdominal girth.      GENITOURINARY: No discharge. No pelvic pain or lumps. No rash around groin or  lesions. No urinary frequency, hesitation, painful urination or blood in    urine. Denies incontinence. No problems with intercourse.      MUSCULOSKELETAL: Denies neck or back pain. Denies weakness in arms or legs,    joint problems or distended inflamed veins in legs. Denies swelling or abnormal  glands.      NEUROLOGICAL: Denies tingling, numbness, altered mentation changes to nerve    function in the face, weakness to one or both of the body. Denies changes to    gait and denies multiple falls or accidents.      PSYCHIATRIC: Denies nervousness, anxiety, hallucinations, depression, suicidal    ideation, trouble sleeping or changes in behavior noticed by family.      The patient denies recent foreign travel or recent exposure to chemicals or    products of concern or infectious diseases.     PHYSICAL EXAM:      Vitals:    01/30/19 1017   BP: (!) 149/71   Pulse: 70   Resp: 20   Temp: 98.9 °F (37.2 °C)       GENERAL: Comfortable looking patient. Patient is in no distress.  Awake, alert and oriented to time, person and place.  No anxiety, or agitation.      HEENT: Normal conjunctivae and eyelids. WNL.  PERRLA 3 to 4 mm. No icterus, no pallor, no congestion, and no discharge noted.     NECK:  Supple. Trachea is central.  No crepitus.  No JVD or masses.    RESPIRATORY:  No intercostal retractions.  No dullness to percussion.  Chest is clear to auscultation.  No rales, rhonchi or wheezes.  No crepitus.  Good air entry bilaterally.    CARDIOVASCULAR:  S1 and S2 are normally heard without murmurs or gallops.  All peripheral pulses are present.    ABDOMEN:  Normal abdomen.  No hepatosplenomegaly.  No free fluid.  Bowel sounds are present.  No hernia noted. No masses.  No rebound or tenderness.  No guarding or rigidity.  Umbilicus is midline.    LYMPHATICS:  No axillary, cervical, supraclavicular, submental, or inguinal lymphadenopathy.    SKIN/MUSCULOSKELETAL:  There is no evidence of excoriation marks or ecchmosis.  No rashes.  No cyanosis.  No clubbing.  No joint or skeletal deformities noted.  Normal range of motion.    NEUROLOGIC:  Higher functions are appropriate.  No cranial nerve deficits.  Normal manan.  Normal strength.  Motor and sensory functions are normal.  Deep tendon reflexes are normal.    GENITAL/RECTAL:  Exams are deferred.      Laboratory:     CBC:  Lab Results   Component Value Date    WBC 13.20 (H) 01/30/2019    RBC 4.31 (L) 01/30/2019    HGB 13.0 (L) 01/30/2019    HCT 39.9 (L) 01/30/2019    MCV 93 01/30/2019    MCH 30.1 01/30/2019    MCHC 32.5 01/30/2019    RDW 14.2 01/30/2019     (H) 01/30/2019    MPV 8.1 (L) 01/30/2019    GRAN 9.1 (H) 01/30/2019    GRAN 69.1 01/30/2019    LYMPH 2.8 01/30/2019    LYMPH 20.9 01/30/2019    MONO 1.0 01/30/2019    MONO 7.7 01/30/2019    EOS 0.2 01/30/2019    BASO 0.00  01/30/2019    EOSINOPHIL 1.9 01/30/2019    BASOPHIL 0.4 01/30/2019       BMP: BMP  Lab Results   Component Value Date     12/14/2018    K 4.1 12/14/2018     12/14/2018    CO2 32 (H) 12/14/2018    BUN 13 12/14/2018    CREATININE 0.9 12/14/2018    CALCIUM 9.7 12/14/2018    ANIONGAP 9 12/14/2018    ESTGFRAFRICA >60.0 12/14/2018    EGFRNONAA >60.0 12/14/2018       LFT:   Lab Results   Component Value Date    ALT 12 12/14/2018    AST 14 12/14/2018    ALKPHOS 72 12/14/2018    BILITOT 0.5 12/14/2018         Assessment/Plan:            anemia neutrophilia and thrombophilia   will workup with iron studies B12 folate retic count haptoglobin SPEP , servando,   if above workup is negative after path review and the neutrophilia continues along with thrombophilia will get JAK2 and refle reflex studies  May also want a bone marrow biopsy   dyslipidemia continue  With PC   continue follow-up of PSA with Urology

## 2019-02-04 ENCOUNTER — CLINICAL SUPPORT (OUTPATIENT)
Dept: REHABILITATION | Facility: HOSPITAL | Age: 79
End: 2019-02-04
Attending: NURSE PRACTITIONER
Payer: MEDICARE

## 2019-02-04 DIAGNOSIS — G89.29 CHRONIC RIGHT SHOULDER PAIN: Primary | ICD-10-CM

## 2019-02-04 DIAGNOSIS — M25.512 CHRONIC LEFT SHOULDER PAIN: ICD-10-CM

## 2019-02-04 DIAGNOSIS — M62.81 MUSCLE WEAKNESS: ICD-10-CM

## 2019-02-04 DIAGNOSIS — M25.511 CHRONIC RIGHT SHOULDER PAIN: Primary | ICD-10-CM

## 2019-02-04 DIAGNOSIS — M25.612 STIFFNESS OF LEFT SHOULDER JOINT: ICD-10-CM

## 2019-02-04 DIAGNOSIS — M25.611 STIFFNESS OF RIGHT SHOULDER JOINT: ICD-10-CM

## 2019-02-04 DIAGNOSIS — G89.29 CHRONIC LEFT SHOULDER PAIN: ICD-10-CM

## 2019-02-04 LAB — PATHOLOGIST INTERPRETATION SPE: NORMAL

## 2019-02-04 PROCEDURE — 97140 MANUAL THERAPY 1/> REGIONS: CPT | Mod: HCNC,PN | Performed by: PHYSICAL THERAPIST

## 2019-02-04 PROCEDURE — 97110 THERAPEUTIC EXERCISES: CPT | Mod: HCNC,PN | Performed by: PHYSICAL THERAPIST

## 2019-02-04 NOTE — PROGRESS NOTES
Physical Therapy Daily Note     Name: Cedric Gupta Jr.  Clinic Number: [id number]  Diagnosis:   Encounter Diagnoses   Name Primary?    Chronic right shoulder pain Yes    Chronic left shoulder pain     Stiffness of right shoulder joint     Stiffness of left shoulder joint     Muscle weakness      Physician: Tracy Stein MD  Treatment Orders: PT Eval and Treat  Past Medical History:   Diagnosis Date    Basal cell carcinoma     BPH (benign prostatic hypertrophy)     Cataract     OU    Elevated PSA     Epiretinal membrane     OS    Fatty liver     noted on usg    History of colon polyps     History of duodenal ulcer     x 2 8/13 and 8/14    History of nephrolithiasis 2008    passed stone    History of prediabetes     Hyperlipidemia     No current medications    Macular degeneration     dry -- OU    Osteoarthritis     S/P colonoscopy     8/15; 8/20    Squamous cell carcinoma 11/08/2016    Right forearm     Precautions: pt with hip pain also, standard  Evaluation date: 1/14/2019  Visit # authorized: 5/20  Authorization period: 12-31-19  Plan of care expiration: 3-11-18  MD Follow up appt: none scheduled       Time In:  8:00  Time Out:  9:14  Billable Time: 55  min    Subjective     Pt reports: slight pain with movement at rest nothing still  Pt states using arms well Pt states he did a lot of lifting this weekend and some items were heavy    Pain Scale: before treatment: 1 with movement 0 at rest currently; after treatment: 0 at rest     Objective         Shoulder ROM: (measured in degrees) 2-4-19 after mob and stretching  Shoulder  RUE LUE   Flexion 160 160   Abduction 100 120   External Rotation neutral 75 75   External Rotation with abd  90 90   Internal Rotation 60 65   Active standing flexion 170 170   Active standing abduction 170 170       Shoulder ROM: (measured in degrees supine) initial eval  Shoulder  RUE LUE   Flexion 130 130   Abduction 60 95   External Rotation neutral 60 70    External Rotation with scaption 50 90   Internal Rotation 40 45      Shoulder ROM: (measured in degrees standing) initial eval  Shoulder  RUE LUE   Active Flexion 120 140   Active Abduction 45 60      Biceps tendon and subscap a little more irritated today with STM    TREATMENT  Therapeutic exercise: Cedric received therapeutic exercises to develop strength, endurance, ROM, flexibility, posture and core stabilization for 40 direct minutes including:   precautioned pt about overdoing with lifting activities to avoid further inflammation and irritation to shoulders     Chin tuck x 10  Shoulder shrugs with retraction x 10  Depressions x 20      Bicep curl 3# x 20     Triceps with RTB x 20    Wand flexion x 10 supine  Wand ER neutral x 1 checked for full ROM  Wand ER with abd x 1 checked for full ROM  Protraction x 20 x 3#  Pect stretch over 1/2 foam 3 min, instructed pt to use rolled towel at home    Shoulder ext prone with ER x 20 with 2#  Horizontal abd prone with ER x 20 with 1#  ER sidelying x 20 with 1#    IR stretch with towel for R UE x 10        Corner stretch x 5 3 planes     Retraction with pulldown x 20 with YTB   Retraction with YTB x 20   ER with YTB x 20  IR with YTB x 20    Verbal and tactile cueing provided for proper performance and recruitment.       Manual therapy: Cedric  received the following manual therapy techniques x  15 min. to include soft tissue and joint mobilization were applied to the: B shoulder girdles to include: STM pect major and minor, subscap/lat dorsi region, scapula mob and shoulder GH mob emphasis on inf glide and A/P mob    Modalities: Pt. received cold pack x 10 min. to B shoulders sitting on mat following treatment.     Written Home Exercises Provided: indented ex noted above  Pt demo good understanding of the education provided. Cedric demonstrated good return demonstration of activities.     Pt. education:  · Posture reeducation, body mechanics, HEP,   · No spiritual  or educational barriers to learning provided  · Pt has no cultural, educational or language barriers to learning provided.    Assessment   Pt with improving ROM and will benefit from further pect stretching Doing well with scap stab. Edin progression well     Pt will continue to benefit from skilled outpatient physical therapy to address the remaining functional deficits, provide pt/family education, and to maximize pt's level of independence in the home and community environment. .     GOALS:   Short Term Goals:  4 weeks    Increase range of motion 25%  Increase strength 1/2 muscle grade  Increase postural awareness to normal  Be able to perform HEP with minimal cueing required     Long Term Goals: 8 weeks  Increase range of motion to 75%to 100% of full  Increase strength 1 muscle grade  Improve scapular stabilization to normal  Restore normal sleep habits without disturbances due to pain  Restore ability to pull up covers in bed without increased pain  Restore ability to reach fully overhead without increased pain  Restore ability to reach in all planes without increased pain or difficulty  Restore ability to perform ADL's and household activities independently and without increased pain  Pt to be independent with HEP to improve ROM and independence with ADL's    Anticipated barriers to physical therapy: none  Pt's spiritual, cultural and educational needs considered and pt agreeable to plan of care and goals        Plan   Continue with established Plan of Care towards PT goals.

## 2019-02-05 LAB — PATH REV BLD -IMP: NORMAL

## 2019-02-06 ENCOUNTER — CLINICAL SUPPORT (OUTPATIENT)
Dept: REHABILITATION | Facility: HOSPITAL | Age: 79
End: 2019-02-06
Attending: NURSE PRACTITIONER
Payer: MEDICARE

## 2019-02-06 DIAGNOSIS — G89.29 CHRONIC LEFT SHOULDER PAIN: ICD-10-CM

## 2019-02-06 DIAGNOSIS — M25.611 STIFFNESS OF RIGHT SHOULDER JOINT: ICD-10-CM

## 2019-02-06 DIAGNOSIS — M62.81 MUSCLE WEAKNESS: ICD-10-CM

## 2019-02-06 DIAGNOSIS — M25.511 CHRONIC RIGHT SHOULDER PAIN: Primary | ICD-10-CM

## 2019-02-06 DIAGNOSIS — M25.512 CHRONIC LEFT SHOULDER PAIN: ICD-10-CM

## 2019-02-06 DIAGNOSIS — G89.29 CHRONIC RIGHT SHOULDER PAIN: Primary | ICD-10-CM

## 2019-02-06 DIAGNOSIS — M25.612 STIFFNESS OF LEFT SHOULDER JOINT: ICD-10-CM

## 2019-02-06 PROCEDURE — 97110 THERAPEUTIC EXERCISES: CPT | Mod: HCNC,PN | Performed by: PHYSICAL THERAPIST

## 2019-02-06 PROCEDURE — 97140 MANUAL THERAPY 1/> REGIONS: CPT | Mod: HCNC,PN | Performed by: PHYSICAL THERAPIST

## 2019-02-06 NOTE — PROGRESS NOTES
Physical Therapy Daily Note     Name: Cedric Gupta Jr.  Clinic Number: [id number]  Diagnosis:   Encounter Diagnoses   Name Primary?    Chronic right shoulder pain Yes    Chronic left shoulder pain     Stiffness of right shoulder joint     Stiffness of left shoulder joint     Muscle weakness      Physician: Tracy Stein MD  Treatment Orders: PT Eval and Treat  Past Medical History:   Diagnosis Date    Basal cell carcinoma     BPH (benign prostatic hypertrophy)     Cataract     OU    Elevated PSA     Epiretinal membrane     OS    Fatty liver     noted on usg    History of colon polyps     History of duodenal ulcer     x 2 8/13 and 8/14    History of nephrolithiasis 2008    passed stone    History of prediabetes     Hyperlipidemia     No current medications    Macular degeneration     dry -- OU    Osteoarthritis     S/P colonoscopy     8/15; 8/20    Squamous cell carcinoma 11/08/2016    Right forearm     Precautions: pt with hip pain also, standard  Evaluation date: 1/14/2019  Visit # authorized: 6/20  Authorization period: 12-31-19  Plan of care expiration: 3-11-18  MD Follow up appt: none scheduled       Time In:  8:55  Time Out:  9:14  Billable Time: 55  min    Subjective     Pt reports: little soreness today, not bad with movement Pt states he did some worked with a chain saw and cutting firewood and was not necessarily cutting into small pieces  Pt states he is now done with cutting firewood  Pt continues to work on ex once one day and twice the other day     Pain Scale: before treatment: 1 with movement 0 at rest currently; after treatment: 0 at rest     Objective         Shoulder ROM: (measured in degrees) 2-6-19 after mob and stretching  Shoulder  RUE LUE   Flexion 160 160   Abduction 100 120   External Rotation neutral 75 75   External Rotation with abd  90 90   Internal Rotation 60 65   Active standing flexion 170 170   Active standing abduction 170 170       Shoulder ROM: (measured  in degrees supine) initial eval  Shoulder  RUE LUE   Flexion 130 130   Abduction 60 95   External Rotation neutral 60 70   External Rotation with scaption 50 90   Internal Rotation 40 45      Shoulder ROM: (measured in degrees standing) initial eval  Shoulder  RUE LUE   Active Flexion 120 140   Active Abduction 45 60      Biceps tendon ok today, but increased subscap tightness on R     TREATMENT  Therapeutic exercise: Cedric received therapeutic exercises to develop strength, endurance, ROM, flexibility, posture and core stabilization for 40 direct minutes including:   Again cautioned pt about doing too much heavy lifting and not to do a lot of heavy work at one time.     Chin tuck x 10  Shoulder shrugs with retraction x 10  Depressions x 20  Bicep curl 3# x 20  Triceps with RTB x 20    Wand flexion x 10 supine  Wand ER neutral x 1 checked for full ROM  Wand ER with abd x 1 checked for full ROM, little tight on R so did 10 reps to fully achieve 90 with ease  Protraction x 20 x 3#   Chest press x 20 with 3#  Pect stretch over 1/2 foam 3 min, instructed pt to use rolled towel at home       Maji stretch x 5  Shoulder ext prone with ER x 20 with 2#  Horizontal abd prone with ER x 20 with 1#   Row 3# prone x 20  ER sidelying x 20 with 1#    IR stretch with towel for R UE x 10    Corner stretch x 5 3 planes     Retraction with pulldown x 20 with RTB   Retraction with RTB x 20   ER with RTB x 20  IR with RTB x 20    Verbal and tactile cueing provided for proper performance and recruitment.       Manual therapy: Cedric  received the following manual therapy techniques x  15 min. to include soft tissue and joint mobilization were applied to the: B shoulder girdles to include: STM pect major and minor, subscap/lat dorsi region, scapula mob and shoulder GH mob emphasis on inf glide and A/P mob    Modalities: Pt. received cold pack x 10 min. to B shoulders sitting on mat following treatment.     Written Home Exercises  Provided: indented ex noted above  Pt demo good understanding of the education provided. Halphen demonstrated good return demonstration of activities.     Pt. education:  · Posture reeducation, body mechanics, HEP,   · No spiritual or educational barriers to learning provided  · Pt has no cultural, educational or language barriers to learning provided.    Assessment   Increased subscap eduardo on R leading to increased scap instability on R with elelvation Pt may benefit from Maji stretching Pt appears to understand need to avoid overdoing.  Edin progression well     Pt will continue to benefit from skilled outpatient physical therapy to address the remaining functional deficits, provide pt/family education, and to maximize pt's level of independence in the home and community environment. .     GOALS:   Short Term Goals:  4 weeks    Increase range of motion 25%  Increase strength 1/2 muscle grade  Increase postural awareness to normal  Be able to perform HEP with minimal cueing required     Long Term Goals: 8 weeks  Increase range of motion to 75%to 100% of full  Increase strength 1 muscle grade  Improve scapular stabilization to normal  Restore normal sleep habits without disturbances due to pain  Restore ability to pull up covers in bed without increased pain  Restore ability to reach fully overhead without increased pain  Restore ability to reach in all planes without increased pain or difficulty  Restore ability to perform ADL's and household activities independently and without increased pain  Pt to be independent with HEP to improve ROM and independence with ADL's    Anticipated barriers to physical therapy: none  Pt's spiritual, cultural and educational needs considered and pt agreeable to plan of care and goals        Plan   Continue with established Plan of Care towards PT goals.

## 2019-02-13 ENCOUNTER — CLINICAL SUPPORT (OUTPATIENT)
Dept: REHABILITATION | Facility: HOSPITAL | Age: 79
End: 2019-02-13
Attending: NURSE PRACTITIONER
Payer: MEDICARE

## 2019-02-13 DIAGNOSIS — M25.611 STIFFNESS OF RIGHT SHOULDER JOINT: ICD-10-CM

## 2019-02-13 DIAGNOSIS — M25.512 CHRONIC LEFT SHOULDER PAIN: ICD-10-CM

## 2019-02-13 DIAGNOSIS — M25.511 CHRONIC RIGHT SHOULDER PAIN: Primary | ICD-10-CM

## 2019-02-13 DIAGNOSIS — G89.29 CHRONIC RIGHT SHOULDER PAIN: Primary | ICD-10-CM

## 2019-02-13 DIAGNOSIS — M25.612 STIFFNESS OF LEFT SHOULDER JOINT: ICD-10-CM

## 2019-02-13 DIAGNOSIS — M62.81 MUSCLE WEAKNESS: ICD-10-CM

## 2019-02-13 DIAGNOSIS — G89.29 CHRONIC LEFT SHOULDER PAIN: ICD-10-CM

## 2019-02-13 PROCEDURE — 97140 MANUAL THERAPY 1/> REGIONS: CPT | Mod: HCNC,PN | Performed by: PHYSICAL THERAPIST

## 2019-02-13 PROCEDURE — 97110 THERAPEUTIC EXERCISES: CPT | Mod: HCNC,PN | Performed by: PHYSICAL THERAPIST

## 2019-02-13 NOTE — PROGRESS NOTES
Physical Therapy Daily Note     Name: Cedric Gupta Jr.  Clinic Number: [id number]  Diagnosis:   Encounter Diagnoses   Name Primary?    Chronic right shoulder pain Yes    Chronic left shoulder pain     Stiffness of right shoulder joint     Stiffness of left shoulder joint     Muscle weakness      Physician: Tracy Stein MD  Treatment Orders: PT Eval and Treat  Past Medical History:   Diagnosis Date    Basal cell carcinoma     BPH (benign prostatic hypertrophy)     Cataract     OU    Elevated PSA     Epiretinal membrane     OS    Fatty liver     noted on usg    History of colon polyps     History of duodenal ulcer     x 2 8/13 and 8/14    History of nephrolithiasis 2008    passed stone    History of prediabetes     Hyperlipidemia     No current medications    Macular degeneration     dry -- OU    Osteoarthritis     S/P colonoscopy     8/15; 8/20    Squamous cell carcinoma 11/08/2016    Right forearm     Precautions: pt with hip pain also, standard  Evaluation date: 1/14/2019  Visit # authorized: 8/20  Authorization period: 12-31-19  Plan of care expiration: 3-11-18  MD Follow up appt: none scheduled       Time In:  9:02  Time Out:  10:05  Billable Time: 55  min    Subjective     Pt reports: little discomfort with movement     Pain Scale: before treatment: 1 with movement 0 at rest currently; after treatment: 0 at rest     Objective     Shoulder ROM: (measured in degrees) 2-13-19 after mob and stretching  Shoulder  RUE LUE   Flexion 160 160   Abduction 110 120   External Rotation neutral 75 75   External Rotation with abd  90 90   Internal Rotation 60 65   Active standing flexion 170 170   Active standing abduction 170 170       Shoulder ROM: (measured in degrees supine) initial eval  Shoulder  RUE LUE   Flexion 130 130   Abduction 60 95   External Rotation neutral 60 70   External Rotation with scaption 50 90   Internal Rotation 40 45      Shoulder ROM: (measured in degrees standing) initial  eval  Shoulder  RUE LUE   Active Flexion 120 140   Active Abduction 45 60      Biceps tendon ok today, but increased subscap tightness on R     TREATMENT  Therapeutic exercise: Cedric received therapeutic exercises to develop strength, endurance, ROM, flexibility, posture and core stabilization for 40 direct minutes including:     Chin tuck x 10  Shoulder shrugs with retraction x 10  Depressions x 20  Bicep curl 3# x 20  Triceps with RTB x 20    (NP)Wand flexion x 10 supine  (NP)Wand ER neutral x 1 checked for full ROM  (NP)Wand ER with abd x 1 checked for full ROM, little tight on R so did 10 reps to fully achieve 90 with ease  Performed PROM with PT prior to cane so did not need to do in clinic but instructed pt to continue with HEP to make sure to maintain ROM  Protraction x 20 x 3#   Chest press x 20 with 3#  Pect stretch over 1/2 foam 3 min, instructed pt to use rolled towel at home       Maji stretch x 5  Shoulder ext prone with ER x 20 with 2#  Horizontal abd prone with ER x 20 with 1#   Row 3# prone x 20  ER sidelying x 10 with 2#, fatigue with increased weight so held at 10 reps    IR stretch with towel for R UE x 10    Corner stretch x 5 3 planes     Retraction with pulldown x 20 with RTB   Retraction with RTB x 20   ER with RTB x 20  IR with RTB x 20    Verbal and tactile cueing provided for proper performance and recruitment.       Manual therapy: Cedric  received the following manual therapy techniques x  15 min. to include soft tissue and joint mobilization were applied to the: B shoulder girdles to include: STM pect major and minor, subscap/lat dorsi region, scapula mob and shoulder GH mob emphasis on inf glide and A/P mob    Davila shoulder taping #1,2, was performed to provide support to scapula and shoulder to decrease pain and improve functional use of UE.  Instructed pt in use, care and precautions with tape.      (NP) due to time constraints, PT had a meeting Modalities: Pt. received cold  pack x 10 min. to B shoulders sitting on mat following treatment.     Written Home Exercises Provided: indented ex noted above  Pt demo good understanding of the education provided. Halphen demonstrated good return demonstration of activities.     Pt. education:  · Posture reeducation, body mechanics, HEP,   · No spiritual or educational barriers to learning provided  · Pt has no cultural, educational or language barriers to learning provided.    Assessment   Pt continues with rounded shoulder posture despite stretching and strengthening.  Pt may benefit from Davila taping  Pt able to elevate arms with less pain with taping. Pt improving with ex performance     Pt will continue to benefit from skilled outpatient physical therapy to address the remaining functional deficits, provide pt/family education, and to maximize pt's level of independence in the home and community environment. .     GOALS:   Short Term Goals:  4 weeks    Increase range of motion 25%  Increase strength 1/2 muscle grade  Increase postural awareness to normal  Be able to perform HEP with minimal cueing required     Long Term Goals: 8 weeks  Increase range of motion to 75%to 100% of full  Increase strength 1 muscle grade  Improve scapular stabilization to normal  Restore normal sleep habits without disturbances due to pain  Restore ability to pull up covers in bed without increased pain  Restore ability to reach fully overhead without increased pain  Restore ability to reach in all planes without increased pain or difficulty  Restore ability to perform ADL's and household activities independently and without increased pain  Pt to be independent with HEP to improve ROM and independence with ADL's    Anticipated barriers to physical therapy: none  Pt's spiritual, cultural and educational needs considered and pt agreeable to plan of care and goals        Plan   Continue with established Plan of Care towards PT goals. Assess response to taping

## 2019-02-18 ENCOUNTER — LAB VISIT (OUTPATIENT)
Dept: LAB | Facility: HOSPITAL | Age: 79
End: 2019-02-18
Attending: INTERNAL MEDICINE
Payer: MEDICARE

## 2019-02-18 ENCOUNTER — OFFICE VISIT (OUTPATIENT)
Dept: HEMATOLOGY/ONCOLOGY | Facility: CLINIC | Age: 79
End: 2019-02-18
Payer: MEDICARE

## 2019-02-18 ENCOUNTER — CLINICAL SUPPORT (OUTPATIENT)
Dept: REHABILITATION | Facility: HOSPITAL | Age: 79
End: 2019-02-18
Attending: NURSE PRACTITIONER
Payer: MEDICARE

## 2019-02-18 VITALS
HEIGHT: 71 IN | TEMPERATURE: 99 F | SYSTOLIC BLOOD PRESSURE: 125 MMHG | RESPIRATION RATE: 20 BRPM | WEIGHT: 180.31 LBS | HEART RATE: 65 BPM | DIASTOLIC BLOOD PRESSURE: 70 MMHG | BODY MASS INDEX: 25.24 KG/M2

## 2019-02-18 DIAGNOSIS — M25.512 CHRONIC LEFT SHOULDER PAIN: ICD-10-CM

## 2019-02-18 DIAGNOSIS — M25.511 CHRONIC RIGHT SHOULDER PAIN: Primary | ICD-10-CM

## 2019-02-18 DIAGNOSIS — D72.823 LEUKEMOID REACTION: ICD-10-CM

## 2019-02-18 DIAGNOSIS — D75.839 THROMBOCYTOSIS: ICD-10-CM

## 2019-02-18 DIAGNOSIS — M25.611 STIFFNESS OF RIGHT SHOULDER JOINT: ICD-10-CM

## 2019-02-18 DIAGNOSIS — M25.552 PAIN OF BOTH HIP JOINTS: ICD-10-CM

## 2019-02-18 DIAGNOSIS — E78.00 PURE HYPERCHOLESTEROLEMIA: ICD-10-CM

## 2019-02-18 DIAGNOSIS — M25.612 STIFFNESS OF LEFT SHOULDER JOINT: ICD-10-CM

## 2019-02-18 DIAGNOSIS — D68.59 THROMBOPHILIA: Primary | ICD-10-CM

## 2019-02-18 DIAGNOSIS — G89.29 CHRONIC LEFT SHOULDER PAIN: ICD-10-CM

## 2019-02-18 DIAGNOSIS — M62.81 MUSCLE WEAKNESS: ICD-10-CM

## 2019-02-18 DIAGNOSIS — D75.839 THROMBOCYTOSIS: Primary | ICD-10-CM

## 2019-02-18 DIAGNOSIS — D68.59 THROMBOPHILIA: ICD-10-CM

## 2019-02-18 DIAGNOSIS — M25.551 PAIN OF BOTH HIP JOINTS: ICD-10-CM

## 2019-02-18 DIAGNOSIS — G89.29 CHRONIC RIGHT SHOULDER PAIN: Primary | ICD-10-CM

## 2019-02-18 PROCEDURE — 99213 PR OFFICE/OUTPT VISIT, EST, LEVL III, 20-29 MIN: ICD-10-PCS | Mod: HCNC,S$GLB,, | Performed by: INTERNAL MEDICINE

## 2019-02-18 PROCEDURE — 99213 OFFICE O/P EST LOW 20 MIN: CPT | Mod: HCNC,S$GLB,, | Performed by: INTERNAL MEDICINE

## 2019-02-18 PROCEDURE — 99999 PR PBB SHADOW E&M-EST. PATIENT-LVL III: ICD-10-PCS | Mod: PBBFAC,HCNC,, | Performed by: INTERNAL MEDICINE

## 2019-02-18 PROCEDURE — 99999 PR PBB SHADOW E&M-EST. PATIENT-LVL III: CPT | Mod: PBBFAC,HCNC,, | Performed by: INTERNAL MEDICINE

## 2019-02-18 PROCEDURE — 1101F PT FALLS ASSESS-DOCD LE1/YR: CPT | Mod: HCNC,CPTII,S$GLB, | Performed by: INTERNAL MEDICINE

## 2019-02-18 PROCEDURE — 97110 THERAPEUTIC EXERCISES: CPT | Mod: HCNC,PN | Performed by: PHYSICAL THERAPIST

## 2019-02-18 PROCEDURE — 97140 MANUAL THERAPY 1/> REGIONS: CPT | Mod: HCNC,PN | Performed by: PHYSICAL THERAPIST

## 2019-02-18 PROCEDURE — 99499 UNLISTED E&M SERVICE: CPT | Mod: HCNC,S$GLB,, | Performed by: INTERNAL MEDICINE

## 2019-02-18 PROCEDURE — 81403 MOPATH PROCEDURE LEVEL 4: CPT | Mod: HCNC

## 2019-02-18 PROCEDURE — 99499 RISK ADDL DX/OHS AUDIT: ICD-10-PCS | Mod: HCNC,S$GLB,, | Performed by: INTERNAL MEDICINE

## 2019-02-18 PROCEDURE — 36415 COLL VENOUS BLD VENIPUNCTURE: CPT | Mod: HCNC,PO

## 2019-02-18 PROCEDURE — 1101F PR PT FALLS ASSESS DOC 0-1 FALLS W/OUT INJ PAST YR: ICD-10-PCS | Mod: HCNC,CPTII,S$GLB, | Performed by: INTERNAL MEDICINE

## 2019-02-18 NOTE — PROGRESS NOTES
Physical Therapy Daily Note     Name: Cedric Gupta Jr.  Clinic Number: [id number]  Diagnosis:   Encounter Diagnoses   Name Primary?    Chronic right shoulder pain Yes    Chronic left shoulder pain     Stiffness of right shoulder joint     Stiffness of left shoulder joint     Muscle weakness      Physician: Tracy Stein MD  Treatment Orders: PT Eval and Treat  Past Medical History:   Diagnosis Date    Basal cell carcinoma     BPH (benign prostatic hypertrophy)     Cataract     OU    Elevated PSA     Epiretinal membrane     OS    Fatty liver     noted on usg    History of colon polyps     History of duodenal ulcer     x 2 8/13 and 8/14    History of nephrolithiasis 2008    passed stone    History of prediabetes     Hyperlipidemia     No current medications    Macular degeneration     dry -- OU    Osteoarthritis     S/P colonoscopy     8/15; 8/20    Squamous cell carcinoma 11/08/2016    Right forearm     Precautions: pt with hip pain also, standard  Evaluation date: 1/14/2019  Visit # authorized: 9/20  Authorization period: 12-31-19  Plan of care expiration: 3-11-18  MD Follow up appt: none scheduled       Time In:  9:08  Time Out:  10:13  Billable Time: 55  min    Subjective     Pt reports: tape helped, did not have anyone to help remove  Pt states little pain this weekend but did ok     Pain Scale: before treatment: 1 with movement 0 at rest currently; after treatment: 0 at rest     Objective     Shoulder ROM: (measured in degrees) 2-18-19 same as 2-13 after mob and stretching  Shoulder  RUE LUE   Flexion 160 160   Abduction 110 120   External Rotation neutral 75 75   External Rotation with abd  90 90   Internal Rotation 60 65   Active standing flexion 170 170   Active standing abduction 170 170       Shoulder ROM: (measured in degrees supine) initial eval  Shoulder  RUE LUE   Flexion 130 130   Abduction 60 95   External Rotation neutral 60 70   External Rotation with scaption 50 90    Internal Rotation 40 45      Shoulder ROM: (measured in degrees standing) initial eval  Shoulder  RUE LUE   Active Flexion 120 140   Active Abduction 45 60      Biceps tendon tender R, but subscap ok on 2-18-19    Pt able to do towel stretch to T-12 on L and L4 on R    PT reviewed FOTO scores for Cedric Gupta Jr. on 2-18-19  FOTO score: 60, IE 46   FOTO scores were entered into EPIC - see media section.      TREATMENT  Therapeutic exercise: Cedric received therapeutic exercises to develop strength, endurance, ROM, flexibility, posture and core stabilization for 45 direct minutes including:   PT removed tape and skin in good shape    Chin tuck x 10  Shoulder shrugs with retraction x 10  Depressions x 20  Bicep curl 3# x 20  Triceps with RTB x 20    (NP)Wand flexion x 10 supine  (NP)Wand ER neutral x 1 checked for full ROM  (NP)Wand ER with abd x 1 checked for full ROM, little tight on R so did 10 reps to fully achieve 90 with ease  Performed PROM with PT prior to cane so did not need to do in clinic but instructed pt to continue with HEP to make sure to maintain ROM  Protraction x 20 x 3#  Chest press x 20 with 3#  Pect stretch over 1/2 foam 3 min, instructed pt to use rolled towel at home      Maji stretch x 5  Shoulder ext prone with ER x 20 with 2#  Horizontal abd prone with ER x 20 with 1#  Row 3# prone x 20  ER sidelying x 20 with 2#, able to progress with reps today    IR stretch with towel for R UE x 10    Corner stretch x 5 3 planes          Retraction with pulldown x 20 with RTB   Retraction with RTB x 20   ER with RTB x 20    ER at 90 degrees abd with step back x 10 with RTB needed cueing to maintain proper postioning  IR with RTB x 20    Verbal and tactile cueing provided for proper performance and recruitment.       Manual therapy: Cedric  received the following manual therapy techniques x  10 min. to include soft tissue and joint mobilization were applied to the: B shoulder girdles to include:  STM pect major and minor, subscap/lat dorsi region, scapula mob and shoulder GH mob emphasis on inf glide and A/P mob    Provided pt information on posture shirt     {NP{Giovanni shoulder taping #1,2, was performed to provide support to scapula and shoulder to decrease pain and improve functional use of UE.  Instructed pt in use, care and precautions with tape.      Pt did ok without ice last time after treatment so will hold today again and note response Modalities: Pt. received cold pack x 10 min. to B shoulders sitting on mat following treatment.     Written Home Exercises Provided: none today  Pt demo good understanding of the education provided. Wolfphen demonstrated good return demonstration of activities.     Pt. education:  · Posture reeducation, body mechanics, HEP,   · No spiritual or educational barriers to learning provided  · Pt has no cultural, educational or language barriers to learning provided.    Assessment   Pt appears to have benefited from tape and understands that posture shirt will provide similar support.  Pt with decreased subscap tightness and tendereness though some c/o biceps tendon R today  Easily achieved ROM with PROM Pt still requires some cueing for proper performance of ex, but reinforced need to continue with HEP and proposed to continue with HEP at 9 AM which was PT time  Less fatigue noted at end of session.  Improved function as per FOTO     Pt will continue to benefit from skilled outpatient physical therapy to address the remaining functional deficits, provide pt/family education, and to maximize pt's level of independence in the home and community environment. .     GOALS:   Short Term Goals:  4 weeks    Increase range of motion 25%  Increase strength 1/2 muscle grade  Increase postural awareness to normal  Be able to perform HEP with minimal cueing required     Long Term Goals: 8 weeks  Increase range of motion to 75%to 100% of full  Increase strength 1 muscle  grade  Improve scapular stabilization to normal  Restore normal sleep habits without disturbances due to pain  Restore ability to pull up covers in bed without increased pain  Restore ability to reach fully overhead without increased pain  Restore ability to reach in all planes without increased pain or difficulty  Restore ability to perform ADL's and household activities independently and without increased pain  Pt to be independent with HEP to improve ROM and independence with ADL's    Anticipated barriers to physical therapy: none  Pt's spiritual, cultural and educational needs considered and pt agreeable to plan of care and goals        Plan   Continue with established Plan of Care towards PT goals. Pt scheduled this week and next week and should be ready for discharge by end of next week. See how pt responds to no ice

## 2019-02-18 NOTE — PROGRESS NOTES
Subjective:       Patient ID: Cedric Gupta Jr. is a 78 y.o. male.    Chief Complaint:  Seen for anemia thrombocytosis and neutrophilia  Here to discuss workup for anemia  HPI:    denies any issue specifically  Social History     Socioeconomic History    Marital status:      Spouse name: Not on file    Number of children: Not on file    Years of education: Not on file    Highest education level: Not on file   Social Needs    Financial resource strain: Not on file    Food insecurity - worry: Not on file    Food insecurity - inability: Not on file    Transportation needs - medical: Not on file    Transportation needs - non-medical: Not on file   Occupational History    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[employer])   Tobacco Use    Smoking status: Former Smoker     Years: 0.50     Last attempt to quit: 1965     Years since quittin.5    Smokeless tobacco: Never Used   Substance and Sexual Activity    Alcohol use: Yes     Alcohol/week: 1.2 - 2.4 oz     Types: 2 - 4 Cans of beer per week     Comment: twice a week    Drug use: Yes     Frequency: 7.0 times per week     Types: Marijuana     Comment: marijuana    Sexual activity: Yes     Partners: Female     Birth control/protection: None   Other Topics Concern    Not on file   Social History Narrative    Not on file     Family History   Problem Relation Age of Onset    Cataracts Mother     Glaucoma Mother     Lupus Mother     Cancer Father         kidney and lung cancer ([employer] worker)    Diabetes Maternal Aunt     Diabetes Maternal Uncle     No Known Problems Sister     No Known Problems Daughter     Cancer Sister         breast cancer    Melanoma Neg Hx     Psoriasis Neg Hx     Eczema Neg Hx     Amblyopia Neg Hx     Hypertension Neg Hx     Macular degeneration Neg Hx     Retinal detachment Neg Hx     Stroke Neg Hx     Strabismus Neg Hx     Thyroid disease Neg Hx      Past Surgical History:   Procedure Laterality Date     BIOPSY, PROSTATE, RECTAL APPROACH, WITH US GUIDANCE N/A 4/7/2014    [facility]    BIOPSY, PROSTATE, TRANSRECTAL APPROACH, WITH US GUIDANCE N/A 8/20/2018    [facility]    CHOLECYSTECTOMY      COLONOSCOPY N/A 8/18/2015    [facility] ENDO    EGD (ESOPHAGOGASTRODUODENOSCOPY) N/A 2/12/2014    [facility]    EGD (ESOPHAGOGASTRODUODENOSCOPY) N/A 8/23/2013    [facility]    FRACTURE SURGERY      Right ankle and leg    ORCHIECTOMY      due to undescended testicle/ Left    PROSTATE BIOPSY  2014 2014, 2018-negative    TONSILLECTOMY      TRANSRECTAL ULTRASOUND GUIDED PROSTATE BIOPSY N/A 1/15/2018    [facility]     Past Medical History:   Diagnosis Date    Basal cell carcinoma     BPH (benign prostatic hypertrophy)     Cataract     OU    Elevated PSA     Epiretinal membrane     OS    Fatty liver     noted on usg    History of colon polyps     History of duodenal ulcer     x 2 8/13 and 8/14    History of nephrolithiasis 2008    passed stone    History of prediabetes     Hyperlipidemia     No current medications    Macular degeneration     dry -- OU    Osteoarthritis     S/P colonoscopy     8/15; 8/20    Squamous cell carcinoma 11/08/2016    Right forearm       Current Outpatient Medications:     ANTIOX #11/OM3/DHA/EPA/LUT/NESHA (OCUVITE ADULT 50+ ORAL), Take by mouth once daily., Disp: , Rfl:     aspirin (ECOTRIN) 81 MG EC tablet, Take 81 mg by mouth once daily. , Disp: , Rfl:     predniSONE (DELTASONE) 20 MG tablet, Take 3 tablets daily for 3 days, then 2 tablets daily for 3 days, then 1 tablet daily for 3 days, Disp: 18 tablet, Rfl: 0    traMADol (ULTRAM) 50 mg tablet, 1 or 2 tablets bid prn, Disp: 45 tablet, Rfl: 0  Review of patient's allergies indicates:   Allergen Reactions     Amantadine Other (See Comments)     Depression         REVIEW OF SYSTEMS:     CONSTITUTIONAL: The patient denies any weight change. There is no apparent    change in appetite, fever, night sweats, headaches, fatigue, dizziness, or    weakness.      SKIN: Denies rash, issues with nails, non-healing sores, bleeding, blotching    skin or abnormal bruising. Denies new moles or changes to existing moles.      BREASTS: There is no swelling around breasts or nipple discharge.    EYES: Denies eye pain, blurred vision, swelling, redness or discharge.      ENT AND MOUTH: Denies runny nose, stuffiness, sinus trouble or sores. Denies    nosebleeds. Denies, hoarseness, change in voice or swelling in front of the    neck.      CARDIOVASCULAR: Denies chest pain, discomfort or palpitations. Denies neck    swelling or episodes of passing out.      RESPIRATORY: Denies cough, sputum production, blood in sputum, and denies    shortness of breath.      GI: Denies trouble swallowing, indigestion, heartburn, abdominal pain, nausea,    vomiting, diarrhea, altered bowel habits, blood in stool, discoloration of    stools, change in nature of stool, bloating, increased abdominal girth.      GENITOURINARY: No discharge. No pelvic pain or lumps. No rash around groin or  lesions. No urinary frequency, hesitation, painful urination or blood in    urine. Denies incontinence. No problems with intercourse.      MUSCULOSKELETAL: Denies neck or back pain. Denies weakness in arms or legs,    joint problems or distended inflamed veins in legs. Denies swelling or abnormal  glands.      NEUROLOGICAL: Denies tingling, numbness, altered mentation changes to nerve    function in the face, weakness to one or both of the body. Denies changes to    gait and denies multiple falls or accidents.      PSYCHIATRIC: Denies nervousness, anxiety, hallucinations, depression, suicidal    ideation, trouble sleeping or changes in behavior noticed by family.      The  patient denies recent foreign travel or recent exposure to chemicals or    products of concern or infectious diseases.     PHYSICAL EXAM:     Wt Readings from Last 3 Encounters:   02/18/19 81.8 kg (180 lb 5.4 oz)   01/30/19 79.7 kg (175 lb 11.3 oz)   01/17/19 80.3 kg (177 lb)     Temp Readings from Last 3 Encounters:   02/18/19 98.8 °F (37.1 °C)   01/30/19 98.9 °F (37.2 °C)   01/04/19 98.3 °F (36.8 °C) (Oral)     BP Readings from Last 3 Encounters:   02/18/19 125/70   01/30/19 (!) 149/71   01/04/19 128/60     Pulse Readings from Last 3 Encounters:   02/18/19 65   01/30/19 70   01/04/19 78     GENERAL: Comfortable looking patient. Patient is in no distress.  Awake, alert and oriented to time, person and place.  No anxiety, or agitation.      HEENT: Normal conjunctivae and eyelids. WNL.  PERRLA 3 to 4 mm. No icterus, no pallor, no congestion, and no discharge noted.     NECK:  Supple. Trachea is central.  No crepitus.  No JVD or masses.    RESPIRATORY:  No intercostal retractions.  No dullness to percussion.  Chest is clear to auscultation.  No rales, rhonchi or wheezes.  No crepitus.  Good air entry bilaterally.    CARDIOVASCULAR:  S1 and S2 are normally heard without murmurs or gallops.  All peripheral pulses are present.    ABDOMEN:  Normal abdomen.  No hepatosplenomegaly.  No free fluid.  Bowel sounds are present.  No hernia noted. No masses.  No rebound or tenderness.  No guarding or rigidity.  Umbilicus is midline.    LYMPHATICS:  No axillary, cervical, supraclavicular, submental, or inguinal lymphadenopathy.    SKIN/MUSCULOSKELETAL:  There is no evidence of excoriation marks or ecchmosis.  No rashes.  No cyanosis.  No clubbing.  No joint or skeletal deformities noted.  Normal range of motion.    NEUROLOGIC:  Higher functions are appropriate.  No cranial nerve deficits.  Normal manan.  Normal strength.  Motor and sensory functions are normal.  Deep tendon reflexes are normal.    GENITAL/RECTAL:  Exams are  deferred.      Laboratory:     CBC:  Lab Results   Component Value Date    WBC 13.20 (H) 01/30/2019    RBC 4.31 (L) 01/30/2019    HGB 13.0 (L) 01/30/2019    HCT 39.9 (L) 01/30/2019    MCV 93 01/30/2019    MCH 30.1 01/30/2019    MCHC 32.5 01/30/2019    RDW 14.2 01/30/2019     (H) 01/30/2019    MPV 8.1 (L) 01/30/2019    GRAN 9.1 (H) 01/30/2019    GRAN 69.1 01/30/2019    LYMPH 2.8 01/30/2019    LYMPH 20.9 01/30/2019    MONO 1.0 01/30/2019    MONO 7.7 01/30/2019    EOS 0.2 01/30/2019    BASO 0.00 01/30/2019    EOSINOPHIL 1.9 01/30/2019    BASOPHIL 0.4 01/30/2019       BMP: BMP  Lab Results   Component Value Date     12/14/2018    K 4.1 12/14/2018     12/14/2018    CO2 32 (H) 12/14/2018    BUN 13 12/14/2018    CREATININE 0.9 12/14/2018    CALCIUM 9.7 12/14/2018    ANIONGAP 9 12/14/2018    ESTGFRAFRICA >60.0 12/14/2018    EGFRNONAA >60.0 12/14/2018       LFT:   Lab Results   Component Value Date    ALT 12 12/14/2018    AST 14 12/14/2018    ALKPHOS 72 12/14/2018    BILITOT 0.5 12/14/2018   Review of the peripheral blood smear reveals slight   anisopoikilocytosis.  There is a mild leukocytosis with a mild   absolute neutrophilia.  There is slight toxic granulation.  No   immature leukocytes or blasts are identified.  There is a mild   thrombocytosis however the platelets demonstrate a normal morphology.   COMMENT:  The mild leukocytosis with mild absolute neutrophilia may   be secondary to an infectious process; please correlate clinically.   A Pravin test haptoglobin SPEP inv B12 folate iron studies all negative    Assessment/Plan:            anemia neutrophilia and thrombophilia    will get JAK2 and refle reflex studies and BCR-ABL  May also want a bone marrow biopsy   dyslipidemia continue  With PC   continue follow-up of PSA with Urology  Has been working with Rheumatology for arthritis inflammation that is chronic and also elevated platelets and white cells

## 2019-02-20 ENCOUNTER — CLINICAL SUPPORT (OUTPATIENT)
Dept: REHABILITATION | Facility: HOSPITAL | Age: 79
End: 2019-02-20
Attending: NURSE PRACTITIONER
Payer: MEDICARE

## 2019-02-20 DIAGNOSIS — M25.611 STIFFNESS OF RIGHT SHOULDER JOINT: ICD-10-CM

## 2019-02-20 DIAGNOSIS — G89.29 CHRONIC LEFT SHOULDER PAIN: ICD-10-CM

## 2019-02-20 DIAGNOSIS — M25.511 CHRONIC RIGHT SHOULDER PAIN: Primary | ICD-10-CM

## 2019-02-20 DIAGNOSIS — M25.512 CHRONIC LEFT SHOULDER PAIN: ICD-10-CM

## 2019-02-20 DIAGNOSIS — M62.81 MUSCLE WEAKNESS: ICD-10-CM

## 2019-02-20 DIAGNOSIS — G89.29 CHRONIC RIGHT SHOULDER PAIN: Primary | ICD-10-CM

## 2019-02-20 DIAGNOSIS — M25.612 STIFFNESS OF LEFT SHOULDER JOINT: ICD-10-CM

## 2019-02-20 PROCEDURE — 97110 THERAPEUTIC EXERCISES: CPT | Mod: HCNC,PN | Performed by: PHYSICAL THERAPIST

## 2019-02-20 PROCEDURE — 97140 MANUAL THERAPY 1/> REGIONS: CPT | Mod: HCNC,PN | Performed by: PHYSICAL THERAPIST

## 2019-02-20 NOTE — PROGRESS NOTES
Physical Therapy Daily Note     Name: Cedric Gupta Jr.  Clinic Number: [id number]  Diagnosis:   Encounter Diagnoses   Name Primary?    Chronic right shoulder pain Yes    Chronic left shoulder pain     Stiffness of right shoulder joint     Stiffness of left shoulder joint     Muscle weakness      Physician: Tracy Stein MD  Treatment Orders: PT Eval and Treat  Past Medical History:   Diagnosis Date    Basal cell carcinoma     BPH (benign prostatic hypertrophy)     Cataract     OU    Elevated PSA     Epiretinal membrane     OS    Fatty liver     noted on usg    History of colon polyps     History of duodenal ulcer     x 2 8/13 and 8/14    History of nephrolithiasis 2008    passed stone    History of prediabetes     Hyperlipidemia     No current medications    Macular degeneration     dry -- OU    Osteoarthritis     S/P colonoscopy     8/15; 8/20    Squamous cell carcinoma 11/08/2016    Right forearm     Precautions: pt with hip pain also, standard  Evaluation date: 1/14/2019  Visit # authorized: 10/20  Authorization period: 12-31-19  Plan of care expiration: 3-11-18  MD Follow up appt: none scheduled       Time In:  9:05  Time Out:  10:05  Billable Time: 25  min    Subjective     Pt reports: shoulders are feeling better this week reaching seems easier this week.    Pain Scale: before treatment: 1 with movement 0 at rest currently; after treatment: 0 at rest     Objective     Shoulder ROM: (measured in degrees) 2-18-19 same as 2-13 after mob and stretching  Shoulder  RUE LUE   Flexion 160 160   Abduction 110 120   External Rotation neutral 75 75   External Rotation with abd  90 90   Internal Rotation 60 65   Active standing flexion 170 170   Active standing abduction 170 170       Shoulder ROM: (measured in degrees supine) initial eval  Shoulder  RUE LUE   Flexion 130 130   Abduction 60 95   External Rotation neutral 60 70   External Rotation with scaption 50 90   Internal Rotation 40 45       Shoulder ROM: (measured in degrees standing) initial eval  Shoulder  RUE LUE   Active Flexion 120 140   Active Abduction 45 60      Biceps tendon tender R, but subscap ok on 2-18-19    Pt able to do towel stretch to T-12 on L and L4 on R    PT reviewed FOTO scores for Cedric Gupta Jr. on 2-18-19  FOTO score: 60, IE 46   FOTO scores were entered into [facility] - see media section.      TREATMENT  Therapeutic exercise: Cedric received therapeutic exercises to develop strength, endurance, ROM, flexibility, posture and core stabilization for 15 direct minutes including:   PT removed tape and skin in good shape    Chin tuck x 10  Shoulder shrugs with retraction x 10  Depressions x 20  Bicep curl 3# x 20  Triceps with RTB x 20    Wand flexion x 10 supine  Wand ER neutral x 1 checked for full ROM  Wand ER with abd x 1 checked for full ROM,  Reinforced upon discharge that he makes sure he maintains ROM  Protraction x 20 x 3#  Chest press x 20 with 3#  Pect stretch over 1/2 foam 3 min, instructed pt to use rolled towel at home      Maji stretch x 5  Shoulder ext prone with ER x 20 with 2#  Horizontal abd prone with ER x 20 with 1#  Row 3# prone x 20  ER sidelying x 20 with 2#, able to progress with reps today    IR stretch with towel for R UE x 10    Corner stretch x 5 3 planes     Retraction with pulldown x 20 with RTB   Retraction with RTB x 20   ER with RTB x 20    ER at 90 degrees abd with step back x 10 with RTB needed cueing to maintain proper postioning  IR with RTB x 20    Verbal and tactile cueing provided for proper performance and recruitment.       Manual therapy: Cedric  received the following manual therapy techniques x  10 min. to include soft tissue and joint mobilization were applied to the: B shoulder girdles to include: STM pect major and minor, subscap/lat dorsi region, scapula mob and shoulder GH mob emphasis on inf glide and A/P mob    Pt did ok without ice last time after treatment so will hold  today again and note response Modalities: Pt. received cold pack x 10 min. to B shoulders sitting on mat following treatment.     Written Home Exercises Provided: none today  Pt demo good understanding of the education provided. Cedric demonstrated good return demonstration of activities.     Pt. education:  · Posture reeducation, body mechanics, HEP,   · No spiritual or educational barriers to learning provided  · Pt has no cultural, educational or language barriers to learning provided.    Assessment   Pt starting to not need as much cueing for proper performance and knowing what exercise is next.  Pt appears to understand need to continue with HEP to maintain current level of function     Pt will continue to benefit from skilled outpatient physical therapy to address the remaining functional deficits, provide pt/family education, and to maximize pt's level of independence in the home and community environment. .     GOALS:   Short Term Goals:  4 weeks    Increase range of motion 25%  Increase strength 1/2 muscle grade  Increase postural awareness to normal  Be able to perform HEP with minimal cueing required     Long Term Goals: 8 weeks  Increase range of motion to 75%to 100% of full  Increase strength 1 muscle grade  Improve scapular stabilization to normal  Restore normal sleep habits without disturbances due to pain  Restore ability to pull up covers in bed without increased pain  Restore ability to reach fully overhead without increased pain  Restore ability to reach in all planes without increased pain or difficulty  Restore ability to perform ADL's and household activities independently and without increased pain  Pt to be independent with HEP to improve ROM and independence with ADL's    Anticipated barriers to physical therapy: none  Pt's spiritual, cultural and educational needs considered and pt agreeable to plan of care and goals        Plan   Continue with established Plan of Care towards PT goals.  Pt schedulednext week and should be ready for discharge by end of next week.

## 2019-02-22 LAB
JAK2 EXON 12 MUTATION DETECTION BLOOD: NORMAL
PATH REPORT.FINAL DX SPEC: NORMAL

## 2019-02-25 ENCOUNTER — CLINICAL SUPPORT (OUTPATIENT)
Dept: REHABILITATION | Facility: HOSPITAL | Age: 79
End: 2019-02-25
Attending: NURSE PRACTITIONER
Payer: MEDICARE

## 2019-02-25 DIAGNOSIS — M25.511 CHRONIC RIGHT SHOULDER PAIN: Primary | ICD-10-CM

## 2019-02-25 DIAGNOSIS — M25.611 STIFFNESS OF RIGHT SHOULDER JOINT: ICD-10-CM

## 2019-02-25 DIAGNOSIS — M25.512 CHRONIC LEFT SHOULDER PAIN: ICD-10-CM

## 2019-02-25 DIAGNOSIS — M25.612 STIFFNESS OF LEFT SHOULDER JOINT: ICD-10-CM

## 2019-02-25 DIAGNOSIS — G89.29 CHRONIC LEFT SHOULDER PAIN: ICD-10-CM

## 2019-02-25 DIAGNOSIS — G89.29 CHRONIC RIGHT SHOULDER PAIN: Primary | ICD-10-CM

## 2019-02-25 DIAGNOSIS — M62.81 MUSCLE WEAKNESS: ICD-10-CM

## 2019-02-25 PROCEDURE — 97110 THERAPEUTIC EXERCISES: CPT | Mod: HCNC,PN | Performed by: PHYSICAL THERAPIST

## 2019-02-25 PROCEDURE — 97140 MANUAL THERAPY 1/> REGIONS: CPT | Mod: HCNC,PN | Performed by: PHYSICAL THERAPIST

## 2019-02-25 NOTE — PROGRESS NOTES
Physical Therapy Daily Note     Name: Cedric Gupta Jr.  Clinic Number: [id number]  Diagnosis:   Encounter Diagnoses   Name Primary?    Chronic right shoulder pain Yes    Chronic left shoulder pain     Stiffness of right shoulder joint     Stiffness of left shoulder joint     Muscle weakness      Physician: Tracy Stein MD  Treatment Orders: PT Eval and Treat  Past Medical History:   Diagnosis Date    Basal cell carcinoma     BPH (benign prostatic hypertrophy)     Cataract     OU    Elevated PSA     Epiretinal membrane     OS    Fatty liver     noted on usg    History of colon polyps     History of duodenal ulcer     x 2 8/13 and 8/14    History of nephrolithiasis 2008    passed stone    History of prediabetes     Hyperlipidemia     No current medications    Macular degeneration     dry -- OU    Osteoarthritis     S/P colonoscopy     8/15; 8/20    Squamous cell carcinoma 11/08/2016    Right forearm     Precautions: pt with hip pain also, standard  Evaluation date: 1/14/2019  Visit # authorized: 11/20  Authorization period: 12-31-19  Plan of care expiration: 3-11-18  MD Follow up appt: none scheduled       Time In:  9:01  Time Out:  10:05  Billable Time: 25  min    Subjective     Pt reports: had a good weekend and shoulders are feeling though still some soreness in top of shoulders at times    Pain Scale: before treatment: 1 with movement 0 at rest currently; after treatment: 0 at rest     Objective     Functional assessment:   - walking/gait: FB trunk slightly, no deficit  - sit to stand: min-mod use of hands  - sit to supine: min to 0 difficutly        - supine to sit: min to 0  - supine to prone: N/T     Cervical ROM: (measured in degrees sitting)  - flexion -  40                    - extension -  55                        - right side bending -  30        - left side bending -  30      - right rotation - 70  - left rotation - 65     Shoulder ROM: (measured in degrees standing)  Shoulder   RUE LUE   Active Flexion 120 140   Active Abduction 45 60      Scapular instability noted with elevation and abduction in standing with increased upper trap recruitment winging, lateral rotation and protraction of B scapulae R>L.       Pt with slightly decreased  R as compared to L and pt reports he deals with this issue occasionally even before this incident     Shoulder ROM: (measured in degrees supine)   Shoulder  RUE LUE   Flexion 130 130   Abduction 60 95   External Rotation neutral 60 70   External Rotation with scaption 50 90   Internal Rotation 40 45         Muscle Strength 2-25-19  MMT R L   Elbow flexion 5/5 5/5   Elbow extension 5/5 5/5   Shoulder flexion 4+/5 4+/5   Shoulder abduction 4+/5 4+/5   Shoulder external rotation 4+/5 4+/5   Shoulder internal rotation 5/5 5/5        Upper trap 5/5 5/5         Muscle Strength initial eval  MMT R L   Elbow flexion 4/5 4/5   Elbow extension 5/5 5/5   Shoulder flexion 3/5 3/5   Shoulder abduction 3-/5 3-/5   Shoulder external rotation 3/5 3/5   Shoulder internal rotation 4/5 4/5           Upper trap 5/5 5/5         Endurance is good at IE  poor.     Palpation: no TTP at IE  mod TTP subscapularis and pects     Joint mobility: good mobility at IE decreased A/P and inf glide GH       Shoulder ROM: (measured in degrees) 2-18-19 same as 2-13 after mob and stretching  Shoulder  RUE LUE   Flexion 160 160   Abduction 110 120   External Rotation neutral 75 75   External Rotation with abd  90 90   Internal Rotation 60 65   Active standing flexion 170 170   Active standing abduction 170 170       Shoulder ROM: (measured in degrees supine) initial eval  Shoulder  RUE LUE   Flexion 130 130   Abduction 60 95   External Rotation neutral 60 70   External Rotation with scaption 50 90   Internal Rotation 40 45      Shoulder ROM: (measured in degrees standing) initial eval  Shoulder  RUE LUE   Active Flexion 120 140   Active Abduction 45 60      Biceps tendon tender R, but  subscap ok on 2-18-19    Pt able to do towel stretch to T-12 on L and L4 on R    PT reviewed FOTO scores for Cedric Gupta Jr. on 2-18-19  FOTO score: 60, IE 46   FOTO scores were entered into EP[facility] - see media section.    HIP REASSESSMENT  Pelvic positioning: level at IE AR R      Hip ROM: (measured in degrees) initial eval  Hip RLE LLE   Flexion 120 120   Abduction  20 20   Adduction 20 20   Internal rotation 20 10   External rotation 50 40         Flexibility testing:  - hamstrings:     90/90 test R 10 L 15 at IE  R 20 L 25           - gastrocnemius:   DF ankle R 5 degrees L 5 degrees             - hip flexors: + tightness B R >L     Muscle Strength 2-25-19  MMT R L   Hip flexion 4+/5 4+/5   Hip abduction 5-/5 5/5   Hip extension 4/5 4+/5   Glut max 3+/5 4-/5        Knee extension 5/5 5/5   Knee flexion 5/5 5/5           Muscle Strength initial eval  MMT R L   Hip flexion 4/5 4/5   Hip abduction 4/5 4/5   Hip extension 4-/5 4-/5   Glut max 3-/5 3-/5           Knee extension 5/5 5/5   Knee flexion 5/5 5/5      Palpation: no TTP at IE mild TTP to psoas B     Outcome measures:   Impairment: mobility  FOTO lumbar disability index: IE 60  PT reviewed FOTO scores for Cedric Gupta Jr.  FOTO scores were entered into EP[facility] - see media section.     § G-code current: CK at least 40% but < 60% impaired, limited or restricted  § G-code goal: CJ at least 20%, but < 40% impaired, limited or restricted  · Severity modifier selections based on the FOTO scoring, objective findings, and co-morbidity assessment           TREATMENT  Therapeutic exercise: Cedric received therapeutic exercises to develop strength, endurance, ROM, flexibility, posture and core stabilization for 15 direct minutes including:   PT removed tape and skin in good shape    Chin tuck x 10  Shoulder shrugs with retraction x 10  Depressions x 20  Bicep curl 3# x 20  Triceps with RTB x 20    Wand flexion x 10 supine  Wand ER neutral x 1 checked for full  ROM  Wand ER with abd x 1 checked for full ROM,  Reinforced upon discharge that he makes sure he maintains ROM  Protraction x 20 x 3#  Chest press x 20 with 3#  Pect stretch over 1/2 foam 3 min, instructed pt to use rolled towel at home      Maji stretch x 5  Shoulder ext prone with ER x 20 with 2#  Horizontal abd prone with ER x 20 with 1#  Row 3# prone x 20  ER sidelying x 20 with 2#, able to progress with reps today    IR stretch with towel for R UE x 10    Corner stretch x 5 3 planes     Retraction with pulldown x 20 with RTB   Retraction with RTB x 20   ER with RTB x 20    (NP)ER at 90 degrees abd with step back x 10 with RTB needed cueing to maintain proper postioning  IR with RTB x 20    Verbal and tactile cueing provided for proper performance and recruitment.       Manual therapy: Cedric  received the following manual therapy techniques x  10 min. to include soft tissue and joint mobilization were applied to the: B shoulder girdles to include: STM pect major and minor, subscap/lat dorsi region, scapula mob and shoulder GH mob emphasis on inf glide and A/P mob    Pt did ok without ice last time after treatment so will hold today again and note response Modalities: Pt. received cold pack x 10 min. to B shoulders sitting on mat following treatment.     Written Home Exercises Provided: none today  Pt demo good understanding of the education provided. Cedric demonstrated good return demonstration of activities.     Pt. education:  · Posture reeducation, body mechanics, HEP,   · No spiritual or educational barriers to learning provided  · Pt has no cultural, educational or language barriers to learning provided.    Assessment   Patient demonstrates improvement in range of motion, strength, stabilization and function overall Pt improving with ex performance.  Pt should be ready for discharge after next visit.       Pt will continue to benefit from skilled outpatient physical therapy to address the remaining  functional deficits, provide pt/family education, and to maximize pt's level of independence in the home and community environment. .     GOALS:   Short Term Goals:  4 weeks    Increase range of motion 25%  Increase strength 1/2 muscle grade  Increase postural awareness to normal  Be able to perform HEP with minimal cueing required     Long Term Goals: 8 weeks  Increase range of motion to 75%to 100% of full  Increase strength 1 muscle grade  Improve scapular stabilization to normal  Restore normal sleep habits without disturbances due to pain  Restore ability to pull up covers in bed without increased pain  Restore ability to reach fully overhead without increased pain  Restore ability to reach in all planes without increased pain or difficulty  Restore ability to perform ADL's and household activities independently and without increased pain  Pt to be independent with HEP to improve ROM and independence with ADL's    Anticipated barriers to physical therapy: none  Pt's spiritual, cultural and educational needs considered and pt agreeable to plan of care and goals        Plan   Continue with established Plan of Care towards PT goals. Reassess next visit should be ready for discharge

## 2019-02-27 ENCOUNTER — DOCUMENTATION ONLY (OUTPATIENT)
Dept: REHABILITATION | Facility: HOSPITAL | Age: 79
End: 2019-02-27

## 2019-02-27 ENCOUNTER — CLINICAL SUPPORT (OUTPATIENT)
Dept: REHABILITATION | Facility: HOSPITAL | Age: 79
End: 2019-02-27
Attending: NURSE PRACTITIONER
Payer: MEDICARE

## 2019-02-27 DIAGNOSIS — M62.81 MUSCLE WEAKNESS: ICD-10-CM

## 2019-02-27 DIAGNOSIS — M25.611 STIFFNESS OF RIGHT SHOULDER JOINT: ICD-10-CM

## 2019-02-27 DIAGNOSIS — G89.29 CHRONIC LEFT SHOULDER PAIN: ICD-10-CM

## 2019-02-27 DIAGNOSIS — G89.29 CHRONIC RIGHT SHOULDER PAIN: Primary | ICD-10-CM

## 2019-02-27 DIAGNOSIS — M25.652 STIFFNESS OF BOTH HIP JOINTS: ICD-10-CM

## 2019-02-27 DIAGNOSIS — M25.552 PAIN OF BOTH HIP JOINTS: Primary | ICD-10-CM

## 2019-02-27 DIAGNOSIS — M25.512 CHRONIC LEFT SHOULDER PAIN: ICD-10-CM

## 2019-02-27 DIAGNOSIS — M25.551 PAIN OF BOTH HIP JOINTS: Primary | ICD-10-CM

## 2019-02-27 DIAGNOSIS — M25.651 STIFFNESS OF BOTH HIP JOINTS: ICD-10-CM

## 2019-02-27 DIAGNOSIS — M25.511 CHRONIC RIGHT SHOULDER PAIN: Primary | ICD-10-CM

## 2019-02-27 DIAGNOSIS — M25.612 STIFFNESS OF LEFT SHOULDER JOINT: ICD-10-CM

## 2019-02-27 PROCEDURE — 97110 THERAPEUTIC EXERCISES: CPT | Mod: HCNC,PN | Performed by: PHYSICAL THERAPIST

## 2019-02-27 NOTE — PROGRESS NOTES
Physical Therapy Discharge Note     Name: Cedric Gupta Jr.  Clinic Number: [id number]  Diagnosis:   Encounter Diagnoses   Name Primary?    Chronic right shoulder pain Yes    Chronic left shoulder pain     Stiffness of right shoulder joint     Stiffness of left shoulder joint     Muscle weakness      Physician: Tracy Stein MD  Treatment Orders: PT Eval and Treat  Past Medical History:   Diagnosis Date    Basal cell carcinoma     BPH (benign prostatic hypertrophy)     Cataract     OU    Elevated PSA     Epiretinal membrane     OS    Fatty liver     noted on usg    History of colon polyps     History of duodenal ulcer     x 2 8/13 and 8/14    History of nephrolithiasis 2008    passed stone    History of prediabetes     Hyperlipidemia     No current medications    Macular degeneration     dry -- OU    Osteoarthritis     S/P colonoscopy     8/15; 8/20    Squamous cell carcinoma 11/08/2016    Right forearm     Precautions: pt with hip pain also, standard  Evaluation date: 1/14/2019  Visit # authorized: 11/20  Authorization period: 12-31-19  Plan of care expiration: 3-11-18  MD Follow up appt: none scheduled       Time In:  8:51  Time Out:  9:30, pt on personal time constraints  Billable Time: 30  min    Subjective     Pt reports: hip is feeling good with no hip pain.  Shoulders still have a little issue with movement at rest, but improved     Pain Scale: before treatment: 1 with movement 0 at rest currently at worst 2-3; after treatment: 0 at rest     Objective     Cervical ROM: (measured in degrees sitting) 2-27-19  - flexion -  45                    - extension -  55                        - right side bending -  30        - left side bending -  30      - right rotation - 70  - left rotation - 70       Cervical ROM: (measured in degrees sitting) initial eval  - flexion -  40                    - extension -  55                        - right side bending -  30        - left side bending -  30       - right rotation - 70  - left rotation - 65      Muscle Strength 2-25-19  MMT R L   Elbow flexion 5/5 5/5   Elbow extension 5/5 5/5   Shoulder flexion 4+/5 4+/5   Shoulder abduction 4+/5 4+/5   Shoulder external rotation 4+/5 4+/5   Shoulder internal rotation 5/5 5/5        Upper trap 5/5 5/5         Muscle Strength initial eval  MMT R L   Elbow flexion 4/5 4/5   Elbow extension 5/5 5/5   Shoulder flexion 3/5 3/5   Shoulder abduction 3-/5 3-/5   Shoulder external rotation 3/5 3/5   Shoulder internal rotation 4/5 4/5           Upper trap 5/5 5/5         Endurance is good at IE  poor.     Palpation: no TTP at IE  mod TTP subscapularis and pects     Joint mobility: good mobility at IE decreased A/P and inf glide GH       Shoulder ROM: (measured in degrees) 2-18-19 same as 2-13 after mob and stretching  Shoulder  RUE LUE   Flexion 160 160   Abduction 110 120   External Rotation neutral 75 75   External Rotation with abd  90 90   Internal Rotation 60 65   Active standing flexion 170 170   Active standing abduction 170 170       Shoulder ROM: (measured in degrees supine) initial eval  Shoulder  RUE LUE   Flexion 130 130   Abduction 60 95   External Rotation neutral 60 70   External Rotation with scaption 50 90   Internal Rotation 40 45      Shoulder ROM: (measured in degrees standing) initial eval  Shoulder  RUE LUE   Active Flexion 120 140   Active Abduction 45 60      Biceps tendon tender R, but subscap ok on 2-18-19    Pt able to do towel stretch to T-12 on L and L4 on R    PT reviewed shoulder  FOTO scores for Cedric Gupta Jr.   FOTO score: 60, IE 46   FOTO scores were entered into EPIC - see media section.    HIP REASSESSMENT  Pelvic positioning: level at IE AR R     Hip ROM: (measured in degrees) 2-27-19  Hip RLE LLE   Flexion 120 120   Abduction  20 20   Adduction 20 20   Internal rotation 20 20   External rotation 50 50        Hip ROM: (measured in degrees) initial eval  Hip RLE LLE   Flexion 120 120    Abduction  20 20   Adduction 20 20   Internal rotation 20 10   External rotation 50 40         Flexibility testing:  - hamstrings:     90/90 test R 10 L 15 at IE  R 20 L 25           - gastrocnemius:   DF ankle R 5 degrees L 5 degrees             - hip flexors: decreased tightness noted at IE  + tightness B R >L     Muscle Strength 2-25-19  MMT R L   Hip flexion 4+/5 4+/5   Hip abduction 5-/5 5/5   Hip extension 4/5 4+/5   Glut max 3+/5 4-/5        Knee extension 5/5 5/5   Knee flexion 5/5 5/5     Muscle Strength initial eval  MMT R L   Hip flexion 4/5 4/5   Hip abduction 4/5 4/5   Hip extension 4-/5 4-/5   Glut max 3-/5 3-/5           Knee extension 5/5 5/5   Knee flexion 5/5 5/5      Palpation: no TTP at IE mild TTP to psoas B     Outcome measures:   Impairment: mobility  FOTO hip disability index: 67 at IE IE 60  PT reviewed FOTO scores for Cedric TAN Alonso Baron  FOTO scores were entered into [facility] - see media section.     § G-code discharge: CJ at least 20%, but < 40% impaired, limited or restricted  § G-code goal: CJ at least 20%, but < 40% impaired, limited or restricted  · Severity modifier selections based on the FOTO scoring, objective findings, and co-morbidity assessment     Functional assessment:   - walking/gait:  no deficits at IE FB trunk slightly, no deficit  - sit to stand:no difficulty at IE min-mod use of hands  - sit to supine: no difficulty at IE min to 0 difficutly        - supine to sit:no difficulty at IE  min to 0  - supine to prone:no difficulty at IE  N/T      TREATMENT  Therapeutic exercise: Cedric received therapeutic exercises to develop strength, endurance, ROM, flexibility, posture and core stabilization for 30 direct minutes including:     Final review of HEP  Pt performed 10 reps of each  Chin tuck x 10  Shoulder shrugs with retraction x 10  Depressions x 20  Bicep curl 3# x 20  Triceps with RTB x 20    Wand flexion x 10 supine  Wand ER neutral x 1 checked for full ROM  Wand ER  with abd x 1 checked for full ROM,  Reinforced upon discharge that he makes sure he maintains ROM  Protraction x 20 x 3#  Chest press x 20 with 3#  Pect stretch over 1/2 foam 3 min, instructed pt to use rolled towel at home      Maji stretch x 5  Shoulder ext prone with ER x 20 with 2#  Horizontal abd prone with ER x 20 with 1#  Row 3# prone x 20  ER sidelying x 20 with 2#,     IR stretch with towel for R UE x 10    Corner stretch x 5 3 planes     Retraction with pulldown x 20 with RTB   Retraction with RTB x 20   ER with RTB x 20   IR with RTB x 20    Verbal and tactile cueing provided for proper performance and recruitment. Reinforced using HEP sheets to help with proper performance        (NP)Manual therapy: Cedric  received the following manual therapy techniques x 0min. to include soft tissue and joint mobilization were applied to the: B shoulder girdles to include: STM pect major and minor, subscap/lat dorsi region, scapula mob and shoulder GH mob emphasis on inf glide and A/P mob       Written Home Exercises Provided: none today  Pt demo good understanding of the education provided. Cedric demonstrated good return demonstration of activities.     Pt. education:  · Posture reeducation, body mechanics, HEP,   · No spiritual or educational barriers to learning provided  · Pt has no cultural, educational or language barriers to learning provided.    Assessment   Patient demonstrates improvement in range of motion, strength, stabilization and function overall  Patient appears independent in the prescribed HEP and ready for discharge after fully achieving the established goals.  '       GOALS:   Short Term Goals:  4 weeks MET STG's   Increase range of motion 25%  Increase strength 1/2 muscle grade  Increase postural awareness to normal  Be able to perform HEP with minimal cueing required     Long Term Goals: 8 weeks MET LTG's  Increase range of motion to 75%to 100% of full  Increase strength 1 muscle  grade  Improve scapular stabilization to normal  Restore normal sleep habits without disturbances due to pain  Restore ability to pull up covers in bed without increased pain  Restore ability to reach fully overhead without increased pain  Restore ability to reach in all planes without increased pain or difficulty  Restore ability to perform ADL's and household activities independently and without increased pain  Pt to be independent with HEP to improve ROM and independence with ADL's       Plan   Patient is discharged from physical therapy after fully achieving the established goals.  Thank you for allowing us to assist in the care of your patient.

## 2019-03-11 ENCOUNTER — TELEPHONE (OUTPATIENT)
Dept: FAMILY MEDICINE | Facility: CLINIC | Age: 79
End: 2019-03-11

## 2019-03-11 ENCOUNTER — TELEPHONE (OUTPATIENT)
Dept: HEMATOLOGY/ONCOLOGY | Facility: CLINIC | Age: 79
End: 2019-03-11

## 2019-03-11 NOTE — TELEPHONE ENCOUNTER
----- Message from Ismael Zavala sent at 3/11/2019 10:55 AM CDT -----  Contact: Patient  Type:  Sooner Apoointment Request    Caller is requesting a sooner appointment.  Caller declined first available appointment listed below.  Caller will not accept being placed on the waitlist and is requesting a message be sent to doctor.    Name of Caller:  Patient  When is the first available appointment?  3/25/19  Symptoms:  thrombocytosis per Vickie Zavala  Best Call Back Number:  [phone]  Additional Information:  Patient will see anyone in office

## 2019-03-11 NOTE — TELEPHONE ENCOUNTER
----- Message from Sujata Lazo sent at 3/11/2019 10:52 AM CDT -----  Contact: Patient  Type:  Sooner Apoointment Request    Caller is requesting a sooner appointment.  Caller declined first available appointment listed below.  Caller will not accept being placed on the waitlist and is requesting a message be sent to doctor.    Name of Caller:  Patient  When is the first available appointment?  04/17/19  Symptoms:  Follow up from lab work ordered by Dr. Zavala.  Best Call Back Number: [phone]  Additional Information: na

## 2019-03-11 NOTE — TELEPHONE ENCOUNTER
Spoke w/ pt , pt dx with Thrombocytosis . Pt is due for follow up w/ Dr Stein . Sched appt next week w/ Dr Stein--lp

## 2019-03-11 NOTE — TELEPHONE ENCOUNTER
----- Message from Donna Gee sent at 3/11/2019 11:57 AM CDT -----  Contact: self [phone]  Pt would like to schedule follow-up appt with Dr. Zavala.  Please call bacl at [phone].  Md Dain

## 2019-03-12 ENCOUNTER — LAB VISIT (OUTPATIENT)
Dept: LAB | Facility: HOSPITAL | Age: 79
End: 2019-03-12
Attending: INTERNAL MEDICINE
Payer: MEDICARE

## 2019-03-12 DIAGNOSIS — D68.59 THROMBOPHILIA: ICD-10-CM

## 2019-03-12 PROCEDURE — 36415 COLL VENOUS BLD VENIPUNCTURE: CPT | Mod: HCNC

## 2019-03-12 PROCEDURE — 88275 CYTOGENETICS 100-300: CPT | Mod: HCNC

## 2019-03-12 PROCEDURE — 88271 CYTOGENETICS DNA PROBE: CPT | Mod: 59,HCNC

## 2019-03-15 LAB
CLINICAL CYTOGENETICIST REVIEW: NORMAL
MBCR DISCLAIMER: NORMAL
MBCR REASON FOR REFERRAL: NORMAL
MBCR RELEASED BY: NORMAL
MBCR RESULT SUMMARY: NORMAL
MBCR RESULT TABLE: NORMAL
MBCR SPECIMEN: NORMAL
REF LAB TEST METHOD: NORMAL
SERVICE CMNT-IMP: NORMAL
SPECIMEN SOURCE: NORMAL
T(9;22)(ABL1,BCR) BLD/T FISH: NORMAL

## 2019-03-22 ENCOUNTER — OFFICE VISIT (OUTPATIENT)
Dept: FAMILY MEDICINE | Facility: CLINIC | Age: 79
End: 2019-03-22
Payer: MEDICARE

## 2019-03-22 ENCOUNTER — TELEPHONE (OUTPATIENT)
Dept: FAMILY MEDICINE | Facility: CLINIC | Age: 79
End: 2019-03-22

## 2019-03-22 VITALS
DIASTOLIC BLOOD PRESSURE: 88 MMHG | BODY MASS INDEX: 25.34 KG/M2 | SYSTOLIC BLOOD PRESSURE: 134 MMHG | WEIGHT: 177 LBS | HEIGHT: 70 IN | RESPIRATION RATE: 16 BRPM | TEMPERATURE: 99 F | HEART RATE: 76 BPM | OXYGEN SATURATION: 97 %

## 2019-03-22 DIAGNOSIS — R97.20 ELEVATED PSA: ICD-10-CM

## 2019-03-22 DIAGNOSIS — M19.90 ARTHRITIS: Primary | ICD-10-CM

## 2019-03-22 PROCEDURE — 99213 PR OFFICE/OUTPT VISIT, EST, LEVL III, 20-29 MIN: ICD-10-PCS | Mod: S$GLB,,, | Performed by: FAMILY MEDICINE

## 2019-03-22 PROCEDURE — 1101F PR PT FALLS ASSESS DOC 0-1 FALLS W/OUT INJ PAST YR: ICD-10-PCS | Mod: CPTII,S$GLB,, | Performed by: FAMILY MEDICINE

## 2019-03-22 PROCEDURE — 1101F PT FALLS ASSESS-DOCD LE1/YR: CPT | Mod: CPTII,S$GLB,, | Performed by: FAMILY MEDICINE

## 2019-03-22 PROCEDURE — 99213 OFFICE O/P EST LOW 20 MIN: CPT | Mod: S$GLB,,, | Performed by: FAMILY MEDICINE

## 2019-03-22 RX ORDER — TRAMADOL HYDROCHLORIDE 50 MG/1
50 TABLET ORAL 2 TIMES DAILY PRN
Qty: 60 TABLET | Refills: 0 | Status: SHIPPED | OUTPATIENT
Start: 2019-03-22 | End: 2019-05-29

## 2019-03-22 NOTE — PROGRESS NOTES
Subjective:       Patient ID: Cedric Gupta Jr. is a 79 y.o. male.    Chief Complaint: Follow-up; Shoulder Pain (both shoulders/ especially when reaching for something ); and Arm Pain (both arms)    HPI\  The patient is a 79-year-old who is here today to discuss his recent arthritis pains.  He has been having pains in his shoulders and his hips.  He has had some oral steroids treatments and steroid shots which have helped but he still has persistent pain in his shoulders. He denies any muscle pain.  He denies any weakness.  His pain is usually a 5 or a 6 but can be a 6 or a 7.  The pain is interfering with his ability to sleep.  His pain does improve if he does not move.  Given his prior history of ulcers, he is not been using NSAIDs.  He has been given Ultram which helps but he has been using this sparingly    Review of Systems   Constitutional: Negative for appetite change, chills, diaphoresis, fatigue, fever and unexpected weight change.   HENT: Negative for congestion, ear pain, postnasal drip, rhinorrhea, sinus pressure, sneezing, sore throat and trouble swallowing.    Eyes: Negative for pain, discharge and visual disturbance.   Respiratory: Negative for cough, chest tightness, shortness of breath and wheezing.    Cardiovascular: Negative for chest pain, palpitations and leg swelling.   Gastrointestinal: Negative for abdominal distention, abdominal pain, blood in stool, constipation, diarrhea, nausea and vomiting.   Musculoskeletal: Positive for arthralgias.   Skin: Negative for rash.       Objective:      Physical Exam   Constitutional: He is oriented to person, place, and time. He appears well-developed and well-nourished. No distress.   HENT:   Head: Normocephalic and atraumatic.   Right Ear: Tympanic membrane, external ear and ear canal normal. Decreased hearing is noted.   Left Ear: Tympanic membrane, external ear and ear canal normal. Decreased hearing is noted.   Nose: Nose normal.   Mouth/Throat:  "Oropharynx is clear and moist and mucous membranes are normal. No oral lesions. No oropharyngeal exudate, posterior oropharyngeal edema or posterior oropharyngeal erythema.   Eyes: Conjunctivae, EOM and lids are normal. Pupils are equal, round, and reactive to light. No scleral icterus.   Neck: Normal range of motion. Neck supple. Carotid bruit is not present. No thyroid mass and no thyromegaly present.   Cardiovascular: Normal rate, regular rhythm and normal heart sounds.  No extrasystoles are present. PMI is not displaced. Exam reveals no gallop.   No murmur heard.  Pulmonary/Chest: Effort normal and breath sounds normal. No accessory muscle usage. No respiratory distress.   Clear to auscultation bilaterally.   Abdominal: Soft. Normal appearance and bowel sounds are normal. He exhibits no abdominal bruit. There is no hepatosplenomegaly. There is no tenderness. There is no rebound.   Musculoskeletal:   No muscle pain on palpation.  Shoulders with limited ROM but no point tenderness.  Arthritic changes noted in the fingers.   Lymphadenopathy:        Head (right side): No submental and no submandibular adenopathy present.        Head (left side): No submental and no submandibular adenopathy present.        Right cervical: No superficial cervical, no deep cervical and no posterior cervical adenopathy present.       Left cervical: No superficial cervical, no deep cervical and no posterior cervical adenopathy present.        Right: No supraclavicular adenopathy present.        Left: No supraclavicular adenopathy present.   Neurological: He is alert and oriented to person, place, and time.   Skin: Skin is warm, dry and intact.   Psychiatric: He has a normal mood and affect.     Blood pressure 134/88, pulse 76, temperature 98.6 °F (37 °C), temperature source Oral, resp. rate 16, height 5' 10" (1.778 m), weight 80.3 kg (177 lb), SpO2 97 %.Body mass index is 25.4 kg/m².          A/P:  1) shoulder pain. Persistent.  I did " refill the Ultram for as needed use.  We also discussed that the orthopedic plan was to consider an MRI for persistent pain and he will follow up there to pursue further workup.    2)  Hip pain. Improved.  If this becomes problematic again, he will let me know   3)  Arthralgias particularly involving the proximal joints.  We are going to check labs to make sure that these are normal  4) history of elevated PSA.  Status unknown.  He is overdue for follow-up with his urologist.  We will check a PSA level and schedule follow up appointment

## 2019-03-22 NOTE — TELEPHONE ENCOUNTER
Call received from Pharmacy requesting diagnosis code for why the patient is taking Ultram.  Diagnosis code given.

## 2019-03-25 ENCOUNTER — OFFICE VISIT (OUTPATIENT)
Dept: HEMATOLOGY/ONCOLOGY | Facility: CLINIC | Age: 79
End: 2019-03-25
Payer: MEDICARE

## 2019-03-25 VITALS
RESPIRATION RATE: 18 BRPM | TEMPERATURE: 99 F | DIASTOLIC BLOOD PRESSURE: 74 MMHG | HEART RATE: 68 BPM | SYSTOLIC BLOOD PRESSURE: 146 MMHG | WEIGHT: 179 LBS | HEIGHT: 71 IN | BODY MASS INDEX: 25.06 KG/M2

## 2019-03-25 DIAGNOSIS — E78.00 PURE HYPERCHOLESTEROLEMIA: ICD-10-CM

## 2019-03-25 DIAGNOSIS — D75.839 THROMBOCYTOSIS: Primary | ICD-10-CM

## 2019-03-25 DIAGNOSIS — F12.90 MARIJUANA SMOKER: ICD-10-CM

## 2019-03-25 DIAGNOSIS — D68.59 THROMBOPHILIA: ICD-10-CM

## 2019-03-25 PROCEDURE — 99499 RISK ADDL DX/OHS AUDIT: ICD-10-PCS | Mod: HCNC,S$GLB,, | Performed by: INTERNAL MEDICINE

## 2019-03-25 PROCEDURE — 99214 PR OFFICE/OUTPT VISIT, EST, LEVL IV, 30-39 MIN: ICD-10-PCS | Mod: HCNC,S$GLB,, | Performed by: INTERNAL MEDICINE

## 2019-03-25 PROCEDURE — 1101F PR PT FALLS ASSESS DOC 0-1 FALLS W/OUT INJ PAST YR: ICD-10-PCS | Mod: HCNC,CPTII,S$GLB, | Performed by: INTERNAL MEDICINE

## 2019-03-25 PROCEDURE — 99999 PR PBB SHADOW E&M-EST. PATIENT-LVL III: ICD-10-PCS | Mod: PBBFAC,HCNC,, | Performed by: INTERNAL MEDICINE

## 2019-03-25 PROCEDURE — 99499 UNLISTED E&M SERVICE: CPT | Mod: HCNC,S$GLB,, | Performed by: INTERNAL MEDICINE

## 2019-03-25 PROCEDURE — 99214 OFFICE O/P EST MOD 30 MIN: CPT | Mod: HCNC,S$GLB,, | Performed by: INTERNAL MEDICINE

## 2019-03-25 PROCEDURE — 99999 PR PBB SHADOW E&M-EST. PATIENT-LVL III: CPT | Mod: PBBFAC,HCNC,, | Performed by: INTERNAL MEDICINE

## 2019-03-25 PROCEDURE — 1101F PT FALLS ASSESS-DOCD LE1/YR: CPT | Mod: HCNC,CPTII,S$GLB, | Performed by: INTERNAL MEDICINE

## 2019-03-25 NOTE — PROGRESS NOTES
Subjective:       Patient ID: Cedric uGpta Jr. is a 79 y.o. male.    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[employer])   Social Needs    Financial resource strain: None    Food insecurity:     Worry: None     Inability: None    Transportation needs:     Medical: None     Non-medical: None   Tobacco Use    Smoking status: Former Smoker     Years: 0.50     Last attempt to quit: 1965     Years since quittin.6    Smokeless tobacco: Never Used   Substance and Sexual Activity    Alcohol use: Yes     Alcohol/week: 1.2 - 2.4 oz     Types: 2 - 4 Cans of beer per week     Comment: twice a week    Drug use: Yes     Frequency: 7.0 times per week     Types: Marijuana     Comment: marijuana    Sexual activity: Yes     Partners: Female     Birth control/protection: None   Lifestyle    Physical activity:     Days per week: None     Minutes per session: None    Stress: None   Relationships    Social connections:     Talks on phone: None     Gets together: None     Attends Cheondoism service: None     Active member of club or organization: None     Attends meetings of clubs or organizations: None     Relationship status: None    Intimate partner violence:     Fear of current or ex partner: None     Emotionally abused: None     Physically abused: None     Forced sexual activity: None   Other Topics Concern    None   Social History Narrative    None     Family History   Problem Relation Age of Onset    Cataracts Mother     Glaucoma Mother     Lupus Mother     Cancer Father         kidney and lung cancer (shipyard worker)    Diabetes Maternal Aunt     Diabetes Maternal  Uncle     No Known Problems Sister     No Known Problems Daughter     Cancer Sister         breast cancer    Melanoma Neg Hx     Psoriasis Neg Hx     Eczema Neg Hx     Amblyopia Neg Hx     Hypertension Neg Hx     Macular degeneration Neg Hx     Retinal detachment Neg Hx     Stroke Neg Hx     Strabismus Neg Hx     Thyroid disease Neg Hx      Past Surgical History:   Procedure Laterality Date    BIOPSY, PROSTATE, RECTAL APPROACH, WITH US GUIDANCE N/A 4/7/2014    [facility]    BIOPSY, PROSTATE, TRANSRECTAL APPROACH, WITH US GUIDANCE N/A 8/20/2018    [facility]    CHOLECYSTECTOMY      COLONOSCOPY N/A 8/18/2015    [facility] ENDO    EGD (ESOPHAGOGASTRODUODENOSCOPY) N/A 2/12/2014    [facility] END[facility]    EGD (ESOPHAGOGASTRODUODENOSCOPY) N/A 8/23/2013    [facility] END[facility]    FRACTURE SURGERY      Right ankle and leg    ORCHIECTOMY      due to undescended testicle/ Left    PROSTATE BIOPSY  2014 2014, 2018-negative    TONSILLECTOMY      TRANSRECTAL ULTRASOUND GUIDED PROSTATE BIOPSY N/A 1/15/2018    Per[facility] OR     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 There is no apparent    change in appetite, fever, night sweats, headaches, fatigue, dizziness, or    weakness.      SKIN: Denies rash, issues with nails, non-healing sores, bleeding, blotching    skin or abnormal bruising. Denies new moles or changes to existing moles.      BREASTS: There is no swelling around breasts or nipple discharge.    EYES: Denies eye pain, blurred vision, swelling, redness or discharge.      ENT AND MOUTH: Denies runny nose, stuffiness, sinus trouble or sores. Denies    nosebleeds. Denies, hoarseness, change in voice or swelling in front of the    neck.      CARDIOVASCULAR: Denies chest pain, discomfort or palpitations. Denies neck    swelling or episodes of passing out.      RESPIRATORY: Denies cough, sputum production, blood in sputum, and denies    shortness of breath.      GI: Denies trouble swallowing, indigestion, heartburn, abdominal pain, nausea,    vomiting, diarrhea, altered bowel habits, blood in stool, discoloration of    stools, change in nature of stool, bloating, increased abdominal girth.      GENITOURINARY: No discharge. No pelvic pain or lumps. No rash around groin or  lesions. No urinary frequency, hesitation, painful urination or blood in    urine. Denies incontinence. No problems with intercourse.      MUSCULOSKELETAL:  Lots of djd pain  NEUROLOGICAL: Denies tingling, numbness, altered mentation changes to nerve    function in the face, weakness to one or both of the body. Denies changes to    gait and denies multiple falls or accidents.      PSYCHIATRIC: Denies nervousness, anxiety, hallucinations, depression, suicidal    ideation, trouble sleeping or changes in behavior noticed by family.      The patient denies recent foreign travel or recent exposure to  chemicals or    products of concern or infectious diseases.     PHYSICAL EXAM:     Wt Readings from Last 3 Encounters:   03/25/19 81.2 kg (179 lb 0.2 oz)   03/22/19 80.3 kg (177 lb)   02/18/19 81.8 kg (180 lb 5.4 oz)     Temp Readings from Last 3 Encounters:   03/25/19 98.5 °F (36.9 °C)   03/22/19 98.6 °F (37 °C) (Oral)   02/18/19 98.8 °F (37.1 °C)     BP Readings from Last 3 Encounters:   03/25/19 (!) 146/74   03/22/19 134/88   02/18/19 125/70     Pulse Readings from Last 3 Encounters:   03/25/19 68   03/22/19 76   02/18/19 65     GENERAL: Comfortable looking patient. Patient is in no distress.  Awake, alert and oriented to time, person and place.  No anxiety, or agitation.      HEENT: Normal conjunctivae and eyelids. WNL.  PERRLA 3 to 4 mm. No icterus, no pallor, no congestion, and no discharge noted.     NECK:  Supple. Trachea is central.  No crepitus.  No JVD or masses.    RESPIRATORY:  No intercostal retractions.  No dullness to percussion.  Chest is clear to auscultation.  No rales, rhonchi or wheezes.  No crepitus.  Good air entry bilaterally.    CARDIOVASCULAR:  S1 and S2 are normally heard without murmurs or gallops.  All peripheral pulses are present.    ABDOMEN:  Normal abdomen.  No hepatosplenomegaly.  No free fluid.  Bowel sounds are present.  No hernia noted. No masses.  No rebound or tenderness.  No guarding or rigidity.  Umbilicus is midline.    LYMPHATICS:  No axillary, cervical, supraclavicular, submental, or inguinal lymphadenopathy.    SKIN/MUSCULOSKELETAL:  There is no evidence of excoriation marks or ecchmosis.  No rashes.  No cyanosis.  No clubbing.  No joint or skeletal deformities noted.  Normal range of motion.    NEUROLOGIC:  Higher functions are appropriate.  No cranial nerve deficits.  Normal manan.  Normal strength.  Motor and sensory functions are normal.  Deep tendon reflexes are normal.    GENITAL/RECTAL:  Exams are deferred.      Laboratory:     CBC:  Lab Results   Component Value  Date    WBC 13.20 (H) 01/30/2019    RBC 4.31 (L) 01/30/2019    HGB 13.0 (L) 01/30/2019    HCT 39.9 (L) 01/30/2019    MCV 93 01/30/2019    MCH 30.1 01/30/2019    MCHC 32.5 01/30/2019    RDW 14.2 01/30/2019     (H) 01/30/2019    MPV 8.1 (L) 01/30/2019    GRAN 9.1 (H) 01/30/2019    GRAN 69.1 01/30/2019    LYMPH 2.8 01/30/2019    LYMPH 20.9 01/30/2019    MONO 1.0 01/30/2019    MONO 7.7 01/30/2019    EOS 0.2 01/30/2019    BASO 0.00 01/30/2019    EOSINOPHIL 1.9 01/30/2019    BASOPHIL 0.4 01/30/2019       BMP: BMP  Lab Results   Component Value Date     12/14/2018    K 4.1 12/14/2018     12/14/2018    CO2 32 (H) 12/14/2018    BUN 13 12/14/2018    CREATININE 0.9 12/14/2018    CALCIUM 9.7 12/14/2018    ANIONGAP 9 12/14/2018    ESTGFRAFRICA >60.0 12/14/2018    EGFRNONAA >60.0 12/14/2018     Lab Results   Component Value Date    IRON 61 01/30/2019    TIBC 357 01/30/2019    FERRITIN 228 01/30/2019       LFT:   Lab Results   Component Value Date    ALT 12 12/14/2018    AST 14 12/14/2018    ALKPHOS 72 12/14/2018    BILITOT 0.5 12/14/2018   Review of the peripheral blood smear reveals slight   anisopoikilocytosis.  There is a mild leukocytosis with a mild   absolute neutrophilia.  There is slight toxic granulation.  No   immature leukocytes or blasts are identified.  There is a mild   thrombocytosis however the platelets demonstrate a normal morphology.   COMMENT:  The mild leukocytosis with mild absolute neutrophilia may   be secondary to an infectious process; please correlate clinically.   A Pravin test haptoglobin SPEP inv B12 folate iron studies all negative    Assessment/Plan:            anemia neutrophilia and thrombophilia    jem 2 neg , bcr-abl neg will check for cora-r and mpl   dyslipidemia continue  With PC   continue follow-up of PSA with Urology  Has been working with Rheumatology for arthritis inflammation that is chronic and also elevated platelets and white cells   plt may be up from djd but this  is dx of exclusion     rtc 2 month with cora-r . Mpl. And cbc if this is neg may need  Bone marrow bx

## 2019-03-26 ENCOUNTER — TELEPHONE (OUTPATIENT)
Dept: HEMATOLOGY/ONCOLOGY | Facility: CLINIC | Age: 79
End: 2019-03-26

## 2019-03-26 ENCOUNTER — LAB VISIT (OUTPATIENT)
Dept: LAB | Facility: HOSPITAL | Age: 79
End: 2019-03-26
Attending: FAMILY MEDICINE
Payer: MEDICARE

## 2019-03-26 DIAGNOSIS — M19.90 ARTHRITIS: ICD-10-CM

## 2019-03-26 DIAGNOSIS — R97.20 ELEVATED PSA: ICD-10-CM

## 2019-03-26 LAB
CK SERPL-CCNC: 107 U/L (ref 20–200)
COMPLEXED PSA SERPL-MCNC: 7.6 NG/ML (ref 0–4)
CRP SERPL-MCNC: 9.6 MG/L (ref 0–8.2)
ERYTHROCYTE [SEDIMENTATION RATE] IN BLOOD BY WESTERGREN METHOD: 17 MM/HR (ref 0–10)
RHEUMATOID FACT SERPL-ACNC: <10 IU/ML (ref 0–15)

## 2019-03-26 PROCEDURE — 86140 C-REACTIVE PROTEIN: CPT | Mod: HCNC

## 2019-03-26 PROCEDURE — 86038 ANTINUCLEAR ANTIBODIES: CPT | Mod: HCNC

## 2019-03-26 PROCEDURE — 85651 RBC SED RATE NONAUTOMATED: CPT | Mod: HCNC,PO

## 2019-03-26 PROCEDURE — 36415 COLL VENOUS BLD VENIPUNCTURE: CPT | Mod: HCNC,PO

## 2019-03-26 PROCEDURE — 86431 RHEUMATOID FACTOR QUANT: CPT | Mod: HCNC

## 2019-03-26 PROCEDURE — 82550 ASSAY OF CK (CPK): CPT | Mod: HCNC

## 2019-03-26 PROCEDURE — 84153 ASSAY OF PSA TOTAL: CPT | Mod: HCNC

## 2019-03-26 NOTE — TELEPHONE ENCOUNTER
----- Message from Nina Mazasrinivas sent at 3/26/2019  2:52 PM CDT -----  Contact: Renetta with Dr Peri Richards [phone]  Patient is scheduled for oral surgery and needs to get surgical clearance, they are faxing over the request and just to let you know.  Please get it signed off and back to us as soon as possible.  Thanks

## 2019-03-27 LAB — ANA SER QL IF: NORMAL

## 2019-03-31 ENCOUNTER — TELEPHONE (OUTPATIENT)
Dept: FAMILY MEDICINE | Facility: CLINIC | Age: 79
End: 2019-03-31

## 2019-03-31 DIAGNOSIS — M25.50 ARTHRALGIA, UNSPECIFIED JOINT: Primary | ICD-10-CM

## 2019-03-31 NOTE — TELEPHONE ENCOUNTER
Pls notify pt:    Overall, labs look good except for PSA which is higher than seen previously.  Be sure to see the urologist as planned regarding the PSA    I also recommend you contact the orthopedist to move forward with the MRI on the shoulder given your persistent pain    If your joint pain continues, we could marycarmen you with the rheumatologist.  Are you interested in proceeding with that now?  (It usually takes a few months to get into the rheumatology)

## 2019-04-01 ENCOUNTER — TELEPHONE (OUTPATIENT)
Dept: RHEUMATOLOGY | Facility: CLINIC | Age: 79
End: 2019-04-01

## 2019-04-01 NOTE — TELEPHONE ENCOUNTER
Spoke to patient,  Patient was informed of results and verbalized full understanding. Pt stated he would like to be put on the schedule for a rheumatologist

## 2019-04-01 NOTE — TELEPHONE ENCOUNTER
----- Message from Beatriz Powell sent at 4/1/2019 10:29 AM CDT -----  Type:  Sooner Apoointment Request   Name of Caller:  self  When is the first available appointment?  Dr Stein is referring him  Symptoms:     Best Call Back Number:  [phone]  Additional Information:  Pain from shoulders down the arms to elbows / stops /has had since November (tried injections / therapy/ massages)     Spoke to the patient who is booked in August. On wait list, letter mailed. CG

## 2019-04-02 ENCOUNTER — TELEPHONE (OUTPATIENT)
Dept: HEMATOLOGY/ONCOLOGY | Facility: CLINIC | Age: 79
End: 2019-04-02

## 2019-04-02 NOTE — TELEPHONE ENCOUNTER
----- Message from Beatriz Powell sent at 4/2/2019  8:42 AM CDT -----  Please call Renetta with Dr Peri Richards [phone] ... Prior to scheduling oral surgery / needs to clarify ... A response sent to Dr Richards

## 2019-04-09 ENCOUNTER — OFFICE VISIT (OUTPATIENT)
Dept: UROLOGY | Facility: CLINIC | Age: 79
End: 2019-04-09
Payer: MEDICARE

## 2019-04-09 VITALS
BODY MASS INDEX: 25.06 KG/M2 | WEIGHT: 179 LBS | HEIGHT: 71 IN | SYSTOLIC BLOOD PRESSURE: 131 MMHG | DIASTOLIC BLOOD PRESSURE: 77 MMHG | HEART RATE: 67 BPM

## 2019-04-09 DIAGNOSIS — N40.1 BENIGN PROSTATIC HYPERPLASIA WITH URINARY OBSTRUCTION: ICD-10-CM

## 2019-04-09 DIAGNOSIS — N13.8 BENIGN PROSTATIC HYPERPLASIA WITH URINARY OBSTRUCTION: ICD-10-CM

## 2019-04-09 DIAGNOSIS — R97.20 ELEVATED PSA: Primary | ICD-10-CM

## 2019-04-09 LAB
BILIRUB SERPL-MCNC: NORMAL MG/DL
BLOOD URINE, POC: NORMAL
COLOR, POC UA: YELLOW
GLUCOSE UR QL STRIP: NORMAL
KETONES UR QL STRIP: NORMAL
LEUKOCYTE ESTERASE URINE, POC: NORMAL
NITRITE, POC UA: NORMAL
PH, POC UA: 7
PROTEIN, POC: NORMAL
SPECIFIC GRAVITY, POC UA: 1.02
UROBILINOGEN, POC UA: NORMAL

## 2019-04-09 PROCEDURE — 99214 OFFICE O/P EST MOD 30 MIN: CPT | Mod: HCNC,25,S$GLB, | Performed by: UROLOGY

## 2019-04-09 PROCEDURE — 99999 PR PBB SHADOW E&M-EST. PATIENT-LVL III: ICD-10-PCS | Mod: PBBFAC,HCNC,, | Performed by: UROLOGY

## 2019-04-09 PROCEDURE — 81002 URINALYSIS NONAUTO W/O SCOPE: CPT | Mod: HCNC,S$GLB,, | Performed by: UROLOGY

## 2019-04-09 PROCEDURE — 1101F PR PT FALLS ASSESS DOC 0-1 FALLS W/OUT INJ PAST YR: ICD-10-PCS | Mod: HCNC,CPTII,S$GLB, | Performed by: UROLOGY

## 2019-04-09 PROCEDURE — 81002 POCT URINE DIPSTICK WITHOUT MICROSCOPE: ICD-10-PCS | Mod: HCNC,S$GLB,, | Performed by: UROLOGY

## 2019-04-09 PROCEDURE — 99214 PR OFFICE/OUTPT VISIT, EST, LEVL IV, 30-39 MIN: ICD-10-PCS | Mod: HCNC,25,S$GLB, | Performed by: UROLOGY

## 2019-04-09 PROCEDURE — 99999 PR PBB SHADOW E&M-EST. PATIENT-LVL III: CPT | Mod: PBBFAC,HCNC,, | Performed by: UROLOGY

## 2019-04-09 PROCEDURE — 1101F PT FALLS ASSESS-DOCD LE1/YR: CPT | Mod: HCNC,CPTII,S$GLB, | Performed by: UROLOGY

## 2019-04-09 NOTE — PROGRESS NOTES
UROLOGY [place]  4 9 19     Cc elevated psa follow up     Age 79, being followed for elevated psa. It was 7.6 two weeks ago, , it was 6.7 last year. It was 4.8 five years ago.      In apr2014 pt had his first prostate biopsy and in jan 2018 his second. Both were benign. His prostate volume was 90 cm3 on the first study and 80 cm3 on the second. No suspicious echographic findings in either case.     Generally voiding well, has nocturia x 1-2. No intermittency or need to strain to void. No pains or burning.      Has hx of Undescended left testicle, removed at age 15. Passed kidney stones 5 yrs ago.        PMH     Surgical:  has a past surgical history that includes Tonsillectomy; Orchiectomy; Fracture surgery; ostate biopsy (2014); and Cholecystectomy.     Medical:  has a past medical history of BPH (benign prostatic hypertrophy); Cataract; Elevated PSA; Epiretinal membrane; Fatty liver; History of colon polyps; History of duodenal ulcer; History of nephrolithiasis; Hyperlipidemia; Macular degeneration; Osteoarthritis; Prediabetes; S/P colonoscopy; and Squamous cell carcinoma.     Familial: no fh of renal disease. 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         naproxen (EC NAPROSYN) 500 MG EC tablet Take 1 tablet (500 mg total) by mouth 2  60 tablet 1         REVIEW OF SYSTEMS  GENERAL:  No headaches or dizzy spells.   HEENT: vision preserved. Sinuses: No complaints.   CARDIOPULMONARY: No swelling of the legs; no chest pain. No shortness of breath.   GASTROINTESTINAL: rare heartburn. Denies diarrhea; denies constipation, no blood or mucus in stools.   GENITOURINARY: Denies dysuria, bleeding or incontinence.   MUSCULOSKELETAL: occasional arthritic complaints such as joint  discomfort  PSYCHIATRIC: No history of depression or anxiety.   ENDOCRINOLOGIC: No reports of sweating, cold or heat intolerance. No polyuria or polydipsia.   NEUROLOGICAL: No headache, dizziness, syncope, paralysis, ataxia, numbness or tingling in the extremities.  LYMPHATICS: No enlarged nodes. No history of splenectomy.  ==        Pt alert, oriented  HEENT: wnl.  Neck: supple, no JVD, no lymphadenopathy  Chest: CV NSR  Lungs: normal auscultation  ABDOMEN: Flat, no masses.    CV: Negative.    Penis circumcised, right testicle normal. Left side absent.    Prostate 60-80 g, asymmetric, L lobe feeling more prominent, especially at the level of the base. This was felt approximately the same one year ago.   Extremities: no edema, peripheral pulses nl  Neuro: preserved        IMPRESSION:      Elevated psa. Hx of two negative prostate biopsies, last one one year ago.  bph on observation. Pt is on no medication, but says he plans to use saw palmetto; I am encouraging him to do so.  Asymmetric prostate, with L lobe more prominent, approximately the same as one year ago.    left undescended testicle status post left orchiectomy. Solitary R testicle  Hx of urolithiasis     We discussed the possibility of updating his prostate biopsy, but pt is undergoing some medical treatments at this time (for thrombosis of the R shoulder); we'll have him RTC 6 mo and update the psa and the ZEE and perhaps we update the trusp with bx then.

## 2019-05-15 ENCOUNTER — LAB VISIT (OUTPATIENT)
Dept: LAB | Facility: HOSPITAL | Age: 79
End: 2019-05-15
Attending: INTERNAL MEDICINE
Payer: MEDICARE

## 2019-05-15 DIAGNOSIS — D75.839 THROMBOCYTOSIS: ICD-10-CM

## 2019-05-15 LAB
ALBUMIN SERPL BCP-MCNC: 4.1 G/DL (ref 3.5–5.2)
ALP SERPL-CCNC: 72 U/L (ref 55–135)
ALT SERPL W/O P-5'-P-CCNC: 14 U/L (ref 10–44)
ANION GAP SERPL CALC-SCNC: 7 MMOL/L (ref 8–16)
AST SERPL-CCNC: 16 U/L (ref 10–40)
BASOPHILS # BLD AUTO: 0.1 K/UL (ref 0–0.2)
BASOPHILS NFR BLD: 0.7 % (ref 0–1.9)
BILIRUB SERPL-MCNC: 0.3 MG/DL (ref 0.1–1)
BUN SERPL-MCNC: 15 MG/DL (ref 8–23)
CALCIUM SERPL-MCNC: 9.8 MG/DL (ref 8.7–10.5)
CHLORIDE SERPL-SCNC: 109 MMOL/L (ref 95–110)
CO2 SERPL-SCNC: 25 MMOL/L (ref 23–29)
CREAT SERPL-MCNC: 0.8 MG/DL (ref 0.5–1.4)
DIFFERENTIAL METHOD: ABNORMAL
EOSINOPHIL # BLD AUTO: 0.5 K/UL (ref 0–0.5)
EOSINOPHIL NFR BLD: 6.6 % (ref 0–8)
ERYTHROCYTE [DISTWIDTH] IN BLOOD BY AUTOMATED COUNT: 13.4 % (ref 11.5–14.5)
EST. GFR  (AFRICAN AMERICAN): >60 ML/MIN/1.73 M^2
EST. GFR  (NON AFRICAN AMERICAN): >60 ML/MIN/1.73 M^2
GLUCOSE SERPL-MCNC: 117 MG/DL (ref 70–110)
HCT VFR BLD AUTO: 41.9 % (ref 40–54)
HGB BLD-MCNC: 13.9 G/DL (ref 14–18)
LYMPHOCYTES # BLD AUTO: 1.7 K/UL (ref 1–4.8)
LYMPHOCYTES NFR BLD: 21.7 % (ref 18–48)
MCH RBC QN AUTO: 30.5 PG (ref 27–31)
MCHC RBC AUTO-ENTMCNC: 33.2 G/DL (ref 32–36)
MCV RBC AUTO: 92 FL (ref 82–98)
MONOCYTES # BLD AUTO: 0.6 K/UL (ref 0.3–1)
MONOCYTES NFR BLD: 7.9 % (ref 4–15)
NEUTROPHILS # BLD AUTO: 4.9 K/UL (ref 1.8–7.7)
NEUTROPHILS NFR BLD: 63.1 % (ref 38–73)
PLATELET # BLD AUTO: 283 K/UL (ref 150–350)
PMV BLD AUTO: 8 FL (ref 9.2–12.9)
POTASSIUM SERPL-SCNC: 4.2 MMOL/L (ref 3.5–5.1)
PROT SERPL-MCNC: 7.5 G/DL (ref 6–8.4)
RBC # BLD AUTO: 4.56 M/UL (ref 4.6–6.2)
SODIUM SERPL-SCNC: 141 MMOL/L (ref 136–145)
WBC # BLD AUTO: 7.8 K/UL (ref 3.9–12.7)

## 2019-05-15 PROCEDURE — 81270 JAK2 GENE: CPT | Mod: HCNC

## 2019-05-15 PROCEDURE — 85025 COMPLETE CBC W/AUTO DIFF WBC: CPT | Mod: HCNC

## 2019-05-15 PROCEDURE — 81403 MOPATH PROCEDURE LEVEL 4: CPT | Mod: HCNC

## 2019-05-15 PROCEDURE — 81270 JAK2 GENE: CPT | Mod: 91,HCNC

## 2019-05-15 PROCEDURE — 81219 CALR GENE COM VARIANTS: CPT | Mod: HCNC

## 2019-05-15 PROCEDURE — 36415 COLL VENOUS BLD VENIPUNCTURE: CPT | Mod: HCNC

## 2019-05-15 PROCEDURE — 80053 COMPREHEN METABOLIC PANEL: CPT | Mod: HCNC

## 2019-05-16 LAB
JAK2 P.V617F BLD/T QL: NORMAL
JAK2 V617F MUTATION DETECTION BLD RESULT: NORMAL

## 2019-05-24 LAB
MPNR  FINAL DIAGNOSIS: NORMAL
MPNR  SPECIMEN TYPE: NORMAL
MPNR RESULT: NORMAL

## 2019-05-29 ENCOUNTER — DOCUMENTATION ONLY (OUTPATIENT)
Dept: HEMATOLOGY/ONCOLOGY | Facility: CLINIC | Age: 79
End: 2019-05-29

## 2019-05-29 ENCOUNTER — OFFICE VISIT (OUTPATIENT)
Dept: HEMATOLOGY/ONCOLOGY | Facility: CLINIC | Age: 79
End: 2019-05-29
Payer: MEDICARE

## 2019-05-29 VITALS
SYSTOLIC BLOOD PRESSURE: 130 MMHG | TEMPERATURE: 99 F | WEIGHT: 176.56 LBS | BODY MASS INDEX: 24.72 KG/M2 | OXYGEN SATURATION: 97 % | HEART RATE: 66 BPM | HEIGHT: 71 IN | RESPIRATION RATE: 12 BRPM | DIASTOLIC BLOOD PRESSURE: 67 MMHG

## 2019-05-29 DIAGNOSIS — E78.00 PURE HYPERCHOLESTEROLEMIA: Primary | ICD-10-CM

## 2019-05-29 DIAGNOSIS — M16.0 PRIMARY OSTEOARTHRITIS OF BOTH HIPS: ICD-10-CM

## 2019-05-29 PROCEDURE — 99999 PR PBB SHADOW E&M-EST. PATIENT-LVL III: CPT | Mod: PBBFAC,HCNC,, | Performed by: INTERNAL MEDICINE

## 2019-05-29 PROCEDURE — 99213 OFFICE O/P EST LOW 20 MIN: CPT | Mod: HCNC,S$GLB,, | Performed by: INTERNAL MEDICINE

## 2019-05-29 PROCEDURE — 99499 UNLISTED E&M SERVICE: CPT | Mod: HCNC,S$GLB,, | Performed by: INTERNAL MEDICINE

## 2019-05-29 PROCEDURE — 1101F PT FALLS ASSESS-DOCD LE1/YR: CPT | Mod: HCNC,CPTII,S$GLB, | Performed by: INTERNAL MEDICINE

## 2019-05-29 PROCEDURE — 1101F PR PT FALLS ASSESS DOC 0-1 FALLS W/OUT INJ PAST YR: ICD-10-PCS | Mod: HCNC,CPTII,S$GLB, | Performed by: INTERNAL MEDICINE

## 2019-05-29 PROCEDURE — 99999 PR PBB SHADOW E&M-EST. PATIENT-LVL III: ICD-10-PCS | Mod: PBBFAC,HCNC,, | Performed by: INTERNAL MEDICINE

## 2019-05-29 PROCEDURE — 99499 RISK ADDL DX/OHS AUDIT: ICD-10-PCS | Mod: HCNC,S$GLB,, | Performed by: INTERNAL MEDICINE

## 2019-05-29 PROCEDURE — 99213 PR OFFICE/OUTPT VISIT, EST, LEVL III, 20-29 MIN: ICD-10-PCS | Mod: HCNC,S$GLB,, | Performed by: INTERNAL MEDICINE

## 2019-05-29 NOTE — PROGRESS NOTES
Subjective:       Patient ID: Cedric Gupta Jr. is a 79 y.o. male.    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[employer])   Social Needs    Financial resource strain: Not on file    Food insecurity:     Worry: Not on file     Inability: Not on file    Transportation needs:     Medical: Not on file     Non-medical: Not on file   Tobacco Use    Smoking status: Former Smoker     Years: 0.50     Last attempt to quit: 1965     Years since quittin.8    Smokeless tobacco: Never Used   Substance and Sexual Activity    Alcohol use: Yes     Alcohol/week: 1.2 - 2.4 oz     Types: 2 - 4 Cans of beer per week     Comment: twice a week    Drug use: Yes     Frequency: 7.0 times per week     Types: Marijuana     Comment: marijuana    Sexual activity: Yes     Partners: Female     Birth control/protection: None   Lifestyle    Physical activity:     Days per week: Not on file     Minutes per session: Not on file    Stress: Not on file   Relationships    Social connections:     Talks on phone: Not on file     Gets together: Not on file     Attends Restorationist service: Not on file     Active member of club or organization: Not on file     Attends meetings of clubs or organizations: Not on file     Relationship status: Not on file   Other Topics Concern    Not on file   Social History Narrative    Not on file     Family History   Problem Relation Age of Onset    Cataracts Mother     Glaucoma Mother     Lupus Mother     Cancer Father         kidney and lung cancer (shipyard worker)    Diabetes Maternal Aunt     Diabetes Maternal Uncle     No Known  Problems Sister     No Known Problems Daughter     Cancer Sister         breast cancer    Melanoma Neg Hx     Psoriasis Neg Hx     Eczema Neg Hx     Amblyopia Neg Hx     Hypertension Neg Hx     Macular degeneration Neg Hx     Retinal detachment Neg Hx     Stroke Neg Hx     Strabismus Neg Hx     Thyroid disease Neg Hx      Past Surgical History:   Procedure Laterality Date    BIOPSY, PROSTATE, RECTAL APPROACH, WITH US GUIDANCE N/A 4/7/2014    [facility] at [facility]    BIOPSY, PROSTATE, TRANSRECTAL APPROACH, WITH US GUIDANCE N/A 8/20/2018    Per[facility] at [facility] OR    CHOLECYSTECTOMY      COLONOSCOPY N/A 8/18/2015    Performed by [facility]ckoy [facility] at [facility] ENDO    EGD (ESOPHAGOGASTRODUODENOSCOPY) N/A 2/12/2014    [facility] ENDO    EGD (ESOPHAGOGASTRODUODENOSCOPY) N/A 8/23/2013    Per[facility] by [facility] at [facility] ENDO    FRACTURE SURGERY      Right ankle and leg    ORCHIECTOMY      due to undescended testicle/ Left    PROSTATE BIOPSY  2014 2014, 2018-negative    TONSILLECTOMY      TRANSRECTAL ULTRASOUND GUIDED PROSTATE BIOPSY N/A 1/15/2018    Per[facility] by [facility] OR     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  Review of patient's allergies indicates:   Allergen Reactions    Amantadine Other (See Comments)     Depression         REVIEW OF  SYSTEMS:     CONSTITUTIONAL: The patient denies any weight change. There is no apparent    change in appetite, fever, night sweats, headaches, fatigue, dizziness, or    weakness.      SKIN: Denies rash, issues with nails, non-healing sores, bleeding, blotching    skin or abnormal bruising. Denies new moles or changes to existing moles.      BREASTS: There is no swelling around breasts or nipple discharge.    EYES: Denies eye pain, blurred vision, swelling, redness or discharge.      ENT AND MOUTH: Denies runny nose, stuffiness, sinus trouble or sores. Denies    nosebleeds. Denies, hoarseness, change in voice or swelling in front of the    neck.      CARDIOVASCULAR: Denies chest pain, discomfort or palpitations. Denies neck    swelling or episodes of passing out.      RESPIRATORY: Denies cough, sputum production, blood in sputum, and denies    shortness of breath.      GI: Denies trouble swallowing, indigestion, heartburn, abdominal pain, nausea,    vomiting, diarrhea, altered bowel habits, blood in stool, discoloration of    stools, change in nature of stool, bloating, increased abdominal girth.      GENITOURINARY: No discharge. No pelvic pain or lumps. No rash around groin or  lesions. No urinary frequency, hesitation, painful urination or blood in    urine. Denies incontinence. No problems with intercourse.      MUSCULOSKELETAL:  Lots of djd pain  NEUROLOGICAL: Denies tingling, numbness, altered mentation changes to nerve    function in the face, weakness to one or both of the body. Denies changes to    gait and denies multiple falls or accidents.      PSYCHIATRIC: Denies nervousness, anxiety, hallucinations, depression, suicidal    ideation, trouble sleeping or changes in behavior noticed by family.      The patient denies recent foreign travel or recent exposure to chemicals or    products of concern or infectious diseases.     PHYSICAL EXAM:     Wt Readings from Last 3 Encounters:   05/29/19 80.1 kg (176 lb 9.4  oz)   04/09/19 81.2 kg (179 lb 0.2 oz)   03/25/19 81.2 kg (179 lb 0.2 oz)     Temp Readings from Last 3 Encounters:   05/29/19 98.5 °F (36.9 °C)   03/25/19 98.5 °F (36.9 °C)   03/22/19 98.6 °F (37 °C) (Oral)     BP Readings from Last 3 Encounters:   05/29/19 130/67   04/09/19 131/77   03/25/19 (!) 146/74     Pulse Readings from Last 3 Encounters:   05/29/19 66   04/09/19 67   03/25/19 68     GENERAL: Comfortable looking patient. Patient is in no distress.  Awake, alert and oriented to time, person and place.  No anxiety, or agitation.      HEENT: Normal conjunctivae and eyelids. WNL.  PERRLA 3 to 4 mm. No icterus, no pallor, no congestion, and no discharge noted.     NECK:  Supple. Trachea is central.  No crepitus.  No JVD or masses.    RESPIRATORY:  No intercostal retractions.  No dullness to percussion.  Chest is clear to auscultation.  No rales, rhonchi or wheezes.  No crepitus.  Good air entry bilaterally.    CARDIOVASCULAR:  S1 and S2 are normally heard without murmurs or gallops.  All peripheral pulses are present.    ABDOMEN:  Normal abdomen.  No hepatosplenomegaly.  No free fluid.  Bowel sounds are present.  No hernia noted. No masses.  No rebound or tenderness.  No guarding or rigidity.  Umbilicus is midline.    LYMPHATICS:  No axillary, cervical, supraclavicular, submental, or inguinal lymphadenopathy.    SKIN/MUSCULOSKELETAL:  There is no evidence of excoriation marks or ecchmosis.  No rashes.  No cyanosis.  No clubbing.  No joint or skeletal deformities noted.  Normal range of motion.    NEUROLOGIC:  Higher functions are appropriate.  No cranial nerve deficits.  Normal manan.  Normal strength.  Motor and sensory functions are normal.  Deep tendon reflexes are normal.    GENITAL/RECTAL:  Exams are deferred.      Laboratory:     CBC:  Lab Results   Component Value Date    WBC 7.80 05/15/2019    RBC 4.56 (L) 05/15/2019    HGB 13.9 (L) 05/15/2019    HCT 41.9 05/15/2019    MCV 92 05/15/2019    MCH 30.5  05/15/2019    MCHC 33.2 05/15/2019    RDW 13.4 05/15/2019     05/15/2019    MPV 8.0 (L) 05/15/2019    GRAN 4.9 05/15/2019    GRAN 63.1 05/15/2019    LYMPH 1.7 05/15/2019    LYMPH 21.7 05/15/2019    MONO 0.6 05/15/2019    MONO 7.9 05/15/2019    EOS 0.5 05/15/2019    BASO 0.10 05/15/2019    EOSINOPHIL 6.6 05/15/2019    BASOPHIL 0.7 05/15/2019       BMP: BMP  Lab Results   Component Value Date     05/15/2019    K 4.2 05/15/2019     05/15/2019    CO2 25 05/15/2019    BUN 15 05/15/2019    CREATININE 0.8 05/15/2019    CALCIUM 9.8 05/15/2019    ANIONGAP 7 (L) 05/15/2019    ESTGFRAFRICA >60 05/15/2019    EGFRNONAA >60 05/15/2019     Lab Results   Component Value Date    IRON 61 01/30/2019    TIBC 357 01/30/2019    FERRITIN 228 01/30/2019       LFT:   Lab Results   Component Value Date    ALT 14 05/15/2019    AST 16 05/15/2019    ALKPHOS 72 05/15/2019    BILITOT 0.3 05/15/2019   Review of the peripheral blood smear reveals slight   anisopoikilocytosis.  There is a mild leukocytosis with a mild   absolute neutrophilia.  There is slight toxic granulation.  No   immature leukocytes or blasts are identified.  There is a mild   thrombocytosis however the platelets demonstrate a normal morphology.   COMMENT:  The mild leukocytosis with mild absolute neutrophilia may   be secondary to an infectious process; please correlate clinically.   A Pravin test haptoglobin SPEP inv B12 folate iron studies all negative    Assessment/Plan:            anemia neutrophilia and thrombophilia    jem 2 neg , bcr-abl neg   cora-r and mpl also neg   dyslipidemia continue  With PC   continue follow-up of PSA with Urology  Has been working with Rheumatology for arthritis inflammation that is chronic and also elevated platelets and white cells   plt may be up from djd but this is dx of exclusion    All cytopenias that are periodic explanied discharge from us to follow with primary  Advance Care Planning     Living Will  During this  visit, I engaged the patient in the advance care planning process.  The patient and I reviewed the role for advance directives and their purpose in directing future healthcare if the patient's unable to speak for himself   At this point in time, the patient does have full decision-making capacity.  We discussed different extreme health states that he could experience, and reviewed what kind of medical care he would want in those situations.  The patient communicated that if he were comatose and had little chance of a meaningful recovery, he would not want machines/life-sustaining treatments used.  .  The patient has completed a living will to reflect these preferences.  The patient  has already designated a healthcare power of  to make decisions on his behalf.

## 2019-06-14 ENCOUNTER — PES CALL (OUTPATIENT)
Dept: ADMINISTRATIVE | Facility: CLINIC | Age: 79
End: 2019-06-14

## 2019-08-27 ENCOUNTER — INITIAL CONSULT (OUTPATIENT)
Dept: RHEUMATOLOGY | Facility: CLINIC | Age: 79
End: 2019-08-27
Payer: MEDICARE

## 2019-08-27 VITALS
DIASTOLIC BLOOD PRESSURE: 64 MMHG | HEIGHT: 70 IN | HEART RATE: 61 BPM | WEIGHT: 181.31 LBS | BODY MASS INDEX: 25.96 KG/M2 | SYSTOLIC BLOOD PRESSURE: 108 MMHG

## 2019-08-27 DIAGNOSIS — M75.81 TENDONITIS OF BOTH ROTATOR CUFFS: Primary | ICD-10-CM

## 2019-08-27 DIAGNOSIS — M75.82 TENDONITIS OF BOTH ROTATOR CUFFS: Primary | ICD-10-CM

## 2019-08-27 DIAGNOSIS — M19.90 OSTEOARTHRITIS, UNSPECIFIED OSTEOARTHRITIS TYPE, UNSPECIFIED SITE: ICD-10-CM

## 2019-08-27 PROCEDURE — 99999 PR PBB SHADOW E&M-EST. PATIENT-LVL III: CPT | Mod: PBBFAC,HCNC,, | Performed by: INTERNAL MEDICINE

## 2019-08-27 PROCEDURE — 99205 OFFICE O/P NEW HI 60 MIN: CPT | Mod: HCNC,S$GLB,, | Performed by: INTERNAL MEDICINE

## 2019-08-27 PROCEDURE — 1101F PT FALLS ASSESS-DOCD LE1/YR: CPT | Mod: HCNC,CPTII,S$GLB, | Performed by: INTERNAL MEDICINE

## 2019-08-27 PROCEDURE — 1101F PR PT FALLS ASSESS DOC 0-1 FALLS W/OUT INJ PAST YR: ICD-10-PCS | Mod: HCNC,CPTII,S$GLB, | Performed by: INTERNAL MEDICINE

## 2019-08-27 PROCEDURE — 99999 PR PBB SHADOW E&M-EST. PATIENT-LVL III: ICD-10-PCS | Mod: PBBFAC,HCNC,, | Performed by: INTERNAL MEDICINE

## 2019-08-27 PROCEDURE — 99205 PR OFFICE/OUTPT VISIT, NEW, LEVL V, 60-74 MIN: ICD-10-PCS | Mod: HCNC,S$GLB,, | Performed by: INTERNAL MEDICINE

## 2019-08-27 ASSESSMENT — ROUTINE ASSESSMENT OF PATIENT INDEX DATA (RAPID3)
PATIENT GLOBAL ASSESSMENT SCORE: 2
PSYCHOLOGICAL DISTRESS SCORE: 1.1
MDHAQ FUNCTION SCORE: .5
PAIN SCORE: 3
TOTAL RAPID3 SCORE: 2.22

## 2019-08-27 NOTE — LETTER
September 15, 2019      Tracy Stein MD  [id number] [address] [address]           [place] [facility] [address] [facility] [address] [place] [address]  Phone: [phone]  Fax: [phone]          Patient: Cedric Gupta Jr.   MR Number: [id number]   YOB: 1940   Date of Visit: 8/27/2019       Dear Dr. Tracy Stein:    Thank you for referring Cedric Gupta to me for evaluation. Attached you will find relevant portions of my assessment and plan of care.    If you have questions, please do not hesitate to call me. I look forward to following Cedric Gupta along with you.    Sincerely,    Ariadna Fernandez, DO    Enclosure  CC:  No Recipients    If you would like to receive this communication electronically, please contact [email or web address] or [phone] to request more information on [employer] Link access.    For providers and/or their staff who would like to refer a patient to [facility], please contact us through our one-stop-shop provider referral line, [facility], at [phone].    If you feel you have received this communication in error or would no longer like to receive these types of communications, please e-mail [email or web address]

## 2019-08-27 NOTE — PROGRESS NOTES
Subjective:          Chief Complaint: Cedric Gupta Jr. is a 79 y.o. male who had concerns including Disease Management.    HPI:    Patient notes this started 11/2018 overnight. With shoulder and hip pains and elevated ESR/CRP.   Has used various therapies: with PT/massage and shoulder injections with limited relief. With Ortho.     He does continue with pain in the shoulders that has continued to be persistent. With certain positions his shoulder is painful.   Rates pain at 3/10 now at its worst was 8/10.   Does seem to worsen with continued use, not present at rest.   No HA, no blurring of vision.   Patient denies weight loss, rashes, dry eye, dry mouth, nasal or palatal ulcerations,  lymphadenopathy, Raynaud's, hx of DVT/miscarriages, psoriasis or family hx of psoriasis, rashes, serositis, anemia or other constitutional symptoms.      Component      Latest Ref Rng & Units 3/26/2019 1/30/2019   Pathologist Interpretation CELIA        REVIEWED   PEYMAN Screen      Negative <1:160 Negative <1:160    CRP      0.0 - 8.2 mg/L 9.6 (H)    CPK      20 - 200 U/L 107    Sed Rate      0 - 10 mm/Hr 17 (H)    Rheumatoid Factor      0.0 - 15.0 IU/mL <10.0      REVIEW OF SYSTEMS:    Review of Systems   Constitutional: Negative for fever, malaise/fatigue and weight loss.   HENT: Negative for sore throat.    Eyes: Negative for double vision, photophobia and redness.   Respiratory: Negative for cough, shortness of breath and wheezing.    Cardiovascular: Negative for chest pain, palpitations and orthopnea.   Gastrointestinal: Negative for abdominal pain, constipation and diarrhea.   Genitourinary: Negative for dysuria, hematuria and urgency.   Musculoskeletal: Negative for back pain, joint pain and myalgias.   Skin: Negative for rash.   Neurological: Negative for dizziness, tingling, focal weakness and headaches.   Endo/Heme/Allergies: Does not bruise/bleed easily.   Psychiatric/Behavioral: Negative for depression, hallucinations and  suicidal ideas.               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    Macular degeneration     dry -- OU    Osteoarthritis     S/P colonoscopy     8/15;      Family History   Problem Relation Age of Onset    Cataracts Mother     Glaucoma Mother     Lupus Mother     Cancer Father         kidney and lung cancer (shipyard worker)    Diabetes Maternal Aunt     Diabetes Maternal Uncle     No Known Problems Sister     No Known Problems Daughter     Cancer Sister         breast cancer    Melanoma Neg Hx     Psoriasis Neg Hx     Eczema Neg Hx     Amblyopia Neg Hx     Hypertension Neg Hx     Macular degeneration Neg Hx     Retinal detachment Neg Hx     Stroke Neg Hx     Strabismus Neg Hx     Thyroid disease Neg Hx      Social History     Tobacco Use    Smoking status: Former Smoker     Years: 0.50     Last attempt to quit: 1965     Years since quittin.0    Smokeless tobacco: Never Used   Substance Use Topics    Alcohol use: Yes     Alcohol/week: 1.2 - 2.4 oz     Types: 2 - 4 Cans of beer per week     Comment: twice a week    Drug use: Yes     Frequency: 7.0 times per week     Types: Marijuana     Comment: marijuana         No current outpatient medications on file prior to visit.     No current facility-administered medications on file prior to visit.        Vitals:    19 1257   BP: 108/64   Pulse: 61       Physical Exam:    Physical Exam   Constitutional: He is oriented to person, place, and time. He appears well-developed and well-nourished.   HENT:    Head: Normocephalic.   Right Ear: Hearing normal.   Left Ear: Hearing normal.   Nose: No rhinorrhea.   Mouth/Throat: Uvula is midline, oropharynx is clear and moist and mucous membranes are normal.   Eyes: Pupils are equal, round, and reactive to light. Conjunctivae and EOM are normal.   Cardiovascular: Normal rate, regular rhythm and normal heart sounds.   Pulmonary/Chest: Effort normal and breath sounds normal.   Musculoskeletal:        Right shoulder: He exhibits normal range of motion, no tenderness and no swelling.        Left shoulder: He exhibits normal range of motion, no tenderness and no swelling.        Right wrist: He exhibits normal range of motion, no tenderness and no swelling.        Left wrist: He exhibits normal range of motion, no tenderness and no swelling.        Right knee: He exhibits normal range of motion and no swelling. No tenderness found.        Left knee: He exhibits normal range of motion and no swelling. No tenderness found.        Right ankle: He exhibits normal range of motion and no swelling. No tenderness.        Left ankle: He exhibits normal range of motion and no swelling. No tenderness.        Right hand: He exhibits normal range of motion, no tenderness and no swelling.        Left hand: He exhibits normal range of motion, no tenderness and no swelling.        Right foot: There is normal range of motion, no tenderness and no swelling.        Left foot: There is normal range of motion, no tenderness and no swelling.   Neurological: He is oriented to person, place, and time. He has normal strength.   Skin: Skin is warm, dry and intact.   Psychiatric: He has a normal mood and affect. His speech is normal and behavior is normal. Cognition and memory are normal.             Assessment:       Encounter Diagnoses   Name Primary?    Osteoarthritis, unspecified osteoarthritis type, unspecified site     Tendonitis of both rotator cuffs Yes          Plan:        Tendonitis of both  rotator cuffs    Osteoarthritis, unspecified osteoarthritis type, unspecified site    Patient noted the hips resolved spontanously -suspect bursitis  Shoulder are painful only with certain motion, but able to still use shoulder with full ROM   Prefers not to take medications if able.   Reviewed shoulder exercises, trial with omega 3 FA, glucosamine chondroitin sulfate.   He did have injections with very little relief.   Imaging with mild OA  Recommend trial with exercises and if not improving can consider MRI  No evidence for PMR>   Discussed natural progression of OA and management strategies including exercises, trial with Motrin and Tylenol for now over the counter      Follow up in about 4 months (around 12/27/2019).      60min consultation with greater than 50% spent in counseling, chart review and coordination of care. All questions answered.

## 2019-10-09 ENCOUNTER — OFFICE VISIT (OUTPATIENT)
Dept: UROLOGY | Facility: CLINIC | Age: 79
End: 2019-10-09
Payer: MEDICARE

## 2019-10-09 ENCOUNTER — TELEPHONE (OUTPATIENT)
Dept: OPTOMETRY | Facility: CLINIC | Age: 79
End: 2019-10-09

## 2019-10-09 ENCOUNTER — LAB VISIT (OUTPATIENT)
Dept: LAB | Facility: HOSPITAL | Age: 79
End: 2019-10-09
Attending: UROLOGY
Payer: MEDICARE

## 2019-10-09 VITALS — WEIGHT: 178.81 LBS | HEIGHT: 70 IN | BODY MASS INDEX: 25.6 KG/M2

## 2019-10-09 DIAGNOSIS — N40.1 BENIGN PROSTATIC HYPERPLASIA WITH NOCTURIA: ICD-10-CM

## 2019-10-09 DIAGNOSIS — R97.20 ELEVATED PSA: Primary | ICD-10-CM

## 2019-10-09 DIAGNOSIS — R35.1 BENIGN PROSTATIC HYPERPLASIA WITH NOCTURIA: ICD-10-CM

## 2019-10-09 DIAGNOSIS — R97.20 ELEVATED PSA: ICD-10-CM

## 2019-10-09 LAB
BILIRUB SERPL-MCNC: NORMAL MG/DL
BLOOD URINE, POC: NORMAL
COLOR, POC UA: NORMAL
COMPLEXED PSA SERPL-MCNC: 9.8 NG/ML (ref 0–4)
GLUCOSE UR QL STRIP: NORMAL
KETONES UR QL STRIP: NORMAL
LEUKOCYTE ESTERASE URINE, POC: NORMAL
NITRITE, POC UA: NORMAL
PH, POC UA: 7
PROTEIN, POC: NORMAL
SPECIFIC GRAVITY, POC UA: 1.02
UROBILINOGEN, POC UA: NORMAL

## 2019-10-09 PROCEDURE — 84153 ASSAY OF PSA TOTAL: CPT | Mod: HCNC

## 2019-10-09 PROCEDURE — 99999 PR PBB SHADOW E&M-EST. PATIENT-LVL II: ICD-10-PCS | Mod: PBBFAC,HCNC,, | Performed by: UROLOGY

## 2019-10-09 PROCEDURE — 1101F PR PT FALLS ASSESS DOC 0-1 FALLS W/OUT INJ PAST YR: ICD-10-PCS | Mod: HCNC,CPTII,S$GLB, | Performed by: UROLOGY

## 2019-10-09 PROCEDURE — 99999 PR PBB SHADOW E&M-EST. PATIENT-LVL II: CPT | Mod: PBBFAC,HCNC,, | Performed by: UROLOGY

## 2019-10-09 PROCEDURE — 99214 PR OFFICE/OUTPT VISIT, EST, LEVL IV, 30-39 MIN: ICD-10-PCS | Mod: HCNC,25,S$GLB, | Performed by: UROLOGY

## 2019-10-09 PROCEDURE — 81002 URINALYSIS NONAUTO W/O SCOPE: CPT | Mod: HCNC,S$GLB,, | Performed by: UROLOGY

## 2019-10-09 PROCEDURE — 81002 POCT URINE DIPSTICK WITHOUT MICROSCOPE: ICD-10-PCS | Mod: HCNC,S$GLB,, | Performed by: UROLOGY

## 2019-10-09 PROCEDURE — 36415 COLL VENOUS BLD VENIPUNCTURE: CPT | Mod: HCNC,PO

## 2019-10-09 PROCEDURE — 99214 OFFICE O/P EST MOD 30 MIN: CPT | Mod: HCNC,25,S$GLB, | Performed by: UROLOGY

## 2019-10-09 PROCEDURE — 1101F PT FALLS ASSESS-DOCD LE1/YR: CPT | Mod: HCNC,CPTII,S$GLB, | Performed by: UROLOGY

## 2019-10-09 NOTE — PROGRESS NOTES
UROLOGY [facility]  10 9 19     Cc elevated psa follow up     Age 79, being followed by hematology for thrombocytosis and by pcp for osteoarthritis.     In urology pt being followed for elevated psa. It was 7.6 in march 2019, 6.7 in July 2018, and 5.6 in march 2017.      In apr2014 pt had his first prostate biopsy and in jan 2018 his second. Both were benign. His prostate volume was 90 cm3 on the first study and 80 cm3 on the second. No suspicious echographic findings in either case.     Generally voiding well, has nocturia x 1-2. No intermittency or need to strain to void. No pains or burning.      Has hx of Undescended left testicle, removed at age 15. Passed kidney stones 6 yrs ago.         PMH     Surgical:  has a past surgical history that includes Tonsillectomy; Orchiectomy; Fracture surgery; ostate biopsy (2014); and Cholecystectomy.     Medical:  has a past medical history of BPH (benign prostatic hypertrophy); Cataract; Elevated PSA; Epiretinal membrane; Fatty liver; History of colon polyps; History of duodenal ulcer; History of nephrolithiasis; Hyperlipidemia; Macular degeneration; Osteoarthritis; Prediabetes; S/P colonoscopy; and Squamous cell carcinoma.     Familial: no fh of renal disease. 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         naproxen (EC NAPROSYN) 500 MG EC tablet Take 1 tablet (500 mg total) by mouth 2  60 tablet 1         REVIEW OF SYSTEMS  GENERAL:  No headaches or dizzy spells.   HEENT: vision preserved. Sinuses: No complaints.   CARDIOPULMONARY: No swelling of the legs; no chest pain. No shortness of breath.   GASTROINTESTINAL: rare heartburn. Denies diarrhea; denies constipation, no blood or mucus in stools.   GENITOURINARY: Denies dysuria, bleeding or  incontinence.   MUSCULOSKELETAL: occasional arthritic complaints such as joint discomfort  PSYCHIATRIC: No history of depression or anxiety.   ENDOCRINOLOGIC: No reports of sweating, cold or heat intolerance. No polyuria or polydipsia.   NEUROLOGICAL: No headache, dizziness, syncope, paralysis, ataxia, numbness or tingling in the extremities.  LYMPHATICS: No enlarged nodes. No history of splenectomy.  ==        Pt alert, oriented  HEENT: wnl.  Neck: supple, no JVD, no lymphadenopathy  Chest: CV NSR  Lungs: normal auscultation  ABDOMEN: Flat, no masses.    CV: Negative.    Penis circumcised, right testicle normal. Left side absent.    Prostate 60 g, gland is high riding, L lobe might be more prominent, no hard areas  Extremities: no edema, peripheral pulses nl  Neuro: preserved        IMPRESSION:      Elevated psa. Hx of two negative prostate biopsies, last one one last year  bph on observation. Pt is on no medication for prostate (or for anything else)  left undescended testicle status post left orchiectomy. Solitary R testicle  Hx of urolithiasis      I am sending pt to the lab for psa update. Prostate rebiopsy is a possibility  RTC 6 mo

## 2019-10-09 NOTE — TELEPHONE ENCOUNTER
----- Message from Zofia Rodriguez sent at 10/9/2019  9:39 AM CDT -----  Contact: pt cell [phone]  Patient called and asked if you  Will be able to fit him in this morning he is at the [facility] clinic now his Eyes are cloudy The Right is worst than the left eye. He can be reached on his cell [phone]

## 2019-10-17 ENCOUNTER — TELEPHONE (OUTPATIENT)
Dept: RHEUMATOLOGY | Facility: CLINIC | Age: 79
End: 2019-10-17

## 2019-10-17 NOTE — TELEPHONE ENCOUNTER
Spoke to pt and notified him that I did not see where anyone called him. Asked pt did he need anything. Pt stated he did not and was not sure who called or why they called. Advised pt if he needed anything to give us a call back and that his next appointment was not until January 14 at 2 pm. Pt confirmed and verbalized understanding.

## 2019-10-17 NOTE — TELEPHONE ENCOUNTER
----- Message from Ariana Bentley sent at 10/17/2019 11:43 AM CDT -----  Contact: SYL TOMPKINS JR. [[id number]] [phone]    Patient is returning a missed call.

## 2019-10-18 ENCOUNTER — HOSPITAL ENCOUNTER (EMERGENCY)
Facility: HOSPITAL | Age: 79
Discharge: HOME | End: 2019-10-19
Attending: EMERGENCY MEDICINE
Payer: MEDICARE

## 2019-10-18 DIAGNOSIS — T18.5XXA FOREIGN BODY ANUS/RECTUM: ICD-10-CM

## 2019-10-18 PROCEDURE — 99283 EMERGENCY DEPT VISIT LOW MDM: CPT

## 2019-10-19 VITALS
BODY MASS INDEX: 25.77 KG/M2 | TEMPERATURE: 98 F | RESPIRATION RATE: 18 BRPM | HEART RATE: 91 BPM | HEIGHT: 70 IN | DIASTOLIC BLOOD PRESSURE: 92 MMHG | WEIGHT: 180 LBS | SYSTOLIC BLOOD PRESSURE: 159 MMHG | OXYGEN SATURATION: 98 %

## 2019-10-19 RX ORDER — AMOXICILLIN AND CLAVULANATE POTASSIUM 875; 125 MG/1; MG/1
1 TABLET, FILM COATED ORAL 2 TIMES DAILY
Qty: 14 TABLET | Refills: 0 | Status: SHIPPED | OUTPATIENT
Start: 2019-10-19 | End: 2019-10-26

## 2019-10-19 NOTE — ED NOTES
Pt states he was putting a dildo in his butt because he is a 79 year old man and hes still horny. HE said the next thing he knew the dildo went up his butt and he couldn't get it out.

## 2019-10-19 NOTE — ED PROVIDER NOTES
Encounter Date: 10/18/2019       History     Chief Complaint   Patient presents with    Foreign body in rectum     HPI     80 yo M presenting with rectal foreign body. Pt states he placed a chap-stick like container into his rectum and has not been able to remove it.  This occurred about an hour prior to arrival.  No associated abdominal pain, nausea, or vomiting. Not on blood thinners.  No rectal bleeding.    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    Macular degeneration     dry -- OU    Osteoarthritis     S/P colonoscopy     8/15; 8/20     Past Surgical History:   Procedure Laterality Date    CHOLECYSTECTOMY      FRACTURE SURGERY      Right ankle and leg    ORCHIECTOMY      due to undescended testicle/ Left    PROSTATE BIOPSY  2014 2014, 2018-negative    TONSILLECTOMY      TRANSRECTAL BIOPSY OF PROSTATE WITH ULTRASOUND GUIDANCE N/A 8/20/2018    Procedure: BIOPSY, PROSTATE, TRANSRECTAL APPROACH, WITH US GUIDANCE;  Surgeon: Joselo Green MD;  Location: [facility] OR;  Service: Urology;  Laterality: N/A;  SITE PROSTATE     Family History   Problem Relation Age of Onset    Cataracts Mother     Glaucoma Mother     Lupus Mother     Cancer Father         kidney and lung cancer (shipyard worker)    Diabetes Maternal Aunt     Diabetes Maternal Uncle     No Known Problems Sister     No Known Problems Daughter     Cancer Sister         breast cancer    Melanoma Neg Hx     Psoriasis Neg  Hx     Eczema Neg Hx     Amblyopia Neg Hx     Hypertension Neg Hx     Macular degeneration Neg Hx     Retinal detachment Neg Hx     Stroke Neg Hx     Strabismus Neg Hx     Thyroid disease Neg Hx      Social History     Tobacco Use    Smoking status: Former Smoker     Years: 0.50     Last attempt to quit: 1965     Years since quittin.2    Smokeless tobacco: Never Used   Substance Use Topics    Alcohol use: Yes     Alcohol/week: 2.0 - 4.0 standard drinks     Types: 2 - 4 Cans of beer per week     Comment: twice a week    Drug use: Yes     Frequency: 7.0 times per week     Types: Marijuana     Comment: marijuana     Review of Systems   Constitutional: Negative for chills and fever.   HENT: Negative for congestion and sore throat.    Eyes: Negative for visual disturbance.   Respiratory: Negative for cough and shortness of breath.    Cardiovascular: Negative for chest pain.   Gastrointestinal: Negative for abdominal pain, nausea and vomiting.   Genitourinary: Negative for dysuria.   Musculoskeletal: Negative for back pain.   Skin: Negative for rash.   Neurological: Negative for weakness and headaches.   Hematological: Does not bruise/bleed easily.   Psychiatric/Behavioral: Negative for agitation and behavioral problems.       Physical Exam     Initial Vitals [10/19/19 0003]   BP Pulse Resp Temp SpO2   (!) 181/108 108 20 98 °F (36.7 °C) 98 %      MAP       --         Physical Exam    Nursing note and vitals reviewed.  Constitutional: He appears well-developed and well-nourished.   Pleasant, cooperative with the exam   HENT:   Head: Normocephalic and atraumatic.   Mouth/Throat: Oropharynx is clear and moist.   Eyes: Conjunctivae are normal.   Neck: Normal range of motion. Neck supple.   Cardiovascular: Normal rate, regular rhythm, normal heart sounds and intact distal pulses.   Pulmonary/Chest: Breath sounds normal. No respiratory distress. He has no wheezes. He has no rales.   Abdominal: Soft. Bowel  sounds are normal. There is no tenderness.   Genitourinary:   Genitourinary Comments: Able to palpate plastic object in rectum, able to remove object with pt bearing down in lithotomy position. Scant amount of blood visualized.    Musculoskeletal: Normal range of motion.   Neurological: He is alert and oriented to person, place, and time.   Skin: Skin is warm and dry. Capillary refill takes less than 2 seconds.   Psychiatric: He has a normal mood and affect. Thought content normal.         ED Course   Procedures  Labs Reviewed - No data to display       Imaging Results          X-Ray Abdomen Flat And Erect (In process)                  Medical Decision Making:   Initial Assessment:   80 yo M presenting with rectal foreign body, placed a Carmex (small chapstick container) into his rectum about an hour prior to arrival.  Has been asymptomatic since.  Abdomen soft, nontender.  Abdominal XR without signs of free air. Low suspicion for perforation at this time. XR unable to visualize any foreign body.  Rectal exam performed at bedside.  Able to remove object as described above. There was a small amount of blood in the rectum, it is likely the object caused minor trauma to the rectal wall. Will give Augmentin empirically for 7 days.  Case managed with Dr. Choudhury.  Patient stable for discharge at this time.  Discussed discharge instructions and precaution measures.  Strong return precautions given.  Patient in agreement with plan.    Yamileth Hatfield, PGY-3  [employer] EM  Clinical Tests:   Radiological Study: Ordered and Reviewed              Attending Attestation:   Physician Attestation Statement for Resident:  As the supervising MD   Physician Attestation Statement: I have personally seen and examined this patient.   I agree with the above history. -:   As the supervising MD I agree with the above PE.    As the supervising MD I agree with the above treatment, course, plan, and disposition.  I was personally present during the  entire procedure.            Attending ED Notes:   Small plastic foreign body retrieved from patient's rectum.  Foreign body appears to be a lip balm container with 1 cramped and and 1 rounded cap.  The foreign body was removed completely and is intact with cap in place.  There was some rectal irritation secondary to the cramped and of the lip balm container.  Bleeding however was minor.  Patient did not require sedation for removal.  Patient will be started on a course of antibiotics to prevent infection and is to follow up with PCP in the next 1-2 days for re-evaluation.  Patient cautioned to return immediately to the ER for any worsening or any further concerns and advised not to insert further objects into his rectum.             Clinical Impression:       ICD-10-CM ICD-9-CM   1. Foreign body anus/rectum T18.5XXA 937                                Gianna Hatfield MD  Resident  10/19/19 0203       Bird Choudhury MD  10/19/19 0207

## 2019-10-23 ENCOUNTER — TELEPHONE (OUTPATIENT)
Dept: FAMILY MEDICINE | Facility: CLINIC | Age: 79
End: 2019-10-23

## 2019-10-23 ENCOUNTER — OFFICE VISIT (OUTPATIENT)
Dept: FAMILY MEDICINE | Facility: CLINIC | Age: 79
End: 2019-10-23
Payer: MEDICARE

## 2019-10-23 VITALS
HEART RATE: 75 BPM | WEIGHT: 182.44 LBS | RESPIRATION RATE: 16 BRPM | TEMPERATURE: 98 F | DIASTOLIC BLOOD PRESSURE: 72 MMHG | HEIGHT: 70 IN | BODY MASS INDEX: 26.12 KG/M2 | SYSTOLIC BLOOD PRESSURE: 124 MMHG | OXYGEN SATURATION: 97 %

## 2019-10-23 DIAGNOSIS — T18.5XXA FOREIGN BODY IN ANUS AND RECTUM, INITIAL ENCOUNTER: ICD-10-CM

## 2019-10-23 DIAGNOSIS — Z09 HOSPITAL DISCHARGE FOLLOW-UP: Primary | ICD-10-CM

## 2019-10-23 PROCEDURE — 99213 OFFICE O/P EST LOW 20 MIN: CPT | Mod: 25,S$GLB,, | Performed by: NURSE PRACTITIONER

## 2019-10-23 PROCEDURE — G0008 ADMIN INFLUENZA VIRUS VAC: HCPCS | Mod: S$GLB,,, | Performed by: NURSE PRACTITIONER

## 2019-10-23 PROCEDURE — 90662 FLU VACCINE - HIGH DOSE (65+) PRESERVATIVE FREE IM: ICD-10-PCS | Mod: S$GLB,,, | Performed by: NURSE PRACTITIONER

## 2019-10-23 PROCEDURE — 1101F PR PT FALLS ASSESS DOC 0-1 FALLS W/OUT INJ PAST YR: ICD-10-PCS | Mod: CPTII,S$GLB,, | Performed by: NURSE PRACTITIONER

## 2019-10-23 PROCEDURE — 90662 IIV NO PRSV INCREASED AG IM: CPT | Mod: S$GLB,,, | Performed by: NURSE PRACTITIONER

## 2019-10-23 PROCEDURE — G0008 FLU VACCINE - HIGH DOSE (65+) PRESERVATIVE FREE IM: ICD-10-PCS | Mod: S$GLB,,, | Performed by: NURSE PRACTITIONER

## 2019-10-23 PROCEDURE — 99213 PR OFFICE/OUTPT VISIT, EST, LEVL III, 20-29 MIN: ICD-10-PCS | Mod: 25,S$GLB,, | Performed by: NURSE PRACTITIONER

## 2019-10-23 PROCEDURE — 1101F PT FALLS ASSESS-DOCD LE1/YR: CPT | Mod: CPTII,S$GLB,, | Performed by: NURSE PRACTITIONER

## 2019-10-23 NOTE — PROGRESS NOTES
Subjective:       Patient ID: Cedric Gupta Jr. is a 79 y.o. male.    Chief Complaint: Hospital Follow Up    HPI hospital follow up.  Patient was seen at [facility] on October 18th.  He tells me his sex toys got away from him.  He was evaluated for foreign body of the rectum.  He states that to 3 different physicians trying to get it out before they were successful.  He did have some rectal bleeding during the procedure due to irritation.  He denies any rectal pain or continued bleeding.  States he is fine, but was told he had to come in and follow-up.  He denies any other concerns today.  See review of systems.    The following portion of the patients history was reviewed and updated as appropriate: allergies, current medications, past medical and surgical history. Past social history and problem list reviewed. Family PMH and Past social history reviewed. Tobacco, Illicit drug use reviewed.      Review of patient's allergies indicates:   Allergen Reactions    Amantadine Other (See Comments)     Depression         Current Outpatient Medications:     amoxicillin-clavulanate 875-125mg (AUGMENTIN) 875-125 mg per tablet, Take 1 tablet by mouth 2 (two) times daily. 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    Macular degeneration     dry -- OU    Osteoarthritis     S/P colonoscopy     8/15; 8/20       Past Surgical History:   Procedure Laterality Date    CHOLECYSTECTOMY      FRACTURE SURGERY      Right ankle and leg    ORCHIECTOMY      due to undescended  testicle/ Left    PROSTATE BIOPSY  2014-negative    TONSILLECTOMY      TRANSRECTAL BIOPSY OF PROSTATE WITH ULTRASOUND GUIDANCE N/A 2018    Procedure: BIOPSY, PROSTATE, TRANSRECTAL APPROACH, WITH US GUIDANCE;  Surgeon: Joselo Green MD;  Location: [facility];  Service: Urology;  Laterality: N/A;  SITE PROSTATE       Social History     Socioeconomic History    Marital status:      Spouse name: Not on file    Number of children: Not on file    Years of education: Not on file    Highest education level: Not on file   Occupational History    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[employer])   Social Needs    Financial resource strain: Not on file    Food insecurity:     Worry: Not on file     Inability: Not on file    Transportation needs:     Medical: Not on file     Non-medical: Not on file   Tobacco Use    Smoking status: Former Smoker     Years: 0.50     Last attempt to quit: 1965     Years since quittin.2    Smokeless tobacco: Never Used   Substance and Sexual Activity    Alcohol use: Yes     Alcohol/week: 2.0 - 4.0 standard drinks     Types: 2 - 4 Cans of beer per week     Comment: twice a week    Drug use: Yes     Frequency: 7.0 times per week     Types: Marijuana     Comment: marijuana    Sexual activity: Yes     Partners: Female     Birth control/protection: None   Lifestyle    Physical activity:     Days per week: Not on file     Minutes per session: Not on file    Stress: Not on file   Relationships    Social connections:     Talks on phone: Not on file     Gets together: Not on file     Attends Restoration service: Not on file     Active member of club or organization: Not on file     Attends meetings of clubs or organizations: Not on file     Relationship status: Not on file   Other Topics Concern    Not on file   Social History Narrative    Not on file     Review of Systems   Constitutional: Negative for activity change, appetite change, fatigue and fever.  "  Eyes: Negative for visual disturbance.   Respiratory: Negative for cough, shortness of breath and wheezing.    Cardiovascular: Negative for chest pain, palpitations and leg swelling.   Gastrointestinal: Negative for abdominal pain, anal bleeding, blood in stool, diarrhea, nausea, rectal pain and vomiting.   Genitourinary: Negative for difficulty urinating.   Musculoskeletal: Negative for back pain and gait problem.   Skin: Negative for rash.   Neurological: Negative for dizziness, weakness and headaches.   Hematological: Negative for adenopathy. Does not bruise/bleed easily.       Objective:      /72 (BP Location: Right arm, Patient Position: Sitting)   Pulse 75   Temp 98.4 °F (36.9 °C) (Oral)   Resp 16   Ht 5' 10" (1.778 m)   Wt 82.7 kg (182 lb 6.9 oz)   SpO2 97%   BMI 26.18 kg/m²    Physical Exam   Constitutional: He is oriented to person, place, and time. He appears well-developed and well-nourished. No distress.   HENT:   Mouth/Throat: No oropharyngeal exudate.   Cardiovascular: Normal rate, regular rhythm and normal heart sounds. Exam reveals no gallop.   No murmur heard.  Pulmonary/Chest: Effort normal and breath sounds normal. No respiratory distress. He has no wheezes. He has no rales. He exhibits no tenderness.   Abdominal: Soft. Bowel sounds are normal. He exhibits no distension. There is no tenderness. There is no rebound and no guarding.   Musculoskeletal: Normal range of motion. He exhibits no edema.   Gait and coordination normal.  strong, equal.  Upper and lower extremity strength is normal.    Neurological: He is alert and oriented to person, place, and time.   Skin: Skin is warm and dry. Capillary refill takes less than 2 seconds. No rash noted. He is not diaphoretic.   Psychiatric: He has a normal mood and affect. His behavior is normal.   Nursing note and vitals reviewed.      Assessment:       1. Hospital discharge follow-up    2. Foreign body in anus and rectum, initial " encounter        Plan:       Hospital discharge follow-up:  Hospital records reviewed.    Foreign body in anus and rectum, initial encounter:  Chapstick was successfully removed from the rectum.  Cap was intact. He denies any discomfort or rectal bleed.  Advised on the dangers a putting small objects into the rectal cavity.    Other orders  -     Influenza - High Dose (65+) (PF) (IM)     Continue current medication  Take medications only as prescribed  Healthy diet, exercise  Adequate rest  Adequate hydration  Avoid allergens  Avoid excessive caffeine     Follow up in p.r.n., unless issues arise.     This note was created using M Modal voice recognition software that occasionally misinterpreted phrases or words

## 2019-10-23 NOTE — TELEPHONE ENCOUNTER
----- Message from Agapito Reeder sent at 10/23/2019 10:59 AM CDT -----  Contact: Patient  Type: Needs Medical Advice    Who Called:  Patient  Best Call Back Number: [phone]  Additional Information: Patient would like to discuss upcoming appointment. Please call to advise. Thanks!

## 2019-11-20 ENCOUNTER — OFFICE VISIT (OUTPATIENT)
Dept: FAMILY MEDICINE | Facility: CLINIC | Age: 79
End: 2019-11-20
Payer: MEDICARE

## 2019-11-20 VITALS
HEIGHT: 70 IN | OXYGEN SATURATION: 98 % | WEIGHT: 177.69 LBS | DIASTOLIC BLOOD PRESSURE: 68 MMHG | BODY MASS INDEX: 25.44 KG/M2 | SYSTOLIC BLOOD PRESSURE: 122 MMHG | HEART RATE: 80 BPM

## 2019-11-20 DIAGNOSIS — Z00.00 ENCOUNTER FOR PREVENTIVE HEALTH EXAMINATION: Primary | ICD-10-CM

## 2019-11-20 DIAGNOSIS — E78.00 PURE HYPERCHOLESTEROLEMIA: ICD-10-CM

## 2019-11-20 DIAGNOSIS — M25.511 BILATERAL SHOULDER PAIN, UNSPECIFIED CHRONICITY: ICD-10-CM

## 2019-11-20 DIAGNOSIS — Z13.6 ENCOUNTER FOR SCREENING FOR CARDIOVASCULAR DISORDERS: ICD-10-CM

## 2019-11-20 DIAGNOSIS — M25.512 BILATERAL SHOULDER PAIN, UNSPECIFIED CHRONICITY: ICD-10-CM

## 2019-11-20 DIAGNOSIS — D68.59 THROMBOPHILIA: ICD-10-CM

## 2019-11-20 PROCEDURE — G0439 PR MEDICARE ANNUAL WELLNESS SUBSEQUENT VISIT: ICD-10-PCS | Mod: S$GLB,,, | Performed by: NURSE PRACTITIONER

## 2019-11-20 PROCEDURE — 99999 PR PBB SHADOW E&M-EST. PATIENT-LVL IV: ICD-10-PCS | Mod: PBBFAC,,, | Performed by: NURSE PRACTITIONER

## 2019-11-20 PROCEDURE — G0439 PPPS, SUBSEQ VISIT: HCPCS | Mod: S$GLB,,, | Performed by: NURSE PRACTITIONER

## 2019-11-20 PROCEDURE — 99999 PR PBB SHADOW E&M-EST. PATIENT-LVL IV: CPT | Mod: PBBFAC,,, | Performed by: NURSE PRACTITIONER

## 2019-11-20 NOTE — PATIENT INSTRUCTIONS
Counseling and Referral of Other Preventative  (Italic type indicates deductible and co-insurance are waived)    Patient Name: Cedric Gupta  Today's Date: 11/20/2019    Health Maintenance       Date Due Completion Date    Shingles Vaccine (2 of 3) 07/08/2008 5/13/2008    TETANUS VACCINE 05/13/2018 5/13/2008    Lipid Panel 12/14/2019 12/14/2018    Colonoscopy 08/18/2020 8/18/2015        No orders of the defined types were placed in this encounter.    The following information is provided to all patients.  This information is to help you find resources for any of the problems found today that may be affecting your health:                Living healthy guide: www.[facility].[place].gov      Understanding Diabetes: www.diabetes.org      Eating healthy: www.cdc.gov/healthyweight      CDC home safety checklist: www.cdc.gov/steadi/patient.html      Agency on Aging: www.goea.[place].[facility]      Alcoholics anonymous (AA): www.aa.org      Physical Activity: www.humaira.nih.gov/yg1avuw      Tobacco use: www.quitwithusla.org

## 2019-11-20 NOTE — PROGRESS NOTES
"Cedric Gupta presented for a  Medicare AWV and comprehensive Health Risk Assessment today. The following components were reviewed and updated:    · Medical history  · Family History  · Social history  · Allergies and Current Medications  · Health Risk Assessment  · Health Maintenance  · Care Team     ** See Completed Assessments for Annual Wellness Visit within the encounter summary.**     The following assessments were completed:  · Living Situation  · CAGE  · Depression Screening  · Timed Get Up and Go  · Whisper Test  · Cognitive Function Screening      · Nutrition Screening  · ADL Screening  · PAQ Screening    Vitals:    11/20/19 0855   BP: 122/68   BP Location: Left arm   Patient Position: Sitting   BP Method: Medium (Manual)   Pulse: 80   SpO2: 98%   Weight: 80.6 kg (177 lb 11.1 oz)   Height: 5' 10" (1.778 m)     Body mass index is 25.5 kg/m².  Physical Exam   Constitutional: He is oriented to person, place, and time. He appears well-nourished.   Cardiovascular: Normal rate, regular rhythm, normal heart sounds and intact distal pulses.   Pulmonary/Chest: Effort normal and breath sounds normal.   Neurological: He is alert and oriented to person, place, and time.   Skin: Skin is warm and dry.   Vitals reviewed.      Diagnoses and health risks identified today and associated recommendations/orders:    1. Encounter for preventive health examination  Reviewed and discussed health maintenance.    - Comprehensive metabolic panel; Future  - Lipid panel; Future    2. Encounter for screening for cardiovascular disorders  - Comprehensive metabolic panel; Future  - Lipid panel; Future    3. Bilateral shoulder pain, unspecified chronicity  Follow up with rheumatology in january  - Ambulatory referral/consult to Orthopedics; Future    4. Pure hypercholesterolemia  Stable- continue current treatment and follow up routinely with PCP     5. Thrombophilia  Stable- continue current treatment and follow up routinely with PCP " and hem/onc ()    Provided Cedric with a 5-10 year written screening schedule and personal prevention plan. Recommendations were developed using the USPSTF age appropriate recommendations. Education, counseling, and referrals were provided as needed. After Visit Summary printed and given to patient which includes a list of additional screenings\tests needed.    Rekha Martin, ELYSE   I offered to discuss end of life issues, including information on how to make advance directives that the patient could use to name someone who would make medical decisions on their behalf if they became too ill to make themselves.    _X_Patient declined  ___Patient is interested, I provided paper work and offered to discuss.

## 2019-12-02 ENCOUNTER — OFFICE VISIT (OUTPATIENT)
Dept: OPHTHALMOLOGY | Facility: CLINIC | Age: 79
End: 2019-12-02
Payer: MEDICARE

## 2019-12-02 VITALS — HEART RATE: 72 BPM | DIASTOLIC BLOOD PRESSURE: 70 MMHG | SYSTOLIC BLOOD PRESSURE: 128 MMHG

## 2019-12-02 DIAGNOSIS — H35.3132 NONEXUDATIVE AGE-RELATED MACULAR DEGENERATION, BILATERAL, INTERMEDIATE DRY STAGE: Primary | ICD-10-CM

## 2019-12-02 DIAGNOSIS — H43.813 POSTERIOR VITREOUS DETACHMENT, BILATERAL: ICD-10-CM

## 2019-12-02 PROBLEM — H25.13 NS (NUCLEAR SCLEROSIS), BILATERAL: Status: ACTIVE | Noted: 2017-05-01

## 2019-12-02 PROCEDURE — 99999 PR PBB SHADOW E&M-EST. PATIENT-LVL III: CPT | Mod: PBBFAC,,, | Performed by: OPHTHALMOLOGY

## 2019-12-02 PROCEDURE — 92226 PR SPECIAL EYE EXAM, SUBSEQUENT: ICD-10-PCS | Mod: 50,S$GLB,, | Performed by: OPHTHALMOLOGY

## 2019-12-02 PROCEDURE — 92226 PR SPECIAL EYE EXAM, SUBSEQUENT: CPT | Mod: 50,S$GLB,, | Performed by: OPHTHALMOLOGY

## 2019-12-02 PROCEDURE — 92014 PR EYE EXAM, EST PATIENT,COMPREHESV: ICD-10-PCS | Mod: S$GLB,,, | Performed by: OPHTHALMOLOGY

## 2019-12-02 PROCEDURE — 92134 POSTERIOR SEGMENT OCT RETINA (OCULAR COHERENCE TOMOGRAPHY)-BOTH EYES: ICD-10-PCS | Mod: S$GLB,,, | Performed by: OPHTHALMOLOGY

## 2019-12-02 PROCEDURE — 99999 PR PBB SHADOW E&M-EST. PATIENT-LVL III: ICD-10-PCS | Mod: PBBFAC,,, | Performed by: OPHTHALMOLOGY

## 2019-12-02 PROCEDURE — 92014 COMPRE OPH EXAM EST PT 1/>: CPT | Mod: S$GLB,,, | Performed by: OPHTHALMOLOGY

## 2019-12-02 PROCEDURE — 92134 CPTRZ OPH DX IMG PST SGM RTA: CPT | Mod: S$GLB,,, | Performed by: OPHTHALMOLOGY

## 2019-12-04 ENCOUNTER — TELEPHONE (OUTPATIENT)
Dept: OPHTHALMOLOGY | Facility: CLINIC | Age: 79
End: 2019-12-04

## 2019-12-04 NOTE — TELEPHONE ENCOUNTER
Received call from pt stating that he is still ahving pain in both shoulders and hips.  Appt made for pt to see Danni 1/4/19 @ 3:20PM.  Pt verbalizes understanding.   Transitions of Care Status:  1.  Name of PCP:  2.  PCP Contacted on Admission: [ ] Y    [ ] N    3.  PCP contacted at Discharge: [ ] Y    [ ] N    [ ] N/A  4.  Post-Discharge Appointment Date and Location:  5.  Summary of Handoff given to PCP:

## 2019-12-10 ENCOUNTER — OFFICE VISIT (OUTPATIENT)
Dept: OPHTHALMOLOGY | Facility: CLINIC | Age: 79
End: 2019-12-10
Payer: MEDICARE

## 2019-12-10 DIAGNOSIS — H25.13 AGE-RELATED NUCLEAR CATARACT OF BOTH EYES: Primary | ICD-10-CM

## 2019-12-10 DIAGNOSIS — H35.3132 INTERMEDIATE STAGE NONEXUDATIVE AGE-RELATED MACULAR DEGENERATION OF BOTH EYES: ICD-10-CM

## 2019-12-10 DIAGNOSIS — H43.813 POSTERIOR VITREOUS DETACHMENT, BILATERAL: ICD-10-CM

## 2019-12-10 PROCEDURE — 92015 PR REFRACTION: ICD-10-PCS | Mod: S$GLB,,, | Performed by: OPHTHALMOLOGY

## 2019-12-10 PROCEDURE — 99999 PR PBB SHADOW E&M-EST. PATIENT-LVL III: CPT | Mod: PBBFAC,,, | Performed by: OPHTHALMOLOGY

## 2019-12-10 PROCEDURE — 92014 PR EYE EXAM, EST PATIENT,COMPREHESV: ICD-10-PCS | Mod: S$GLB,,, | Performed by: OPHTHALMOLOGY

## 2019-12-10 PROCEDURE — 92014 COMPRE OPH EXAM EST PT 1/>: CPT | Mod: S$GLB,,, | Performed by: OPHTHALMOLOGY

## 2019-12-10 PROCEDURE — 92015 DETERMINE REFRACTIVE STATE: CPT | Mod: S$GLB,,, | Performed by: OPHTHALMOLOGY

## 2019-12-10 PROCEDURE — 99999 PR PBB SHADOW E&M-EST. PATIENT-LVL III: ICD-10-PCS | Mod: PBBFAC,,, | Performed by: OPHTHALMOLOGY

## 2019-12-10 RX ORDER — HYDROCODONE BITARTRATE AND ACETAMINOPHEN 10; 325 MG/1; MG/1
TABLET ORAL
COMMUNITY
Start: 2019-12-03 | End: 2020-01-28

## 2019-12-10 NOTE — LETTER
December 10, 2019      NANCIE Fry MD  [address]  [facility] [address]           [facility]  [address] [place] [address]  Phone: [phone]  Fax: [phone]          Patient: Cedric Gupta Jr.   MR Number: [id number]   YOB: 1940   Date of Visit: 12/10/2019       Dear Dr. NANCIE Fry:    Thank you for referring Cedric Gupta to me for evaluation. Attached you will find relevant portions of my assessment and plan of care.    If you have questions, please do not hesitate to call me. I look forward to following Cedric Gupta along with you.    Sincerely,    Xu Bone Jr., MD    Enclosure  CC:  No Recipients    If you would like to receive this communication electronically, please contact [email or web address] or [phone] to request more information on [employer] Link access.    For providers and/or their staff who would like to refer a patient to [facility], please contact us through our one-stop-shop provider referral line, [facility], at [phone].    If you feel you have received this communication in error or would no longer like to receive these types of communications, please e-mail [email or web address]

## 2019-12-10 NOTE — PATIENT INSTRUCTIONS
Small-Incision Cataract Surgery: Implanting the New Lens    Once your old lens has been removed, your doctor slips the new lens (IOL or intraocular lens) in through the incision. The IOL is then placed in the capsule that held your old lens. With the new lens in place, your doctor is ready to close the incision. Some incisions may be closed with stitches. Others are self-sealing. That means they will stay closed on their own without stitches. The IOL does much the same thing as your old lens did before it became cloudy. It focuses light, letting you see sharp images and vivid colors. The IOL normally lasts a lifetime.  How small is an IOL?     Date Last Reviewed: 6/14/2015  © 0350-4798 [employer], [employer]. [address] [place] [address], [place] [address]. All rights reserved. This information is not intended as a substitute for professional medical care. Always follow your healthcare professional's instructions.

## 2019-12-10 NOTE — PROGRESS NOTES
HPI     Blurred Vision      Additional comments: Blurred VA// Mazz ref for cat eval//              Comments     Blurred VA// Mazz ref for cat eval//  No flashes, pos for floaters in the   pasdt but not lately //          Last edited by Pamela Russell on 12/10/2019  9:59 AM. (History)        ROS     Negative for: Constitutional, Gastrointestinal, Neurological, Skin,   Genitourinary, Musculoskeletal, HENT, Endocrine, Cardiovascular, Eyes,   Respiratory, Psychiatric, Allergic/Imm, Heme/Lymph    Last edited by Xu Bone Jr., MD on 12/10/2019 11:14 AM. (History)        Assessment /Plan     For exam results, see Encounter Report.    Age-related nuclear cataract of both eyes  -schedule cataract surgery, right  -R&B benefits discussed with patient  -Risks of worse vision, loss of vision, infection, bleeding, re-surgery,and may need to have surgery done by a different physician was explained to the patient  -patient was also counseled on premium lens options, laser cataract, and astigmatic correction  -patient was also counseled that they may still need glasses after surgery to help improve their vision, especially the need for reading glasses for reading small print texts after surgery  -patient understood the risks involved with cataract surgery and wanted to move forward with surgery  Intermediate stage nonexudative age-related macular degeneration of both eyes  -Check your Amsler Grid daily, each eye separately; instructions are available on the grid  -Return promptly if a change is noted such as lines that are not visible or looking wavy on the grid  -It is also recommended that you take eye vitamin supplements.  These are available over-the-counter. I recommend I-Caps AREDS2 Formula or Preservision AREDS2 Formula. Should be taken 1 tab po BID  -F/U in 6 months  Posterior vitreous detachment, bilateral  Patient was counseled on the following:  IF YOU HAVE:  1. Increase in floaters or flashing  lights  2. Worsening vision  3. Appearance of a persistent curtain  4. Shadow anywhere in the field  of vision  Return immediately if these symptoms develop.  There is an increased possibility that you will have the develop floaters and flashes in the opposite eye.    Follow up in about 1 month (around 1/10/2020) for Measurements, H&P, and consents.

## 2019-12-31 ENCOUNTER — TELEPHONE (OUTPATIENT)
Dept: ORTHOPEDICS | Facility: CLINIC | Age: 79
End: 2019-12-31

## 2020-01-02 ENCOUNTER — OFFICE VISIT (OUTPATIENT)
Dept: ORTHOPEDICS | Facility: CLINIC | Age: 80
End: 2020-01-02
Payer: MEDICARE

## 2020-01-02 ENCOUNTER — PATIENT OUTREACH (OUTPATIENT)
Dept: ADMINISTRATIVE | Facility: HOSPITAL | Age: 80
End: 2020-01-02

## 2020-01-02 ENCOUNTER — LAB VISIT (OUTPATIENT)
Dept: LAB | Facility: HOSPITAL | Age: 80
End: 2020-01-02
Payer: MEDICARE

## 2020-01-02 VITALS
BODY MASS INDEX: 25.34 KG/M2 | SYSTOLIC BLOOD PRESSURE: 125 MMHG | HEIGHT: 70 IN | HEART RATE: 60 BPM | WEIGHT: 177 LBS | DIASTOLIC BLOOD PRESSURE: 73 MMHG

## 2020-01-02 DIAGNOSIS — Z00.00 ENCOUNTER FOR PREVENTIVE HEALTH EXAMINATION: ICD-10-CM

## 2020-01-02 DIAGNOSIS — Z13.6 ENCOUNTER FOR SCREENING FOR CARDIOVASCULAR DISORDERS: ICD-10-CM

## 2020-01-02 DIAGNOSIS — M19.012 PRIMARY OSTEOARTHRITIS OF SHOULDERS, BILATERAL: Primary | ICD-10-CM

## 2020-01-02 DIAGNOSIS — M19.011 PRIMARY OSTEOARTHRITIS OF SHOULDERS, BILATERAL: Primary | ICD-10-CM

## 2020-01-02 LAB
ALBUMIN SERPL BCP-MCNC: 3.9 G/DL (ref 3.5–5.2)
ALP SERPL-CCNC: 60 U/L (ref 55–135)
ALT SERPL W/O P-5'-P-CCNC: 16 U/L (ref 10–44)
ANION GAP SERPL CALC-SCNC: 7 MMOL/L (ref 8–16)
AST SERPL-CCNC: 19 U/L (ref 10–40)
BILIRUB SERPL-MCNC: 0.5 MG/DL (ref 0.1–1)
BUN SERPL-MCNC: 14 MG/DL (ref 8–23)
CALCIUM SERPL-MCNC: 9.7 MG/DL (ref 8.7–10.5)
CHLORIDE SERPL-SCNC: 106 MMOL/L (ref 95–110)
CHOLEST SERPL-MCNC: 228 MG/DL (ref 120–199)
CHOLEST/HDLC SERPL: 3.4 {RATIO} (ref 2–5)
CO2 SERPL-SCNC: 29 MMOL/L (ref 23–29)
CREAT SERPL-MCNC: 0.9 MG/DL (ref 0.5–1.4)
EST. GFR  (AFRICAN AMERICAN): >60 ML/MIN/1.73 M^2
EST. GFR  (NON AFRICAN AMERICAN): >60 ML/MIN/1.73 M^2
GLUCOSE SERPL-MCNC: 96 MG/DL (ref 70–110)
HDLC SERPL-MCNC: 67 MG/DL (ref 40–75)
HDLC SERPL: 29.4 % (ref 20–50)
LDLC SERPL CALC-MCNC: 145.2 MG/DL (ref 63–159)
NONHDLC SERPL-MCNC: 161 MG/DL
POTASSIUM SERPL-SCNC: 4.3 MMOL/L (ref 3.5–5.1)
PROT SERPL-MCNC: 7.1 G/DL (ref 6–8.4)
SODIUM SERPL-SCNC: 142 MMOL/L (ref 136–145)
TRIGL SERPL-MCNC: 79 MG/DL (ref 30–150)

## 2020-01-02 PROCEDURE — 99214 OFFICE O/P EST MOD 30 MIN: CPT | Mod: HCNC,S$GLB,, | Performed by: ORTHOPAEDIC SURGERY

## 2020-01-02 PROCEDURE — 1125F PR PAIN SEVERITY QUANTIFIED, PAIN PRESENT: ICD-10-PCS | Mod: HCNC,S$GLB,, | Performed by: ORTHOPAEDIC SURGERY

## 2020-01-02 PROCEDURE — 99999 PR PBB SHADOW E&M-EST. PATIENT-LVL III: CPT | Mod: PBBFAC,HCNC,, | Performed by: ORTHOPAEDIC SURGERY

## 2020-01-02 PROCEDURE — 1159F PR MEDICATION LIST DOCUMENTED IN MEDICAL RECORD: ICD-10-PCS | Mod: HCNC,S$GLB,, | Performed by: ORTHOPAEDIC SURGERY

## 2020-01-02 PROCEDURE — 99999 PR PBB SHADOW E&M-EST. PATIENT-LVL III: ICD-10-PCS | Mod: PBBFAC,HCNC,, | Performed by: ORTHOPAEDIC SURGERY

## 2020-01-02 PROCEDURE — 1159F MED LIST DOCD IN RCRD: CPT | Mod: HCNC,S$GLB,, | Performed by: ORTHOPAEDIC SURGERY

## 2020-01-02 PROCEDURE — 80053 COMPREHEN METABOLIC PANEL: CPT | Mod: HCNC

## 2020-01-02 PROCEDURE — 80061 LIPID PANEL: CPT | Mod: HCNC

## 2020-01-02 PROCEDURE — 1101F PT FALLS ASSESS-DOCD LE1/YR: CPT | Mod: HCNC,CPTII,S$GLB, | Performed by: ORTHOPAEDIC SURGERY

## 2020-01-02 PROCEDURE — 1101F PR PT FALLS ASSESS DOC 0-1 FALLS W/OUT INJ PAST YR: ICD-10-PCS | Mod: HCNC,CPTII,S$GLB, | Performed by: ORTHOPAEDIC SURGERY

## 2020-01-02 PROCEDURE — 36415 COLL VENOUS BLD VENIPUNCTURE: CPT | Mod: HCNC,PO

## 2020-01-02 PROCEDURE — 1125F AMNT PAIN NOTED PAIN PRSNT: CPT | Mod: HCNC,S$GLB,, | Performed by: ORTHOPAEDIC SURGERY

## 2020-01-02 PROCEDURE — 99214 PR OFFICE/OUTPT VISIT, EST, LEVL IV, 30-39 MIN: ICD-10-PCS | Mod: HCNC,S$GLB,, | Performed by: ORTHOPAEDIC SURGERY

## 2020-01-02 RX ORDER — MELOXICAM 15 MG/1
15 TABLET ORAL DAILY
Qty: 30 TABLET | Refills: 11 | Status: SHIPPED | OUTPATIENT
Start: 2020-01-02 | End: 2020-01-28

## 2020-01-02 NOTE — LETTER
January 10, 2020      Rekha Martin NP  [address] [facility] [address] [place] [address]           [facility]  [address] [place] [address]  Phone: [phone]          Patient: Cedric Gupta Jr.   MR Number: [id number]   YOB: 1940   Date of Visit: 1/2/2020       Dear Rekha Martin:    Thank you for referring Cedric Gupta to me for evaluation. Attached you will find relevant portions of my assessment and plan of care.    If you have questions, please do not hesitate to call me. I look forward to following Cedric Gupta along with you.    Sincerely,    Meet Cruz MD    Enclosure  CC:  No Recipients    If you would like to receive this communication electronically, please contact [email or web address] or [phone] to request more information on [email or web address] Link access.    For providers and/or their staff who would like to refer a patient to [facility], please contact us through our one-stop-shop provider referral line, [facility], at [phone].    If you feel you have received this communication in error or would no longer like to receive these types of communications, please e-mail [email or web address]

## 2020-01-02 NOTE — PROGRESS NOTES
Chief Complaint   Patient presents with    Shoulder Pain     bilateral shoulder pain       HPI:    This is a 79 y.o. who presents today complaining of bilateral shoulder pain for years after no known injury. Pain is dull. Improved after injection 1 year ago.  No numbness or tingling. No associated signs or symptoms.      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    Macular degeneration     dry -- OU    Osteoarthritis     S/P colonoscopy     8/15; 8/20      Past Surgical History:   Procedure Laterality Date    CHOLECYSTECTOMY      FRACTURE SURGERY      Right ankle and leg    ORCHIECTOMY      due to undescended testicle/ Left    PROSTATE BIOPSY  2014 2014, 2018-negative    TONSILLECTOMY      TRANSRECTAL BIOPSY OF PROSTATE WITH ULTRASOUND GUIDANCE N/A 8/20/2018    Procedure: BIOPSY, PROSTATE, TRANSRECTAL APPROACH, WITH US GUIDANCE;  Surgeon: Joselo Green MD;  Location: [facility];  Service: Urology;  Laterality: N/A;  SITE PROSTATE      Current Outpatient Medications on File Prior to Visit   Medication Sig Dispense Refill    HYDROcodone-acetaminophen (NORCO)  mg per tablet        No current facility-administered medications on file prior to visit.       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[employer])   Social Needs    Financial resource strain: Not on file    Food insecurity:     Worry: Not on file     Inability: Not on file    Transportation needs:     Medical: Not on file     Non-medical: Not on file   Tobacco Use    Smoking status: Former Smoker     Years: 0.50     Last attempt to quit: 1965     Years since quittin.4    Smokeless tobacco: Never Used   Substance and Sexual Activity    Alcohol use: Yes     Alcohol/week: 2.0 - 4.0 standard drinks     Types: 2 - 4 Cans of beer per week     Comment: twice a week    Drug use: Yes     Frequency: 7.0 times per week     Types: Marijuana     Comment: marijuana    Sexual activity: Yes     Partners: Female     Birth control/protection: None   Lifestyle    Physical activity:     Days per week: Not on file     Minutes per session: Not on file    Stress: Not on file   Relationships    Social connections:     Talks on phone: Not on file     Gets together: Not on file     Attends Scientologist service: Not on file     Active member of club or organization: Not on file     Attends meetings of clubs or organizations: Not on file     Relationship status: Not on file   Other Topics Concern    Not on file   Social History Narrative    Not on file         Review of Systems:   Constitutional:  Denies fever or chills    Eyes:  Denies change in visual acuity    HENT:  Denies nasal congestion or sore throat    Respiratory:  Denies cough or shortness of breath    Cardiovascular:  Denies chest pain or edema    GI:  Denies abdominal pain, nausea, vomiting, bloody stools or diarrhea    :  Denies dysuria    Integument:  Denies rash    Neurologic:  Denies headache, focal weakness or sensory changes    Endocrine:  Denies polyuria or polydipsia    Lymphatic:  Denies swollen glands    Psychiatric:  Denies depression or anxiety       Physical Exam:    Constitutional:  Well developed, well nourished, no  acute distress, non-toxic appearance    Integument:  Well hydrated, no rash    Lymphatic:  No lymphadenopathy noted    Neurologic:  Alert & oriented x 3,     Psychiatric:  Speech and behavior appropriate    Gi: abdomen soft  Eyes: EOMI     Bilateral Shoulder Exam    Tenderness   Shoulder tenderness location: none    Range of Motion   Forward Flexion: normal   External Rotation: normal     Muscle Strength   Supraspinatus: 4/5     Tests   Hawkin's test: negative  Impingement: negative    Other   Erythema: absent  Sensation: normal  Pulse: present           Primary osteoarthritis of shoulders, bilateral    Other orders  -     meloxicam (MOBIC) 15 MG tablet; Take 1 tablet (15 mg total) by mouth once daily.  Dispense: 30 tablet; Refill: 11        Treatment options discussed. Mobic ordered.

## 2020-01-02 NOTE — PROGRESS NOTES
Health Maintenance Due   Topic Date Due    Shingles Vaccine (2 of 3) 07/08/2008    TETANUS VACCINE  05/13/2018     Future Appointments   Date Time Provider Department Center   1/10/2020  3:00 PM Xu Bone Jr., MD [facility] OPHTHAL [place]   1/14/2020  2:00 PM Ariadna Fernandez DO [facility] RHEUM [place]   1/17/2020  9:50 AM Tracy Stein MD [facility] MED [place]   4/8/2020  9:30 AM Joselo Green MD [facility] UROLOGY [place]

## 2020-01-03 ENCOUNTER — PATIENT MESSAGE (OUTPATIENT)
Dept: FAMILY MEDICINE | Facility: CLINIC | Age: 80
End: 2020-01-03

## 2020-01-10 ENCOUNTER — OFFICE VISIT (OUTPATIENT)
Dept: OPHTHALMOLOGY | Facility: CLINIC | Age: 80
End: 2020-01-10
Payer: MEDICARE

## 2020-01-10 VITALS — DIASTOLIC BLOOD PRESSURE: 84 MMHG | SYSTOLIC BLOOD PRESSURE: 135 MMHG | HEART RATE: 74 BPM

## 2020-01-10 DIAGNOSIS — H25.11 AGE-RELATED NUCLEAR CATARACT OF RIGHT EYE: Primary | ICD-10-CM

## 2020-01-10 PROCEDURE — 92136 IOL MASTER - OD - RIGHT EYE: ICD-10-PCS | Mod: HCNC,RT,S$GLB, | Performed by: OPHTHALMOLOGY

## 2020-01-10 PROCEDURE — 99499 UNLISTED E&M SERVICE: CPT | Mod: HCNC,S$GLB,, | Performed by: OPHTHALMOLOGY

## 2020-01-10 PROCEDURE — 99499 NO LOS: ICD-10-PCS | Mod: HCNC,S$GLB,, | Performed by: OPHTHALMOLOGY

## 2020-01-10 PROCEDURE — 99999 PR PBB SHADOW E&M-EST. PATIENT-LVL III: CPT | Mod: PBBFAC,HCNC,, | Performed by: OPHTHALMOLOGY

## 2020-01-10 PROCEDURE — 92136 OPHTHALMIC BIOMETRY: CPT | Mod: HCNC,RT,S$GLB, | Performed by: OPHTHALMOLOGY

## 2020-01-10 PROCEDURE — 92025 CPTRIZED CORNEAL TOPOGRAPHY: CPT | Mod: HCNC,S$GLB,, | Performed by: OPHTHALMOLOGY

## 2020-01-10 PROCEDURE — 92025 CORNEAL TOPOGRAPHY - OD - RIGHT EYE: ICD-10-PCS | Mod: HCNC,S$GLB,, | Performed by: OPHTHALMOLOGY

## 2020-01-10 PROCEDURE — 99999 PR PBB SHADOW E&M-EST. PATIENT-LVL III: ICD-10-PCS | Mod: PBBFAC,HCNC,, | Performed by: OPHTHALMOLOGY

## 2020-01-10 RX ORDER — MOXIFLOXACIN 5 MG/ML
1 SOLUTION/ DROPS OPHTHALMIC 4 TIMES DAILY
Qty: 3 ML | Refills: 1 | Status: SHIPPED | OUTPATIENT
Start: 2020-01-10 | End: 2020-01-25

## 2020-01-10 RX ORDER — AMOXICILLIN 500 MG/1
CAPSULE ORAL
COMMUNITY
Start: 2020-01-07 | End: 2020-01-28

## 2020-01-10 RX ORDER — PREDNISOLONE ACETATE 10 MG/ML
1 SUSPENSION/ DROPS OPHTHALMIC 4 TIMES DAILY
Qty: 5 ML | Refills: 3 | Status: SHIPPED | OUTPATIENT
Start: 2020-01-10 | End: 2020-02-09

## 2020-01-10 RX ORDER — PHENYLEPHRINE HYDROCHLORIDE 25 MG/ML
1 SOLUTION/ DROPS OPHTHALMIC
Status: CANCELLED | OUTPATIENT
Start: 2020-01-10 | End: 2020-01-10

## 2020-01-10 RX ORDER — PROPARACAINE HYDROCHLORIDE 5 MG/ML
1 SOLUTION/ DROPS OPHTHALMIC
Status: CANCELLED | OUTPATIENT
Start: 2020-01-10 | End: 2020-01-10

## 2020-01-10 RX ORDER — DICLOFENAC SODIUM 1 MG/ML
1 SOLUTION/ DROPS OPHTHALMIC 4 TIMES DAILY
Qty: 5 ML | Refills: 3 | Status: SHIPPED | OUTPATIENT
Start: 2020-01-10 | End: 2020-02-09

## 2020-01-10 RX ORDER — PHENYLEPHRINE HYDROCHLORIDE 100 MG/ML
1 SOLUTION/ DROPS OPHTHALMIC
Status: CANCELLED | OUTPATIENT
Start: 2020-01-10 | End: 2020-01-10

## 2020-01-10 RX ORDER — TROPICAMIDE 10 MG/ML
1 SOLUTION/ DROPS OPHTHALMIC
Status: CANCELLED | OUTPATIENT
Start: 2020-01-10 | End: 2020-01-10

## 2020-01-10 NOTE — PROGRESS NOTES
HPI     Pre-op Exam      Additional comments: Phaco iol OD to b d 1/23//              Comments     Phaco iol OD to b d 1/23//    Pt will be having implanted teeth starting Feb, 2nd eye will be in early   Feb//          Last edited by Pamela Russell on 1/10/2020  3:24 PM. (History)        ROS     Negative for: Constitutional, Gastrointestinal, Neurological, Skin,   Genitourinary, Musculoskeletal, HENT, Endocrine, Cardiovascular, Eyes,   Respiratory, Psychiatric, Allergic/Imm, Heme/Lymph    Last edited by Xu Bone Jr., MD on 1/10/2020  3:47 PM. (History)        Assessment /Plan     For exam results, see Encounter Report.    Age-related nuclear cataract of right eye  -     diclofenac (VOLTAREN) 0.1 % ophthalmic solution; Place 1 drop into the right eye 4 (four) times daily. Start drops 3 days before surgery  Dispense: 5 mL; Refill: 3  -     moxifloxacin (VIGAMOX) 0.5 % ophthalmic solution; Place 1 drop into the right eye 4 (four) times daily. for 7 days  Dispense: 3 mL; Refill: 1  -     prednisoLONE acetate (PRED FORTE) 1 % DrpS; Place 1 drop into the right eye 4 (four) times daily.  Dispense: 5 mL; Refill: 3  -     Case Request Operating Room: PHACOEMULSIFICATION, CATARACT  -     Corneal Topography - OD - Right Eye  -     IOL Master - OD - Right Eye      Follow up in about 2 weeks (around 1/24/2020) for Cataract surgery Right eye.

## 2020-01-10 NOTE — H&P (VIEW-ONLY)
CC: H&P for cataract surgery  HPI: Patient has been diagnosed with cataracts, which are interfering with daily activities due to blurred vision and glare which cannot adequately be corrected with glasses.   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    Macular degeneration     dry -- OU    Osteoarthritis     S/P colonoscopy     8/15; 8/20       FH:  Family History   Problem Relation Age of Onset    Cataracts Mother     Glaucoma Mother     Lupus Mother     Cancer Father         kidney and lung cancer ([employer] worker)    Diabetes Maternal Aunt     Diabetes Maternal Uncle     No Known Problems Sister     No Known Problems Daughter     Cancer Sister         breast cancer    Melanoma Neg Hx     Psoriasis Neg Hx     Eczema Neg Hx     Amblyopia Neg Hx     Hypertension Neg Hx     Macular degeneration Neg Hx     Retinal detachment Neg Hx     Stroke Neg Hx     Strabismus Neg Hx     Thyroid disease Neg Hx     Blindness Neg Hx        SH:  Social History     Socioeconomic History    Marital status:      Spouse name: Not on file    Number of children: Not on file    Years of education: Not on file    Highest education level: Not on file   Occupational History    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[employer])   Social Needs    Financial resource strain: Not on file    Food insecurity:     Worry: Not on file     Inability: Not on file    Transportation needs:     Medical: Not on file     Non-medical: Not on file   Tobacco Use    Smoking status: Former Smoker     Years: 0.50     Last attempt to quit:  1965     Years since quittin.4    Smokeless tobacco: Never Used   Substance and Sexual Activity    Alcohol use: Yes     Alcohol/week: 2.0 - 4.0 standard drinks     Types: 2 - 4 Cans of beer per week     Comment: twice a week    Drug use: Yes     Frequency: 7.0 times per week     Types: Marijuana     Comment: marijuana    Sexual activity: Yes     Partners: Female     Birth control/protection: None   Lifestyle    Physical activity:     Days per week: Not on file     Minutes per session: Not on file    Stress: Not on file   Relationships    Social connections:     Talks on phone: Not on file     Gets together: Not on file     Attends Caodaism service: Not on file     Active member of club or organization: Not on file     Attends meetings of clubs or organizations: Not on file     Relationship status: Not on file   Other Topics Concern    Not on file   Social History Narrative    Not on file       ROS:  HEENT: Blurred vision  CV: No chest pain  Pulm: No SOB  Please see my last exam note for additional ROS details    PE:  Vitals:    01/10/20 1521   BP: 135/84   Pulse: 74     HEENT: Cataract  CV: N S1 and S2 no murmurs  Pulm: CTAB wheezes, rales, or ronchi  Abd:  soft nabs NTND no masses  Extremeties: No edema    A/P  1. Cataract - Indications, risks and alternatives to the procedure were explained to the patient. The patient was given the opportunity to ask questions and consented to the procedure in writing.  Proceed with cataract surgery as scheduled.  2. Other health issues - patient has been cleared by their PCP. See last note for details.

## 2020-01-10 NOTE — H&P
CC: H&P for cataract surgery  HPI: Patient has been diagnosed with cataracts, which are interfering with daily activities due to blurred vision and glare which cannot adequately be corrected with glasses.   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    Macular degeneration     dry -- OU    Osteoarthritis     S/P colonoscopy     8/15; 8/20       FH:  Family History   Problem Relation Age of Onset    Cataracts Mother     Glaucoma Mother     Lupus Mother     Cancer Father         kidney and lung cancer ([employer] worker)    Diabetes Maternal Aunt     Diabetes Maternal Uncle     No Known Problems Sister     No Known Problems Daughter     Cancer Sister         breast cancer    Melanoma Neg Hx     Psoriasis Neg Hx     Eczema Neg Hx     Amblyopia Neg Hx     Hypertension Neg Hx     Macular degeneration Neg Hx     Retinal detachment Neg Hx     Stroke Neg Hx     Strabismus Neg Hx     Thyroid disease Neg Hx     Blindness Neg Hx        SH:  Social History     Socioeconomic History    Marital status:      Spouse name: Not on file    Number of children: Not on file    Years of education: Not on file    Highest education level: Not on file   Occupational History    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[employer])   Social Needs    Financial resource strain: Not on file    Food insecurity:     Worry: Not on file     Inability: Not on file    Transportation needs:     Medical: Not on file     Non-medical: Not on file   Tobacco Use    Smoking status: Former Smoker     Years: 0.50     Last attempt to quit:  1965     Years since quittin.4    Smokeless tobacco: Never Used   Substance and Sexual Activity    Alcohol use: Yes     Alcohol/week: 2.0 - 4.0 standard drinks     Types: 2 - 4 Cans of beer per week     Comment: twice a week    Drug use: Yes     Frequency: 7.0 times per week     Types: Marijuana     Comment: marijuana    Sexual activity: Yes     Partners: Female     Birth control/protection: None   Lifestyle    Physical activity:     Days per week: Not on file     Minutes per session: Not on file    Stress: Not on file   Relationships    Social connections:     Talks on phone: Not on file     Gets together: Not on file     Attends Denominational service: Not on file     Active member of club or organization: Not on file     Attends meetings of clubs or organizations: Not on file     Relationship status: Not on file   Other Topics Concern    Not on file   Social History Narrative    Not on file       ROS:  HEENT: Blurred vision  CV: No chest pain  Pulm: No SOB  Please see my last exam note for additional ROS details    PE:  Vitals:    01/10/20 1521   BP: 135/84   Pulse: 74     HEENT: Cataract  CV: N S1 and S2 no murmurs  Pulm: CTAB wheezes, rales, or ronchi  Abd:  soft nabs NTND no masses  Extremeties: No edema    A/P  1. Cataract - Indications, risks and alternatives to the procedure were explained to the patient. The patient was given the opportunity to ask questions and consented to the procedure in writing.  Proceed with cataract surgery as scheduled.  2. Other health issues - patient has been cleared by their PCP. See last note for details.

## 2020-01-10 NOTE — H&P (VIEW-ONLY)
CC: H&P for cataract surgery  HPI: Patient has been diagnosed with cataracts, which are interfering with daily activities due to blurred vision and glare which cannot adequately be corrected with glasses.   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    Macular degeneration     dry -- OU    Osteoarthritis     S/P colonoscopy     8/15; 8/20       FH:  Family History   Problem Relation Age of Onset    Cataracts Mother     Glaucoma Mother     Lupus Mother     Cancer Father         kidney and lung cancer ([employer] worker)    Diabetes Maternal Aunt     Diabetes Maternal Uncle     No Known Problems Sister     No Known Problems Daughter     Cancer Sister         breast cancer    Melanoma Neg Hx     Psoriasis Neg Hx     Eczema Neg Hx     Amblyopia Neg Hx     Hypertension Neg Hx     Macular degeneration Neg Hx     Retinal detachment Neg Hx     Stroke Neg Hx     Strabismus Neg Hx     Thyroid disease Neg Hx     Blindness Neg Hx        SH:  Social History     Socioeconomic History    Marital status:      Spouse name: Not on file    Number of children: Not on file    Years of education: Not on file    Highest education level: Not on file   Occupational History    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[employer])   Social Needs    Financial resource strain: Not on file    Food insecurity:     Worry: Not on file     Inability: Not on file    Transportation needs:     Medical: Not on file     Non-medical: Not on file   Tobacco Use    Smoking status: Former Smoker     Years: 0.50     Last attempt to quit:  1965     Years since quittin.4    Smokeless tobacco: Never Used   Substance and Sexual Activity    Alcohol use: Yes     Alcohol/week: 2.0 - 4.0 standard drinks     Types: 2 - 4 Cans of beer per week     Comment: twice a week    Drug use: Yes     Frequency: 7.0 times per week     Types: Marijuana     Comment: marijuana    Sexual activity: Yes     Partners: Female     Birth control/protection: None   Lifestyle    Physical activity:     Days per week: Not on file     Minutes per session: Not on file    Stress: Not on file   Relationships    Social connections:     Talks on phone: Not on file     Gets together: Not on file     Attends Anabaptist service: Not on file     Active member of club or organization: Not on file     Attends meetings of clubs or organizations: Not on file     Relationship status: Not on file   Other Topics Concern    Not on file   Social History Narrative    Not on file       ROS:  HEENT: Blurred vision  CV: No chest pain  Pulm: No SOB  Please see my last exam note for additional ROS details    PE:  Vitals:    01/10/20 1521   BP: 135/84   Pulse: 74     HEENT: Cataract  CV: N S1 and S2 no murmurs  Pulm: CTAB wheezes, rales, or ronchi  Abd:  soft nabs NTND no masses  Extremeties: No edema    A/P  1. Cataract - Indications, risks and alternatives to the procedure were explained to the patient. The patient was given the opportunity to ask questions and consented to the procedure in writing.  Proceed with cataract surgery as scheduled.  2. Other health issues - patient has been cleared by their PCP. See last note for details.

## 2020-01-14 ENCOUNTER — OFFICE VISIT (OUTPATIENT)
Dept: RHEUMATOLOGY | Facility: CLINIC | Age: 80
End: 2020-01-14
Payer: MEDICARE

## 2020-01-14 VITALS
BODY MASS INDEX: 25.85 KG/M2 | HEIGHT: 70 IN | SYSTOLIC BLOOD PRESSURE: 115 MMHG | HEART RATE: 65 BPM | DIASTOLIC BLOOD PRESSURE: 76 MMHG | WEIGHT: 180.56 LBS

## 2020-01-14 DIAGNOSIS — M19.011 PRIMARY OSTEOARTHRITIS OF SHOULDERS, BILATERAL: ICD-10-CM

## 2020-01-14 DIAGNOSIS — M16.0 PRIMARY OSTEOARTHRITIS OF BOTH HIPS: Primary | ICD-10-CM

## 2020-01-14 DIAGNOSIS — M19.012 PRIMARY OSTEOARTHRITIS OF SHOULDERS, BILATERAL: ICD-10-CM

## 2020-01-14 PROCEDURE — 1101F PT FALLS ASSESS-DOCD LE1/YR: CPT | Mod: HCNC,CPTII,S$GLB, | Performed by: INTERNAL MEDICINE

## 2020-01-14 PROCEDURE — 99214 PR OFFICE/OUTPT VISIT, EST, LEVL IV, 30-39 MIN: ICD-10-PCS | Mod: HCNC,S$GLB,, | Performed by: INTERNAL MEDICINE

## 2020-01-14 PROCEDURE — 1159F PR MEDICATION LIST DOCUMENTED IN MEDICAL RECORD: ICD-10-PCS | Mod: HCNC,S$GLB,, | Performed by: INTERNAL MEDICINE

## 2020-01-14 PROCEDURE — 1101F PR PT FALLS ASSESS DOC 0-1 FALLS W/OUT INJ PAST YR: ICD-10-PCS | Mod: HCNC,CPTII,S$GLB, | Performed by: INTERNAL MEDICINE

## 2020-01-14 PROCEDURE — 99214 OFFICE O/P EST MOD 30 MIN: CPT | Mod: HCNC,S$GLB,, | Performed by: INTERNAL MEDICINE

## 2020-01-14 PROCEDURE — 99999 PR PBB SHADOW E&M-EST. PATIENT-LVL III: CPT | Mod: PBBFAC,HCNC,, | Performed by: INTERNAL MEDICINE

## 2020-01-14 PROCEDURE — 99999 PR PBB SHADOW E&M-EST. PATIENT-LVL III: ICD-10-PCS | Mod: PBBFAC,HCNC,, | Performed by: INTERNAL MEDICINE

## 2020-01-14 PROCEDURE — 1159F MED LIST DOCD IN RCRD: CPT | Mod: HCNC,S$GLB,, | Performed by: INTERNAL MEDICINE

## 2020-01-14 PROCEDURE — 1126F PR PAIN SEVERITY QUANTIFIED, NO PAIN PRESENT: ICD-10-PCS | Mod: HCNC,S$GLB,, | Performed by: INTERNAL MEDICINE

## 2020-01-14 PROCEDURE — 1126F AMNT PAIN NOTED NONE PRSNT: CPT | Mod: HCNC,S$GLB,, | Performed by: INTERNAL MEDICINE

## 2020-01-14 ASSESSMENT — ROUTINE ASSESSMENT OF PATIENT INDEX DATA (RAPID3)
PSYCHOLOGICAL DISTRESS SCORE: 1.1
PATIENT GLOBAL ASSESSMENT SCORE: 1.5
MDHAQ FUNCTION SCORE: .4
PAIN SCORE: 1.5
FATIGUE SCORE: 1.1
TOTAL RAPID3 SCORE: 1.44

## 2020-01-14 NOTE — PROGRESS NOTES
Subjective:          Chief Complaint: Cedric Gupta Jr. is a 79 y.o. male who had concerns including Disease Management.    HPI:    Patient notes this started 11/2018 overnight. With shoulder and hip pains and elevated ESR/CRP.   Has used various therapies: with PT/massage and shoulder injections with limited relief. With Ortho.     He does continue with pain in the shoulders that has continued to be persistent. With certain positions his shoulder is painful.   Rates pain at 3/10 now at its worst was 8/10.   Does seem to worsen with continued use, not present at rest.   No HA, no blurring of vision.   Patient denies weight loss, rashes, dry eye, dry mouth, nasal or palatal ulcerations,  lymphadenopathy, Raynaud's, hx of DVT/miscarriages, psoriasis or family hx of psoriasis, rashes, serositis, anemia or other constitutional symptoms.      Component      Latest Ref Rng & Units 3/26/2019 1/30/2019   Pathologist Interpretation CELIA        REVIEWED   PEYMAN Screen      Negative <1:160 Negative <1:160    CRP      0.0 - 8.2 mg/L 9.6 (H)    CPK      20 - 200 U/L 107    Sed Rate      0 - 10 mm/Hr 17 (H)    Rheumatoid Factor      0.0 - 15.0 IU/mL <10.0      REVIEW OF SYSTEMS:    Review of Systems   Constitutional: Negative for fever, malaise/fatigue and weight loss.   HENT: Negative for sore throat.    Eyes: Negative for double vision, photophobia and redness.   Respiratory: Negative for cough, shortness of breath and wheezing.    Cardiovascular: Negative for chest pain, palpitations and orthopnea.   Gastrointestinal: Negative for abdominal pain, constipation and diarrhea.   Genitourinary: Negative for dysuria, hematuria and urgency.   Musculoskeletal: Negative for back pain, joint pain and myalgias.   Skin: Negative for rash.   Neurological: Negative for dizziness, tingling, focal weakness and headaches.   Endo/Heme/Allergies: Does not bruise/bleed easily.   Psychiatric/Behavioral: Negative for depression, hallucinations and  suicidal ideas.               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    Macular degeneration     dry -- OU    Osteoarthritis     S/P colonoscopy     8/15;      Family History   Problem Relation Age of Onset    Cataracts Mother     Glaucoma Mother     Lupus Mother     Cancer Father         kidney and lung cancer ([employer] worker)    Diabetes Maternal Aunt     Diabetes Maternal Uncle     No Known Problems Sister     No Known Problems Daughter     Cancer Sister         breast cancer    Melanoma Neg Hx     Psoriasis Neg Hx     Eczema Neg Hx     Amblyopia Neg Hx     Hypertension Neg Hx     Macular degeneration Neg Hx     Retinal detachment Neg Hx     Stroke Neg Hx     Strabismus Neg Hx     Thyroid disease Neg Hx     Blindness Neg Hx      Social History     Tobacco Use    Smoking status: Former Smoker     Years: 0.50     Last attempt to quit: 1965     Years since quittin.4    Smokeless tobacco: Never Used   Substance Use Topics    Alcohol use: Yes     Alcohol/week: 2.0 - 4.0 standard drinks     Types: 2 - 4 Cans of beer per week     Comment: twice a week    Drug use: Yes     Frequency: 7.0 times per week     Types: Marijuana     Comment: marijuana         Current Outpatient Medications on File Prior to Visit   Medication Sig Dispense Refill    amoxicillin (AMOXIL) 500 MG capsule       diclofenac (VOLTAREN) 0.1 % ophthalmic solution Place 1 drop into the right eye 4 (four) times daily. Start drops 3 days before surgery 5 mL 3    HYDROcodone-acetaminophen  (NORCO)  mg per tablet       meloxicam (MOBIC) 15 MG tablet Take 1 tablet (15 mg total) by mouth once daily. 30 tablet 11    moxifloxacin (VIGAMOX) 0.5 % ophthalmic solution Place 1 drop into the right eye 4 (four) times daily. for 7 days 3 mL 1    prednisoLONE acetate (PRED FORTE) 1 % DrpS Place 1 drop into the right eye 4 (four) times daily. 5 mL 3     No current facility-administered medications on file prior to visit.        Vitals:    01/14/20 1350   BP: 115/76   Pulse: 65       Physical Exam:    Physical Exam   Constitutional: He is oriented to person, place, and time. He appears well-developed and well-nourished.   HENT:   Head: Normocephalic.   Right Ear: Hearing normal.   Left Ear: Hearing normal.   Nose: No rhinorrhea.   Mouth/Throat: Uvula is midline, oropharynx is clear and moist and mucous membranes are normal.   Eyes: Pupils are equal, round, and reactive to light. Conjunctivae and EOM are normal.   Cardiovascular: Normal rate, regular rhythm and normal heart sounds.   Pulmonary/Chest: Effort normal and breath sounds normal.   Musculoskeletal:        Right shoulder: He exhibits normal range of motion, no tenderness and no swelling.        Left shoulder: He exhibits normal range of motion, no tenderness and no swelling.        Right wrist: He exhibits normal range of motion, no tenderness and no swelling.        Left wrist: He exhibits normal range of motion, no tenderness and no swelling.        Right knee: He exhibits normal range of motion and no swelling. No tenderness found.        Left knee: He exhibits normal range of motion and no swelling. No tenderness found.        Right ankle: He exhibits normal range of motion and no swelling. No tenderness.        Left ankle: He exhibits normal range of motion and no swelling. No tenderness.        Right hand: He exhibits normal range of motion, no tenderness and no swelling.        Left hand: He exhibits normal range of motion, no tenderness and  no swelling.        Right foot: There is normal range of motion, no tenderness and no swelling.        Left foot: There is normal range of motion, no tenderness and no swelling.   Neurological: He is oriented to person, place, and time. He has normal strength.   Skin: Skin is warm, dry and intact.   Psychiatric: He has a normal mood and affect. His speech is normal and behavior is normal. Cognition and memory are normal.             Assessment:       Encounter Diagnoses   Name Primary?    Primary osteoarthritis of both hips Yes    Primary osteoarthritis of shoulders, bilateral           Plan:        Primary osteoarthritis of both hips    Primary osteoarthritis of shoulders, bilateral         Patient noted the hips resolved spontanously -suspect bursitis  Did return few months ago seen with Dr. Cruz, offered Mobic and I agree with persistent pain use Mobic once daily about 1-2 weeks then take drug holiday PRN.   Shoulder are painful only with certain motion, but able to still use shoulder with full ROM   Prefers not to take medications if able.   Reviewed shoulder exercises, trial with omega 3 FA, glucosamine chondroitin sulfate.   He did have injections with very little relief.   Imaging with mild OA  Recommend trial with exercises and if not improving can consider MRI  No evidence for PMR>   Discussed natural progression of OA and management strategies including exercises, trial with Motrin and Tylenol for now over the counter      Follow up if symptoms worsen or fail to improve.      30min consultation with greater than 50% spent in counseling, chart review and coordination of care. All questions answered.

## 2020-01-20 ENCOUNTER — ANESTHESIA EVENT (OUTPATIENT)
Dept: SURGERY | Facility: HOSPITAL | Age: 80
End: 2020-01-20
Payer: MEDICARE

## 2020-01-23 ENCOUNTER — HOSPITAL ENCOUNTER (OUTPATIENT)
Facility: HOSPITAL | Age: 80
Discharge: HOME | End: 2020-01-23
Attending: OPHTHALMOLOGY | Admitting: OPHTHALMOLOGY
Payer: MEDICARE

## 2020-01-23 ENCOUNTER — ANESTHESIA (OUTPATIENT)
Dept: SURGERY | Facility: HOSPITAL | Age: 80
End: 2020-01-23
Payer: MEDICARE

## 2020-01-23 VITALS
HEART RATE: 57 BPM | RESPIRATION RATE: 16 BRPM | HEIGHT: 70 IN | OXYGEN SATURATION: 99 % | SYSTOLIC BLOOD PRESSURE: 117 MMHG | TEMPERATURE: 98 F | BODY MASS INDEX: 25.77 KG/M2 | WEIGHT: 180 LBS | DIASTOLIC BLOOD PRESSURE: 70 MMHG

## 2020-01-23 DIAGNOSIS — H25.11 AGE-RELATED NUCLEAR CATARACT OF RIGHT EYE: Primary | ICD-10-CM

## 2020-01-23 PROCEDURE — 25000003 PHARM REV CODE 250: Mod: HCNC,PO | Performed by: OPHTHALMOLOGY

## 2020-01-23 PROCEDURE — 25000003 PHARM REV CODE 250: Mod: HCNC,PO | Performed by: ANESTHESIOLOGY

## 2020-01-23 PROCEDURE — 71000033 HC RECOVERY, INTIAL HOUR: Mod: HCNC,PO | Performed by: OPHTHALMOLOGY

## 2020-01-23 PROCEDURE — 37000008 HC ANESTHESIA 1ST 15 MINUTES: Mod: HCNC,PO | Performed by: OPHTHALMOLOGY

## 2020-01-23 PROCEDURE — 25000003 PHARM REV CODE 250: Mod: HCNC,PO | Performed by: NURSE ANESTHETIST, CERTIFIED REGISTERED

## 2020-01-23 PROCEDURE — 63600175 PHARM REV CODE 636 W HCPCS: Mod: HCNC,PO | Performed by: NURSE ANESTHETIST, CERTIFIED REGISTERED

## 2020-01-23 PROCEDURE — 36000706: Mod: HCNC,PO | Performed by: OPHTHALMOLOGY

## 2020-01-23 PROCEDURE — 63600175 PHARM REV CODE 636 W HCPCS: Mod: HCNC,PO | Performed by: OPHTHALMOLOGY

## 2020-01-23 PROCEDURE — 66984 XCAPSL CTRC RMVL W/O ECP: CPT | Mod: HCNC,RT,, | Performed by: OPHTHALMOLOGY

## 2020-01-23 PROCEDURE — D9220A PRA ANESTHESIA: Mod: HCNC,CRNA,, | Performed by: NURSE ANESTHETIST, CERTIFIED REGISTERED

## 2020-01-23 PROCEDURE — 36000707: Mod: HCNC,PO | Performed by: OPHTHALMOLOGY

## 2020-01-23 PROCEDURE — 63600175 PHARM REV CODE 636 W HCPCS: Mod: HCNC,PO | Performed by: ANESTHESIOLOGY

## 2020-01-23 PROCEDURE — 71000015 HC POSTOP RECOV 1ST HR: Mod: HCNC,PO | Performed by: OPHTHALMOLOGY

## 2020-01-23 PROCEDURE — 66984 PR REMOVAL, CATARACT, W/INSRT INTRAOC LENS, W/O ENDO CYCLO: ICD-10-PCS | Mod: HCNC,RT,, | Performed by: OPHTHALMOLOGY

## 2020-01-23 PROCEDURE — 37000009 HC ANESTHESIA EA ADD 15 MINS: Mod: HCNC,PO | Performed by: OPHTHALMOLOGY

## 2020-01-23 PROCEDURE — D9220A PRA ANESTHESIA: ICD-10-PCS | Mod: HCNC,ANES,, | Performed by: ANESTHESIOLOGY

## 2020-01-23 PROCEDURE — D9220A PRA ANESTHESIA: ICD-10-PCS | Mod: HCNC,CRNA,, | Performed by: NURSE ANESTHETIST, CERTIFIED REGISTERED

## 2020-01-23 PROCEDURE — D9220A PRA ANESTHESIA: Mod: HCNC,ANES,, | Performed by: ANESTHESIOLOGY

## 2020-01-23 PROCEDURE — 25000003 PHARM REV CODE 250: Mod: HCNC,PO

## 2020-01-23 PROCEDURE — V2632 POST CHMBR INTRAOCULAR LENS: HCPCS | Mod: HCNC,PO | Performed by: OPHTHALMOLOGY

## 2020-01-23 DEVICE — LENS 18.5: Type: IMPLANTABLE DEVICE | Site: EYE | Status: FUNCTIONAL

## 2020-01-23 RX ORDER — LIDOCAINE HYDROCHLORIDE 40 MG/ML
INJECTION, SOLUTION RETROBULBAR
Status: DISCONTINUED | OUTPATIENT
Start: 2020-01-23 | End: 2020-01-23

## 2020-01-23 RX ORDER — EPINEPHRINE 1 MG/ML
INJECTION INTRAMUSCULAR; INTRAVENOUS; SUBCUTANEOUS
Status: DISCONTINUED | OUTPATIENT
Start: 2020-01-23 | End: 2020-01-23 | Stop reason: HOSPADM

## 2020-01-23 RX ORDER — LIDOCAINE HYDROCHLORIDE 10 MG/ML
1 INJECTION, SOLUTION EPIDURAL; INFILTRATION; INTRACAUDAL; PERINEURAL ONCE
Status: COMPLETED | OUTPATIENT
Start: 2020-01-23 | End: 2020-01-23

## 2020-01-23 RX ORDER — PHENYLEPHRINE HYDROCHLORIDE 25 MG/ML
1 SOLUTION/ DROPS OPHTHALMIC
Status: COMPLETED | OUTPATIENT
Start: 2020-01-23 | End: 2020-01-23

## 2020-01-23 RX ORDER — OXYCODONE HYDROCHLORIDE 5 MG/1
5 TABLET ORAL
Status: DISCONTINUED | OUTPATIENT
Start: 2020-01-23 | End: 2020-01-23 | Stop reason: HOSPADM

## 2020-01-23 RX ORDER — LIDOCAINE HYDROCHLORIDE 10 MG/ML
INJECTION INFILTRATION; PERINEURAL
Status: DISCONTINUED | OUTPATIENT
Start: 2020-01-23 | End: 2020-01-23 | Stop reason: HOSPADM

## 2020-01-23 RX ORDER — FENTANYL CITRATE 50 UG/ML
25 INJECTION, SOLUTION INTRAMUSCULAR; INTRAVENOUS EVERY 5 MIN PRN
Status: DISCONTINUED | OUTPATIENT
Start: 2020-01-23 | End: 2020-01-23 | Stop reason: HOSPADM

## 2020-01-23 RX ORDER — FENTANYL CITRATE 50 UG/ML
INJECTION, SOLUTION INTRAMUSCULAR; INTRAVENOUS
Status: DISCONTINUED | OUTPATIENT
Start: 2020-01-23 | End: 2020-01-23

## 2020-01-23 RX ORDER — PROPARACAINE HYDROCHLORIDE 5 MG/ML
1 SOLUTION/ DROPS OPHTHALMIC
Status: COMPLETED | OUTPATIENT
Start: 2020-01-23 | End: 2020-01-23

## 2020-01-23 RX ORDER — MIDAZOLAM HYDROCHLORIDE 1 MG/ML
INJECTION, SOLUTION INTRAMUSCULAR; INTRAVENOUS
Status: DISCONTINUED | OUTPATIENT
Start: 2020-01-23 | End: 2020-01-23

## 2020-01-23 RX ORDER — LIDOCAINE HYDROCHLORIDE 40 MG/ML
INJECTION, SOLUTION RETROBULBAR
Status: DISCONTINUED | OUTPATIENT
Start: 2020-01-23 | End: 2020-01-23 | Stop reason: HOSPADM

## 2020-01-23 RX ORDER — PHENYLEPHRINE HYDROCHLORIDE 100 MG/ML
1 SOLUTION/ DROPS OPHTHALMIC
Status: COMPLETED | OUTPATIENT
Start: 2020-01-23 | End: 2020-01-23

## 2020-01-23 RX ORDER — TROPICAMIDE 10 MG/ML
1 SOLUTION/ DROPS OPHTHALMIC
Status: COMPLETED | OUTPATIENT
Start: 2020-01-23 | End: 2020-01-23

## 2020-01-23 RX ORDER — OFLOXACIN 3 MG/ML
SOLUTION/ DROPS OPHTHALMIC
Status: DISCONTINUED | OUTPATIENT
Start: 2020-01-23 | End: 2020-01-23 | Stop reason: HOSPADM

## 2020-01-23 RX ORDER — ONDANSETRON 2 MG/ML
INJECTION INTRAMUSCULAR; INTRAVENOUS
Status: DISCONTINUED | OUTPATIENT
Start: 2020-01-23 | End: 2020-01-23

## 2020-01-23 RX ORDER — SODIUM CHLORIDE, SODIUM LACTATE, POTASSIUM CHLORIDE, CALCIUM CHLORIDE 600; 310; 30; 20 MG/100ML; MG/100ML; MG/100ML; MG/100ML
INJECTION, SOLUTION INTRAVENOUS CONTINUOUS
Status: DISCONTINUED | OUTPATIENT
Start: 2020-01-23 | End: 2020-01-23 | Stop reason: HOSPADM

## 2020-01-23 RX ADMIN — TROPICAMIDE 1 DROP: 10 SOLUTION/ DROPS OPHTHALMIC at 07:01

## 2020-01-23 RX ADMIN — PHENYLEPHRINE HYDROCHLORIDE 1 DROP: 25 SOLUTION/ DROPS OPHTHALMIC at 07:01

## 2020-01-23 RX ADMIN — PROPARACAINE HYDROCHLORIDE 1 DROP: 5 SOLUTION/ DROPS OPHTHALMIC at 07:01

## 2020-01-23 RX ADMIN — MIDAZOLAM HYDROCHLORIDE 1 MG: 1 INJECTION, SOLUTION INTRAMUSCULAR; INTRAVENOUS at 08:01

## 2020-01-23 RX ADMIN — LIDOCAINE HYDROCHLORIDE 0.5 ML: 40 SOLUTION RETROBULBAR; TOPICAL at 08:01

## 2020-01-23 RX ADMIN — FENTANYL CITRATE 25 MCG: 50 INJECTION, SOLUTION INTRAMUSCULAR; INTRAVENOUS at 08:01

## 2020-01-23 RX ADMIN — ONDANSETRON 4 MG: 2 INJECTION, SOLUTION INTRAMUSCULAR; INTRAVENOUS at 08:01

## 2020-01-23 RX ADMIN — SODIUM CHLORIDE, SODIUM LACTATE, POTASSIUM CHLORIDE, AND CALCIUM CHLORIDE: .6; .31; .03; .02 INJECTION, SOLUTION INTRAVENOUS at 07:01

## 2020-01-23 RX ADMIN — PHENYLEPHRINE HYDROCHLORIDE 1 DROP: 100 SOLUTION/ DROPS OPHTHALMIC at 07:01

## 2020-01-23 RX ADMIN — LIDOCAINE HYDROCHLORIDE: 10 INJECTION, SOLUTION EPIDURAL; INFILTRATION; INTRACAUDAL; PERINEURAL at 07:01

## 2020-01-23 NOTE — BRIEF OP NOTE
Operative Note     SUMMARY     Surgery Date: 1/23/2020     Surgeon(s) and Role:     * Xu Bone Jr., MD - Primary    Pre-op Diagnosis:  Age-related nuclear cataract of right eye [H25.11]    Post-op Diagnosis:  Age-related nuclear cataract of right eye [H25.11]    Procedure(s) (LRB):  PHACOEMULSIFICATION, CATARACT (Right)    Anesthesia: Monitor Anesthesia Care    Findings/Key Components:  Same as post op diagnosis    Estimated Blood Loss: * No values recorded between 1/23/2020  8:17 AM and 1/23/2020  8:50 AM *         Specimens (From admission, onward)    None            Discharge Note      SUMMARY     Admit Date: 1/23/2020    Attending Physician: Xu Bone Jr., MD     Discharge Physician: Xu Bone Jr., MD    Discharge Date: 1/23/2020     Final Diagnosis: Age-related nuclear cataract of right eye [H25.11]    Hospital Course: The patient was admitted for outpatient surgery and tolerated the procedure well. The patient was discharged home in stable condition the same day.    Diet: Advance as tolerated    Follow-Up: Follow up with Dr. Bone, next day, as previously scheduled  Activity as tolerated.      Activity: Per Handout Instructions    Disposition: Home or self care    Patient Instructions:   Current Discharge Medication List      CONTINUE these medications which have NOT CHANGED    Details   amoxicillin (AMOXIL) 500 MG capsule       diclofenac (VOLTAREN) 0.1 % ophthalmic solution Place 1 drop into the right eye 4 (four) times daily. Start drops 3 days before surgery  Qty: 5 mL, Refills: 3    Associated Diagnoses: Age-related nuclear cataract of right eye      meloxicam (MOBIC) 15 MG tablet Take 1 tablet (15 mg total) by mouth once daily.  Qty: 30 tablet, Refills: 11      moxifloxacin (VIGAMOX) 0.5 % ophthalmic solution Place 1 drop into the right eye 4 (four) times daily. for 7 days  Qty: 3 mL, Refills: 1    Associated Diagnoses: Age-related nuclear cataract of right eye      prednisoLONE  acetate (PRED FORTE) 1 % DrpS Place 1 drop into the right eye 4 (four) times daily.  Qty: 5 mL, Refills: 3    Associated Diagnoses: Age-related nuclear cataract of right eye      HYDROcodone-acetaminophen (NORCO)  mg per tablet     Comments: Quantity prescribed more than 7 day supply? Press F2 and select one:[id number]               Discharge Procedure Orders (must include Diet, Follow-up, Activity)   Discharge Procedure Orders (must include Diet, Follow-up, Activity)   Diet general     Lifting restrictions     Call MD for:  persistent nausea and vomiting     Call MD for:  severe uncontrolled pain     Leave dressing on - Keep it clean, dry, and intact until clinic visit     Activity as tolerated

## 2020-01-23 NOTE — OP NOTE
Date of surgery: 1/23/2020    Operative Report    Preoperative Diagnosis: Nuclear sclerosis, right eye    Postoperative Diagnosis: Nuclear sclerosis, right eye    Procedure: Phacoemulsification with posterior chamber intraocular lens, right eye    Surgeon: Xu Bone Jr. M.D.    Anesthesia: MAC with topical     Brief Preoperative Note:  The patient was seen in the office with cataract of the right eye.  Because of the impairment of the patient's reading, driving, ambulation, and other activities of daily living needing a good quality of vision, the patient elected to remove the cataract and place a acrylic lens implant, if possible    Procedure in detail:    Informed consent was obtained. Indications, risks and alternatives to the procedure were explained to the patient. The patient was given the opportunity to ask questions and consented to the procedure in writing. In the preoperative holding area, the patients identity and operative site were confirmed.  Topical anesthetic was administered, consisting of alternating 0.5% Proparacaine and 4% preservative-free Lidocaine Q 5 minutes times 3.  The patient was taken to the operative suite.  A time-out was performed.  The right eye was prepped with 5% Betadine and draped in sterile fashion.  A lid speculum was placed in the right eye.  The temporal clear corneal incision was developed using a LRI fabián knife and crescent blade for a 3 plane incision.  A paracentesis was done with a 1mm fabián blade.  Before instillation of the Viscoat, approximately 0.1 to 0.3cc of diluted preservative free intracameral lidocaine was instilled.  Viscoat was injected into the anterior chamber with a 27-gauge needle.  A 2.4mm fabián blade was used to make a temporal clear corneal incision.  Afterwards, a cystotome was used to perform a continuous curvilinear capsulorrhexis with the assistance of utrata forceps.  Hydrodissection was performed using balanced salt  solution,injected under the anterior capsule using a kline cannula and hydrodelineation was performed using a blunt-tipped cannula.  Next, phacoemulsification performed in a divide and conquer fashion with the use of a nucleus rotator to turn the lens and protect the posterior capsule.  Cortical material was then removed using irrigation and aspiration.  Healon was then injected into the anterior chamber and into capsular bag to inflate the bag for the placement of the lens implant  and then an Edin SN60WF 18.5 D lens was injected into the capsular bag and rotated in position with the assistance of a Sinsky hook.  Irrigation and aspiration were used to remove the remaining viscoelastic. Next, Miochol was injected into the anterior chamber and the pupil was allowed to constrict in a symmetrical fashion.  Wounds closed with BSS.  A collagen shield was placed into the eye and the eye was covered with a Unger shield.  The patient tolerated the procedure well and was transferred to the recovery area in good condition.  Estimated blood loss:  none  Specimens:   none  Complications:  none  Disposition:   stable to PACU

## 2020-01-23 NOTE — DISCHARGE INSTRUCTIONS
Eye Care     1. No bending or lifting.  2. Sit upright in bed or in recliner.  3. Do not remove shield.  4. Do not use drops in surgical eye.   5. If lens falls out of eye, do not attempt to replace it.  6. Follow up tomorrow in clinic.  7. Bring black bag with drops to clinic.      Recovery After Procedural Sedation (Adult)  You have been given medicine by vein to make you sleep during your surgery. This may have included both a pain medicine and sleeping medicine. Most of the effects have worn off. But you may still have some drowsiness for the next 6 to 8 hours.  Home care  Follow these guidelines when you get home:  · For the next 8 hours, you should be watched by a responsible adult. This person should make sure your condition is not getting worse.  · Don't drink any alcohol for the next 24 hours.  · Don't drive, operate dangerous machinery, or make important business or personal decisions during the next 24 hours.  Note: Your healthcare provider may tell you not to take any medicine by mouth for pain or sleep in the next 4 hours. These medicines may react with the medicines you were given in the hospital. This could cause a much stronger response than usual.  Follow-up care  Follow up with your healthcare provider if you are not alert and back to your usual level of activity within 12 hours.  When to seek medical advice  Call your healthcare provider right away if any of these occur:  · Drowsiness gets worse  · Weakness or dizziness gets worse  · Repeated vomiting  · You can't be awakened   Date Last Reviewed: 10/18/2016  © 4187-9546 [employer]. [address] [place] [address], [place] [address]. All rights reserved. This information is not intended as a substitute for professional medical care. Always follow your healthcare professional's instructions.

## 2020-01-23 NOTE — ANESTHESIA POSTPROCEDURE EVALUATION
Anesthesia Post Evaluation    Patient: Cedric Gupta Jr.    Procedure(s) Performed: Procedure(s) (LRB):  PHACOEMULSIFICATION, CATARACT (Right)    Final Anesthesia Type: MAC    Patient location during evaluation: PACU  Patient participation: Yes- Able to Participate  Level of consciousness: awake and alert  Post-procedure vital signs: reviewed and stable  Pain management: adequate  Airway patency: patent    PONV status at discharge: No PONV  Anesthetic complications: no      Cardiovascular status: blood pressure returned to baseline  Respiratory status: spontaneous ventilation  Hydration status: euvolemic  Follow-up not needed.          Vitals Value Taken Time   /75 1/23/2020  9:00 AM   Temp 36.5 °C (97.7 °F) 1/23/2020  8:45 AM   Pulse 56 1/23/2020  9:00 AM   Resp 16 1/23/2020  9:00 AM   SpO2 98 % 1/23/2020  9:00 AM         No case tracking events are documented in the log.      Pain/Derek Score: Derek Score: 10 (1/23/2020  8:45 AM)

## 2020-01-23 NOTE — ANESTHESIA PREPROCEDURE EVALUATION
2020  Cedric Gupta Jr. is a 79 y.o., male.    Pre-op Assessment    I have reviewed the Patient Summary Reports.     I have reviewed the Nursing Notes.   I have reviewed the Medications.     Review of Systems  Social:  Former Smoker Smoking Status: Former Smoker  Quit Smokin65  Smokeless Tobacco Status: Never Used  Alcohol use: Yes; 1.2 oz per week  Drug use: Marijuana; Times per week: 7       Cardiovascular:  Cardiovascular Normal     Pulmonary:  Pulmonary Normal    Renal/:   BPH    Hepatic/GI:   Liver Disease,    Musculoskeletal:   Arthritis     Neurological:  Neurology Normal    Endocrine:  Endocrine Normal        Physical Exam  General:  Well nourished    Airway/Jaw/Neck:  Airway Findings: Mouth Opening: Normal Tongue: Normal  General Airway Assessment: Adult  Mallampati: III  Improves to II with phonation.            Mental Status:  Mental Status Findings:  Cooperative, Alert and Oriented         Anesthesia Plan  Type of Anesthesia, risks & benefits discussed:  Anesthesia Type:  MAC  Patient's Preference:   Intra-op Monitoring Plan: standard ASA monitors  Intra-op Monitoring Plan Comments:   Post Op Pain Control Plan:   Post Op Pain Control Plan Comments:   Induction:   IV  Beta Blocker:  Patient is not currently on a Beta-Blocker (No further documentation required).       Informed Consent: Patient understands risks and agrees with Anesthesia plan.  Questions answered. Anesthesia consent signed with patient.  ASA Score: 2     Day of Surgery Review of History & Physical: I have interviewed and examined the patient. I have reviewed the patient's H&P dated:  There are no significant changes.          Ready For Surgery From Anesthesia Perspective.

## 2020-01-23 NOTE — TRANSFER OF CARE
"Anesthesia Transfer of Care Note    Patient: Cedric Gupta Jr.    Procedure(s) Performed: Procedure(s) (LRB):  PHACOEMULSIFICATION, CATARACT (Right)    Patient location: PACU    Anesthesia Type: MAC    Transport from OR: Transported from OR on room air with adequate spontaneous ventilation    Post pain: adequate analgesia    Post assessment: no apparent anesthetic complications    Post vital signs: stable    Level of consciousness: responds to stimulation    Nausea/Vomiting: no nausea/vomiting    Complications: none    Transfer of care protocol was followed      Last vitals:   Visit Vitals  /76 (BP Location: Right arm, Patient Position: Sitting)   Pulse (!) 55   Temp 36.5 °C (97.7 °F) (Skin)   Resp 16   Ht 5' 10" (1.778 m)   Wt 81.6 kg (180 lb)   SpO2 99%   BMI 25.83 kg/m²     "

## 2020-01-24 ENCOUNTER — TELEPHONE (OUTPATIENT)
Dept: OPHTHALMOLOGY | Facility: CLINIC | Age: 80
End: 2020-01-24

## 2020-01-24 ENCOUNTER — OFFICE VISIT (OUTPATIENT)
Dept: OPHTHALMOLOGY | Facility: CLINIC | Age: 80
End: 2020-01-24
Payer: MEDICARE

## 2020-01-24 DIAGNOSIS — Z98.41 STATUS POST CATARACT EXTRACTION AND INSERTION OF INTRAOCULAR LENS OF RIGHT EYE: Primary | ICD-10-CM

## 2020-01-24 DIAGNOSIS — Z96.1 STATUS POST CATARACT EXTRACTION AND INSERTION OF INTRAOCULAR LENS OF RIGHT EYE: Primary | ICD-10-CM

## 2020-01-24 PROCEDURE — 99999 PR PBB SHADOW E&M-EST. PATIENT-LVL II: ICD-10-PCS | Mod: PBBFAC,HCNC,, | Performed by: OPHTHALMOLOGY

## 2020-01-24 PROCEDURE — 99999 PR PBB SHADOW E&M-EST. PATIENT-LVL II: CPT | Mod: PBBFAC,HCNC,, | Performed by: OPHTHALMOLOGY

## 2020-01-24 PROCEDURE — 99024 PR POST-OP FOLLOW-UP VISIT: ICD-10-PCS | Mod: HCNC,S$GLB,, | Performed by: OPHTHALMOLOGY

## 2020-01-24 PROCEDURE — 99024 POSTOP FOLLOW-UP VISIT: CPT | Mod: HCNC,S$GLB,, | Performed by: OPHTHALMOLOGY

## 2020-01-24 NOTE — PROGRESS NOTES
HPI     Post-op Evaluation      Additional comments: 1 d PO PHAco IOL OD d 1/23/2020//  tech instilled   drops this am into OD//  moxi, diclo, Pred//              Comments     1 d PO PHAco IOL OD d 1/23/2020//  tech instilled drops this am into OD//    moxi, diclo, Pred//    No flashes or floaters//          Last edited by Pamela Russell on 1/24/2020  8:08 AM. (History)            Assessment /Plan     For exam results, see Encounter Report.    Status post cataract extraction and insertion of intraocular lens of right eye      Prednisolone acetate (pink or white top drop) 1 drop 4x daily Right eye; shake well before using drop  Vigamox 1 drop 4x daily right eye (tan top)  Diclofenac 1 drop 4x daily right eye (gray top)  No bending no lifting  Keep head elevated when laying down  Keep dry   F/u 1 week

## 2020-01-28 ENCOUNTER — OFFICE VISIT (OUTPATIENT)
Dept: FAMILY MEDICINE | Facility: CLINIC | Age: 80
End: 2020-01-28
Payer: MEDICARE

## 2020-01-28 VITALS
HEART RATE: 78 BPM | BODY MASS INDEX: 26.04 KG/M2 | DIASTOLIC BLOOD PRESSURE: 70 MMHG | OXYGEN SATURATION: 97 % | HEIGHT: 70 IN | WEIGHT: 181.88 LBS | RESPIRATION RATE: 16 BRPM | TEMPERATURE: 99 F | SYSTOLIC BLOOD PRESSURE: 134 MMHG

## 2020-01-28 DIAGNOSIS — R01.1 HEART MURMUR: Primary | ICD-10-CM

## 2020-01-28 DIAGNOSIS — Z79.1 LONG TERM (CURRENT) USE OF NON-STEROIDAL ANTI-INFLAMMATORIES (NSAID): ICD-10-CM

## 2020-01-28 PROCEDURE — 1101F PR PT FALLS ASSESS DOC 0-1 FALLS W/OUT INJ PAST YR: ICD-10-PCS | Mod: CPTII,S$GLB,, | Performed by: FAMILY MEDICINE

## 2020-01-28 PROCEDURE — 1159F PR MEDICATION LIST DOCUMENTED IN MEDICAL RECORD: ICD-10-PCS | Mod: S$GLB,,, | Performed by: FAMILY MEDICINE

## 2020-01-28 PROCEDURE — 99213 PR OFFICE/OUTPT VISIT, EST, LEVL III, 20-29 MIN: ICD-10-PCS | Mod: S$GLB,,, | Performed by: FAMILY MEDICINE

## 2020-01-28 PROCEDURE — 99213 OFFICE O/P EST LOW 20 MIN: CPT | Mod: S$GLB,,, | Performed by: FAMILY MEDICINE

## 2020-01-28 PROCEDURE — 1101F PT FALLS ASSESS-DOCD LE1/YR: CPT | Mod: CPTII,S$GLB,, | Performed by: FAMILY MEDICINE

## 2020-01-28 PROCEDURE — 1159F MED LIST DOCD IN RCRD: CPT | Mod: S$GLB,,, | Performed by: FAMILY MEDICINE

## 2020-01-28 PROCEDURE — 1126F AMNT PAIN NOTED NONE PRSNT: CPT | Mod: S$GLB,,, | Performed by: FAMILY MEDICINE

## 2020-01-28 PROCEDURE — 1126F PR PAIN SEVERITY QUANTIFIED, NO PAIN PRESENT: ICD-10-PCS | Mod: S$GLB,,, | Performed by: FAMILY MEDICINE

## 2020-01-28 RX ORDER — MOXIFLOXACIN 5 MG/ML
1 SOLUTION/ DROPS OPHTHALMIC 4 TIMES DAILY
COMMUNITY
End: 2020-11-05

## 2020-01-28 NOTE — PATIENT INSTRUCTIONS
The CVD Risk score (ABHAYAgoino, et al., 2008) failed to calculate for the following reasons:    The 2008 CVD risk score is only valid for ages 30 to 74

## 2020-01-29 ENCOUNTER — OFFICE VISIT (OUTPATIENT)
Dept: OPHTHALMOLOGY | Facility: CLINIC | Age: 80
End: 2020-01-29
Payer: MEDICARE

## 2020-01-29 DIAGNOSIS — Z98.41 STATUS POST CATARACT EXTRACTION AND INSERTION OF INTRAOCULAR LENS OF RIGHT EYE: Primary | ICD-10-CM

## 2020-01-29 DIAGNOSIS — Z96.1 STATUS POST CATARACT EXTRACTION AND INSERTION OF INTRAOCULAR LENS OF RIGHT EYE: Primary | ICD-10-CM

## 2020-01-29 DIAGNOSIS — H25.12 AGE-RELATED NUCLEAR CATARACT OF LEFT EYE: ICD-10-CM

## 2020-01-29 DIAGNOSIS — H25.12 AGE-RELATED NUCLEAR CATARACT, LEFT EYE: ICD-10-CM

## 2020-01-29 PROCEDURE — 99999 PR PBB SHADOW E&M-EST. PATIENT-LVL III: CPT | Mod: PBBFAC,HCNC,, | Performed by: OPHTHALMOLOGY

## 2020-01-29 PROCEDURE — 99024 PR POST-OP FOLLOW-UP VISIT: ICD-10-PCS | Mod: HCNC,S$GLB,, | Performed by: OPHTHALMOLOGY

## 2020-01-29 PROCEDURE — 99999 PR PBB SHADOW E&M-EST. PATIENT-LVL III: ICD-10-PCS | Mod: PBBFAC,HCNC,, | Performed by: OPHTHALMOLOGY

## 2020-01-29 PROCEDURE — 92136 OPHTHALMIC BIOMETRY: CPT | Mod: 26,HCNC,LT,S$GLB | Performed by: OPHTHALMOLOGY

## 2020-01-29 PROCEDURE — 92136 IOL MASTER - OS - LEFT EYE: ICD-10-PCS | Mod: 26,HCNC,LT,S$GLB | Performed by: OPHTHALMOLOGY

## 2020-01-29 PROCEDURE — 99024 POSTOP FOLLOW-UP VISIT: CPT | Mod: HCNC,S$GLB,, | Performed by: OPHTHALMOLOGY

## 2020-01-29 PROCEDURE — 92025 COMPUTERIZED CORNEAL TOPOGRAPHY: ICD-10-PCS | Mod: HCNC,S$GLB,, | Performed by: OPHTHALMOLOGY

## 2020-01-29 PROCEDURE — 92025 CPTRIZED CORNEAL TOPOGRAPHY: CPT | Mod: HCNC,S$GLB,, | Performed by: OPHTHALMOLOGY

## 2020-01-29 RX ORDER — PHENYLEPHRINE HYDROCHLORIDE 25 MG/ML
1 SOLUTION/ DROPS OPHTHALMIC
Status: CANCELLED | OUTPATIENT
Start: 2020-01-29 | End: 2020-01-29

## 2020-01-29 RX ORDER — TROPICAMIDE 10 MG/ML
1 SOLUTION/ DROPS OPHTHALMIC
Status: CANCELLED | OUTPATIENT
Start: 2020-01-29 | End: 2020-01-29

## 2020-01-29 RX ORDER — PHENYLEPHRINE HYDROCHLORIDE 100 MG/ML
1 SOLUTION/ DROPS OPHTHALMIC
Status: CANCELLED | OUTPATIENT
Start: 2020-01-29 | End: 2020-01-29

## 2020-01-29 RX ORDER — PROPARACAINE HYDROCHLORIDE 5 MG/ML
1 SOLUTION/ DROPS OPHTHALMIC
Status: CANCELLED | OUTPATIENT
Start: 2020-01-29 | End: 2020-01-29

## 2020-01-29 NOTE — PROGRESS NOTES
HPI     Post-op Evaluation      Additional comments: 1 wk s/p phaco iol OD 1/23              Comments     Pt states vision still blurry. No pain or irritations.    Gtts: Prednisolone, Diclofenac, Moxifloxacin QID OD          Last edited by Cheryle Quintana on 1/29/2020  1:45 PM. (History)        ROS     Negative for: Constitutional, Gastrointestinal, Neurological, Skin,   Genitourinary, Musculoskeletal, HENT, Endocrine, Cardiovascular, Eyes,   Respiratory, Psychiatric, Allergic/Imm, Heme/Lymph    Last edited by Xu Bone Jr., MD on 1/29/2020  2:11 PM. (History)        Assessment /Plan     For exam results, see Encounter Report.    Status post cataract extraction and insertion of intraocular lens of right eye  Stop Moxifloxacin  Decrease PF and Diclofenac 1x daily right  Age-related nuclear cataract of left eye  -     Case Request Operating Room: PHACOEMULSIFICATION, CATARACT  -     Computerized corneal topography  -     IOL Master - OS - Left Eye    Age-related nuclear cataract, left eye  -schedule cataract surgery, left  -R&B benefits discussed with patient  -Risks of worse vision, loss of vision, infection, bleeding, re-surgery,and may need to have surgery done by a different physician was explained to the patient  -patient was also counseled on premium lens options, laser cataract, and astigmatic correction  -patient was also counseled that they may still need glasses after surgery to help improve their vision, especially the need for reading glasses for reading small print texts after surgery  -patient understood the risks involved with cataract surgery and wanted to move forward with surgery

## 2020-01-30 NOTE — PROGRESS NOTES
Subjective:       Patient ID: Cedric Gupta Jr. is a 79 y.o. male.    Chief Complaint: Follow-up    HPI   The patient is a 79-year-old who is here today for follow-up.  Overall, he is doing well except for some dental and cataract issues.  He has having dental implants placed and has had his right cataract removed and will be having his left cataract soon    He is not taking any oral medications.  He recently had labs which reviewed today.  He is due for Tdap and Shingrix and would be willing to have these done the pharmacy.  He is eating healthy diet.  He is not exercising regularly but has been talking to friends about starting to exercise soon.    Review of Systems   Constitutional: Negative for appetite change, chills, diaphoresis, fatigue, fever and unexpected weight change.   HENT: Positive for dental problem. Negative for congestion, ear pain, postnasal drip, rhinorrhea, sinus pressure, sneezing, sore throat and trouble swallowing.    Eyes: Negative for pain, discharge and visual disturbance.   Respiratory: Negative for cough, chest tightness, shortness of breath and wheezing.    Cardiovascular: Negative for chest pain, palpitations and leg swelling.   Gastrointestinal: Negative for abdominal distention, abdominal pain, blood in stool, constipation, diarrhea, nausea and vomiting.   Skin: Negative for rash.       Objective:      Physical Exam   Constitutional: He is oriented to person, place, and time. He appears well-developed and well-nourished. No distress.   HENT:   Head: Normocephalic and atraumatic.   Right Ear: Hearing, tympanic membrane, external ear and ear canal normal.   Left Ear: Hearing, tympanic membrane, external ear and ear canal normal.   Nose: Nose normal.   Mouth/Throat: Oropharynx is clear and moist and mucous membranes are normal. No oral lesions. No oropharyngeal exudate, posterior oropharyngeal edema or posterior oropharyngeal erythema.   Eyes: Pupils are equal, round, and reactive  "to light. Conjunctivae, EOM and lids are normal. No scleral icterus.   Neck: Normal range of motion. Neck supple. Carotid bruit is not present. No thyroid mass and no thyromegaly present.   Cardiovascular: Normal rate and regular rhythm.  No extrasystoles are present. PMI is not displaced. Exam reveals no gallop.   Murmur heard.   Systolic murmur is present with a grade of 3/6.  Pulmonary/Chest: Effort normal and breath sounds normal. No accessory muscle usage. No respiratory distress.   Clear to auscultation bilaterally.   Abdominal: Soft. Normal appearance and bowel sounds are normal. He exhibits no abdominal bruit. There is no hepatosplenomegaly. There is no tenderness. There is no rebound.   Lymphadenopathy:        Head (right side): No submental and no submandibular adenopathy present.        Head (left side): No submental and no submandibular adenopathy present.        Right cervical: No superficial cervical, no deep cervical and no posterior cervical adenopathy present.       Left cervical: No superficial cervical, no deep cervical and no posterior cervical adenopathy present.        Right: No supraclavicular adenopathy present.        Left: No supraclavicular adenopathy present.   Neurological: He is alert and oriented to person, place, and time.   Skin: Skin is warm, dry and intact.   Psychiatric: He has a normal mood and affect.     Blood pressure 134/70, pulse 78, temperature 98.6 °F (37 °C), temperature source Oral, resp. rate 16, height 5' 10" (1.778 m), weight 82.5 kg (181 lb 14.1 oz), SpO2 97 %.Body mass index is 26.1 kg/m².          A/P:  1) cataracts.  Follow up with Ophthalmology as planned  2) heart murmur.  New.  We will schedule him for an echo to evaluate this further  3)  health maintenance issues.  He will work on diet exercise and weight loss.  He will receive Tdap and Shingrix at the pharmacy.  We will recheck labs again in 1 year   3)  Elevated PSA.  Follow-up with Urology as planned in " April    As long as he does well, I will see him back in 1 year sooner if needed

## 2020-02-03 ENCOUNTER — TELEPHONE (OUTPATIENT)
Dept: OPHTHALMOLOGY | Facility: CLINIC | Age: 80
End: 2020-02-03

## 2020-02-05 ENCOUNTER — ANESTHESIA EVENT (OUTPATIENT)
Dept: SURGERY | Facility: HOSPITAL | Age: 80
End: 2020-02-05
Payer: MEDICARE

## 2020-02-06 ENCOUNTER — ANESTHESIA (OUTPATIENT)
Dept: SURGERY | Facility: HOSPITAL | Age: 80
End: 2020-02-06
Payer: MEDICARE

## 2020-02-06 ENCOUNTER — HOSPITAL ENCOUNTER (OUTPATIENT)
Facility: HOSPITAL | Age: 80
Discharge: HOME | End: 2020-02-06
Attending: OPHTHALMOLOGY | Admitting: OPHTHALMOLOGY
Payer: MEDICARE

## 2020-02-06 VITALS
RESPIRATION RATE: 19 BRPM | SYSTOLIC BLOOD PRESSURE: 133 MMHG | OXYGEN SATURATION: 96 % | HEART RATE: 59 BPM | TEMPERATURE: 98 F | WEIGHT: 180 LBS | DIASTOLIC BLOOD PRESSURE: 72 MMHG | BODY MASS INDEX: 25.83 KG/M2

## 2020-02-06 DIAGNOSIS — H25.12 AGE-RELATED NUCLEAR CATARACT OF LEFT EYE: ICD-10-CM

## 2020-02-06 DIAGNOSIS — H25.12 AGE-RELATED NUCLEAR CATARACT, LEFT EYE: ICD-10-CM

## 2020-02-06 PROCEDURE — 66984 PR REMOVAL, CATARACT, W/INSRT INTRAOC LENS, W/O ENDO CYCLO: ICD-10-PCS | Mod: HCNC,79,LT, | Performed by: OPHTHALMOLOGY

## 2020-02-06 PROCEDURE — D9220A PRA ANESTHESIA: Mod: HCNC,ANES,, | Performed by: ANESTHESIOLOGY

## 2020-02-06 PROCEDURE — 37000008 HC ANESTHESIA 1ST 15 MINUTES: Mod: HCNC,PO | Performed by: OPHTHALMOLOGY

## 2020-02-06 PROCEDURE — 71000015 HC POSTOP RECOV 1ST HR: Mod: HCNC,PO | Performed by: OPHTHALMOLOGY

## 2020-02-06 PROCEDURE — V2632 POST CHMBR INTRAOCULAR LENS: HCPCS | Mod: HCNC,PO | Performed by: OPHTHALMOLOGY

## 2020-02-06 PROCEDURE — 63600175 PHARM REV CODE 636 W HCPCS: Mod: HCNC,PO | Performed by: NURSE ANESTHETIST, CERTIFIED REGISTERED

## 2020-02-06 PROCEDURE — 63600175 PHARM REV CODE 636 W HCPCS: Mod: HCNC,PO | Performed by: ANESTHESIOLOGY

## 2020-02-06 PROCEDURE — D9220A PRA ANESTHESIA: ICD-10-PCS | Mod: HCNC,CRNA,, | Performed by: NURSE ANESTHETIST, CERTIFIED REGISTERED

## 2020-02-06 PROCEDURE — D9220A PRA ANESTHESIA: ICD-10-PCS | Mod: HCNC,ANES,, | Performed by: ANESTHESIOLOGY

## 2020-02-06 PROCEDURE — 66984 XCAPSL CTRC RMVL W/O ECP: CPT | Mod: HCNC,79,LT, | Performed by: OPHTHALMOLOGY

## 2020-02-06 PROCEDURE — 63600175 PHARM REV CODE 636 W HCPCS: Mod: HCNC,PO | Performed by: OPHTHALMOLOGY

## 2020-02-06 PROCEDURE — 25000003 PHARM REV CODE 250: Mod: HCNC,PO | Performed by: ANESTHESIOLOGY

## 2020-02-06 PROCEDURE — 25000003 PHARM REV CODE 250: Mod: HCNC,PO | Performed by: OPHTHALMOLOGY

## 2020-02-06 PROCEDURE — 25000003 PHARM REV CODE 250: Mod: HCNC,PO | Performed by: NURSE ANESTHETIST, CERTIFIED REGISTERED

## 2020-02-06 PROCEDURE — 25000003 PHARM REV CODE 250: Mod: HCNC,PO

## 2020-02-06 PROCEDURE — D9220A PRA ANESTHESIA: Mod: HCNC,CRNA,, | Performed by: NURSE ANESTHETIST, CERTIFIED REGISTERED

## 2020-02-06 PROCEDURE — 36000707: Mod: HCNC,PO | Performed by: OPHTHALMOLOGY

## 2020-02-06 PROCEDURE — 36000706: Mod: HCNC,PO | Performed by: OPHTHALMOLOGY

## 2020-02-06 PROCEDURE — 37000009 HC ANESTHESIA EA ADD 15 MINS: Mod: HCNC,PO | Performed by: OPHTHALMOLOGY

## 2020-02-06 DEVICE — LENS 18.5: Type: IMPLANTABLE DEVICE | Site: EYE | Status: FUNCTIONAL

## 2020-02-06 RX ORDER — PHENYLEPHRINE HYDROCHLORIDE 100 MG/ML
1 SOLUTION/ DROPS OPHTHALMIC
Status: COMPLETED | OUTPATIENT
Start: 2020-02-06 | End: 2020-02-06

## 2020-02-06 RX ORDER — LIDOCAINE HYDROCHLORIDE 10 MG/ML
INJECTION INFILTRATION; PERINEURAL
Status: DISCONTINUED | OUTPATIENT
Start: 2020-02-06 | End: 2020-02-06 | Stop reason: HOSPADM

## 2020-02-06 RX ORDER — OFLOXACIN 3 MG/ML
SOLUTION/ DROPS OPHTHALMIC
Status: DISCONTINUED | OUTPATIENT
Start: 2020-02-06 | End: 2020-02-06 | Stop reason: HOSPADM

## 2020-02-06 RX ORDER — PROPARACAINE HYDROCHLORIDE 5 MG/ML
1 SOLUTION/ DROPS OPHTHALMIC
Status: COMPLETED | OUTPATIENT
Start: 2020-02-06 | End: 2020-02-06

## 2020-02-06 RX ORDER — TROPICAMIDE 10 MG/ML
1 SOLUTION/ DROPS OPHTHALMIC
Status: COMPLETED | OUTPATIENT
Start: 2020-02-06 | End: 2020-02-06

## 2020-02-06 RX ORDER — EPINEPHRINE 1 MG/ML
INJECTION INTRAMUSCULAR; INTRAVENOUS; SUBCUTANEOUS
Status: DISCONTINUED | OUTPATIENT
Start: 2020-02-06 | End: 2020-02-06 | Stop reason: HOSPADM

## 2020-02-06 RX ORDER — LIDOCAINE HYDROCHLORIDE 10 MG/ML
1 INJECTION, SOLUTION EPIDURAL; INFILTRATION; INTRACAUDAL; PERINEURAL ONCE
Status: COMPLETED | OUTPATIENT
Start: 2020-02-06 | End: 2020-02-06

## 2020-02-06 RX ORDER — MIDAZOLAM HYDROCHLORIDE 1 MG/ML
INJECTION INTRAMUSCULAR; INTRAVENOUS
Status: DISCONTINUED | OUTPATIENT
Start: 2020-02-06 | End: 2020-02-06

## 2020-02-06 RX ORDER — LIDOCAINE HYDROCHLORIDE 40 MG/ML
INJECTION, SOLUTION RETROBULBAR
Status: DISCONTINUED | OUTPATIENT
Start: 2020-02-06 | End: 2020-02-06

## 2020-02-06 RX ORDER — SODIUM CHLORIDE, SODIUM LACTATE, POTASSIUM CHLORIDE, CALCIUM CHLORIDE 600; 310; 30; 20 MG/100ML; MG/100ML; MG/100ML; MG/100ML
INJECTION, SOLUTION INTRAVENOUS CONTINUOUS
Status: DISCONTINUED | OUTPATIENT
Start: 2020-02-06 | End: 2020-02-06 | Stop reason: HOSPADM

## 2020-02-06 RX ORDER — FENTANYL CITRATE 50 UG/ML
INJECTION, SOLUTION INTRAMUSCULAR; INTRAVENOUS
Status: DISCONTINUED | OUTPATIENT
Start: 2020-02-06 | End: 2020-02-06

## 2020-02-06 RX ORDER — ONDANSETRON HYDROCHLORIDE 2 MG/ML
INJECTION, SOLUTION INTRAMUSCULAR; INTRAVENOUS
Status: DISCONTINUED | OUTPATIENT
Start: 2020-02-06 | End: 2020-02-06

## 2020-02-06 RX ORDER — SODIUM CHLORIDE 0.9 % (FLUSH) 0.9 %
3 SYRINGE (ML) INJECTION
Status: DISCONTINUED | OUTPATIENT
Start: 2020-02-06 | End: 2020-02-06 | Stop reason: HOSPADM

## 2020-02-06 RX ORDER — PHENYLEPHRINE HYDROCHLORIDE 25 MG/ML
1 SOLUTION/ DROPS OPHTHALMIC
Status: COMPLETED | OUTPATIENT
Start: 2020-02-06 | End: 2020-02-06

## 2020-02-06 RX ADMIN — PHENYLEPHRINE HYDROCHLORIDE 1 DROP: 100 SOLUTION/ DROPS OPHTHALMIC at 06:02

## 2020-02-06 RX ADMIN — PROPARACAINE HYDROCHLORIDE 1 DROP: 5 SOLUTION/ DROPS OPHTHALMIC at 06:02

## 2020-02-06 RX ADMIN — PHENYLEPHRINE HYDROCHLORIDE 1 DROP: 25 SOLUTION/ DROPS OPHTHALMIC at 06:02

## 2020-02-06 RX ADMIN — LIDOCAINE HYDROCHLORIDE 0.25 ML: 40 SOLUTION RETROBULBAR; TOPICAL at 07:02

## 2020-02-06 RX ADMIN — ONDANSETRON 4 MG: 2 INJECTION, SOLUTION INTRAMUSCULAR; INTRAVENOUS at 07:02

## 2020-02-06 RX ADMIN — TROPICAMIDE 1 DROP: 10 SOLUTION/ DROPS OPHTHALMIC at 06:02

## 2020-02-06 RX ADMIN — SODIUM CHLORIDE, SODIUM LACTATE, POTASSIUM CHLORIDE, AND CALCIUM CHLORIDE: .6; .31; .03; .02 INJECTION, SOLUTION INTRAVENOUS at 06:02

## 2020-02-06 RX ADMIN — MIDAZOLAM HYDROCHLORIDE 1 MG: 1 INJECTION, SOLUTION INTRAMUSCULAR; INTRAVENOUS at 07:02

## 2020-02-06 RX ADMIN — FENTANYL CITRATE 25 MCG: 50 INJECTION, SOLUTION INTRAMUSCULAR; INTRAVENOUS at 07:02

## 2020-02-06 RX ADMIN — LIDOCAINE HYDROCHLORIDE 1 MG: 10 INJECTION, SOLUTION EPIDURAL; INFILTRATION; INTRACAUDAL; PERINEURAL at 06:02

## 2020-02-06 NOTE — ANESTHESIA PREPROCEDURE EVALUATION
02/06/2020  Cedric Gupta Jr. is a 79 y.o., male.    Anesthesia Evaluation    I have reviewed the Patient Summary Reports.    I have reviewed the Nursing Notes.   I have reviewed the Medications.     Review of Systems  Anesthesia Hx:  No problems with previous Anesthesia    Social:  Non-Smoker    Hematology/Oncology:         -- Cancer in past history (squamous cell ca, basal cell ca):   Cardiovascular:  Cardiovascular Normal     Pulmonary:  Pulmonary Normal    Renal/:   renal calculi    Hepatic/GI:   PUD, Liver Disease,    Musculoskeletal:   Arthritis     Neurological:  Neurology Normal    Endocrine:  Endocrine Normal        Physical Exam  General:  Well nourished    Airway/Jaw/Neck:  Airway Findings: Mouth Opening: Normal Tongue: Normal  General Airway Assessment: Adult  Oropharynx Findings:  Mallampati: II  Jaw/Neck Findings:  Neck ROM: Normal ROM     Eyes/Ears/Nose:  Eyes/Ears/Nose Findings:    Dental:  Dental Findings:   Chest/Lungs:  Chest/Lungs Findings: Normal Respiratory Rate     Heart/Vascular:  Heart Findings: Rate: Normal  Rhythm: Regular Rhythm        Mental Status:  Mental Status Findings:  Cooperative, Alert and Oriented         Anesthesia Plan  Type of Anesthesia, risks & benefits discussed:  Anesthesia Type:  general  Patient's Preference:   Intra-op Monitoring Plan: standard ASA monitors  Intra-op Monitoring Plan Comments:   Post Op Pain Control Plan: multimodal analgesia  Post Op Pain Control Plan Comments:   Induction:   IV  Beta Blocker:  Patient is not currently on a Beta-Blocker (No further documentation required).       Informed Consent: Patient understands risks and agrees with Anesthesia plan.  Questions answered. Anesthesia consent signed with patient.  ASA Score: 3     Day of Surgery Review of History & Physical:  There are no significant changes.   H&P completed by  Anesthesiologist.       Ready For Surgery From Anesthesia Perspective.

## 2020-02-06 NOTE — OP NOTE
Date of surgery: 2/6/2020    Operative Report    Preoperative Diagnosis: Nuclear sclerosis, left eye    Postoperative Diagnosis: Nuclear sclerosis, left eye    Procedure: Phacoemulsification with posterior chamber intraocular lens, left eye    Surgeon: Xu Bone Jr. M.D.    Anesthesia: MAC with topical     Brief Preoperative Note:  The patient was seen in the office with cataract of the left eye.  Because of the impairment of the patient's reading, driving, ambulation, and other activities of daily living needing a good quality of vision, the patient elected to remove the cataract and place a acrylic lens implant, if possible    Procedure in detail:    Informed consent was obtained. Indications, risks and alternatives to the procedure were explained to the patient. The patient was given the opportunity to ask questions and consented to the procedure in writing. In the preoperative holding area, the patients identity and operative site were confirmed.  Topical anesthetic was administered, consisting of alternating 0.5% Proparacaine and 4% preservative-free Lidocaine Q 5 minutes times 3.  The patient was taken to the operative suite.  A time-out was performed.  The left eye was prepped with 5% Betadine and draped in sterile fashion.  A lid speculum was placed in the left eye.  The temporal clear corneal incision was developed using a LRI fabián knife and crescent blade for a 3 plane incision.  A paracentesis was done with a 1mm fabián blade.  Before instillation of the Viscoat, approximately 0.1 to 0.3cc of diluted preservative free intracameral lidocaine was instilled.  Viscoat was injected into the anterior chamber with a 27-gauge needle.  A 2.4mm fabián blade was used to make a temporal clear corneal incision.  Afterwards, a cystotome was used to perform a continuous curvilinear capsulorrhexis with the assistance of utrata forceps.  Hydrodissection was performed using balanced salt solution,injected  under the anterior capsule using a kline cannula and hydrodelineation was performed using a blunt-tipped cannula.  Next, phacoemulsification performed in a divide and conquer fashion with the use of a nucleus rotator to turn the lens and protect the posterior capsule.  Cortical material was then removed using irrigation and aspiration.  Healon was then injected into the anterior chamber and into capsular bag to inflate the bag for the placement of the lens implant and then an Edin SN60WF 18.5 D lens was injected into the capsular bag and rotated in position with the assistance of a Sinsky hook.  Irrigation and aspiration were used to remove the remaining viscoelastic. Next, Miochol was injected into the anterior chamber and the pupil was allowed to constrict in a symmetrical fashion.  Wound hydrated with BSS. A collagen shield was placed into the eye and the eye was covered with a Unger shield.  The patient tolerated the procedure well and was transferred to the recovery area in good condition.  Estimated blood loss:  none  Specimens:   none  Complications:  none  Disposition:   stable to PACU

## 2020-02-06 NOTE — ANESTHESIA POSTPROCEDURE EVALUATION
Anesthesia Post Evaluation    Patient: Cedric Gupta Jr.    Procedure(s) Performed: Procedure(s) (LRB):  PHACOEMULSIFICATION, CATARACT (Left)    Final Anesthesia Type: MAC    Patient location during evaluation: PACU  Patient participation: Yes- Able to Participate  Level of consciousness: awake and alert and oriented  Post-procedure vital signs: reviewed and stable  Pain management: adequate  Airway patency: patent    PONV status at discharge: No PONV  Anesthetic complications: no      Cardiovascular status: blood pressure returned to baseline and stable  Respiratory status: unassisted and spontaneous ventilation  Hydration status: euvolemic  Follow-up not needed.          Vitals Value Taken Time   /72 2/6/2020  7:59 AM   Temp 36.4 °C (97.5 °F) 2/6/2020  7:46 AM   Pulse 59 2/6/2020  7:59 AM   Resp 19 2/6/2020  7:59 AM   SpO2 96 % 2/6/2020  7:59 AM         Event Time     Out of Recovery 08:10:00          Pain/Derek Score: Derek Score: 10 (2/6/2020  7:59 AM)

## 2020-02-06 NOTE — TRANSFER OF CARE
Anesthesia Transfer of Care Note    Patient: Cedric Gupta Jr.    Procedure(s) Performed: Procedure(s) (LRB):  PHACOEMULSIFICATION, CATARACT (Left)    Patient location: PACU    Anesthesia Type: MAC    Transport from OR: Transported from OR on room air with adequate spontaneous ventilation    Post pain: adequate analgesia    Post assessment: no apparent anesthetic complications and tolerated procedure well    Post vital signs: stable    Level of consciousness: awake, oriented and alert    Nausea/Vomiting: no nausea/vomiting    Complications: none    Transfer of care protocol was followed      Last vitals:   Visit Vitals  /70   Pulse 64   Temp 36.2 °C (97.1 °F) (Skin)   Resp 10   Wt 81.6 kg (180 lb)   SpO2 98%   BMI 25.83 kg/m²

## 2020-02-06 NOTE — BRIEF OP NOTE
Operative Note     SUMMARY     Surgery Date: 2/6/2020     Surgeon(s) and Role:     * Xu Bone Jr., MD - Primary    Pre-op Diagnosis:  Age-related nuclear cataract of left eye [H25.12]    Post-op Diagnosis:  Age-related nuclear cataract of left eye [H25.12]    Procedure(s) (LRB):  PHACOEMULSIFICATION, CATARACT (Left)    Anesthesia: Monitor Anesthesia Care    Findings/Key Components:  Same as post op diagnosis    Estimated Blood Loss: * No values recorded between 2/6/2020  7:22 AM and 2/6/2020  7:46 AM *         Specimens (From admission, onward)    None            Discharge Note      SUMMARY     Admit Date: 2/6/2020    Attending Physician: Xu Bone Jr., MD     Discharge Physician: Xu Bone Jr., MD    Discharge Date: 2/6/2020     Final Diagnosis: Age-related nuclear cataract of left eye [H25.12]    Hospital Course: The patient was admitted for outpatient surgery and tolerated the procedure well. The patient was discharged home in stable condition the same day.    Diet: Advance as tolerated    Follow-Up: Follow up with Dr. Bone, next day, as previously scheduled  Activity as tolerated.      Activity: Per Handout Instructions    Disposition: Home or self care    Patient Instructions:   Current Discharge Medication List      CONTINUE these medications which have NOT CHANGED    Details   diclofenac (VOLTAREN) 0.1 % ophthalmic solution Place 1 drop into the right eye 4 (four) times daily. Start drops 3 days before surgery  Qty: 5 mL, Refills: 3    Associated Diagnoses: Age-related nuclear cataract of right eye      moxifloxacin (VIGAMOX) 0.5 % ophthalmic solution 1 drop 4 (four) times daily.      prednisoLONE acetate (PRED FORTE) 1 % DrpS Place 1 drop into the right eye 4 (four) times daily.  Qty: 5 mL, Refills: 3    Associated Diagnoses: Age-related nuclear cataract of right eye             Discharge Procedure Orders (must include Diet, Follow-up, Activity)   Discharge Procedure Orders (must  include Diet, Follow-up, Activity)   Diet general     Lifting restrictions     Call MD for:  persistent nausea and vomiting     Call MD for:  severe uncontrolled pain     Leave dressing on - Keep it clean, dry, and intact until clinic visit     Activity as tolerated

## 2020-02-06 NOTE — PLAN OF CARE
Patient tolerating oral liquids without difficulty. No apparent s&s of distress noted at this time, no complaints voiced at this time. Discharge instructions reviewed with patient with good verbal feedback received. Patient ready for discharge

## 2020-02-07 ENCOUNTER — OFFICE VISIT (OUTPATIENT)
Dept: OPHTHALMOLOGY | Facility: CLINIC | Age: 80
End: 2020-02-07
Payer: MEDICARE

## 2020-02-07 DIAGNOSIS — Z96.1 STATUS POST CATARACT EXTRACTION AND INSERTION OF INTRAOCULAR LENS OF LEFT EYE: Primary | ICD-10-CM

## 2020-02-07 DIAGNOSIS — Z98.42 STATUS POST CATARACT EXTRACTION AND INSERTION OF INTRAOCULAR LENS OF LEFT EYE: Primary | ICD-10-CM

## 2020-02-07 PROCEDURE — 99999 PR PBB SHADOW E&M-EST. PATIENT-LVL II: CPT | Mod: PBBFAC,HCNC,, | Performed by: OPHTHALMOLOGY

## 2020-02-07 PROCEDURE — 99024 POSTOP FOLLOW-UP VISIT: CPT | Mod: HCNC,S$GLB,, | Performed by: OPHTHALMOLOGY

## 2020-02-07 PROCEDURE — 99024 PR POST-OP FOLLOW-UP VISIT: ICD-10-PCS | Mod: HCNC,S$GLB,, | Performed by: OPHTHALMOLOGY

## 2020-02-07 PROCEDURE — 99999 PR PBB SHADOW E&M-EST. PATIENT-LVL II: ICD-10-PCS | Mod: PBBFAC,HCNC,, | Performed by: OPHTHALMOLOGY

## 2020-02-07 NOTE — PROGRESS NOTES
HPI     Post-op Evaluation      Additional comments: 1 day s/p phaco iol OS 2/6              Comments     Pt states feel a slight pain OS.     Gtts: Prednisolone, Diclofenac, Moxifloxacin QID OS - Installed 1 drop   each into OS at 8:24am          Last edited by Cheryle Quintana on 2/7/2020  8:51 AM. (History)            Assessment /Plan     For exam results, see Encounter Report.    Status post cataract extraction and insertion of intraocular lens of left eye    Prednisolone acetate (pink or white top drop) 1 drop 4x daily left eye; shake well before using drop  Vigamox 1 drop 4x daily left eye (tan top)  Diclofenac 1 drop 4x daily left eye (gray top)  No bending no lifting  Keep head elevated when laying down  Keep dry   F/u 1 week

## 2020-02-12 ENCOUNTER — OFFICE VISIT (OUTPATIENT)
Dept: OPHTHALMOLOGY | Facility: CLINIC | Age: 80
End: 2020-02-12
Payer: MEDICARE

## 2020-02-12 ENCOUNTER — CLINICAL SUPPORT (OUTPATIENT)
Dept: CARDIOLOGY | Facility: CLINIC | Age: 80
End: 2020-02-12
Attending: FAMILY MEDICINE
Payer: MEDICARE

## 2020-02-12 VITALS — WEIGHT: 180 LBS | HEIGHT: 70 IN | BODY MASS INDEX: 25.77 KG/M2

## 2020-02-12 DIAGNOSIS — Z98.42 STATUS POST CATARACT EXTRACTION AND INSERTION OF INTRAOCULAR LENS OF LEFT EYE: Primary | ICD-10-CM

## 2020-02-12 DIAGNOSIS — Z96.1 STATUS POST CATARACT EXTRACTION AND INSERTION OF INTRAOCULAR LENS OF LEFT EYE: Primary | ICD-10-CM

## 2020-02-12 PROCEDURE — 99999 PR PBB SHADOW E&M-EST. PATIENT-LVL II: ICD-10-PCS | Mod: PBBFAC,HCNC,, | Performed by: OPHTHALMOLOGY

## 2020-02-12 PROCEDURE — 99024 POSTOP FOLLOW-UP VISIT: CPT | Mod: HCNC,S$GLB,, | Performed by: OPHTHALMOLOGY

## 2020-02-12 PROCEDURE — 99024 PR POST-OP FOLLOW-UP VISIT: ICD-10-PCS | Mod: HCNC,S$GLB,, | Performed by: OPHTHALMOLOGY

## 2020-02-12 PROCEDURE — 99999 PR PBB SHADOW E&M-EST. PATIENT-LVL II: CPT | Mod: PBBFAC,HCNC,, | Performed by: OPHTHALMOLOGY

## 2020-02-12 PROCEDURE — 93306 TTE W/DOPPLER COMPLETE: CPT | Mod: HCNC,S$GLB,, | Performed by: INTERNAL MEDICINE

## 2020-02-12 PROCEDURE — 93306 ECHO (CUPID ONLY): ICD-10-PCS | Mod: HCNC,S$GLB,, | Performed by: INTERNAL MEDICINE

## 2020-02-12 PROCEDURE — 99999 PR PBB SHADOW E&M-EST. PATIENT-LVL II: ICD-10-PCS | Mod: PBBFAC,HCNC,,

## 2020-02-12 PROCEDURE — 99999 PR PBB SHADOW E&M-EST. PATIENT-LVL II: CPT | Mod: PBBFAC,HCNC,,

## 2020-02-12 RX ORDER — PREDNISOLONE ACETATE 10 MG/ML
1 SUSPENSION/ DROPS OPHTHALMIC 4 TIMES DAILY
Status: ON HOLD | COMMUNITY
End: 2020-10-14 | Stop reason: CLARIF

## 2020-02-12 RX ORDER — DICLOFENAC SODIUM 1 MG/ML
1 SOLUTION/ DROPS OPHTHALMIC 4 TIMES DAILY
COMMUNITY
End: 2020-11-05

## 2020-02-13 ENCOUNTER — PATIENT MESSAGE (OUTPATIENT)
Dept: FAMILY MEDICINE | Facility: CLINIC | Age: 80
End: 2020-02-13

## 2020-02-13 LAB
AV INDEX (PROSTH): 0.54
AV MEAN GRADIENT: 6 MMHG
AV PEAK GRADIENT: 12 MMHG
AV VALVE AREA: 2.14 CM2
AV VELOCITY RATIO: 0.5
BSA FOR ECHO PROCEDURE: 2.01 M2
CV ECHO LV RWT: 0.4 CM
DOP CALC AO PEAK VEL: 1.76 M/S
DOP CALC AO VTI: 35.77 CM
DOP CALC LVOT AREA: 3.9 CM2
DOP CALC LVOT DIAMETER: 2.24 CM
DOP CALC LVOT PEAK VEL: 0.88 M/S
DOP CALC LVOT STROKE VOLUME: 76.57 CM3
DOP CALCLVOT PEAK VEL VTI: 19.44 CM
E WAVE DECELERATION TIME: 315.3 MSEC
E/A RATIO: 0.68
E/E' RATIO: 10.71 M/S
ECHO LV POSTERIOR WALL: 0.92 CM (ref 0.6–1.1)
FRACTIONAL SHORTENING: 42 % (ref 28–44)
INTERVENTRICULAR SEPTUM: 0.88 CM (ref 0.6–1.1)
IVRT: 0.11 MSEC
LA MAJOR: 4.92 CM
LA MINOR: 3.6 CM
LA WIDTH: 4.29 CM
LEFT ATRIUM SIZE: 3.54 CM
LEFT ATRIUM VOLUME INDEX: 26.9 ML/M2
LEFT ATRIUM VOLUME: 53.67 CM3
LEFT INTERNAL DIMENSION IN SYSTOLE: 2.71 CM (ref 2.1–4)
LEFT VENTRICLE DIASTOLIC VOLUME INDEX: 50.04 ML/M2
LEFT VENTRICLE DIASTOLIC VOLUME: 99.87 ML
LEFT VENTRICLE MASS INDEX: 70 G/M2
LEFT VENTRICLE SYSTOLIC VOLUME INDEX: 13.6 ML/M2
LEFT VENTRICLE SYSTOLIC VOLUME: 27.19 ML
LEFT VENTRICULAR INTERNAL DIMENSION IN DIASTOLE: 4.65 CM (ref 3.5–6)
LEFT VENTRICULAR MASS: 140.2 G
LV LATERAL E/E' RATIO: 9.38 M/S
LV SEPTAL E/E' RATIO: 12.5 M/S
MV PEAK A VEL: 1.1 M/S
MV PEAK E VEL: 0.75 M/S
PISA TR MAX VEL: 2.5 M/S
PULM VEIN S/D RATIO: 1.72
PV PEAK D VEL: 0.39 M/S
PV PEAK S VEL: 0.67 M/S
RA MAJOR: 4.28 CM
RA PRESSURE: 3 MMHG
RA WIDTH: 4.52 CM
RIGHT VENTRICULAR END-DIASTOLIC DIMENSION: 4.22 CM
RV TISSUE DOPPLER FREE WALL SYSTOLIC VELOCITY 1 (APICAL 4 CHAMBER VIEW): 12.75 CM/S
SINUS: 3.49 CM
STJ: 2.93 CM
TDI LATERAL: 0.08 M/S
TDI SEPTAL: 0.06 M/S
TDI: 0.07 M/S
TR MAX PG: 25 MMHG
TRICUSPID ANNULAR PLANE SYSTOLIC EXCURSION: 3.01 CM
TV REST PULMONARY ARTERY PRESSURE: 28 MMHG

## 2020-02-13 NOTE — PROGRESS NOTES
HPI     POST OP- 1 week sp phaco iol os done 2/6     Pt states va is doing well. Denies eye pain. Denies any flashes or   floaters since sx. Pt is taking drops as directed Pred x4, Diclo x4, and   Viga x4 OS.     Last edited by Berny Zamudio on 2/12/2020  2:28 PM. (History)        ROS     Negative for: Constitutional, Gastrointestinal, Neurological, Skin,   Genitourinary, Musculoskeletal, HENT, Endocrine, Cardiovascular, Eyes,   Respiratory, Psychiatric, Allergic/Imm, Heme/Lymph    Last edited by Xu Bone Jr., MD on 2/12/2020  7:22 PM. (History)        Assessment /Plan     For exam results, see Encounter Report.    Status post cataract extraction and insertion of intraocular lens of left eye    Stop Moxifloxacin  Decrease PF and Diclofenac 1x daily left  Dilation at next election to check PCO

## 2020-02-28 ENCOUNTER — PATIENT OUTREACH (OUTPATIENT)
Dept: ADMINISTRATIVE | Facility: OTHER | Age: 80
End: 2020-02-28

## 2020-03-02 ENCOUNTER — OFFICE VISIT (OUTPATIENT)
Dept: OPHTHALMOLOGY | Facility: CLINIC | Age: 80
End: 2020-03-02
Payer: MEDICARE

## 2020-03-02 DIAGNOSIS — Z98.42 STATUS POST CATARACT EXTRACTION AND INSERTION OF INTRAOCULAR LENS OF LEFT EYE: Primary | ICD-10-CM

## 2020-03-02 DIAGNOSIS — H35.3132 INTERMEDIATE STAGE NONEXUDATIVE AGE-RELATED MACULAR DEGENERATION OF BOTH EYES: ICD-10-CM

## 2020-03-02 DIAGNOSIS — H43.813 POSTERIOR VITREOUS DETACHMENT, BILATERAL: ICD-10-CM

## 2020-03-02 DIAGNOSIS — Z96.1 STATUS POST CATARACT EXTRACTION AND INSERTION OF INTRAOCULAR LENS OF LEFT EYE: Primary | ICD-10-CM

## 2020-03-02 PROCEDURE — 99999 PR PBB SHADOW E&M-EST. PATIENT-LVL II: CPT | Mod: PBBFAC,HCNC,, | Performed by: OPHTHALMOLOGY

## 2020-03-02 PROCEDURE — 99999 PR PBB SHADOW E&M-EST. PATIENT-LVL II: ICD-10-PCS | Mod: PBBFAC,HCNC,, | Performed by: OPHTHALMOLOGY

## 2020-03-02 PROCEDURE — 99024 POSTOP FOLLOW-UP VISIT: CPT | Mod: HCNC,S$GLB,, | Performed by: OPHTHALMOLOGY

## 2020-03-02 PROCEDURE — 99024 PR POST-OP FOLLOW-UP VISIT: ICD-10-PCS | Mod: HCNC,S$GLB,, | Performed by: OPHTHALMOLOGY

## 2020-03-02 NOTE — PROGRESS NOTES
HPI     Patient is here for 1 month s/p OS DLS 02/12/2020  Patient states he is finished with his gtts, not having any pain or   watering. Has occasional floaters OU. No FOL  Patient states he may need reading specs. Vision does not seem to be as   sharp as he would like.    Last edited by Lindsay Montiel MA on 3/2/2020  9:10 AM. (History)        ROS     Negative for: Constitutional, Gastrointestinal, Neurological, Skin,   Genitourinary, Musculoskeletal, HENT, Endocrine, Cardiovascular, Eyes,   Respiratory, Psychiatric, Allergic/Imm, Heme/Lymph    Last edited by Xu Bone Jr., MD on 3/2/2020  9:13 AM. (History)        Assessment /Plan     For exam results, see Encounter Report.    Status post cataract extraction and insertion of intraocular lens of left eye    Intermediate stage nonexudative age-related macular degeneration of both eyes    Posterior vitreous detachment, bilateral    Doing well, satisfactory course  D/c drops  Continue AREDS 1 tab BID with amsler monitoring  Patient preferred to use OTC readers prn  Follow up in about 6 months (around 9/2/2020) for f/u PSK.

## 2020-03-24 NOTE — TELEPHONE ENCOUNTER
----- Message from Tanya Marinelli sent at 3/1/2017 11:53 AM CST -----  Contact: self  Call placed to pod.  Returning your call Please call back at    BMI above normal limits, recommended weight loss, improve diet and follow up with internist.

## 2020-04-08 ENCOUNTER — OFFICE VISIT (OUTPATIENT)
Dept: UROLOGY | Facility: CLINIC | Age: 80
End: 2020-04-08
Payer: MEDICARE

## 2020-04-08 DIAGNOSIS — N40.1 BENIGN PROSTATIC HYPERPLASIA WITH WEAK URINARY STREAM: ICD-10-CM

## 2020-04-08 DIAGNOSIS — R97.20 ELEVATED PSA: Primary | ICD-10-CM

## 2020-04-08 DIAGNOSIS — R39.12 BENIGN PROSTATIC HYPERPLASIA WITH WEAK URINARY STREAM: ICD-10-CM

## 2020-04-08 PROCEDURE — 99213 OFFICE O/P EST LOW 20 MIN: CPT | Mod: 95,,, | Performed by: UROLOGY

## 2020-04-08 PROCEDURE — 1159F MED LIST DOCD IN RCRD: CPT | Mod: 95,,, | Performed by: UROLOGY

## 2020-04-08 PROCEDURE — 1101F PR PT FALLS ASSESS DOC 0-1 FALLS W/OUT INJ PAST YR: ICD-10-PCS | Mod: CPTII,95,, | Performed by: UROLOGY

## 2020-04-08 PROCEDURE — 99213 PR OFFICE/OUTPT VISIT, EST, LEVL III, 20-29 MIN: ICD-10-PCS | Mod: 95,,, | Performed by: UROLOGY

## 2020-04-08 PROCEDURE — 1101F PT FALLS ASSESS-DOCD LE1/YR: CPT | Mod: CPTII,95,, | Performed by: UROLOGY

## 2020-04-08 PROCEDURE — 1159F PR MEDICATION LIST DOCUMENTED IN MEDICAL RECORD: ICD-10-PCS | Mod: 95,,, | Performed by: UROLOGY

## 2020-04-08 NOTE — PROGRESS NOTES
UROLOGY [place] [phone]    The patient location is: Home   The chief complaint leading to consultation is: elevated psa  Visit type: Intended to be virtual visit with synchronous audio and video. However, pt could not connect and so we did the interview by telephone.  Total time spent with patient: 15 min  Each patient to whom [facility] provides medical services by telemedicine is:  (1) informed of the relationship between the physician and patient and the respective role of any other health care provider with respect to management of the patient; and (2) notified that he or she may decline to receive medical services by telemedicine and may withdraw from such care at any time.    Age 80, has long hx of elevated psa.  It was 7.6 in march 2019, and 9.8 in oct 2019. It was  6.7 in July 2018, and 5.6 in march 2017.      In apr2014 pt had his first prostate biopsy and in jan 2018 his second. Both were benign. His prostate volume was 90 cm3 on the first study and 80 cm3 on the second. No suspicious echographic findings in either case.     Generally voiding well, has nocturia x 1-2. No intermittency or need to strain to void. No pains or burning.      Has hx of Undescended left testicle, removed at age 15. Passed kidney stones 6 yrs ago.     Also is followed by hematology for thrombocytosis and by pcp for osteoarthritis.           PMH     Surgical:  has a past surgical history that includes Tonsillectomy; Orchiectomy; Fracture surgery; ostate biopsy (2014); and Cholecystectomy.     Medical:  has a past medical history of BPH (benign prostatic hypertrophy); Cataract; Elevated PSA; Epiretinal membrane; Fatty liver; History of colon polyps; History of duodenal ulcer; History of nephrolithiasis; Hyperlipidemia; Macular degeneration; Osteoarthritis; Prediabetes; S/P colonoscopy; and Squamous cell carcinoma.     Familial: no fh of renal disease. Father had lung cancer, mother had lupus     Social: lives in [place], ,  retired teacher                 Current Outpatient Prescriptions on File Prior to Visit   Medication Sig Dispense Refill    aspirin (ECOTRIN) 81 MG EC tablet Take 81 mg by mouth once daily.         naproxen (EC NAPROSYN) 500 MG EC tablet Take 1 tablet (500 mg total) by mouth 2  60 tablet 1         REVIEW OF SYSTEMS  GENERAL:  No headaches or dizzy spells.   HEENT: vision preserved. Sinuses: No complaints.   CARDIOPULMONARY: No swelling of the legs; no chest pain. No shortness of breath.   GASTROINTESTINAL: rare heartburn. Denies diarrhea; denies constipation, no blood or mucus in stools.   GENITOURINARY: Denies dysuria, bleeding or incontinence.   MUSCULOSKELETAL: occasional arthritic complaints such as joint discomfort  PSYCHIATRIC: No history of depression or anxiety.   ENDOCRINOLOGIC: No reports of sweating, cold or heat intolerance. No polyuria or polydipsia.   NEUROLOGICAL: No headache, dizziness, syncope, paralysis, ataxia, numbness or tingling in the extremities.  LYMPHATICS: No enlarged nodes. No history of splenectomy.  ==        IMPRESSION:      Elevated psa. Hx of two negative prostate biopsies, last one one jan 2018.  bph on observation. Pt is on no medication for prostate (or for anything else)  left undescended testicle status post left orchiectomy. Solitary R testicle  Hx of urolithiasis      Will update psa.  RTC 6 mo

## 2020-04-13 ENCOUNTER — LAB VISIT (OUTPATIENT)
Dept: LAB | Facility: HOSPITAL | Age: 80
End: 2020-04-13
Attending: UROLOGY
Payer: MEDICARE

## 2020-04-13 DIAGNOSIS — R97.20 ELEVATED PSA: ICD-10-CM

## 2020-04-13 LAB
PROSTATE SPECIFIC ANTIGEN, TOTAL: 8.3 NG/ML (ref 0–4)
PSA FREE MFR SERPL: 30.48 %
PSA FREE SERPL-MCNC: 2.53 NG/ML (ref 0.01–1.5)

## 2020-04-13 PROCEDURE — 84154 ASSAY OF PSA FREE: CPT

## 2020-04-13 PROCEDURE — 36415 COLL VENOUS BLD VENIPUNCTURE: CPT | Mod: PO

## 2020-04-14 ENCOUNTER — PATIENT MESSAGE (OUTPATIENT)
Dept: UROLOGY | Facility: CLINIC | Age: 80
End: 2020-04-14

## 2020-04-15 NOTE — TELEPHONE ENCOUNTER
I recommended to pt to RTC 6 mo for ZEE and psa update. I explained the free psa over 25% it is slightly 'protective'. When he return to clinic in 6 mo we can decide if we proceed with prostate biopsy or not. If we do, it would be two years after the last biopsy. I recommend targeted biopsy, with MRI and U/S  (left message; pt did not )

## 2020-07-28 ENCOUNTER — PES CALL (OUTPATIENT)
Dept: ADMINISTRATIVE | Facility: CLINIC | Age: 80
End: 2020-07-28

## 2020-07-31 ENCOUNTER — OFFICE VISIT (OUTPATIENT)
Dept: HOME HEALTH SERVICES | Facility: CLINIC | Age: 80
End: 2020-07-31
Payer: MEDICARE

## 2020-07-31 VITALS
WEIGHT: 180 LBS | TEMPERATURE: 98 F | SYSTOLIC BLOOD PRESSURE: 141 MMHG | BODY MASS INDEX: 25.77 KG/M2 | HEIGHT: 70 IN | HEART RATE: 68 BPM | DIASTOLIC BLOOD PRESSURE: 79 MMHG

## 2020-07-31 DIAGNOSIS — Z12.11 SPECIAL SCREENING FOR MALIGNANT NEOPLASMS, COLON: ICD-10-CM

## 2020-07-31 DIAGNOSIS — D68.59 THROMBOPHILIA: ICD-10-CM

## 2020-07-31 DIAGNOSIS — E78.00 PURE HYPERCHOLESTEROLEMIA: ICD-10-CM

## 2020-07-31 DIAGNOSIS — R97.20 ELEVATED PSA: ICD-10-CM

## 2020-07-31 DIAGNOSIS — Z00.00 ENCOUNTER FOR PREVENTIVE HEALTH EXAMINATION: Primary | ICD-10-CM

## 2020-07-31 PROCEDURE — 99499 RISK ADDL DX/OHS AUDIT: ICD-10-PCS | Mod: S$GLB,,, | Performed by: NURSE PRACTITIONER

## 2020-07-31 PROCEDURE — 99499 UNLISTED E&M SERVICE: CPT | Mod: S$GLB,,, | Performed by: NURSE PRACTITIONER

## 2020-07-31 PROCEDURE — G0439 PPPS, SUBSEQ VISIT: HCPCS | Mod: S$GLB,,, | Performed by: NURSE PRACTITIONER

## 2020-07-31 PROCEDURE — G0439 PR MEDICARE ANNUAL WELLNESS SUBSEQUENT VISIT: ICD-10-PCS | Mod: S$GLB,,, | Performed by: NURSE PRACTITIONER

## 2020-07-31 NOTE — PATIENT INSTRUCTIONS
Counseling and Referral of Other Preventative  (Italic type indicates deductible and co-insurance are waived)    Patient Name: Cedric Gupta  Today's Date: 7/31/2020    Health Maintenance       Date Due Completion Date    Shingles Vaccine (3 of 3) 03/24/2020 1/28/2020    Colonoscopy 08/18/2020 8/18/2015    Influenza Vaccine (1) 09/01/2020 10/23/2019    Lipid Panel 01/02/2025 1/2/2020    TETANUS VACCINE 01/28/2030 1/28/2020        No orders of the defined types were placed in this encounter.    The following information is provided to all patients.  This information is to help you find resources for any of the problems found today that may be affecting your health:                Living healthy guide: www.[facility].[place].gov      Understanding Diabetes: www.diabetes.org      Eating healthy: www.cdc.gov/healthyweight      [facility] home safety checklist: www.cdc.gov/steadi/patient.html      Agency on Aging: www.goea.[place].[facility]      Alcoholics anonymous (AA): www.aa.org      Physical Activity: www.humaira.nih.gov/oo5xmqs      Tobacco use: www.quitwithusla.org

## 2020-07-31 NOTE — PROGRESS NOTES
"  Cedric Gupta presented for a  Medicare AWV and comprehensive Health Risk Assessment today. The following components were reviewed and updated:    · Medical history  · Family History  · Social history  · Allergies and Current Medications  · Health Risk Assessment  · Health Maintenance  · Care Team     ** See Completed Assessments for Annual Wellness Visit within the encounter summary.**         The following assessments were completed:  · Living Situation  · CAGE  · Depression Screening  · Timed Get Up and Go  · Whisper Test  · Cognitive Function Screening  ·   · Nutrition Screening  · ADL Screening  · PAQ Screening        Vitals:    07/31/20 0801   BP: (!) 141/79   Pulse: 68   Temp: 97.9 °F (36.6 °C)   Weight: 81.6 kg (180 lb)   Height: 5' 10" (1.778 m)     Body mass index is 25.83 kg/m².  Physical Exam  Vitals signs reviewed.   Constitutional:       Appearance: He is well-developed.   HENT:      Head: Normocephalic.   Eyes:      Pupils: Pupils are equal, round, and reactive to light.   Neck:      Musculoskeletal: Normal range of motion.   Cardiovascular:      Rate and Rhythm: Normal rate and regular rhythm.      Heart sounds: Normal heart sounds.   Pulmonary:      Effort: Pulmonary effort is normal.      Breath sounds: Normal breath sounds.   Abdominal:      General: Bowel sounds are normal. There is no distension.      Palpations: Abdomen is soft.      Tenderness: There is no abdominal tenderness.   Musculoskeletal: Normal range of motion.      Right lower leg: No edema.      Left lower leg: No edema.   Skin:     General: Skin is warm and dry.   Neurological:      Mental Status: He is alert and oriented to person, place, and time.   Psychiatric:         Behavior: Behavior normal.               Diagnoses and health risks identified today and associated recommendations/orders:    1. Encounter for preventive health examination  AWV Completed  -Patient to have 2nd shingles vaccine completed    2. Special " screening for malignant neoplasms, colon  - Case request GI: colon cancer screening    3. Pure hypercholesterolemia  Stable diet controlled, followed by PCP.    4. Elevated PSA  -Upcoming appointment with Urology.    5. Thrombophilia  Stable, followed by Hem/Onc.      Provided Halphen with a 5-10 year written screening schedule and personal prevention plan. Recommendations were developed using the USPSTF age appropriate recommendations. Education, counseling, and referrals were provided as needed. After Visit Summary printed and given to patient which includes a list of additional screenings\tests needed.    Follow up in about 1 year (around 7/31/2021) for your next annual wellness visit.    Lynda Artis NP  I offered to discuss end of life issues, including information on how to make advance directives that the patient could use to name someone who would make medical decisions on their behalf if they became too ill to make themselves.    ___Patient declined  _X_Patient is interested, I provided paper work and offered to discuss.

## 2020-08-25 ENCOUNTER — PATIENT MESSAGE (OUTPATIENT)
Dept: UROLOGY | Facility: CLINIC | Age: 80
End: 2020-08-25

## 2020-08-25 ENCOUNTER — OFFICE VISIT (OUTPATIENT)
Dept: OPHTHALMOLOGY | Facility: CLINIC | Age: 80
End: 2020-08-25
Payer: MEDICARE

## 2020-08-25 DIAGNOSIS — H26.493 PCO (POSTERIOR CAPSULAR OPACIFICATION), BILATERAL: Primary | ICD-10-CM

## 2020-08-25 DIAGNOSIS — R97.20 ELEVATED PSA: Primary | ICD-10-CM

## 2020-08-25 DIAGNOSIS — H43.813 POSTERIOR VITREOUS DETACHMENT, BILATERAL: ICD-10-CM

## 2020-08-25 DIAGNOSIS — H35.3132 INTERMEDIATE STAGE NONEXUDATIVE AGE-RELATED MACULAR DEGENERATION OF BOTH EYES: ICD-10-CM

## 2020-08-25 PROCEDURE — 99999 PR PBB SHADOW E&M-EST. PATIENT-LVL III: ICD-10-PCS | Mod: PBBFAC,HCNC,, | Performed by: OPHTHALMOLOGY

## 2020-08-25 PROCEDURE — 99999 PR PBB SHADOW E&M-EST. PATIENT-LVL III: CPT | Mod: PBBFAC,HCNC,, | Performed by: OPHTHALMOLOGY

## 2020-08-25 PROCEDURE — 92014 COMPRE OPH EXAM EST PT 1/>: CPT | Mod: HCNC,S$GLB,, | Performed by: OPHTHALMOLOGY

## 2020-08-25 PROCEDURE — 92014 PR EYE EXAM, EST PATIENT,COMPREHESV: ICD-10-PCS | Mod: HCNC,S$GLB,, | Performed by: OPHTHALMOLOGY

## 2020-08-25 NOTE — PROGRESS NOTES
HPI     DLS: 3/2/2020    Pt here for 6 m f/u for PSK OU/ PCO OS   Pt states cloudy vision. States OS better than OD. Not using any gtts.   Denies pain/ FOL/ floaters.     Last edited by Maggy Echols on 8/25/2020  8:12 AM. (History)        ROS     Negative for: Constitutional, Gastrointestinal, Neurological, Skin,   Genitourinary, Musculoskeletal, HENT, Endocrine, Cardiovascular, Eyes,   Respiratory, Psychiatric, Allergic/Imm, Heme/Lymph    Last edited by Xu Bone Jr., MD on 8/25/2020  8:28 AM. (History)        Assessment /Plan     For exam results, see Encounter Report.    PCO (posterior capsular opacification), bilateral    Intermediate stage nonexudative age-related macular degeneration of both eyes    Posterior vitreous detachment, bilateral      Schedule YAG OU  -Check your Amsler Grid daily, each eye separately; instructions are available on the grid  -Return promptly if a change is noted such as lines that are not visible or looking wavy on the grid  -It is also recommended that you take eye vitamin supplements.  These are available over-the-counter. I recommend I-Caps AREDS2 Formula or Preservision AREDS2 Formula. Should be taken 1 tab po BID  RD precautions given  Follow up in about 2 weeks (around 9/8/2020) for YAG OU.

## 2020-08-25 NOTE — PATIENT INSTRUCTIONS
What Is YAG Capsulotomy?  YAG capsulotomy is a laser eye treatment. It is used to improve your vision after cataract surgery. Your vision can become cloudy again after cataract surgery. This is sometimes called an after cataract. But it isnt a new cataract. Instead, your posterior capsule, which holds the lens in place, becomes cloudy. Your eye doctor may use YAG capsulotomy to help you see better.      Your eye after cataract surgery  When your cataract is removed, your eyes cloudy lens is replaced with a plastic lens called an IOL (intraocular lens). The IOL is placed in the posterior capsule, which held the old cloudy lens. You can see again because the IOL lets light reach your retina. The retina is a tissue layer that lines the back of your eye.  If the capsule becomes cloudy  The posterior capsule is a thin, clear film. It can become cloudy. This can happen months or even years after cataract surgery. This may block light from reaching your retina.  Date Last Reviewed: 6/13/2015  © 3758-4756 [employer]. [address] [place] [address], [place] [address]. All rights reserved. This information is not intended as a substitute for professional medical care. Always follow your healthcare professional's instructions.

## 2020-09-03 ENCOUNTER — TELEPHONE (OUTPATIENT)
Dept: GASTROENTEROLOGY | Facility: CLINIC | Age: 80
End: 2020-09-03

## 2020-09-11 ENCOUNTER — TELEPHONE (OUTPATIENT)
Dept: GASTROENTEROLOGY | Facility: CLINIC | Age: 80
End: 2020-09-11

## 2020-09-11 NOTE — TELEPHONE ENCOUNTER
Attempted to contact pt x 2 through [employer] as he has no vmail. Order canceled at this time and will proceed with scheduling when pt returns call. PCP to be notified.

## 2020-09-11 NOTE — LETTER
[facility] - Gastroenterology  [address]  [facility] [address]  Phone: [phone] September 14, 2020     Cerdic Gupta Jr.  [address] [facility] [address]      Dear Cedric:    We have tried to reach you by phone and have been unsuccessful in speaking with you. Your health and follow-up medical care are important to us. Please call our office as soon as possible so that we may assist with scheduling your colonoscopy. If you have already scheduled this appointment, please disregard this letter.    Sincerely,        Tracy Stein MD

## 2020-09-14 ENCOUNTER — TELEPHONE (OUTPATIENT)
Dept: GASTROENTEROLOGY | Facility: CLINIC | Age: 80
End: 2020-09-14

## 2020-09-14 DIAGNOSIS — Z01.818 PREOP EXAMINATION: ICD-10-CM

## 2020-09-14 NOTE — TELEPHONE ENCOUNTER
----- Message from Maria Dolores Dobbins sent at 9/14/2020 12:22 PM CDT -----  Contact: pt  Pt returning a call from nurse Martino..[phone] (home)

## 2020-09-22 ENCOUNTER — PROCEDURE VISIT (OUTPATIENT)
Dept: OPHTHALMOLOGY | Facility: CLINIC | Age: 80
End: 2020-09-22
Payer: MEDICARE

## 2020-09-22 DIAGNOSIS — H26.493 PCO (POSTERIOR CAPSULAR OPACIFICATION), BILATERAL: Primary | ICD-10-CM

## 2020-09-22 PROCEDURE — 99499 UNLISTED E&M SERVICE: CPT | Mod: HCNC,S$GLB,, | Performed by: OPHTHALMOLOGY

## 2020-09-22 PROCEDURE — 66821 YAG CAPSULOTOMY - OU - BOTH EYES: ICD-10-PCS | Mod: 50,HCNC,S$GLB, | Performed by: OPHTHALMOLOGY

## 2020-09-22 PROCEDURE — 66821 AFTER CATARACT LASER SURGERY: CPT | Mod: 50,HCNC,S$GLB, | Performed by: OPHTHALMOLOGY

## 2020-09-22 PROCEDURE — 99499 NO LOS: ICD-10-PCS | Mod: HCNC,S$GLB,, | Performed by: OPHTHALMOLOGY

## 2020-09-23 ENCOUNTER — IMMUNIZATION (OUTPATIENT)
Dept: PHARMACY | Facility: CLINIC | Age: 80
End: 2020-09-23
Payer: MEDICARE

## 2020-10-07 ENCOUNTER — OFFICE VISIT (OUTPATIENT)
Dept: OPHTHALMOLOGY | Facility: CLINIC | Age: 80
End: 2020-10-07
Payer: MEDICARE

## 2020-10-07 DIAGNOSIS — Z98.890 S/P YAG CAPSULOTOMY, BILATERAL: ICD-10-CM

## 2020-10-07 DIAGNOSIS — H26.493 PCO (POSTERIOR CAPSULAR OPACIFICATION), BILATERAL: Primary | ICD-10-CM

## 2020-10-07 PROCEDURE — 99024 POSTOP FOLLOW-UP VISIT: CPT | Mod: HCNC,S$GLB,, | Performed by: OPHTHALMOLOGY

## 2020-10-07 PROCEDURE — 99024 PR POST-OP FOLLOW-UP VISIT: ICD-10-PCS | Mod: HCNC,S$GLB,, | Performed by: OPHTHALMOLOGY

## 2020-10-07 PROCEDURE — 99999 PR PBB SHADOW E&M-EST. PATIENT-LVL III: ICD-10-PCS | Mod: PBBFAC,HCNC,, | Performed by: OPHTHALMOLOGY

## 2020-10-07 PROCEDURE — 99999 PR PBB SHADOW E&M-EST. PATIENT-LVL III: CPT | Mod: PBBFAC,HCNC,, | Performed by: OPHTHALMOLOGY

## 2020-10-07 NOTE — PATIENT INSTRUCTIONS
Adapting to Age-Related Macular Degeneration    If you have vision loss from macular degeneration, you can continue many of the activities you do now. Vision aids can help you with tasks that require detailed vision. Keep monitoring your vision and call your doctor if you notice any changes.  Use vision aids  Vision aids can help you continue to read, take care of yourself, and enjoy the world around you. Here are some types of vision aids:  · Magnifiers and closed-circuit television devices for reading text  · Check-writing guides and large print checks  · Large-faced watches and large button phones  · Books with large type and books on tape  · Talking clocks and other talking devices  What you can do  Try the following to lower your risk of further vision loss:  · Dont smoke.  · Eat healthy foods, especially green leafy vegetables and fish.  · Take vitamins with lutein.  · Control your blood pressure and cholesterol levels.  · Control your weight.  · Exercise regularly.  Date Last Reviewed: 6/8/2015  © 9743-7331 [employer]. [address] [place] [address], [place] [address]. All rights reserved. This information is not intended as a substitute for professional medical care. Always follow your healthcare professional's instructions.

## 2020-10-07 NOTE — PROGRESS NOTES
HPI     DLE- 9/22/20     Pt is here for 2 wk post YAG OU. Pt states he did not notice much of a   change since the laser. Denies flashes or floaters. Denies eye pain after   laser. No gtts given after procedure.     Last edited by Berny Zamudio on 10/7/2020  1:08 PM. (History)        ROS     Negative for: Constitutional, Gastrointestinal, Neurological, Skin,   Genitourinary, Musculoskeletal, HENT, Endocrine, Cardiovascular, Eyes,   Respiratory, Psychiatric, Allergic/Imm, Heme/Lymph    Last edited by Xu Bone Jr., MD on 10/7/2020  1:34 PM. (History)        Assessment /Plan     For exam results, see Encounter Report.    PCO (posterior capsular opacification), bilateral    S/P YAG capsulotomy, bilateral      Doing well, satisfactory course  Rx for glasses given to patient  Follow up in about 6 months (around 4/7/2021) for f/u ARMD both eyes.

## 2020-10-09 ENCOUNTER — TELEPHONE (OUTPATIENT)
Dept: GASTROENTEROLOGY | Facility: CLINIC | Age: 80
End: 2020-10-09

## 2020-10-11 ENCOUNTER — LAB VISIT (OUTPATIENT)
Dept: FAMILY MEDICINE | Facility: CLINIC | Age: 80
End: 2020-10-11
Payer: MEDICARE

## 2020-10-11 DIAGNOSIS — Z01.818 PREOP EXAMINATION: ICD-10-CM

## 2020-10-11 PROCEDURE — U0003 INFECTIOUS AGENT DETECTION BY NUCLEIC ACID (DNA OR RNA); SEVERE ACUTE RESPIRATORY SYNDROME CORONAVIRUS 2 (SARS-COV-2) (CORONAVIRUS DISEASE [COVID-19]), AMPLIFIED PROBE TECHNIQUE, MAKING USE OF HIGH THROUGHPUT TECHNOLOGIES AS DESCRIBED BY CMS-2020-01-R: HCPCS | Mod: HCNC

## 2020-10-12 LAB — SARS-COV-2 RNA RESP QL NAA+PROBE: NOT DETECTED

## 2020-10-13 ENCOUNTER — TELEPHONE (OUTPATIENT)
Dept: GASTROENTEROLOGY | Facility: CLINIC | Age: 80
End: 2020-10-13

## 2020-10-13 ENCOUNTER — PATIENT MESSAGE (OUTPATIENT)
Dept: ENDOSCOPY | Facility: HOSPITAL | Age: 80
End: 2020-10-13

## 2020-10-13 NOTE — TELEPHONE ENCOUNTER
Spoke with pt. Informed his procedure is still on for tomorrow. Pt verbalized understanding & will prep today.

## 2020-10-13 NOTE — TELEPHONE ENCOUNTER
----- Message from Maria Dolores Dobbins sent at 10/13/2020  9:26 AM CDT -----  Contact: pt  Pt calling states that he got a text that for his procedure tomorrow 10/14/20 the Dr will not be in. Would like a call to verify and confirm,please  today. And if have to reschedule.[phone] (home)

## 2020-10-14 ENCOUNTER — HOSPITAL ENCOUNTER (OUTPATIENT)
Facility: HOSPITAL | Age: 80
Discharge: HOME | End: 2020-10-14
Attending: INTERNAL MEDICINE | Admitting: INTERNAL MEDICINE
Payer: MEDICARE

## 2020-10-14 ENCOUNTER — ANESTHESIA (OUTPATIENT)
Dept: ENDOSCOPY | Facility: HOSPITAL | Age: 80
End: 2020-10-14
Payer: MEDICARE

## 2020-10-14 ENCOUNTER — ANESTHESIA EVENT (OUTPATIENT)
Dept: ENDOSCOPY | Facility: HOSPITAL | Age: 80
End: 2020-10-14
Payer: MEDICARE

## 2020-10-14 DIAGNOSIS — Z86.010 HX OF COLONIC POLYPS: ICD-10-CM

## 2020-10-14 PROBLEM — Z86.0100 HX OF COLONIC POLYPS: Status: ACTIVE | Noted: 2020-10-14

## 2020-10-14 PROCEDURE — 63600175 PHARM REV CODE 636 W HCPCS: Mod: HCNC,PO | Performed by: NURSE ANESTHETIST, CERTIFIED REGISTERED

## 2020-10-14 PROCEDURE — 25000003 PHARM REV CODE 250: Mod: HCNC,PO | Performed by: NURSE ANESTHETIST, CERTIFIED REGISTERED

## 2020-10-14 PROCEDURE — D9220A PRA ANESTHESIA: Mod: HCNC,CRNA,, | Performed by: NURSE ANESTHETIST, CERTIFIED REGISTERED

## 2020-10-14 PROCEDURE — G0105 COLORECTAL SCRN; HI RISK IND: ICD-10-PCS | Mod: HCNC,,, | Performed by: INTERNAL MEDICINE

## 2020-10-14 PROCEDURE — 37000009 HC ANESTHESIA EA ADD 15 MINS: Mod: HCNC,PO | Performed by: INTERNAL MEDICINE

## 2020-10-14 PROCEDURE — D9220A PRA ANESTHESIA: Mod: HCNC,ANES,, | Performed by: ANESTHESIOLOGY

## 2020-10-14 PROCEDURE — D9220A PRA ANESTHESIA: ICD-10-PCS | Mod: HCNC,ANES,, | Performed by: ANESTHESIOLOGY

## 2020-10-14 PROCEDURE — 37000008 HC ANESTHESIA 1ST 15 MINUTES: Mod: HCNC,PO | Performed by: INTERNAL MEDICINE

## 2020-10-14 PROCEDURE — G0105 COLORECTAL SCRN; HI RISK IND: HCPCS | Mod: HCNC,PO | Performed by: INTERNAL MEDICINE

## 2020-10-14 PROCEDURE — 63600175 PHARM REV CODE 636 W HCPCS: Mod: HCNC,PO | Performed by: INTERNAL MEDICINE

## 2020-10-14 PROCEDURE — G0105 COLORECTAL SCRN; HI RISK IND: HCPCS | Mod: HCNC,,, | Performed by: INTERNAL MEDICINE

## 2020-10-14 PROCEDURE — D9220A PRA ANESTHESIA: ICD-10-PCS | Mod: HCNC,CRNA,, | Performed by: NURSE ANESTHETIST, CERTIFIED REGISTERED

## 2020-10-14 RX ORDER — PROPOFOL 10 MG/ML
VIAL (ML) INTRAVENOUS
Status: DISCONTINUED | OUTPATIENT
Start: 2020-10-14 | End: 2020-10-14

## 2020-10-14 RX ORDER — SODIUM CHLORIDE, SODIUM LACTATE, POTASSIUM CHLORIDE, CALCIUM CHLORIDE 600; 310; 30; 20 MG/100ML; MG/100ML; MG/100ML; MG/100ML
INJECTION, SOLUTION INTRAVENOUS CONTINUOUS
Status: DISCONTINUED | OUTPATIENT
Start: 2020-10-14 | End: 2020-10-14 | Stop reason: HOSPADM

## 2020-10-14 RX ORDER — LIDOCAINE HCL/PF 100 MG/5ML
SYRINGE (ML) INTRAVENOUS
Status: DISCONTINUED | OUTPATIENT
Start: 2020-10-14 | End: 2020-10-14

## 2020-10-14 RX ORDER — SODIUM CHLORIDE 0.9 % (FLUSH) 0.9 %
10 SYRINGE (ML) INJECTION
Status: DISCONTINUED | OUTPATIENT
Start: 2020-10-14 | End: 2020-10-14 | Stop reason: HOSPADM

## 2020-10-14 RX ADMIN — PROPOFOL 30 MG: 10 INJECTION, EMULSION INTRAVENOUS at 07:10

## 2020-10-14 RX ADMIN — LIDOCAINE HYDROCHLORIDE 100 MG: 20 INJECTION, SOLUTION INTRAVENOUS at 07:10

## 2020-10-14 RX ADMIN — SODIUM CHLORIDE, SODIUM LACTATE, POTASSIUM CHLORIDE, AND CALCIUM CHLORIDE: .6; .31; .03; .02 INJECTION, SOLUTION INTRAVENOUS at 07:10

## 2020-10-14 NOTE — ANESTHESIA POSTPROCEDURE EVALUATION
Anesthesia Post Evaluation    Patient: Cedric Gupta Jr.    Procedure(s) Performed: Procedure(s) (LRB):  COLONOSCOPY (N/A)    Final Anesthesia Type: general    Patient location during evaluation: PACU  Patient participation: Yes- Able to Participate  Level of consciousness: awake and alert  Post-procedure vital signs: reviewed and stable  Pain management: adequate  Airway patency: patent    PONV status at discharge: No PONV  Anesthetic complications: no      Cardiovascular status: blood pressure returned to baseline  Respiratory status: unassisted  Hydration status: euvolemic  Follow-up not needed.          Vitals Value Taken Time   /68 10/14/20 0805   Temp na 10/14/20 0821   Pulse 49 10/14/20 0805   Resp 15 10/14/20 0805   SpO2 99 % 10/14/20 0805         No case tracking events are documented in the log.      Pain/Derek Score: Derek Score: 5 (10/14/2020  8:05 AM)

## 2020-10-14 NOTE — ANESTHESIA PREPROCEDURE EVALUATION
10/14/2020  Cedric Gupta Jr. is a 80 y.o., male.    Pre-op Assessment    I have reviewed the Patient Summary Reports.     I have reviewed the Nursing Notes. I have reviewed the NPO Status.   I have reviewed the Medications.     Review of Systems  Anesthesia Hx:  Denies Family Hx of Anesthesia complications.   Denies Personal Hx of Anesthesia complications.   Social:  Former Smoker Smoking Status: Former Smoker  Quit Smokin65  Smokeless Tobacco Status: Never Used  Alcohol use: Yes; 1.2 oz per week  Drug use: Marijuana; Times per week: 7       Renal/:   BPH    Hepatic/GI:   Bowel Prep. Liver Disease,    Musculoskeletal:   Arthritis         Physical Exam  General:  Well nourished    Airway/Jaw/Neck:  Airway Findings: Mouth Opening: Normal Tongue: Normal  General Airway Assessment: Adult  Mallampati: III  Improves to II with phonation.       Chest/Lungs:  Chest/Lungs Findings: Normal Respiratory Rate     Heart/Vascular:  Heart Findings: Rate: Normal  Rhythm: Regular Rhythm        Mental Status:  Mental Status Findings:  Cooperative, Alert and Oriented         Anesthesia Plan  Type of Anesthesia, risks & benefits discussed:  Anesthesia Type:  general  Patient's Preference:   Intra-op Monitoring Plan: standard ASA monitors  Intra-op Monitoring Plan Comments:   Post Op Pain Control Plan:   Post Op Pain Control Plan Comments:   Induction:   IV  Beta Blocker:  Patient is not currently on a Beta-Blocker (No further documentation required).       Informed Consent: Patient understands risks and agrees with Anesthesia plan.  Questions answered. Anesthesia consent signed with patient.  ASA Score: 2     Day of Surgery Review of History & Physical:    H&P update referred to the provider.         Ready For Surgery From Anesthesia Perspective.

## 2020-10-14 NOTE — DISCHARGE SUMMARY
Discharge Note  Short Stay      SUMMARY     Admit Date: 10/14/2020    Attending Physician: Kasi Mckoy MD     Discharge Physician: Kasi Mckoy MD    Discharge Date: 10/14/2020 8:05 AM    Final Diagnosis: Screen for colon cancer [Z12.11]    Disposition: HOME OR SELF CARE    Patient Instructions:   Current Discharge Medication List      CONTINUE these medications which have NOT CHANGED    Details   diclofenac (VOLTAREN) 0.1 % ophthalmic solution 1 drop 4 (four) times daily.      moxifloxacin (VIGAMOX) 0.5 % ophthalmic solution 1 drop 4 (four) times daily.             Discharge Procedure Orders (must include Diet, Follow-up, Activity)    Follow Up:  Follow up with PCP as previously scheduled  Resume routine diet.  Activity as tolerated.    No driving day of procedure.

## 2020-10-14 NOTE — H&P
History & Physical - Short Stay  Gastroenterology      SUBJECTIVE:     Procedure: Colonoscopy    Chief Complaint/Indication for Procedure: Change in Bowel Habits and Previous Polyps    PTA Medications   Medication Sig    diclofenac (VOLTAREN) 0.1 % ophthalmic solution 1 drop 4 (four) times daily.    moxifloxacin (VIGAMOX) 0.5 % ophthalmic solution 1 drop 4 (four) times daily.       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    Macular degeneration     dry -- OU    Osteoarthritis     S/P colonoscopy     8/15; 8/20     Past Surgical History:   Procedure Laterality Date    CATARACT EXTRACTION W/  INTRAOCULAR LENS IMPLANT Right 01/23/2020    Dr Bone    CATARACT EXTRACTION W/  INTRAOCULAR LENS IMPLANT Left 02/06/2020    Dr Bone    CHOLECYSTECTOMY      FRACTURE SURGERY      Right ankle and leg    ORCHIECTOMY      due to undescended testicle/ Left    PHACOEMULSIFICATION OF CATARACT Right 1/23/2020    Procedure: PHACOEMULSIFICATION, CATARACT;  Surgeon: Xu Bone Jr., MD;  Location: [facility] OR;  Service: Ophthalmology;  Laterality: Right;  Right    PHACOEMULSIFICATION OF CATARACT Left 2/6/2020    Procedure: PHACOEMULSIFICATION, CATARACT;  Surgeon: Xu Bone Jr., MD;  Location: [facility] OR;  Service: Ophthalmology;  Laterality: Left;  Left    PROSTATE BIOPSY  2014 2014, 2018-negative    TONSILLECTOMY      TRANSRECTAL BIOPSY OF PROSTATE WITH ULTRASOUND  GUIDANCE N/A 2018    Procedure: BIOPSY, PROSTATE, TRANSRECTAL APPROACH, WITH US GUIDANCE;  Surgeon: Joselo Green MD;  Location: [facility];  Service: Urology;  Laterality: N/A;  SITE PROSTATE     Family History   Problem Relation Age of Onset    Cataracts Mother     Glaucoma Mother     Lupus Mother     Cancer Father         kidney and lung cancer (shipyard worker)    Diabetes Maternal Aunt     Diabetes Maternal Uncle     No Known Problems Sister     No Known Problems Daughter     Cancer Sister         breast cancer    Melanoma Neg Hx     Psoriasis Neg Hx     Eczema Neg Hx     Amblyopia Neg Hx     Hypertension Neg Hx     Macular degeneration Neg Hx     Retinal detachment Neg Hx     Stroke Neg Hx     Strabismus Neg Hx     Thyroid disease Neg Hx     Blindness Neg Hx      Social History     Tobacco Use    Smoking status: Former Smoker     Years: 0.50     Quit date: 1965     Years since quittin.1    Smokeless tobacco: Never Used   Substance Use Topics    Alcohol use: Yes     Alcohol/week: 2.0 - 4.0 standard drinks     Types: 2 - 4 Cans of beer per week     Comment: twice a week    Drug use: Yes     Frequency: 7.0 times per week     Types: Marijuana     Comment: marijuana         OBJECTIVE:     Vital Signs (Most Recent)  Temp: 98.2 °F (36.8 °C) (10/14/20 0654)  Pulse: (!) 54 (10/14/20 0654)  Resp: 14 (10/14/20 0654)  BP: 124/65 (10/14/20 0654)  SpO2: 97 % (10/14/20 0654)    Physical Exam:                                                       GENERAL:  Comfortable, in no acute distress.                                 HEENT EXAM:  Nonicteric.  No adenopathy.  Oropharynx is clear.               NECK:  Supple.                                                               LUNGS:  Clear.                                                               CARDIAC:  Regular rate and rhythm.  S1, S2.  No murmur.                      ABDOMEN:  Soft, positive bowel sounds, nontender.  No  hepatosplenomegaly or masses.  No rebound or guarding.                                             EXTREMITIES:  No edema.     MENTAL STATUS:  Normal, alert and oriented.      ASSESSMENT/PLAN:     Assessment: Change in Bowel Habits and Previous Polyps    Plan: Colonoscopy    Anesthesia Plan: General    ASA Grade: ASA 2 - Patient with mild systemic disease with no functional limitations    MALLAMPATI SCORE:  I (soft palate, uvula, fauces, and tonsillar pillars visible)     In my medical opinion and judgment, this medical or surgical procedure was not able to be safely postponed in accordance with [place] Department of Health, Healthcare Facility Notice #2020-[id number]-007.

## 2020-10-14 NOTE — [employer] PATIENT INSTRUCTIONS
Discharge Summary/Instructions after an Endoscopic Procedure  Patient Name: Cedric Gupta  Patient MRN: [id number]  Patient YOB: 1940  Wednesday, October 14, 2020  Kasi Mckoy MD  RESTRICTIONS:  During your procedure today, you received medications for sedation.  These   medications may affect your judgment, balance and coordination.  Therefore,   for 24 hours, you have the following restrictions:   - DO NOT drive a car, operate machinery, make legal/financial decisions,   sign important papers or drink alcohol.    ACTIVITY:  Today: no heavy lifting, straining or running due to procedural   sedation/anesthesia.  The following day: return to full activity including work.  DIET:  Eat and drink normally unless instructed otherwise.     TREATMENT FOR COMMON SIDE EFFECTS:  - Mild abdominal pain, nausea, belching, bloating or excessive gas:  rest,   eat lightly and use a heating pad.  - Sore Throat: treat with throat lozenges and/or gargle with warm salt   water.  - Because air was used during the procedure, expelling large amounts of air   from your rectum or belching is normal.  - If a bowel prep was taken, you may not have a bowel movement for 1-3 days.    This is normal.  SYMPTOMS TO WATCH FOR AND REPORT TO YOUR PHYSICIAN:  1. Abdominal pain or bloating, other than gas cramps.  2. Chest pain.  3. Back pain.  4. Signs of infection such as: chills or fever occurring within 24 hours   after the procedure.  5. Rectal bleeding, which would show as bright red, maroon, or black stools.   (A tablespoon of blood from the rectum is not serious, especially if   hemorrhoids are present.)  6. Vomiting.  7. Weakness or dizziness.  GO DIRECTLY TO THE NEAREST EMERGENCY ROOM IF YOU HAVE ANY OF THE FOLLOWING:      Difficulty breathing              Chills and/or fever over 101 F   Persistent vomiting and/or vomiting blood   Severe abdominal pain   Severe chest pain   Black, tarry stools   Bleeding- more than one  tablespoon   Any other symptom or condition that you feel may need urgent attention  Your doctor recommends these additional instructions:  If any biopsies were taken, your doctors clinic will contact you in 1 to 2   weeks with any results.  Your physician has indicated that a repeat colonoscopy is not recommended   for surveillance.   You are being discharged to home.  For questions, problems or results please call your physician - Kasi Mckoy MD at Work:  [phone].  EMERGENCY PHONE NUMBER: [phone], LAB RESULTS: [phone]  IF A COMPLICATION OR EMERGENCY SITUATION ARISES AND YOU ARE UNABLE TO REACH   YOUR PHYSICIAN - GO DIRECTLY TO THE EMERGENCY ROOM.  ___________________________________________  Nurse Signature  ___________________________________________  Patient/Designated Responsible Party Signature  Kasi Mckoy MD  10/14/2020 8:02:41 AM  This report has been verified and signed electronically.  [employer]

## 2020-10-14 NOTE — TRANSFER OF CARE
"Anesthesia Transfer of Care Note    Patient: Cedric Gupta Jr.    Procedure(s) Performed: Procedure(s) (LRB):  COLONOSCOPY (N/A)    Patient location: PACU    Anesthesia Type: general    Transport from OR: Transported from OR on room air with adequate spontaneous ventilation    Post pain: adequate analgesia    Post assessment: no apparent anesthetic complications and tolerated procedure well    Post vital signs: stable    Level of consciousness: awake and alert    Nausea/Vomiting: no nausea/vomiting    Complications: none    Transfer of care protocol was followed      Last vitals:   Visit Vitals  /65   Pulse (!) 54   Temp 36.8 °C (98.2 °F) (Skin)   Resp 14   Ht 5' 11" (1.803 m)   Wt 81.6 kg (180 lb)   SpO2 97%   BMI 25.10 kg/m²     "

## 2020-10-15 VITALS
SYSTOLIC BLOOD PRESSURE: 127 MMHG | OXYGEN SATURATION: 97 % | WEIGHT: 180 LBS | DIASTOLIC BLOOD PRESSURE: 73 MMHG | BODY MASS INDEX: 25.2 KG/M2 | HEART RATE: 52 BPM | RESPIRATION RATE: 16 BRPM | HEIGHT: 71 IN | TEMPERATURE: 98 F

## 2020-11-04 ENCOUNTER — TELEPHONE (OUTPATIENT)
Dept: FAMILY MEDICINE | Facility: CLINIC | Age: 80
End: 2020-11-04

## 2020-11-04 NOTE — TELEPHONE ENCOUNTER
----- Message from Ana Hilliard MA sent at 11/4/2020  2:55 PM CST -----  Pt is requesting a call back to [facility] and appt with Dr. Stein only for a follow up and chest pain issues.   Call Back # [phone]

## 2020-11-05 ENCOUNTER — OFFICE VISIT (OUTPATIENT)
Dept: FAMILY MEDICINE | Facility: CLINIC | Age: 80
End: 2020-11-05
Payer: MEDICARE

## 2020-11-05 ENCOUNTER — TELEPHONE (OUTPATIENT)
Dept: FAMILY MEDICINE | Facility: CLINIC | Age: 80
End: 2020-11-05

## 2020-11-05 VITALS
DIASTOLIC BLOOD PRESSURE: 78 MMHG | SYSTOLIC BLOOD PRESSURE: 152 MMHG | BODY MASS INDEX: 27.42 KG/M2 | TEMPERATURE: 98 F | HEART RATE: 72 BPM | WEIGHT: 195.88 LBS | HEIGHT: 71 IN | RESPIRATION RATE: 18 BRPM

## 2020-11-05 DIAGNOSIS — R06.09 DOE (DYSPNEA ON EXERTION): ICD-10-CM

## 2020-11-05 DIAGNOSIS — R07.9 CHEST PAIN, UNSPECIFIED TYPE: Primary | ICD-10-CM

## 2020-11-05 PROCEDURE — 99214 OFFICE O/P EST MOD 30 MIN: CPT | Mod: S$GLB,,, | Performed by: NURSE PRACTITIONER

## 2020-11-05 PROCEDURE — 1159F MED LIST DOCD IN RCRD: CPT | Mod: S$GLB,,, | Performed by: NURSE PRACTITIONER

## 2020-11-05 PROCEDURE — 93010 EKG 12-LEAD: ICD-10-PCS | Mod: S$GLB,,, | Performed by: INTERNAL MEDICINE

## 2020-11-05 PROCEDURE — 1159F PR MEDICATION LIST DOCUMENTED IN MEDICAL RECORD: ICD-10-PCS | Mod: S$GLB,,, | Performed by: NURSE PRACTITIONER

## 2020-11-05 PROCEDURE — 93005 EKG 12-LEAD: ICD-10-PCS | Mod: S$GLB,,, | Performed by: NURSE PRACTITIONER

## 2020-11-05 PROCEDURE — 99214 PR OFFICE/OUTPT VISIT, EST, LEVL IV, 30-39 MIN: ICD-10-PCS | Mod: S$GLB,,, | Performed by: NURSE PRACTITIONER

## 2020-11-05 PROCEDURE — 1125F PR PAIN SEVERITY QUANTIFIED, PAIN PRESENT: ICD-10-PCS | Mod: S$GLB,,, | Performed by: NURSE PRACTITIONER

## 2020-11-05 PROCEDURE — 1101F PR PT FALLS ASSESS DOC 0-1 FALLS W/OUT INJ PAST YR: ICD-10-PCS | Mod: CPTII,S$GLB,, | Performed by: NURSE PRACTITIONER

## 2020-11-05 PROCEDURE — 93005 ELECTROCARDIOGRAM TRACING: CPT | Mod: S$GLB,,, | Performed by: NURSE PRACTITIONER

## 2020-11-05 PROCEDURE — 1101F PT FALLS ASSESS-DOCD LE1/YR: CPT | Mod: CPTII,S$GLB,, | Performed by: NURSE PRACTITIONER

## 2020-11-05 PROCEDURE — 93010 ELECTROCARDIOGRAM REPORT: CPT | Mod: S$GLB,,, | Performed by: INTERNAL MEDICINE

## 2020-11-05 PROCEDURE — 1125F AMNT PAIN NOTED PAIN PRSNT: CPT | Mod: S$GLB,,, | Performed by: NURSE PRACTITIONER

## 2020-11-05 NOTE — PROGRESS NOTES
"Subjective:       Patient ID: Cedric Gupta Jr. is a 80 y.o. male.    Chief Complaint: Chest Pain    Pt has no history of CAD, never had stress test.     Chest Pain   This is a new problem. The current episode started 1 to 4 weeks ago. The onset quality is sudden. Episode frequency: happen twice for a few minutes. The pain is present in the substernal region (left). Associated symptoms include orthopnea and shortness of breath. Pertinent negatives include no abdominal pain, back pain, dizziness, headaches, leg pain, lower extremity edema, nausea, palpitations, sputum production, syncope, vomiting or weakness. Associated symptoms comments: Chest pain once at rest, once with exertion.   SOB going up stairs (new).     Review of Systems   Constitutional: Negative for activity change and appetite change.   HENT: Negative for congestion, postnasal drip, rhinorrhea and sinus pressure.    Eyes: Negative for pain and redness.   Respiratory: Positive for chest tightness and shortness of breath. Negative for sputum production and choking.    Cardiovascular: Positive for chest pain and orthopnea. Negative for palpitations, leg swelling and syncope.   Gastrointestinal: Negative for abdominal distention, abdominal pain, blood in stool, constipation, diarrhea, nausea and vomiting.   Endocrine: Negative for polydipsia and polyphagia.   Genitourinary: Negative for dysuria and hematuria.   Musculoskeletal: Negative for arthralgias, back pain and myalgias.   Skin: Negative for color change and rash.   Neurological: Negative for dizziness, weakness and headaches.   Psychiatric/Behavioral: Negative for agitation and behavioral problems.       Past medical, surgical, family and social history reviewed.  Objective:     Vitals:    11/05/20 1042   BP: (!) 152/78   Pulse: 72   Resp: 18   Temp: 98.4 °F (36.9 °C)   TempSrc: Temporal   Weight: 88.9 kg (195 lb 14.1 oz)   Height: 5' 11" (1.803 m)   PainSc:   3   PainLoc: Knee     Body mass " index is 27.32 kg/m².     Physical Exam  Constitutional:       General: He is not in acute distress.     Appearance: He is well-developed. He is not diaphoretic.   HENT:      Head: Normocephalic and atraumatic.      Right Ear: Hearing, tympanic membrane, ear canal and external ear normal.      Left Ear: Hearing, tympanic membrane, ear canal and external ear normal.      Nose: Nose normal.      Mouth/Throat:      Pharynx: Uvula midline.   Eyes:      General:         Right eye: No discharge.         Left eye: No discharge.      Conjunctiva/sclera: Conjunctivae normal.      Pupils: Pupils are equal, round, and reactive to light.   Neck:      Musculoskeletal: Normal range of motion and neck supple.      Thyroid: No thyromegaly.      Vascular: No carotid bruit or JVD.      Trachea: Trachea normal.   Cardiovascular:      Rate and Rhythm: Normal rate and regular rhythm.      Heart sounds: No murmur. No friction rub. No gallop.    Pulmonary:      Effort: Pulmonary effort is normal. No respiratory distress.      Breath sounds: Normal breath sounds. No wheezing or rales.   Chest:      Chest wall: No tenderness.   Abdominal:      General: Bowel sounds are normal. There is no distension.      Palpations: Abdomen is soft. There is no mass.      Tenderness: There is no abdominal tenderness. There is no guarding or rebound.   Musculoskeletal: Normal range of motion.   Skin:     General: Skin is warm and dry.   Neurological:      Mental Status: He is alert and oriented to person, place, and time.      Coordination: Coordination normal.   Psychiatric:         Behavior: Behavior normal.         Thought Content: Thought content normal.         Judgment: Judgment normal.         Assessment:       1. Chest pain, unspecified type    2. KING (dyspnea on exertion)        Plan:       Cedric was seen today for chest pain and knee pain.    Diagnoses and all orders for this visit:    Chest pain, unspecified type  -     IN OFFICE EKG 12-LEAD  (to Muse)  -     Nuclear Stress - Cardiology Interpreted; Future  EKG personally reviewed by me here in the clinic in shows sinus bradycardia with a rate of to the and nonspecific T-wave abnormalities in the inferior leads.     KING (dyspnea on exertion)  -     IN OFFICE EKG 12-LEAD (to Muse)  -     Nuclear Stress - Cardiology Interpreted; Future    ? Unsure if KING is from deconditioning, will check stress test       follow up in 2 weeks for BP check

## 2020-11-05 NOTE — TELEPHONE ENCOUNTER
----- Message from Wily Pate sent at 11/5/2020 12:20 PM CST -----  Contact: patient  Patient called in and stated he just left office and has nuclear med stress test scheduled on 11/12/20 at [facility] and needs to reschedule.    Patient call back number is [phone]

## 2020-11-10 ENCOUNTER — TELEPHONE (OUTPATIENT)
Dept: FAMILY MEDICINE | Facility: CLINIC | Age: 80
End: 2020-11-10

## 2020-11-10 DIAGNOSIS — R07.9 CHEST PAIN, UNSPECIFIED TYPE: ICD-10-CM

## 2020-11-10 DIAGNOSIS — R06.09 DOE (DYSPNEA ON EXERTION): Primary | ICD-10-CM

## 2020-11-10 NOTE — TELEPHONE ENCOUNTER
----- Message from Brian Sebastian sent at 11/10/2020 12:51 PM CST -----  Regarding: advice  Contact: self  Type: Needs Medical Advice  Who Called:  self  Symptoms (please be specific):    How long has patient had these symptoms:    Pharmacy name and phone #:    Best Call Back Number: [phone]  Additional Information: SYL TOMPKINS calling regarding the test for schedule stress test. Patient may not be able to do the stress test due to a problem with right knee.

## 2020-11-10 NOTE — TELEPHONE ENCOUNTER
Patient is scheduled for exercise stress test on 11/12 and states since most recent office visit, right knee pain has increased. Patient is concerned about this as he feels that he will not be able to complete this test due to the pain. Patient requesting recommendations from provider, please advise.

## 2020-11-10 NOTE — TELEPHONE ENCOUNTER
----- Message from Nina Alonso sent at 11/10/2020  4:19 PM CST -----  Contact: self  Patient is requesting a call back to discuss his stress tests on Thursday 11/12, he has couple of questions.  He could hardly walk today and that is the reason for the change, call back at [phone] (home).  Thanks

## 2020-11-12 ENCOUNTER — PATIENT MESSAGE (OUTPATIENT)
Dept: FAMILY MEDICINE | Facility: CLINIC | Age: 80
End: 2020-11-12

## 2020-11-12 ENCOUNTER — TELEPHONE (OUTPATIENT)
Dept: FAMILY MEDICINE | Facility: CLINIC | Age: 80
End: 2020-11-12

## 2020-11-12 DIAGNOSIS — R94.39 POSITIVE CARDIAC STRESS TEST: Primary | ICD-10-CM

## 2020-11-12 NOTE — TELEPHONE ENCOUNTER
Stress test positive-shows possible decrease in blood flow to the heart (coronary arteries) when stressed. Please set up with cardiology as he may need an angiogram

## 2020-11-23 ENCOUNTER — CLINICAL SUPPORT (OUTPATIENT)
Dept: FAMILY MEDICINE | Facility: CLINIC | Age: 80
End: 2020-11-23
Payer: MEDICARE

## 2020-11-23 VITALS
TEMPERATURE: 98 F | RESPIRATION RATE: 16 BRPM | OXYGEN SATURATION: 97 % | HEART RATE: 67 BPM | SYSTOLIC BLOOD PRESSURE: 132 MMHG | DIASTOLIC BLOOD PRESSURE: 70 MMHG

## 2020-11-23 NOTE — PROGRESS NOTES
Blood pressure reading after 15 minutes was 132/70, Pulse 67.  Reading within normal limits.  Provider not notified.       Cedric Gupta Jr. 80 y.o. male is here today for Blood Pressure check.   History of HTN no.    Review of patient's allergies indicates:   Allergen Reactions    Amantadine Other (See Comments)     Depression     Creatinine   Date Value Ref Range Status   01/02/2020 0.9 0.5 - 1.4 mg/dL Final     Sodium   Date Value Ref Range Status   01/02/2020 142 136 - 145 mmol/L Final     Potassium   Date Value Ref Range Status   01/02/2020 4.3 3.5 - 5.1 mmol/L Final   ]  Patient does not take any medications.   No current outpatient medications on file.    Current Facility-Administered Medications:     aminophylline injection 75 mg, 75 mg, Intravenous, Once PRN, Zulema Mejia NP  Does patient have record of home blood pressure readings no. Readings have been averaging n/a.   Last dose of blood pressure medication was taken at n/a.  Patient is asymptomatic.   Complains of n/a.

## 2020-11-30 ENCOUNTER — OFFICE VISIT (OUTPATIENT)
Dept: CARDIOLOGY | Facility: CLINIC | Age: 80
End: 2020-11-30
Payer: MEDICARE

## 2020-11-30 VITALS
SYSTOLIC BLOOD PRESSURE: 156 MMHG | DIASTOLIC BLOOD PRESSURE: 80 MMHG | HEART RATE: 76 BPM | WEIGHT: 192.88 LBS | BODY MASS INDEX: 27 KG/M2 | HEIGHT: 71 IN

## 2020-11-30 DIAGNOSIS — R07.2 PRECORDIAL PAIN: Primary | ICD-10-CM

## 2020-11-30 DIAGNOSIS — Z03.818 ENCOUNTER FOR OBSERVATION FOR SUSPECTED EXPOSURE TO OTHER BIOLOGICAL AGENTS RULED OUT: ICD-10-CM

## 2020-11-30 DIAGNOSIS — E78.00 PURE HYPERCHOLESTEROLEMIA: ICD-10-CM

## 2020-11-30 DIAGNOSIS — I10 ESSENTIAL HYPERTENSION: ICD-10-CM

## 2020-11-30 DIAGNOSIS — R94.39 ABNORMAL NUCLEAR STRESS TEST: ICD-10-CM

## 2020-11-30 PROCEDURE — 99999 PR PBB SHADOW E&M-EST. PATIENT-LVL III: CPT | Mod: PBBFAC,HCNC,, | Performed by: INTERNAL MEDICINE

## 2020-11-30 PROCEDURE — 99204 OFFICE O/P NEW MOD 45 MIN: CPT | Mod: HCNC,S$GLB,, | Performed by: INTERNAL MEDICINE

## 2020-11-30 PROCEDURE — 1126F PR PAIN SEVERITY QUANTIFIED, NO PAIN PRESENT: ICD-10-PCS | Mod: HCNC,S$GLB,, | Performed by: INTERNAL MEDICINE

## 2020-11-30 PROCEDURE — 3288F FALL RISK ASSESSMENT DOCD: CPT | Mod: HCNC,CPTII,S$GLB, | Performed by: INTERNAL MEDICINE

## 2020-11-30 PROCEDURE — 1101F PT FALLS ASSESS-DOCD LE1/YR: CPT | Mod: HCNC,CPTII,S$GLB, | Performed by: INTERNAL MEDICINE

## 2020-11-30 PROCEDURE — 1101F PR PT FALLS ASSESS DOC 0-1 FALLS W/OUT INJ PAST YR: ICD-10-PCS | Mod: HCNC,CPTII,S$GLB, | Performed by: INTERNAL MEDICINE

## 2020-11-30 PROCEDURE — 3288F PR FALLS RISK ASSESSMENT DOCUMENTED: ICD-10-PCS | Mod: HCNC,CPTII,S$GLB, | Performed by: INTERNAL MEDICINE

## 2020-11-30 PROCEDURE — 1126F AMNT PAIN NOTED NONE PRSNT: CPT | Mod: HCNC,S$GLB,, | Performed by: INTERNAL MEDICINE

## 2020-11-30 PROCEDURE — 1159F MED LIST DOCD IN RCRD: CPT | Mod: HCNC,S$GLB,, | Performed by: INTERNAL MEDICINE

## 2020-11-30 PROCEDURE — 99999 PR PBB SHADOW E&M-EST. PATIENT-LVL III: ICD-10-PCS | Mod: PBBFAC,HCNC,, | Performed by: INTERNAL MEDICINE

## 2020-11-30 PROCEDURE — 99204 PR OFFICE/OUTPT VISIT, NEW, LEVL IV, 45-59 MIN: ICD-10-PCS | Mod: HCNC,S$GLB,, | Performed by: INTERNAL MEDICINE

## 2020-11-30 PROCEDURE — 1159F PR MEDICATION LIST DOCUMENTED IN MEDICAL RECORD: ICD-10-PCS | Mod: HCNC,S$GLB,, | Performed by: INTERNAL MEDICINE

## 2020-11-30 RX ORDER — LOSARTAN POTASSIUM 25 MG/1
25 TABLET ORAL DAILY
Qty: 90 TABLET | Refills: 3 | Status: ON HOLD | OUTPATIENT
Start: 2020-11-30 | End: 2021-04-08 | Stop reason: HOSPADM

## 2020-11-30 RX ORDER — ROSUVASTATIN CALCIUM 10 MG/1
10 TABLET, COATED ORAL DAILY
Qty: 90 TABLET | Refills: 3 | Status: SHIPPED | OUTPATIENT
Start: 2020-11-30 | End: 2021-09-20 | Stop reason: SDUPTHER

## 2020-11-30 NOTE — PROGRESS NOTES
Subjective:    Patient ID:  Cedric Gupta Jr. is a 80 y.o. male who presents for evaluation of Shortness of Breath (review stress test) and Chest Pain      Pt referred for cardiac evaluation. He has been getting more SOB with exertion over the past year and had 2 episodes of chest pain recently. His PCP ordered a SPECT stress which was abnormal suggesting inferior ischemia.       Review of Systems   Constitution: Negative for weight gain and weight loss.   HENT: Negative.    Eyes: Negative.    Cardiovascular: Positive for chest pain and dyspnea on exertion. Negative for claudication, cyanosis, irregular heartbeat, leg swelling, near-syncope, orthopnea (no PND), palpitations and syncope.   Respiratory: Negative for cough, hemoptysis, shortness of breath and snoring.    Endocrine: Negative.    Skin: Negative.    Musculoskeletal: Negative for joint pain, muscle cramps, muscle weakness and myalgias.   Gastrointestinal: Negative for diarrhea, hematemesis, nausea and vomiting.   Genitourinary: Negative.    Neurological: Negative for dizziness, focal weakness, light-headedness, loss of balance, numbness, paresthesias and seizures.   Psychiatric/Behavioral: Negative.         Objective:    Physical Exam   Constitutional: He is oriented to person, place, and time. He appears well-developed and well-nourished.   HENT:   Mouth/Throat: Oropharynx is clear and moist.   Eyes: Pupils are equal, round, and reactive to light.   Neck: Normal range of motion. No thyromegaly present.   Cardiovascular: Normal rate, regular rhythm, S1 normal, S2 normal, normal heart sounds, intact distal pulses and normal pulses.  No extrasystoles are present. PMI is not displaced. Exam reveals no friction rub.   No murmur heard.  Pulmonary/Chest: Effort normal and breath sounds normal. He has no wheezes. He has no rales. He exhibits no tenderness.   Abdominal: Soft. Bowel sounds are normal. He exhibits no distension and no mass. There is no abdominal  tenderness.   Musculoskeletal: Normal range of motion.         General: No edema.   Neurological: He is alert and oriented to person, place, and time.   Skin: Skin is warm and dry.   Vitals reviewed.      Test(s) Reviewed  I have reviewed the following in detail:  [x] Stress test   [] Angiography   [] Echocardiogram   [x] Labs   [] Other:         Assessment:       1. Precordial pain    2. Abnormal nuclear stress test    3. Pure hypercholesterolemia    4. Essential hypertension    5. Encounter for observation for suspected exposure to other biological agents ruled out         Plan:       We discussed his symptoms, risk factors and positive SPECT stress   Have recommened proceeding with coronary angiogram   Discussed risks and benefits of cath and coronary angiogram and alternatives. Pt understands, all questions answered and he agrees to proceed with cath.  Start losartan 25 mg daily  Restart crestor 10 mg daily

## 2020-11-30 NOTE — LETTER
November 30, 2020      [place]  [address]  [facility] [address]           [facility]  [address] [place] [address]  Phone: [phone]          Patient: Cedric Gupta Jr.   MR Number: [id number]   YOB: 1940   Date of Visit: 11/30/2020       Dear Zulema Mejia:    Thank you for referring Cedric Gupta to me for evaluation. Attached you will find relevant portions of my assessment and plan of care.    If you have questions, please do not hesitate to call me. I look forward to following Cedric Gupta along with you.    Sincerely,    Tyree Knutson MD    Enclosure  CC:  No Recipients    If you would like to receive this communication electronically, please contact [email or web address] or [phone] to request more information on [facility] Link access.    For providers and/or their staff who would like to refer a patient to [facility], please contact us through our one-stop-shop provider referral line, [facility] , at [phone].    If you feel you have received this communication in error or would no longer like to receive these types of communications, please e-mail [email or web address]

## 2020-12-07 ENCOUNTER — TELEPHONE (OUTPATIENT)
Dept: FAMILY MEDICINE | Facility: CLINIC | Age: 80
End: 2020-12-07

## 2020-12-07 NOTE — TELEPHONE ENCOUNTER
Spoke with pt regarding pain in his right side just under rib cage. Pt states he was calling his primary care to notify of pain, that pt states feels like a spasm. Pt wanted to make sure this was not something that would affect angiogram. Pt states that pain comes and goes for the last month now. Please advise.

## 2020-12-07 NOTE — TELEPHONE ENCOUNTER
----- Message from Meena Cruz sent at 12/7/2020  9:18 AM CST -----  Regarding: Advice  Contact: patient  Patient want to speak with a nurse regarding angiogram questions please call back at [phone] (home)     Case number [id number]

## 2020-12-08 ENCOUNTER — LAB VISIT (OUTPATIENT)
Dept: FAMILY MEDICINE | Facility: CLINIC | Age: 80
End: 2020-12-08
Payer: MEDICARE

## 2020-12-08 DIAGNOSIS — Z03.818 ENCOUNTER FOR OBSERVATION FOR SUSPECTED EXPOSURE TO OTHER BIOLOGICAL AGENTS RULED OUT: ICD-10-CM

## 2020-12-08 PROCEDURE — U0003 INFECTIOUS AGENT DETECTION BY NUCLEIC ACID (DNA OR RNA); SEVERE ACUTE RESPIRATORY SYNDROME CORONAVIRUS 2 (SARS-COV-2) (CORONAVIRUS DISEASE [COVID-19]), AMPLIFIED PROBE TECHNIQUE, MAKING USE OF HIGH THROUGHPUT TECHNOLOGIES AS DESCRIBED BY CMS-2020-01-R: HCPCS | Mod: HCNC

## 2020-12-09 LAB — SARS-COV-2 RNA RESP QL NAA+PROBE: NOT DETECTED

## 2020-12-11 ENCOUNTER — PATIENT MESSAGE (OUTPATIENT)
Dept: OTHER | Facility: OTHER | Age: 80
End: 2020-12-11

## 2020-12-14 NOTE — TELEPHONE ENCOUNTER
----- Message from Emily Ashby sent at 12/14/2020  8:34 AM CST -----  Contact: patient  Type: Needs Medical Advice  Who Called:  patient  Symptoms (please be specific):  na  How long has patient had these symptoms:  li  Pharmacy name and phone #:  li  Best Call Back Number: [phone]  Additional Information: Patient is asking to speak to the nurse about getting a digital cuff so he can monitor his BP.  Please call to advise.  Thanks!

## 2020-12-16 ENCOUNTER — OFFICE VISIT (OUTPATIENT)
Dept: FAMILY MEDICINE | Facility: CLINIC | Age: 80
End: 2020-12-16
Payer: MEDICARE

## 2020-12-16 VITALS
RESPIRATION RATE: 18 BRPM | WEIGHT: 188.5 LBS | HEART RATE: 77 BPM | HEIGHT: 71 IN | OXYGEN SATURATION: 96 % | BODY MASS INDEX: 26.39 KG/M2 | TEMPERATURE: 99 F | DIASTOLIC BLOOD PRESSURE: 60 MMHG | SYSTOLIC BLOOD PRESSURE: 122 MMHG

## 2020-12-16 DIAGNOSIS — R07.89 ATYPICAL CHEST PAIN: Primary | ICD-10-CM

## 2020-12-16 DIAGNOSIS — I25.10 ASCVD (ARTERIOSCLEROTIC CARDIOVASCULAR DISEASE): ICD-10-CM

## 2020-12-16 DIAGNOSIS — R79.9 ABNORMAL FINDING OF BLOOD CHEMISTRY, UNSPECIFIED: ICD-10-CM

## 2020-12-16 PROCEDURE — 1159F MED LIST DOCD IN RCRD: CPT | Mod: S$GLB,,, | Performed by: FAMILY MEDICINE

## 2020-12-16 PROCEDURE — 1159F PR MEDICATION LIST DOCUMENTED IN MEDICAL RECORD: ICD-10-PCS | Mod: S$GLB,,, | Performed by: FAMILY MEDICINE

## 2020-12-16 PROCEDURE — 1126F AMNT PAIN NOTED NONE PRSNT: CPT | Mod: S$GLB,,, | Performed by: FAMILY MEDICINE

## 2020-12-16 PROCEDURE — 1126F PR PAIN SEVERITY QUANTIFIED, NO PAIN PRESENT: ICD-10-PCS | Mod: S$GLB,,, | Performed by: FAMILY MEDICINE

## 2020-12-16 PROCEDURE — 3288F PR FALLS RISK ASSESSMENT DOCUMENTED: ICD-10-PCS | Mod: CPTII,S$GLB,, | Performed by: FAMILY MEDICINE

## 2020-12-16 PROCEDURE — 99214 PR OFFICE/OUTPT VISIT, EST, LEVL IV, 30-39 MIN: ICD-10-PCS | Mod: S$GLB,,, | Performed by: FAMILY MEDICINE

## 2020-12-16 PROCEDURE — 99214 OFFICE O/P EST MOD 30 MIN: CPT | Mod: S$GLB,,, | Performed by: FAMILY MEDICINE

## 2020-12-16 PROCEDURE — 1101F PR PT FALLS ASSESS DOC 0-1 FALLS W/OUT INJ PAST YR: ICD-10-PCS | Mod: CPTII,S$GLB,, | Performed by: FAMILY MEDICINE

## 2020-12-16 PROCEDURE — 1101F PT FALLS ASSESS-DOCD LE1/YR: CPT | Mod: CPTII,S$GLB,, | Performed by: FAMILY MEDICINE

## 2020-12-16 PROCEDURE — 3288F FALL RISK ASSESSMENT DOCD: CPT | Mod: CPTII,S$GLB,, | Performed by: FAMILY MEDICINE

## 2020-12-17 ENCOUNTER — OFFICE VISIT (OUTPATIENT)
Dept: VASCULAR SURGERY | Facility: CLINIC | Age: 80
End: 2020-12-17
Payer: MEDICARE

## 2020-12-17 VITALS
HEIGHT: 71 IN | HEART RATE: 74 BPM | DIASTOLIC BLOOD PRESSURE: 66 MMHG | WEIGHT: 189.81 LBS | SYSTOLIC BLOOD PRESSURE: 125 MMHG | BODY MASS INDEX: 26.57 KG/M2

## 2020-12-17 DIAGNOSIS — I25.10 ATHEROSCLEROSIS OF NATIVE CORONARY ARTERY OF NATIVE HEART WITHOUT ANGINA PECTORIS: Primary | ICD-10-CM

## 2020-12-17 DIAGNOSIS — I25.10 CORONARY ATHEROSCLEROSIS OF NATIVE CORONARY ARTERY: ICD-10-CM

## 2020-12-17 DIAGNOSIS — I65.23 BILATERAL CAROTID ARTERY STENOSIS: Primary | ICD-10-CM

## 2020-12-17 DIAGNOSIS — I25.10 CORONARY ARTERY DISEASE INVOLVING NATIVE CORONARY ARTERY OF NATIVE HEART WITHOUT ANGINA PECTORIS: ICD-10-CM

## 2020-12-17 PROCEDURE — 99204 PR OFFICE/OUTPT VISIT, NEW, LEVL IV, 45-59 MIN: ICD-10-PCS | Mod: HCNC,S$GLB,, | Performed by: THORACIC SURGERY (CARDIOTHORACIC VASCULAR SURGERY)

## 2020-12-17 PROCEDURE — 1101F PT FALLS ASSESS-DOCD LE1/YR: CPT | Mod: HCNC,CPTII,S$GLB, | Performed by: THORACIC SURGERY (CARDIOTHORACIC VASCULAR SURGERY)

## 2020-12-17 PROCEDURE — 1159F MED LIST DOCD IN RCRD: CPT | Mod: HCNC,S$GLB,, | Performed by: THORACIC SURGERY (CARDIOTHORACIC VASCULAR SURGERY)

## 2020-12-17 PROCEDURE — 99999 PR PBB SHADOW E&M-EST. PATIENT-LVL III: ICD-10-PCS | Mod: PBBFAC,HCNC,, | Performed by: THORACIC SURGERY (CARDIOTHORACIC VASCULAR SURGERY)

## 2020-12-17 PROCEDURE — 1126F PR PAIN SEVERITY QUANTIFIED, NO PAIN PRESENT: ICD-10-PCS | Mod: HCNC,S$GLB,, | Performed by: THORACIC SURGERY (CARDIOTHORACIC VASCULAR SURGERY)

## 2020-12-17 PROCEDURE — 1101F PR PT FALLS ASSESS DOC 0-1 FALLS W/OUT INJ PAST YR: ICD-10-PCS | Mod: HCNC,CPTII,S$GLB, | Performed by: THORACIC SURGERY (CARDIOTHORACIC VASCULAR SURGERY)

## 2020-12-17 PROCEDURE — 1126F AMNT PAIN NOTED NONE PRSNT: CPT | Mod: HCNC,S$GLB,, | Performed by: THORACIC SURGERY (CARDIOTHORACIC VASCULAR SURGERY)

## 2020-12-17 PROCEDURE — 1159F PR MEDICATION LIST DOCUMENTED IN MEDICAL RECORD: ICD-10-PCS | Mod: HCNC,S$GLB,, | Performed by: THORACIC SURGERY (CARDIOTHORACIC VASCULAR SURGERY)

## 2020-12-17 PROCEDURE — 99999 PR PBB SHADOW E&M-EST. PATIENT-LVL III: CPT | Mod: PBBFAC,HCNC,, | Performed by: THORACIC SURGERY (CARDIOTHORACIC VASCULAR SURGERY)

## 2020-12-17 PROCEDURE — 3288F FALL RISK ASSESSMENT DOCD: CPT | Mod: HCNC,CPTII,S$GLB, | Performed by: THORACIC SURGERY (CARDIOTHORACIC VASCULAR SURGERY)

## 2020-12-17 PROCEDURE — 99204 OFFICE O/P NEW MOD 45 MIN: CPT | Mod: HCNC,S$GLB,, | Performed by: THORACIC SURGERY (CARDIOTHORACIC VASCULAR SURGERY)

## 2020-12-17 PROCEDURE — 3288F PR FALLS RISK ASSESSMENT DOCUMENTED: ICD-10-PCS | Mod: HCNC,CPTII,S$GLB, | Performed by: THORACIC SURGERY (CARDIOTHORACIC VASCULAR SURGERY)

## 2020-12-17 RX ORDER — CHLORHEXIDINE GLUCONATE ORAL RINSE 1.2 MG/ML
10 SOLUTION DENTAL
Status: CANCELLED | OUTPATIENT
Start: 2020-12-17

## 2020-12-17 RX ORDER — MUPIROCIN 20 MG/G
OINTMENT TOPICAL
Status: CANCELLED | OUTPATIENT
Start: 2020-12-17

## 2020-12-17 RX ORDER — LIDOCAINE HYDROCHLORIDE 10 MG/ML
1 INJECTION, SOLUTION EPIDURAL; INFILTRATION; INTRACAUDAL; PERINEURAL ONCE
Status: CANCELLED | OUTPATIENT
Start: 2020-12-17 | End: 2020-12-17

## 2020-12-17 NOTE — PROGRESS NOTES
This gentleman was referred to the office with coronary artery disease.  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  He has had angina on at least a few occasions and an abnormal stress test prompting the recent studies.  He has chronic hypertension.  His other issues are described in the problem list.  He smokes marijuana but not tobacco.  He has had no major surgeries.  He has chronic pain in the right knee.  Family history is not pertinent this time.  Review of systems is significant for the angina occurring with exertion.  On exam vital signs stable.  Pupils are equal and round reactive to light.  Neck is supple.  Chest is equal breath sounds.  Heart is in a regular rate rhythm.  Abdomen is benign.  Perfusion to the legs and feet seems to be satisfactory.  The recent studies were reviewed.  He has multivessel disease and preserved left ventricular function.  Recommendations for coronary artery bypass grafting.  The procedure and risks were discussed with the patient and sister.  They seem to be understanding and agreeable.  Risks and potential complications were discussed as well.

## 2020-12-21 ENCOUNTER — PATIENT MESSAGE (OUTPATIENT)
Dept: ADMINISTRATIVE | Facility: OTHER | Age: 80
End: 2020-12-21

## 2020-12-21 ENCOUNTER — TELEPHONE (OUTPATIENT)
Dept: VASCULAR SURGERY | Facility: CLINIC | Age: 80
End: 2020-12-21

## 2020-12-21 NOTE — TELEPHONE ENCOUNTER
----- Message from Agapito Reeder sent at 12/21/2020 12:05 PM CST -----  Type: Needs Medical Advice    Who Called:  Patient  Best Call Back Number: [phone]  Additional Information: Patient would like to discuss medical device. Please call to advise. Thanks!

## 2020-12-21 NOTE — TELEPHONE ENCOUNTER
Calling about digital BP program. I told him that Dr Stein put in the orders and that she will be monitoring his readings.

## 2020-12-22 ENCOUNTER — PATIENT MESSAGE (OUTPATIENT)
Dept: ADMINISTRATIVE | Facility: OTHER | Age: 80
End: 2020-12-22

## 2020-12-23 ENCOUNTER — TELEPHONE (OUTPATIENT)
Dept: VASCULAR SURGERY | Facility: CLINIC | Age: 80
End: 2020-12-23

## 2020-12-23 NOTE — TELEPHONE ENCOUNTER
----- Message from Zofia Rodriguez sent at 12/23/2020  9:24 AM CST -----  Contact: pt  Type: Needs Medical Advice  Who Called:  PATIENT    Best Call Back Number: [phone]  Additional Information: Patient called he is having surgery with you on Janurary 8TH he is asking   If he will be able to donate part of his liver during the surgery  Patient is asking for a call back to give him advise. .

## 2020-12-26 NOTE — PROGRESS NOTES
Subjective:       Patient ID: Cedric Gupta Jr. is a 80 y.o. male.    Chief Complaint: Knee Pain (Symptoms about weeks) and Chest pain/discomfort    HPI   The patient is a 80-year-old who is here today to discuss some chest pain.  He had been having some recent right-sided chest pain almost like gallbladder pain.  He localizes the pain to just below the ribs on the right.  He describes the pain as a spasm of the muscle.  He had been having this 2 or 3 times a week but has not had it now in 4-5 weeks.    Of note, he recently had an angiogram which showed multivessel disease.  He has an upcoming appointment with cardiovascular surgeon to discuss bypass surgery.  He was experiencing some left-sided chest pain after climbing off of a tractor and this is what prompted a stress test which was abnormal and a subsequent angiogram which was also abnormal.  Since his angiogram on December 11th, he has been started on Imdur, Crestor, aspirin and Plavix.    He also scheduled this appointment to discuss knee pain but that is no longer a problem.  He had been cutting a tree and was kneeling on his knees to cut this tree with the chainsaw.  After doing this, he had knee pain for 3 days and was having trouble walking and so was using crutches.  Thankfully, his knee pain gradually improved and is fine now with no residual issues.    Review of Systems   Constitutional: Negative for activity change, appetite change, chills, diaphoresis, fatigue, fever and unexpected weight change.   HENT: Negative for congestion, ear pain, postnasal drip, rhinorrhea, sinus pressure, sneezing, sore throat and trouble swallowing.    Eyes: Negative for pain, discharge and visual disturbance.   Respiratory: Negative for cough, chest tightness, shortness of breath and wheezing.    Cardiovascular: Positive for chest pain. Negative for palpitations and leg swelling.   Gastrointestinal: Negative for abdominal distention, abdominal pain, blood in stool,  constipation, diarrhea, nausea and vomiting.   Skin: Negative for rash.       Objective:      Physical Exam  Constitutional:       General: He is not in acute distress.     Appearance: Normal appearance. He is well-developed.   HENT:      Head: Normocephalic and atraumatic.      Right Ear: Hearing, tympanic membrane, ear canal and external ear normal.      Left Ear: Hearing, tympanic membrane, ear canal and external ear normal.      Nose: Nose normal.      Mouth/Throat:      Mouth: No oral lesions.      Pharynx: No oropharyngeal exudate or posterior oropharyngeal erythema.   Eyes:      General: Lids are normal. No scleral icterus.     Extraocular Movements: Extraocular movements intact.      Conjunctiva/sclera: Conjunctivae normal.      Pupils: Pupils are equal, round, and reactive to light.   Neck:      Musculoskeletal: Normal range of motion and neck supple.      Thyroid: No thyroid mass or thyromegaly.      Vascular: No carotid bruit.   Cardiovascular:      Rate and Rhythm: Normal rate and regular rhythm.  No extrasystoles are present.     Chest Wall: PMI is not displaced.      Heart sounds: Normal heart sounds. No murmur. No gallop.    Pulmonary:      Effort: Pulmonary effort is normal. No accessory muscle usage or respiratory distress.      Breath sounds: Normal breath sounds.   Abdominal:      General: Bowel sounds are normal. There is no abdominal bruit.      Palpations: Abdomen is soft.      Tenderness: There is no abdominal tenderness. There is no rebound.   Lymphadenopathy:      Head:      Right side of head: No submental or submandibular adenopathy.      Left side of head: No submental or submandibular adenopathy.      Cervical:      Right cervical: No superficial, deep or posterior cervical adenopathy.     Left cervical: No superficial, deep or posterior cervical adenopathy.      Upper Body:      Right upper body: No supraclavicular adenopathy.      Left upper body: No supraclavicular adenopathy.  "  Skin:     General: Skin is warm and dry.   Neurological:      Mental Status: He is alert and oriented to person, place, and time.       Blood pressure 122/60, pulse 77, temperature 98.5 °F (36.9 °C), temperature source Temporal, resp. rate 18, height 5' 10.5" (1.791 m), weight 85.5 kg (188 lb 7.9 oz), SpO2 96 %.Body mass index is 26.66 kg/m².        A/P:  1) atypical right-sided abdominal pain.  Currently asymptomatic.  If this recurs, he will let me know.  It is reassuring that his LFTs were recently normal.  This may be musculoskeletal in origin  2)  ASCVD with multivessel disease.  Newly diagnosed.  Follow-up with CV surgeon as planned  3) knee pain.  Resolved.  If this recurs, he will let me know      4) hyperlipidemia.  Status unknown.  We will check FLP 3 months after starting the Crestor.      As long as he does well, I will see him back in 3 months with labs prior or sooner if needed  "

## 2020-12-28 ENCOUNTER — TELEPHONE (OUTPATIENT)
Dept: FAMILY MEDICINE | Facility: CLINIC | Age: 80
End: 2020-12-28

## 2020-12-28 NOTE — TELEPHONE ENCOUNTER
----- Message from Zofia Rodriguez sent at 12/28/2020 11:33 AM CST -----  Contact: pt  Type: Needs Medical Advice  Who Called:  patient  Symptoms (please be specific):  blood Pressure Readings on his Machine  Best Call Back Number: [phone]  Additional Information: Patient is having trouble sending  you his Bp reading thru his machine

## 2020-12-28 NOTE — TELEPHONE ENCOUNTER
"It appears he is still being "onboarded"  This should be completed in a couple of days and then reading can be uploaded  If they don't contact you by Wednesday, let me know  "

## 2020-12-28 NOTE — TELEPHONE ENCOUNTER
Patient reports he is experiencing difficulty sending BP readings through digital HTN program. Please advise if you have received this information, or are able to access this?

## 2020-12-30 ENCOUNTER — TELEPHONE (OUTPATIENT)
Dept: FAMILY MEDICINE | Facility: CLINIC | Age: 80
End: 2020-12-30

## 2020-12-30 NOTE — TELEPHONE ENCOUNTER
----- Message from Leann Li LPN sent at 12/30/2020  1:31 PM CST -----  Contact: Pt  Sent to Dr. Kwan in error  ----- Message -----  From: Jelly Martinez  Sent: 12/30/2020   1:07 PM CST  To: [facility] Staff    Pt states he has digital heart monitor and states he spoke with someone in the office regarding sending his BP results to the office and was told to call back if he hadn't heard anything. Pt states he has not heard anything back yet and is requesting a call back. Pt can be reached at [phone]

## 2020-12-30 NOTE — TELEPHONE ENCOUNTER
Patient states he believes he has completed registration, etc for HTN program. Contact information received from [facility] and given to patient for follow up and any additional steps needing completion.

## 2020-12-31 ENCOUNTER — PATIENT OUTREACH (OUTPATIENT)
Dept: OTHER | Facility: OTHER | Age: 80
End: 2020-12-31

## 2021-01-04 ENCOUNTER — TELEPHONE (OUTPATIENT)
Dept: VASCULAR SURGERY | Facility: CLINIC | Age: 81
End: 2021-01-04

## 2021-01-07 ENCOUNTER — TELEPHONE (OUTPATIENT)
Dept: CARDIOLOGY | Facility: CLINIC | Age: 81
End: 2021-01-07

## 2021-01-08 ENCOUNTER — PATIENT MESSAGE (OUTPATIENT)
Dept: CARDIOLOGY | Facility: CLINIC | Age: 81
End: 2021-01-08

## 2021-01-11 ENCOUNTER — IMMUNIZATION (OUTPATIENT)
Dept: FAMILY MEDICINE | Facility: CLINIC | Age: 81
End: 2021-01-11
Payer: MEDICARE

## 2021-01-11 DIAGNOSIS — Z23 NEED FOR VACCINATION: ICD-10-CM

## 2021-01-11 PROCEDURE — 91300 COVID-19, MRNA, LNP-S, PF, 30 MCG/0.3 ML DOSE VACCINE: CPT | Mod: HCNC,PBBFAC | Performed by: FAMILY MEDICINE

## 2021-01-12 ENCOUNTER — PATIENT MESSAGE (OUTPATIENT)
Dept: VASCULAR SURGERY | Facility: CLINIC | Age: 81
End: 2021-01-12

## 2021-01-13 ENCOUNTER — PATIENT MESSAGE (OUTPATIENT)
Dept: VASCULAR SURGERY | Facility: CLINIC | Age: 81
End: 2021-01-13

## 2021-01-15 PROCEDURE — 99453 PR REMOTE MONITR, PHYSIOL PARAM, INITIAL: ICD-10-PCS | Mod: S$GLB,,, | Performed by: FAMILY MEDICINE

## 2021-01-15 PROCEDURE — 99453 REM MNTR PHYSIOL PARAM SETUP: CPT | Mod: S$GLB,,, | Performed by: FAMILY MEDICINE

## 2021-01-22 ENCOUNTER — HOSPITAL ENCOUNTER (OUTPATIENT)
Dept: RADIOLOGY | Facility: HOSPITAL | Age: 81
Discharge: HOME | End: 2021-01-22
Attending: THORACIC SURGERY (CARDIOTHORACIC VASCULAR SURGERY)
Payer: MEDICARE

## 2021-01-22 DIAGNOSIS — I65.23 BILATERAL CAROTID ARTERY STENOSIS: ICD-10-CM

## 2021-01-22 PROCEDURE — 93880 EXTRACRANIAL BILAT STUDY: CPT | Mod: 26,,, | Performed by: RADIOLOGY

## 2021-01-22 PROCEDURE — 93880 EXTRACRANIAL BILAT STUDY: CPT | Mod: TC

## 2021-01-22 PROCEDURE — 93880 US CAROTID BILATERAL: ICD-10-PCS | Mod: 26,,, | Performed by: RADIOLOGY

## 2021-02-01 ENCOUNTER — IMMUNIZATION (OUTPATIENT)
Dept: FAMILY MEDICINE | Facility: CLINIC | Age: 81
End: 2021-02-01
Payer: MEDICARE

## 2021-02-01 DIAGNOSIS — Z23 NEED FOR VACCINATION: Primary | ICD-10-CM

## 2021-02-01 PROCEDURE — 91300 COVID-19, MRNA, LNP-S, PF, 30 MCG/0.3 ML DOSE VACCINE: CPT | Mod: HCNC,PBBFAC | Performed by: FAMILY MEDICINE

## 2021-02-01 PROCEDURE — 0002A COVID-19, MRNA, LNP-S, PF, 30 MCG/0.3 ML DOSE VACCINE: CPT | Mod: HCNC,PBBFAC | Performed by: FAMILY MEDICINE

## 2021-02-11 DIAGNOSIS — I25.10 ATHEROSCLEROSIS OF NATIVE CORONARY ARTERY OF NATIVE HEART WITHOUT ANGINA PECTORIS: Primary | ICD-10-CM

## 2021-02-28 ENCOUNTER — PATIENT MESSAGE (OUTPATIENT)
Dept: ADMINISTRATIVE | Facility: OTHER | Age: 81
End: 2021-02-28

## 2021-03-06 ENCOUNTER — LAB VISIT (OUTPATIENT)
Dept: FAMILY MEDICINE | Facility: CLINIC | Age: 81
End: 2021-03-06
Payer: MEDICARE

## 2021-03-06 DIAGNOSIS — I25.10 ATHEROSCLEROSIS OF NATIVE CORONARY ARTERY OF NATIVE HEART WITHOUT ANGINA PECTORIS: ICD-10-CM

## 2021-03-06 PROCEDURE — U0003 INFECTIOUS AGENT DETECTION BY NUCLEIC ACID (DNA OR RNA); SEVERE ACUTE RESPIRATORY SYNDROME CORONAVIRUS 2 (SARS-COV-2) (CORONAVIRUS DISEASE [COVID-19]), AMPLIFIED PROBE TECHNIQUE, MAKING USE OF HIGH THROUGHPUT TECHNOLOGIES AS DESCRIBED BY CMS-2020-01-R: HCPCS | Performed by: THORACIC SURGERY (CARDIOTHORACIC VASCULAR SURGERY)

## 2021-03-06 PROCEDURE — U0005 INFEC AGEN DETEC AMPLI PROBE: HCPCS | Performed by: THORACIC SURGERY (CARDIOTHORACIC VASCULAR SURGERY)

## 2021-03-07 LAB — SARS-COV-2 RNA RESP QL NAA+PROBE: NOT DETECTED

## 2021-03-08 ENCOUNTER — TELEPHONE (OUTPATIENT)
Dept: FAMILY MEDICINE | Facility: CLINIC | Age: 81
End: 2021-03-08

## 2021-03-09 PROBLEM — I25.10 CORONARY ATHEROSCLEROSIS OF NATIVE CORONARY ARTERY: Status: ACTIVE | Noted: 2021-03-09

## 2021-03-10 PROBLEM — Z95.1 S/P CABG X 4: Status: ACTIVE | Noted: 2021-03-10

## 2021-03-15 ENCOUNTER — TELEPHONE (OUTPATIENT)
Dept: VASCULAR SURGERY | Facility: CLINIC | Age: 81
End: 2021-03-15

## 2021-03-19 ENCOUNTER — TELEPHONE (OUTPATIENT)
Dept: CARDIOLOGY | Facility: CLINIC | Age: 81
End: 2021-03-19

## 2021-03-23 ENCOUNTER — OFFICE VISIT (OUTPATIENT)
Dept: CARDIOLOGY | Facility: CLINIC | Age: 81
End: 2021-03-23
Payer: MEDICARE

## 2021-03-23 VITALS
HEIGHT: 71 IN | HEART RATE: 72 BPM | WEIGHT: 184.75 LBS | SYSTOLIC BLOOD PRESSURE: 98 MMHG | BODY MASS INDEX: 25.86 KG/M2 | DIASTOLIC BLOOD PRESSURE: 50 MMHG

## 2021-03-23 DIAGNOSIS — I10 ESSENTIAL HYPERTENSION: ICD-10-CM

## 2021-03-23 DIAGNOSIS — Z95.1 S/P CABG X 4: ICD-10-CM

## 2021-03-23 DIAGNOSIS — I70.0 ATHEROSCLEROSIS OF AORTA: ICD-10-CM

## 2021-03-23 DIAGNOSIS — Z95.1 S/P CABG (CORONARY ARTERY BYPASS GRAFT): Primary | ICD-10-CM

## 2021-03-23 DIAGNOSIS — I25.118 CORONARY ARTERY DISEASE OF NATIVE ARTERY OF NATIVE HEART WITH STABLE ANGINA PECTORIS: ICD-10-CM

## 2021-03-23 PROCEDURE — 1101F PT FALLS ASSESS-DOCD LE1/YR: CPT | Mod: CPTII,S$GLB,, | Performed by: PHYSICIAN ASSISTANT

## 2021-03-23 PROCEDURE — 1126F AMNT PAIN NOTED NONE PRSNT: CPT | Mod: S$GLB,,, | Performed by: PHYSICIAN ASSISTANT

## 2021-03-23 PROCEDURE — 99999 PR PBB SHADOW E&M-EST. PATIENT-LVL III: CPT | Mod: PBBFAC,,, | Performed by: PHYSICIAN ASSISTANT

## 2021-03-23 PROCEDURE — 3078F DIAST BP <80 MM HG: CPT | Mod: CPTII,S$GLB,, | Performed by: PHYSICIAN ASSISTANT

## 2021-03-23 PROCEDURE — 99214 OFFICE O/P EST MOD 30 MIN: CPT | Mod: S$GLB,,, | Performed by: PHYSICIAN ASSISTANT

## 2021-03-23 PROCEDURE — 3288F FALL RISK ASSESSMENT DOCD: CPT | Mod: CPTII,S$GLB,, | Performed by: PHYSICIAN ASSISTANT

## 2021-03-23 PROCEDURE — 3074F SYST BP LT 130 MM HG: CPT | Mod: CPTII,S$GLB,, | Performed by: PHYSICIAN ASSISTANT

## 2021-03-23 PROCEDURE — 1159F MED LIST DOCD IN RCRD: CPT | Mod: S$GLB,,, | Performed by: PHYSICIAN ASSISTANT

## 2021-03-23 PROCEDURE — 1159F PR MEDICATION LIST DOCUMENTED IN MEDICAL RECORD: ICD-10-PCS | Mod: S$GLB,,, | Performed by: PHYSICIAN ASSISTANT

## 2021-03-23 PROCEDURE — 99214 PR OFFICE/OUTPT VISIT, EST, LEVL IV, 30-39 MIN: ICD-10-PCS | Mod: S$GLB,,, | Performed by: PHYSICIAN ASSISTANT

## 2021-03-23 PROCEDURE — 3078F PR MOST RECENT DIASTOLIC BLOOD PRESSURE < 80 MM HG: ICD-10-PCS | Mod: CPTII,S$GLB,, | Performed by: PHYSICIAN ASSISTANT

## 2021-03-23 PROCEDURE — 1157F ADVNC CARE PLAN IN RCRD: CPT | Mod: S$GLB,,, | Performed by: PHYSICIAN ASSISTANT

## 2021-03-23 PROCEDURE — 99499 RISK ADDL DX/OHS AUDIT: ICD-10-PCS | Mod: HCNC,S$GLB,, | Performed by: PHYSICIAN ASSISTANT

## 2021-03-23 PROCEDURE — 99999 PR PBB SHADOW E&M-EST. PATIENT-LVL III: ICD-10-PCS | Mod: PBBFAC,,, | Performed by: PHYSICIAN ASSISTANT

## 2021-03-23 PROCEDURE — 99499 UNLISTED E&M SERVICE: CPT | Mod: HCNC,S$GLB,, | Performed by: PHYSICIAN ASSISTANT

## 2021-03-23 PROCEDURE — 1101F PR PT FALLS ASSESS DOC 0-1 FALLS W/OUT INJ PAST YR: ICD-10-PCS | Mod: CPTII,S$GLB,, | Performed by: PHYSICIAN ASSISTANT

## 2021-03-23 PROCEDURE — 3074F PR MOST RECENT SYSTOLIC BLOOD PRESSURE < 130 MM HG: ICD-10-PCS | Mod: CPTII,S$GLB,, | Performed by: PHYSICIAN ASSISTANT

## 2021-03-23 PROCEDURE — 1157F PR ADVANCE CARE PLAN OR EQUIV PRESENT IN MEDICAL RECORD: ICD-10-PCS | Mod: S$GLB,,, | Performed by: PHYSICIAN ASSISTANT

## 2021-03-23 PROCEDURE — 3288F PR FALLS RISK ASSESSMENT DOCUMENTED: ICD-10-PCS | Mod: CPTII,S$GLB,, | Performed by: PHYSICIAN ASSISTANT

## 2021-03-23 PROCEDURE — 1126F PR PAIN SEVERITY QUANTIFIED, NO PAIN PRESENT: ICD-10-PCS | Mod: S$GLB,,, | Performed by: PHYSICIAN ASSISTANT

## 2021-03-24 ENCOUNTER — OFFICE VISIT (OUTPATIENT)
Dept: VASCULAR SURGERY | Facility: CLINIC | Age: 81
End: 2021-03-24
Payer: MEDICARE

## 2021-03-24 VITALS
HEIGHT: 71 IN | DIASTOLIC BLOOD PRESSURE: 59 MMHG | HEART RATE: 66 BPM | BODY MASS INDEX: 26.13 KG/M2 | SYSTOLIC BLOOD PRESSURE: 117 MMHG

## 2021-03-24 DIAGNOSIS — Z95.1 S/P CABG X 4: Primary | ICD-10-CM

## 2021-03-24 PROCEDURE — 3288F PR FALLS RISK ASSESSMENT DOCUMENTED: ICD-10-PCS | Mod: CPTII,S$GLB,, | Performed by: THORACIC SURGERY (CARDIOTHORACIC VASCULAR SURGERY)

## 2021-03-24 PROCEDURE — 99024 POSTOP FOLLOW-UP VISIT: CPT | Mod: S$GLB,,, | Performed by: THORACIC SURGERY (CARDIOTHORACIC VASCULAR SURGERY)

## 2021-03-24 PROCEDURE — 1157F ADVNC CARE PLAN IN RCRD: CPT | Mod: S$GLB,,, | Performed by: THORACIC SURGERY (CARDIOTHORACIC VASCULAR SURGERY)

## 2021-03-24 PROCEDURE — 1126F PR PAIN SEVERITY QUANTIFIED, NO PAIN PRESENT: ICD-10-PCS | Mod: S$GLB,,, | Performed by: THORACIC SURGERY (CARDIOTHORACIC VASCULAR SURGERY)

## 2021-03-24 PROCEDURE — 1101F PR PT FALLS ASSESS DOC 0-1 FALLS W/OUT INJ PAST YR: ICD-10-PCS | Mod: CPTII,S$GLB,, | Performed by: THORACIC SURGERY (CARDIOTHORACIC VASCULAR SURGERY)

## 2021-03-24 PROCEDURE — 1157F PR ADVANCE CARE PLAN OR EQUIV PRESENT IN MEDICAL RECORD: ICD-10-PCS | Mod: S$GLB,,, | Performed by: THORACIC SURGERY (CARDIOTHORACIC VASCULAR SURGERY)

## 2021-03-24 PROCEDURE — 99999 PR PBB SHADOW E&M-EST. PATIENT-LVL III: ICD-10-PCS | Mod: PBBFAC,,, | Performed by: THORACIC SURGERY (CARDIOTHORACIC VASCULAR SURGERY)

## 2021-03-24 PROCEDURE — 3288F FALL RISK ASSESSMENT DOCD: CPT | Mod: CPTII,S$GLB,, | Performed by: THORACIC SURGERY (CARDIOTHORACIC VASCULAR SURGERY)

## 2021-03-24 PROCEDURE — 1126F AMNT PAIN NOTED NONE PRSNT: CPT | Mod: S$GLB,,, | Performed by: THORACIC SURGERY (CARDIOTHORACIC VASCULAR SURGERY)

## 2021-03-24 PROCEDURE — 99999 PR PBB SHADOW E&M-EST. PATIENT-LVL III: CPT | Mod: PBBFAC,,, | Performed by: THORACIC SURGERY (CARDIOTHORACIC VASCULAR SURGERY)

## 2021-03-24 PROCEDURE — 1101F PT FALLS ASSESS-DOCD LE1/YR: CPT | Mod: CPTII,S$GLB,, | Performed by: THORACIC SURGERY (CARDIOTHORACIC VASCULAR SURGERY)

## 2021-03-24 PROCEDURE — 99024 PR POST-OP FOLLOW-UP VISIT: ICD-10-PCS | Mod: S$GLB,,, | Performed by: THORACIC SURGERY (CARDIOTHORACIC VASCULAR SURGERY)

## 2021-03-25 RX ORDER — ISOSORBIDE MONONITRATE 30 MG/1
30 TABLET, EXTENDED RELEASE ORAL NIGHTLY
Qty: 90 TABLET | Refills: 4 | Status: ON HOLD | OUTPATIENT
Start: 2021-03-25 | End: 2021-04-08 | Stop reason: HOSPADM

## 2021-03-26 ENCOUNTER — PES CALL (OUTPATIENT)
Dept: ADMINISTRATIVE | Facility: CLINIC | Age: 81
End: 2021-03-26

## 2021-03-26 ENCOUNTER — TELEPHONE (OUTPATIENT)
Dept: UROLOGY | Facility: CLINIC | Age: 81
End: 2021-03-26

## 2021-03-26 ENCOUNTER — TELEPHONE (OUTPATIENT)
Dept: FAMILY MEDICINE | Facility: CLINIC | Age: 81
End: 2021-03-26

## 2021-04-01 ENCOUNTER — CLINICAL SUPPORT (OUTPATIENT)
Dept: UROLOGY | Facility: CLINIC | Age: 81
End: 2021-04-01
Payer: MEDICARE

## 2021-04-01 VITALS — TEMPERATURE: 99 F

## 2021-04-01 DIAGNOSIS — N30.00 ACUTE CYSTITIS WITHOUT HEMATURIA: ICD-10-CM

## 2021-04-01 DIAGNOSIS — N40.1 BENIGN PROSTATIC HYPERPLASIA WITH URINARY OBSTRUCTION: Primary | ICD-10-CM

## 2021-04-01 DIAGNOSIS — N13.8 BENIGN PROSTATIC HYPERPLASIA WITH URINARY OBSTRUCTION: Primary | ICD-10-CM

## 2021-04-01 LAB
BILIRUB SERPL-MCNC: NORMAL MG/DL
BLOOD URINE, POC: NORMAL
CLARITY, POC UA: CLEAR
COLOR, POC UA: YELLOW
GLUCOSE UR QL STRIP: NORMAL
KETONES UR QL STRIP: NORMAL
LEUKOCYTE ESTERASE URINE, POC: NORMAL
NITRITE, POC UA: NORMAL
PH, POC UA: 5.5
POC RESIDUAL URINE VOLUME: 1 ML (ref 0–100)
PROTEIN, POC: NORMAL
SPECIFIC GRAVITY, POC UA: 1.02
UROBILINOGEN, POC UA: 0.2

## 2021-04-01 PROCEDURE — 81002 URINALYSIS NONAUTO W/O SCOPE: CPT | Mod: HCNC,S$GLB,, | Performed by: UROLOGY

## 2021-04-01 PROCEDURE — 81002 POCT URINE DIPSTICK WITHOUT MICROSCOPE: ICD-10-PCS | Mod: HCNC,S$GLB,, | Performed by: UROLOGY

## 2021-04-01 PROCEDURE — 87077 CULTURE AEROBIC IDENTIFY: CPT | Mod: 59,HCNC | Performed by: UROLOGY

## 2021-04-01 PROCEDURE — 99999 PR PBB SHADOW E&M-EST. PATIENT-LVL I: CPT | Mod: PBBFAC,,,

## 2021-04-01 PROCEDURE — 87186 SC STD MICRODIL/AGAR DIL: CPT | Mod: HCNC | Performed by: UROLOGY

## 2021-04-01 PROCEDURE — 51798 US URINE CAPACITY MEASURE: CPT | Mod: HCNC,S$GLB,, | Performed by: UROLOGY

## 2021-04-01 PROCEDURE — 51798 POCT BLADDER SCAN: ICD-10-PCS | Mod: HCNC,S$GLB,, | Performed by: UROLOGY

## 2021-04-01 PROCEDURE — 87088 URINE BACTERIA CULTURE: CPT | Mod: HCNC | Performed by: UROLOGY

## 2021-04-01 PROCEDURE — 87086 URINE CULTURE/COLONY COUNT: CPT | Performed by: UROLOGY

## 2021-04-01 PROCEDURE — 99999 PR PBB SHADOW E&M-EST. PATIENT-LVL I: ICD-10-PCS | Mod: PBBFAC,,,

## 2021-04-02 PROBLEM — R93.5 ABNORMAL COMPUTED TOMOGRAPHY ANGIOGRAPHY (CTA) OF ABDOMEN AND PELVIS: Status: ACTIVE | Noted: 2021-04-02

## 2021-04-02 PROBLEM — N39.0 SEPSIS DUE TO GRAM-NEGATIVE UTI: Status: ACTIVE | Noted: 2021-04-02

## 2021-04-02 PROBLEM — N39.0 URINARY TRACT INFECTION WITHOUT HEMATURIA: Status: ACTIVE | Noted: 2021-04-02

## 2021-04-02 PROBLEM — A41.50 SEPSIS DUE TO GRAM-NEGATIVE UTI: Status: ACTIVE | Noted: 2021-04-02

## 2021-04-04 PROBLEM — R78.81 BACTEREMIA DUE TO KLEBSIELLA PNEUMONIAE: Status: ACTIVE | Noted: 2021-04-04

## 2021-04-04 PROBLEM — B96.1 BACTEREMIA DUE TO KLEBSIELLA PNEUMONIAE: Status: ACTIVE | Noted: 2021-04-04

## 2021-04-04 LAB
BACTERIA UR CULT: ABNORMAL
BACTERIA UR CULT: ABNORMAL

## 2021-04-08 ENCOUNTER — TELEPHONE (OUTPATIENT)
Dept: UROLOGY | Facility: CLINIC | Age: 81
End: 2021-04-08

## 2021-04-09 ENCOUNTER — TELEPHONE (OUTPATIENT)
Dept: UROLOGY | Facility: CLINIC | Age: 81
End: 2021-04-09

## 2021-04-14 ENCOUNTER — OFFICE VISIT (OUTPATIENT)
Dept: INFECTIOUS DISEASES | Facility: CLINIC | Age: 81
End: 2021-04-14
Payer: MEDICARE

## 2021-04-14 DIAGNOSIS — R78.81 GRAM-NEGATIVE BACTEREMIA: ICD-10-CM

## 2021-04-14 DIAGNOSIS — N41.2 PROSTATIC ABSCESS: ICD-10-CM

## 2021-04-14 DIAGNOSIS — N41.0 ACUTE BACTERIAL PROSTATITIS: Primary | ICD-10-CM

## 2021-04-14 PROCEDURE — 1159F MED LIST DOCD IN RCRD: CPT | Mod: HCNC,95,, | Performed by: PHYSICIAN ASSISTANT

## 2021-04-14 PROCEDURE — 99214 OFFICE O/P EST MOD 30 MIN: CPT | Mod: HCNC,95,, | Performed by: PHYSICIAN ASSISTANT

## 2021-04-14 PROCEDURE — 99214 PR OFFICE/OUTPT VISIT, EST, LEVL IV, 30-39 MIN: ICD-10-PCS | Mod: HCNC,95,, | Performed by: PHYSICIAN ASSISTANT

## 2021-04-14 PROCEDURE — 1159F PR MEDICATION LIST DOCUMENTED IN MEDICAL RECORD: ICD-10-PCS | Mod: HCNC,95,, | Performed by: PHYSICIAN ASSISTANT

## 2021-04-14 PROCEDURE — 1157F ADVNC CARE PLAN IN RCRD: CPT | Mod: HCNC,95,, | Performed by: PHYSICIAN ASSISTANT

## 2021-04-14 PROCEDURE — 1157F PR ADVANCE CARE PLAN OR EQUIV PRESENT IN MEDICAL RECORD: ICD-10-PCS | Mod: HCNC,95,, | Performed by: PHYSICIAN ASSISTANT

## 2021-04-16 ENCOUNTER — TELEPHONE (OUTPATIENT)
Dept: UROLOGY | Facility: CLINIC | Age: 81
End: 2021-04-16

## 2021-04-19 ENCOUNTER — PES CALL (OUTPATIENT)
Dept: ADMINISTRATIVE | Facility: CLINIC | Age: 81
End: 2021-04-19

## 2021-04-20 NOTE — TELEPHONE ENCOUNTER
Left message to call clinic .--lp   Perilesional Excision Additional Text (Leave Blank If You Do Not Want): The margin was drawn around the clinically apparent lesion. Incisions were then made along these lines to the appropriate tissue plane and the lesion was extirpated.

## 2021-04-21 ENCOUNTER — OFFICE VISIT (OUTPATIENT)
Dept: UROLOGY | Facility: CLINIC | Age: 81
End: 2021-04-21
Payer: MEDICARE

## 2021-04-21 VITALS
HEART RATE: 77 BPM | BODY MASS INDEX: 24.59 KG/M2 | WEIGHT: 171.75 LBS | SYSTOLIC BLOOD PRESSURE: 111 MMHG | HEIGHT: 70 IN | DIASTOLIC BLOOD PRESSURE: 70 MMHG

## 2021-04-21 DIAGNOSIS — N41.0 ACUTE PROSTATITIS: ICD-10-CM

## 2021-04-21 DIAGNOSIS — N39.0 URINARY TRACT INFECTION WITHOUT HEMATURIA, SITE UNSPECIFIED: Primary | ICD-10-CM

## 2021-04-21 DIAGNOSIS — R97.20 ELEVATED PSA: ICD-10-CM

## 2021-04-21 DIAGNOSIS — N41.2 PROSTATIC ABSCESS: ICD-10-CM

## 2021-04-21 LAB
BILIRUB SERPL-MCNC: NORMAL MG/DL
BLOOD URINE, POC: NORMAL
CLARITY, POC UA: CLEAR
COLOR, POC UA: YELLOW
GLUCOSE UR QL STRIP: NORMAL
KETONES UR QL STRIP: NORMAL
LEUKOCYTE ESTERASE URINE, POC: NORMAL
NITRITE, POC UA: NORMAL
PH, POC UA: 6.5
PROTEIN, POC: 30
SPECIFIC GRAVITY, POC UA: >1.03
UROBILINOGEN, POC UA: 0.2

## 2021-04-21 PROCEDURE — 81002 POCT URINE DIPSTICK WITHOUT MICROSCOPE: ICD-10-PCS | Mod: HCNC,S$GLB,, | Performed by: UROLOGY

## 2021-04-21 PROCEDURE — 1157F ADVNC CARE PLAN IN RCRD: CPT | Mod: HCNC,S$GLB,, | Performed by: UROLOGY

## 2021-04-21 PROCEDURE — 99999 PR PBB SHADOW E&M-EST. PATIENT-LVL IV: ICD-10-PCS | Mod: PBBFAC,HCNC,, | Performed by: UROLOGY

## 2021-04-21 PROCEDURE — 1126F PR PAIN SEVERITY QUANTIFIED, NO PAIN PRESENT: ICD-10-PCS | Mod: HCNC,S$GLB,, | Performed by: UROLOGY

## 2021-04-21 PROCEDURE — 1126F AMNT PAIN NOTED NONE PRSNT: CPT | Mod: HCNC,S$GLB,, | Performed by: UROLOGY

## 2021-04-21 PROCEDURE — 1101F PT FALLS ASSESS-DOCD LE1/YR: CPT | Mod: HCNC,CPTII,S$GLB, | Performed by: UROLOGY

## 2021-04-21 PROCEDURE — 1157F PR ADVANCE CARE PLAN OR EQUIV PRESENT IN MEDICAL RECORD: ICD-10-PCS | Mod: HCNC,S$GLB,, | Performed by: UROLOGY

## 2021-04-21 PROCEDURE — 99999 PR PBB SHADOW E&M-EST. PATIENT-LVL IV: CPT | Mod: PBBFAC,HCNC,, | Performed by: UROLOGY

## 2021-04-21 PROCEDURE — 81002 URINALYSIS NONAUTO W/O SCOPE: CPT | Mod: HCNC,S$GLB,, | Performed by: UROLOGY

## 2021-04-21 PROCEDURE — 1159F PR MEDICATION LIST DOCUMENTED IN MEDICAL RECORD: ICD-10-PCS | Mod: HCNC,S$GLB,, | Performed by: UROLOGY

## 2021-04-21 PROCEDURE — 1159F MED LIST DOCD IN RCRD: CPT | Mod: HCNC,S$GLB,, | Performed by: UROLOGY

## 2021-04-21 PROCEDURE — 3288F PR FALLS RISK ASSESSMENT DOCUMENTED: ICD-10-PCS | Mod: HCNC,CPTII,S$GLB, | Performed by: UROLOGY

## 2021-04-21 PROCEDURE — 1101F PR PT FALLS ASSESS DOC 0-1 FALLS W/OUT INJ PAST YR: ICD-10-PCS | Mod: HCNC,CPTII,S$GLB, | Performed by: UROLOGY

## 2021-04-21 PROCEDURE — 99214 OFFICE O/P EST MOD 30 MIN: CPT | Mod: HCNC,25,S$GLB, | Performed by: UROLOGY

## 2021-04-21 PROCEDURE — 3288F FALL RISK ASSESSMENT DOCD: CPT | Mod: HCNC,CPTII,S$GLB, | Performed by: UROLOGY

## 2021-04-21 PROCEDURE — 99214 PR OFFICE/OUTPT VISIT, EST, LEVL IV, 30-39 MIN: ICD-10-PCS | Mod: HCNC,25,S$GLB, | Performed by: UROLOGY

## 2021-04-23 ENCOUNTER — OFFICE VISIT (OUTPATIENT)
Dept: FAMILY MEDICINE | Facility: CLINIC | Age: 81
End: 2021-04-23
Payer: MEDICARE

## 2021-04-23 VITALS
BODY MASS INDEX: 24.73 KG/M2 | TEMPERATURE: 99 F | WEIGHT: 172.75 LBS | DIASTOLIC BLOOD PRESSURE: 70 MMHG | OXYGEN SATURATION: 99 % | HEART RATE: 81 BPM | HEIGHT: 70 IN | SYSTOLIC BLOOD PRESSURE: 138 MMHG | RESPIRATION RATE: 18 BRPM

## 2021-04-23 DIAGNOSIS — N41.2 PROSTATE ABSCESS: Primary | ICD-10-CM

## 2021-04-23 DIAGNOSIS — I10 HYPERTENSION, ESSENTIAL: ICD-10-CM

## 2021-04-23 DIAGNOSIS — I25.10 ASCVD (ARTERIOSCLEROTIC CARDIOVASCULAR DISEASE): ICD-10-CM

## 2021-04-23 DIAGNOSIS — E78.5 HYPERLIPIDEMIA, UNSPECIFIED HYPERLIPIDEMIA TYPE: ICD-10-CM

## 2021-04-23 DIAGNOSIS — E78.5 HYPERLIPIDEMIA, UNSPECIFIED: ICD-10-CM

## 2021-04-23 LAB
ALBUMIN SERPL BCP-MCNC: 3.5 G/DL (ref 3.5–5.2)
ALP SERPL-CCNC: 86 U/L (ref 55–135)
ALT SERPL W/O P-5'-P-CCNC: 9 U/L (ref 10–44)
ANION GAP SERPL CALC-SCNC: 11 MMOL/L (ref 8–16)
AST SERPL-CCNC: 17 U/L (ref 10–40)
BASOPHILS # BLD AUTO: 0.03 K/UL (ref 0–0.2)
BASOPHILS NFR BLD: 0.4 % (ref 0–1.9)
BILIRUB SERPL-MCNC: 0.5 MG/DL (ref 0.1–1)
BUN SERPL-MCNC: 14 MG/DL (ref 8–23)
CALCIUM SERPL-MCNC: 9.3 MG/DL (ref 8.7–10.5)
CHLORIDE SERPL-SCNC: 104 MMOL/L (ref 95–110)
CO2 SERPL-SCNC: 24 MMOL/L (ref 23–29)
CREAT SERPL-MCNC: 0.8 MG/DL (ref 0.5–1.4)
CRP SERPL-MCNC: 20.9 MG/L (ref 0–8.2)
DIFFERENTIAL METHOD: ABNORMAL
EOSINOPHIL # BLD AUTO: 0.2 K/UL (ref 0–0.5)
EOSINOPHIL NFR BLD: 3.6 % (ref 0–8)
ERYTHROCYTE [DISTWIDTH] IN BLOOD BY AUTOMATED COUNT: 13.7 % (ref 11.5–14.5)
ERYTHROCYTE [SEDIMENTATION RATE] IN BLOOD BY WESTERGREN METHOD: 90 MM/HR (ref 0–23)
EST. GFR  (AFRICAN AMERICAN): >60 ML/MIN/1.73 M^2
EST. GFR  (NON AFRICAN AMERICAN): >60 ML/MIN/1.73 M^2
GLUCOSE SERPL-MCNC: 95 MG/DL (ref 70–110)
HCT VFR BLD AUTO: 32.7 % (ref 40–54)
HGB BLD-MCNC: 10.5 G/DL (ref 14–18)
IMM GRANULOCYTES # BLD AUTO: 0.01 K/UL (ref 0–0.04)
IMM GRANULOCYTES NFR BLD AUTO: 0.1 % (ref 0–0.5)
LYMPHOCYTES # BLD AUTO: 1.5 K/UL (ref 1–4.8)
LYMPHOCYTES NFR BLD: 22.3 % (ref 18–48)
MCH RBC QN AUTO: 29.8 PG (ref 27–31)
MCHC RBC AUTO-ENTMCNC: 32.1 G/DL (ref 32–36)
MCV RBC AUTO: 93 FL (ref 82–98)
MONOCYTES # BLD AUTO: 0.7 K/UL (ref 0.3–1)
MONOCYTES NFR BLD: 10.8 % (ref 4–15)
NEUTROPHILS # BLD AUTO: 4.2 K/UL (ref 1.8–7.7)
NEUTROPHILS NFR BLD: 62.8 % (ref 38–73)
NRBC BLD-RTO: 0 /100 WBC
PLATELET # BLD AUTO: 376 K/UL (ref 150–450)
PMV BLD AUTO: 10.9 FL (ref 9.2–12.9)
POTASSIUM SERPL-SCNC: 4.4 MMOL/L (ref 3.5–5.1)
PREALB SERPL-MCNC: 12 MG/DL (ref 20–43)
PROT SERPL-MCNC: 7.7 G/DL (ref 6–8.4)
RBC # BLD AUTO: 3.52 M/UL (ref 4.6–6.2)
SODIUM SERPL-SCNC: 139 MMOL/L (ref 136–145)
T4 FREE SERPL-MCNC: 0.86 NG/DL (ref 0.71–1.51)
TSH SERPL DL<=0.005 MIU/L-ACNC: 0.26 UIU/ML (ref 0.4–4)
WBC # BLD AUTO: 6.76 K/UL (ref 3.9–12.7)

## 2021-04-23 PROCEDURE — 3288F PR FALLS RISK ASSESSMENT DOCUMENTED: ICD-10-PCS | Mod: HCNC,CPTII,S$GLB, | Performed by: FAMILY MEDICINE

## 2021-04-23 PROCEDURE — 1159F PR MEDICATION LIST DOCUMENTED IN MEDICAL RECORD: ICD-10-PCS | Mod: HCNC,S$GLB,, | Performed by: FAMILY MEDICINE

## 2021-04-23 PROCEDURE — 99499 UNLISTED E&M SERVICE: CPT | Mod: HCNC,S$GLB,, | Performed by: FAMILY MEDICINE

## 2021-04-23 PROCEDURE — 1101F PT FALLS ASSESS-DOCD LE1/YR: CPT | Mod: HCNC,CPTII,S$GLB, | Performed by: FAMILY MEDICINE

## 2021-04-23 PROCEDURE — 80053 COMPREHEN METABOLIC PANEL: CPT | Mod: HCNC | Performed by: FAMILY MEDICINE

## 2021-04-23 PROCEDURE — 86140 C-REACTIVE PROTEIN: CPT | Mod: HCNC | Performed by: FAMILY MEDICINE

## 2021-04-23 PROCEDURE — 1157F ADVNC CARE PLAN IN RCRD: CPT | Mod: HCNC,S$GLB,, | Performed by: FAMILY MEDICINE

## 2021-04-23 PROCEDURE — 84439 ASSAY OF FREE THYROXINE: CPT | Mod: HCNC | Performed by: FAMILY MEDICINE

## 2021-04-23 PROCEDURE — 1159F MED LIST DOCD IN RCRD: CPT | Mod: HCNC,S$GLB,, | Performed by: FAMILY MEDICINE

## 2021-04-23 PROCEDURE — 99214 OFFICE O/P EST MOD 30 MIN: CPT | Mod: HCNC,S$GLB,, | Performed by: FAMILY MEDICINE

## 2021-04-23 PROCEDURE — 36415 COLL VENOUS BLD VENIPUNCTURE: CPT | Mod: S$GLB,,, | Performed by: FAMILY MEDICINE

## 2021-04-23 PROCEDURE — 1126F PR PAIN SEVERITY QUANTIFIED, NO PAIN PRESENT: ICD-10-PCS | Mod: HCNC,S$GLB,, | Performed by: FAMILY MEDICINE

## 2021-04-23 PROCEDURE — 1101F PR PT FALLS ASSESS DOC 0-1 FALLS W/OUT INJ PAST YR: ICD-10-PCS | Mod: HCNC,CPTII,S$GLB, | Performed by: FAMILY MEDICINE

## 2021-04-23 PROCEDURE — 85652 RBC SED RATE AUTOMATED: CPT | Mod: HCNC | Performed by: FAMILY MEDICINE

## 2021-04-23 PROCEDURE — 99499 RISK ADDL DX/OHS AUDIT: ICD-10-PCS | Mod: HCNC,S$GLB,, | Performed by: FAMILY MEDICINE

## 2021-04-23 PROCEDURE — 84134 ASSAY OF PREALBUMIN: CPT | Mod: HCNC | Performed by: FAMILY MEDICINE

## 2021-04-23 PROCEDURE — 1126F AMNT PAIN NOTED NONE PRSNT: CPT | Mod: HCNC,S$GLB,, | Performed by: FAMILY MEDICINE

## 2021-04-23 PROCEDURE — 3288F FALL RISK ASSESSMENT DOCD: CPT | Mod: HCNC,CPTII,S$GLB, | Performed by: FAMILY MEDICINE

## 2021-04-23 PROCEDURE — 36415 PR COLLECTION VENOUS BLOOD,VENIPUNCTURE: ICD-10-PCS | Mod: S$GLB,,, | Performed by: FAMILY MEDICINE

## 2021-04-23 PROCEDURE — 85025 COMPLETE CBC W/AUTO DIFF WBC: CPT | Mod: HCNC | Performed by: FAMILY MEDICINE

## 2021-04-23 PROCEDURE — 84443 ASSAY THYROID STIM HORMONE: CPT | Mod: HCNC | Performed by: FAMILY MEDICINE

## 2021-04-23 PROCEDURE — 1157F PR ADVANCE CARE PLAN OR EQUIV PRESENT IN MEDICAL RECORD: ICD-10-PCS | Mod: HCNC,S$GLB,, | Performed by: FAMILY MEDICINE

## 2021-04-23 PROCEDURE — 99214 PR OFFICE/OUTPT VISIT, EST, LEVL IV, 30-39 MIN: ICD-10-PCS | Mod: HCNC,S$GLB,, | Performed by: FAMILY MEDICINE

## 2021-04-23 RX ORDER — OXYBUTYNIN CHLORIDE 15 MG/1
15 TABLET, EXTENDED RELEASE ORAL DAILY
Qty: 30 TABLET | Refills: 1 | Status: SHIPPED | OUTPATIENT
Start: 2021-04-23 | End: 2021-06-11 | Stop reason: SDUPTHER

## 2021-04-23 RX ORDER — LOSARTAN POTASSIUM 25 MG/1
25 TABLET ORAL DAILY
COMMUNITY
End: 2021-06-11 | Stop reason: SDUPTHER

## 2021-04-25 PROBLEM — D68.59 THROMBOPHILIA: Status: RESOLVED | Noted: 2019-02-18 | Resolved: 2021-04-25

## 2021-04-26 ENCOUNTER — PATIENT MESSAGE (OUTPATIENT)
Dept: FAMILY MEDICINE | Facility: CLINIC | Age: 81
End: 2021-04-26

## 2021-04-27 ENCOUNTER — PATIENT MESSAGE (OUTPATIENT)
Dept: FAMILY MEDICINE | Facility: CLINIC | Age: 81
End: 2021-04-27

## 2021-04-28 ENCOUNTER — PATIENT MESSAGE (OUTPATIENT)
Dept: FAMILY MEDICINE | Facility: CLINIC | Age: 81
End: 2021-04-28

## 2021-04-30 ENCOUNTER — OFFICE VISIT (OUTPATIENT)
Dept: INFECTIOUS DISEASES | Facility: CLINIC | Age: 81
End: 2021-04-30
Payer: MEDICARE

## 2021-04-30 DIAGNOSIS — N41.0 ACUTE BACTERIAL PROSTATITIS: Primary | ICD-10-CM

## 2021-04-30 PROCEDURE — 1159F MED LIST DOCD IN RCRD: CPT | Mod: HCNC,95,, | Performed by: INTERNAL MEDICINE

## 2021-04-30 PROCEDURE — 99213 PR OFFICE/OUTPT VISIT, EST, LEVL III, 20-29 MIN: ICD-10-PCS | Mod: HCNC,95,, | Performed by: INTERNAL MEDICINE

## 2021-04-30 PROCEDURE — 99213 OFFICE O/P EST LOW 20 MIN: CPT | Mod: HCNC,95,, | Performed by: INTERNAL MEDICINE

## 2021-04-30 PROCEDURE — 1157F PR ADVANCE CARE PLAN OR EQUIV PRESENT IN MEDICAL RECORD: ICD-10-PCS | Mod: HCNC,95,, | Performed by: INTERNAL MEDICINE

## 2021-04-30 PROCEDURE — 1159F PR MEDICATION LIST DOCUMENTED IN MEDICAL RECORD: ICD-10-PCS | Mod: HCNC,95,, | Performed by: INTERNAL MEDICINE

## 2021-04-30 PROCEDURE — 1157F ADVNC CARE PLAN IN RCRD: CPT | Mod: HCNC,95,, | Performed by: INTERNAL MEDICINE

## 2021-05-10 ENCOUNTER — OFFICE VISIT (OUTPATIENT)
Dept: OPHTHALMOLOGY | Facility: CLINIC | Age: 81
End: 2021-05-10
Payer: MEDICARE

## 2021-05-10 ENCOUNTER — OFFICE VISIT (OUTPATIENT)
Dept: FAMILY MEDICINE | Facility: CLINIC | Age: 81
End: 2021-05-10
Payer: MEDICARE

## 2021-05-10 VITALS
BODY MASS INDEX: 24.81 KG/M2 | TEMPERATURE: 98 F | DIASTOLIC BLOOD PRESSURE: 66 MMHG | HEART RATE: 69 BPM | HEIGHT: 70 IN | SYSTOLIC BLOOD PRESSURE: 110 MMHG | RESPIRATION RATE: 16 BRPM | OXYGEN SATURATION: 99 % | WEIGHT: 173.31 LBS

## 2021-05-10 DIAGNOSIS — N41.2 PROSTATE ABSCESS: Primary | ICD-10-CM

## 2021-05-10 DIAGNOSIS — K59.00 CONSTIPATION, UNSPECIFIED CONSTIPATION TYPE: ICD-10-CM

## 2021-05-10 DIAGNOSIS — H43.813 POSTERIOR VITREOUS DETACHMENT, BILATERAL: ICD-10-CM

## 2021-05-10 DIAGNOSIS — Z98.890 S/P YAG CAPSULOTOMY, BILATERAL: ICD-10-CM

## 2021-05-10 DIAGNOSIS — I25.10 CORONARY ARTERY DISEASE, ANGINA PRESENCE UNSPECIFIED, UNSPECIFIED VESSEL OR LESION TYPE, UNSPECIFIED WHETHER NATIVE OR TRANSPLANTED HEART: ICD-10-CM

## 2021-05-10 DIAGNOSIS — H35.3132 INTERMEDIATE STAGE NONEXUDATIVE AGE-RELATED MACULAR DEGENERATION OF BOTH EYES: Primary | ICD-10-CM

## 2021-05-10 LAB
ALBUMIN SERPL BCP-MCNC: 3.6 G/DL (ref 3.5–5.2)
ALP SERPL-CCNC: 83 U/L (ref 55–135)
ALT SERPL W/O P-5'-P-CCNC: 9 U/L (ref 10–44)
ANION GAP SERPL CALC-SCNC: 10 MMOL/L (ref 8–16)
AST SERPL-CCNC: 19 U/L (ref 10–40)
BACTERIA #/AREA URNS AUTO: NORMAL /HPF
BASOPHILS # BLD AUTO: 0.05 K/UL (ref 0–0.2)
BASOPHILS NFR BLD: 0.7 % (ref 0–1.9)
BILIRUB SERPL-MCNC: 0.6 MG/DL (ref 0.1–1)
BILIRUB UR QL STRIP: NEGATIVE
BUN SERPL-MCNC: 12 MG/DL (ref 8–23)
CALCIUM SERPL-MCNC: 10.4 MG/DL (ref 8.7–10.5)
CHLORIDE SERPL-SCNC: 104 MMOL/L (ref 95–110)
CLARITY UR REFRACT.AUTO: CLEAR
CO2 SERPL-SCNC: 26 MMOL/L (ref 23–29)
COLOR UR AUTO: YELLOW
CREAT SERPL-MCNC: 0.8 MG/DL (ref 0.5–1.4)
CRP SERPL-MCNC: 58.4 MG/L (ref 0–8.2)
DIFFERENTIAL METHOD: ABNORMAL
EOSINOPHIL # BLD AUTO: 0.3 K/UL (ref 0–0.5)
EOSINOPHIL NFR BLD: 4.4 % (ref 0–8)
ERYTHROCYTE [DISTWIDTH] IN BLOOD BY AUTOMATED COUNT: 13.6 % (ref 11.5–14.5)
ERYTHROCYTE [SEDIMENTATION RATE] IN BLOOD BY WESTERGREN METHOD: 40 MM/HR (ref 0–23)
EST. GFR  (AFRICAN AMERICAN): >60 ML/MIN/1.73 M^2
EST. GFR  (NON AFRICAN AMERICAN): >60 ML/MIN/1.73 M^2
GLUCOSE SERPL-MCNC: 85 MG/DL (ref 70–110)
GLUCOSE UR QL STRIP: NEGATIVE
HCT VFR BLD AUTO: 34.9 % (ref 40–54)
HGB BLD-MCNC: 11.2 G/DL (ref 14–18)
HGB UR QL STRIP: NEGATIVE
IMM GRANULOCYTES # BLD AUTO: 0.01 K/UL (ref 0–0.04)
IMM GRANULOCYTES NFR BLD AUTO: 0.1 % (ref 0–0.5)
KETONES UR QL STRIP: NEGATIVE
LEUKOCYTE ESTERASE UR QL STRIP: ABNORMAL
LYMPHOCYTES # BLD AUTO: 2 K/UL (ref 1–4.8)
LYMPHOCYTES NFR BLD: 28.4 % (ref 18–48)
MCH RBC QN AUTO: 30.5 PG (ref 27–31)
MCHC RBC AUTO-ENTMCNC: 32.1 G/DL (ref 32–36)
MCV RBC AUTO: 95 FL (ref 82–98)
MICROSCOPIC COMMENT: NORMAL
MONOCYTES # BLD AUTO: 0.7 K/UL (ref 0.3–1)
MONOCYTES NFR BLD: 10.6 % (ref 4–15)
NEUTROPHILS # BLD AUTO: 3.9 K/UL (ref 1.8–7.7)
NEUTROPHILS NFR BLD: 55.8 % (ref 38–73)
NITRITE UR QL STRIP: NEGATIVE
NRBC BLD-RTO: 0 /100 WBC
PH UR STRIP: 6 [PH] (ref 5–8)
PLATELET # BLD AUTO: 329 K/UL (ref 150–450)
PMV BLD AUTO: 10.4 FL (ref 9.2–12.9)
POTASSIUM SERPL-SCNC: 4.7 MMOL/L (ref 3.5–5.1)
PREALB SERPL-MCNC: 17 MG/DL (ref 20–43)
PROT SERPL-MCNC: 7.8 G/DL (ref 6–8.4)
PROT UR QL STRIP: NEGATIVE
RBC # BLD AUTO: 3.67 M/UL (ref 4.6–6.2)
RBC #/AREA URNS AUTO: 1 /HPF (ref 0–4)
SODIUM SERPL-SCNC: 140 MMOL/L (ref 136–145)
SP GR UR STRIP: 1.01 (ref 1–1.03)
URN SPEC COLLECT METH UR: ABNORMAL
WBC # BLD AUTO: 6.97 K/UL (ref 3.9–12.7)
WBC #/AREA URNS AUTO: 2 /HPF (ref 0–5)

## 2021-05-10 PROCEDURE — 85652 RBC SED RATE AUTOMATED: CPT | Mod: HCNC | Performed by: FAMILY MEDICINE

## 2021-05-10 PROCEDURE — 1101F PR PT FALLS ASSESS DOC 0-1 FALLS W/OUT INJ PAST YR: ICD-10-PCS | Mod: CPTII,S$GLB,, | Performed by: FAMILY MEDICINE

## 2021-05-10 PROCEDURE — 81001 URINALYSIS AUTO W/SCOPE: CPT | Mod: HCNC | Performed by: FAMILY MEDICINE

## 2021-05-10 PROCEDURE — 1101F PR PT FALLS ASSESS DOC 0-1 FALLS W/OUT INJ PAST YR: ICD-10-PCS | Mod: HCNC,CPTII,S$GLB, | Performed by: OPHTHALMOLOGY

## 2021-05-10 PROCEDURE — 85025 COMPLETE CBC W/AUTO DIFF WBC: CPT | Mod: HCNC | Performed by: FAMILY MEDICINE

## 2021-05-10 PROCEDURE — 1101F PT FALLS ASSESS-DOCD LE1/YR: CPT | Mod: CPTII,S$GLB,, | Performed by: FAMILY MEDICINE

## 2021-05-10 PROCEDURE — 1157F PR ADVANCE CARE PLAN OR EQUIV PRESENT IN MEDICAL RECORD: ICD-10-PCS | Mod: HCNC,S$GLB,, | Performed by: OPHTHALMOLOGY

## 2021-05-10 PROCEDURE — 84134 ASSAY OF PREALBUMIN: CPT | Mod: HCNC | Performed by: FAMILY MEDICINE

## 2021-05-10 PROCEDURE — 3288F PR FALLS RISK ASSESSMENT DOCUMENTED: ICD-10-PCS | Mod: HCNC,CPTII,S$GLB, | Performed by: OPHTHALMOLOGY

## 2021-05-10 PROCEDURE — 1157F PR ADVANCE CARE PLAN OR EQUIV PRESENT IN MEDICAL RECORD: ICD-10-PCS | Mod: S$GLB,,, | Performed by: FAMILY MEDICINE

## 2021-05-10 PROCEDURE — 99214 OFFICE O/P EST MOD 30 MIN: CPT | Mod: S$GLB,,, | Performed by: FAMILY MEDICINE

## 2021-05-10 PROCEDURE — 99214 PR OFFICE/OUTPT VISIT, EST, LEVL IV, 30-39 MIN: ICD-10-PCS | Mod: S$GLB,,, | Performed by: FAMILY MEDICINE

## 2021-05-10 PROCEDURE — 99999 PR PBB SHADOW E&M-EST. PATIENT-LVL III: ICD-10-PCS | Mod: PBBFAC,HCNC,, | Performed by: OPHTHALMOLOGY

## 2021-05-10 PROCEDURE — 3288F FALL RISK ASSESSMENT DOCD: CPT | Mod: CPTII,S$GLB,, | Performed by: FAMILY MEDICINE

## 2021-05-10 PROCEDURE — 99999 PR PBB SHADOW E&M-EST. PATIENT-LVL III: CPT | Mod: PBBFAC,HCNC,, | Performed by: OPHTHALMOLOGY

## 2021-05-10 PROCEDURE — 3288F PR FALLS RISK ASSESSMENT DOCUMENTED: ICD-10-PCS | Mod: CPTII,S$GLB,, | Performed by: FAMILY MEDICINE

## 2021-05-10 PROCEDURE — 1157F ADVNC CARE PLAN IN RCRD: CPT | Mod: HCNC,S$GLB,, | Performed by: OPHTHALMOLOGY

## 2021-05-10 PROCEDURE — 1126F AMNT PAIN NOTED NONE PRSNT: CPT | Mod: HCNC,S$GLB,, | Performed by: OPHTHALMOLOGY

## 2021-05-10 PROCEDURE — 36415 COLL VENOUS BLD VENIPUNCTURE: CPT | Mod: S$GLB,,, | Performed by: FAMILY MEDICINE

## 2021-05-10 PROCEDURE — 1159F PR MEDICATION LIST DOCUMENTED IN MEDICAL RECORD: ICD-10-PCS | Mod: S$GLB,,, | Performed by: FAMILY MEDICINE

## 2021-05-10 PROCEDURE — 80053 COMPREHEN METABOLIC PANEL: CPT | Mod: HCNC | Performed by: FAMILY MEDICINE

## 2021-05-10 PROCEDURE — 1159F MED LIST DOCD IN RCRD: CPT | Mod: S$GLB,,, | Performed by: FAMILY MEDICINE

## 2021-05-10 PROCEDURE — 1157F ADVNC CARE PLAN IN RCRD: CPT | Mod: S$GLB,,, | Performed by: FAMILY MEDICINE

## 2021-05-10 PROCEDURE — 1126F AMNT PAIN NOTED NONE PRSNT: CPT | Mod: S$GLB,,, | Performed by: FAMILY MEDICINE

## 2021-05-10 PROCEDURE — 86140 C-REACTIVE PROTEIN: CPT | Mod: HCNC | Performed by: FAMILY MEDICINE

## 2021-05-10 PROCEDURE — 1101F PT FALLS ASSESS-DOCD LE1/YR: CPT | Mod: HCNC,CPTII,S$GLB, | Performed by: OPHTHALMOLOGY

## 2021-05-10 PROCEDURE — 1126F PR PAIN SEVERITY QUANTIFIED, NO PAIN PRESENT: ICD-10-PCS | Mod: S$GLB,,, | Performed by: FAMILY MEDICINE

## 2021-05-10 PROCEDURE — 3288F FALL RISK ASSESSMENT DOCD: CPT | Mod: HCNC,CPTII,S$GLB, | Performed by: OPHTHALMOLOGY

## 2021-05-10 PROCEDURE — 92014 COMPRE OPH EXAM EST PT 1/>: CPT | Mod: HCNC,S$GLB,, | Performed by: OPHTHALMOLOGY

## 2021-05-10 PROCEDURE — 1126F PR PAIN SEVERITY QUANTIFIED, NO PAIN PRESENT: ICD-10-PCS | Mod: HCNC,S$GLB,, | Performed by: OPHTHALMOLOGY

## 2021-05-10 PROCEDURE — 92014 PR EYE EXAM, EST PATIENT,COMPREHESV: ICD-10-PCS | Mod: HCNC,S$GLB,, | Performed by: OPHTHALMOLOGY

## 2021-05-10 PROCEDURE — 36415 PR COLLECTION VENOUS BLOOD,VENIPUNCTURE: ICD-10-PCS | Mod: S$GLB,,, | Performed by: FAMILY MEDICINE

## 2021-05-14 ENCOUNTER — PATIENT MESSAGE (OUTPATIENT)
Dept: FAMILY MEDICINE | Facility: CLINIC | Age: 81
End: 2021-05-14

## 2021-05-14 DIAGNOSIS — N41.2 PROSTATE ABSCESS: Primary | ICD-10-CM

## 2021-05-17 ENCOUNTER — PATIENT MESSAGE (OUTPATIENT)
Dept: FAMILY MEDICINE | Facility: CLINIC | Age: 81
End: 2021-05-17

## 2021-05-21 ENCOUNTER — HOSPITAL ENCOUNTER (OUTPATIENT)
Dept: RADIOLOGY | Facility: HOSPITAL | Age: 81
Discharge: HOME | End: 2021-05-21
Attending: FAMILY MEDICINE
Payer: MEDICARE

## 2021-05-21 DIAGNOSIS — N41.2 PROSTATE ABSCESS: ICD-10-CM

## 2021-05-21 PROCEDURE — 74177 CT ABDOMEN PELVIS WITH CONTRAST: ICD-10-PCS | Mod: 26,HCNC,, | Performed by: RADIOLOGY

## 2021-05-21 PROCEDURE — 74177 CT ABD & PELVIS W/CONTRAST: CPT | Mod: TC,HCNC,PO

## 2021-05-21 PROCEDURE — A9698 NON-RAD CONTRAST MATERIALNOC: HCPCS | Mod: HCNC,PO | Performed by: FAMILY MEDICINE

## 2021-05-21 PROCEDURE — 25500020 PHARM REV CODE 255: Mod: HCNC,PO | Performed by: FAMILY MEDICINE

## 2021-05-21 PROCEDURE — 74177 CT ABD & PELVIS W/CONTRAST: CPT | Mod: 26,HCNC,, | Performed by: RADIOLOGY

## 2021-05-21 RX ADMIN — IOHEXOL 1000 ML: 9 SOLUTION ORAL at 02:05

## 2021-05-21 RX ADMIN — IOHEXOL 75 ML: 350 INJECTION, SOLUTION INTRAVENOUS at 02:05

## 2021-05-26 ENCOUNTER — OFFICE VISIT (OUTPATIENT)
Dept: FAMILY MEDICINE | Facility: CLINIC | Age: 81
End: 2021-05-26
Payer: MEDICARE

## 2021-05-26 VITALS
TEMPERATURE: 98 F | BODY MASS INDEX: 25.21 KG/M2 | RESPIRATION RATE: 16 BRPM | HEART RATE: 68 BPM | WEIGHT: 176.13 LBS | OXYGEN SATURATION: 96 % | SYSTOLIC BLOOD PRESSURE: 112 MMHG | HEIGHT: 70 IN | DIASTOLIC BLOOD PRESSURE: 64 MMHG

## 2021-05-26 DIAGNOSIS — N41.9 PROSTATE INFECTION: Primary | ICD-10-CM

## 2021-05-26 DIAGNOSIS — F32.A DEPRESSION, UNSPECIFIED DEPRESSION TYPE: ICD-10-CM

## 2021-05-26 DIAGNOSIS — I25.10 ASCVD (ARTERIOSCLEROTIC CARDIOVASCULAR DISEASE): ICD-10-CM

## 2021-05-26 LAB
BASOPHILS # BLD AUTO: 0.06 K/UL (ref 0–0.2)
BASOPHILS NFR BLD: 0.8 % (ref 0–1.9)
DIFFERENTIAL METHOD: ABNORMAL
EOSINOPHIL # BLD AUTO: 0.4 K/UL (ref 0–0.5)
EOSINOPHIL NFR BLD: 5.2 % (ref 0–8)
ERYTHROCYTE [DISTWIDTH] IN BLOOD BY AUTOMATED COUNT: 13.9 % (ref 11.5–14.5)
ERYTHROCYTE [SEDIMENTATION RATE] IN BLOOD BY WESTERGREN METHOD: 19 MM/HR (ref 0–23)
HCT VFR BLD AUTO: 37.4 % (ref 40–54)
HGB BLD-MCNC: 11.7 G/DL (ref 14–18)
IMM GRANULOCYTES # BLD AUTO: 0.02 K/UL (ref 0–0.04)
IMM GRANULOCYTES NFR BLD AUTO: 0.3 % (ref 0–0.5)
LYMPHOCYTES # BLD AUTO: 2.1 K/UL (ref 1–4.8)
LYMPHOCYTES NFR BLD: 26.2 % (ref 18–48)
MCH RBC QN AUTO: 29 PG (ref 27–31)
MCHC RBC AUTO-ENTMCNC: 31.3 G/DL (ref 32–36)
MCV RBC AUTO: 93 FL (ref 82–98)
MONOCYTES # BLD AUTO: 0.7 K/UL (ref 0.3–1)
MONOCYTES NFR BLD: 8.6 % (ref 4–15)
NEUTROPHILS # BLD AUTO: 4.6 K/UL (ref 1.8–7.7)
NEUTROPHILS NFR BLD: 58.9 % (ref 38–73)
NRBC BLD-RTO: 0 /100 WBC
PLATELET # BLD AUTO: 272 K/UL (ref 150–450)
PMV BLD AUTO: 10.7 FL (ref 9.2–12.9)
RBC # BLD AUTO: 4.04 M/UL (ref 4.6–6.2)
WBC # BLD AUTO: 7.82 K/UL (ref 3.9–12.7)

## 2021-05-26 PROCEDURE — 1159F PR MEDICATION LIST DOCUMENTED IN MEDICAL RECORD: ICD-10-PCS | Mod: HCNC,S$GLB,, | Performed by: FAMILY MEDICINE

## 2021-05-26 PROCEDURE — 1126F PR PAIN SEVERITY QUANTIFIED, NO PAIN PRESENT: ICD-10-PCS | Mod: HCNC,S$GLB,, | Performed by: FAMILY MEDICINE

## 2021-05-26 PROCEDURE — 85025 COMPLETE CBC W/AUTO DIFF WBC: CPT | Mod: HCNC | Performed by: FAMILY MEDICINE

## 2021-05-26 PROCEDURE — 99499 RISK ADDL DX/OHS AUDIT: ICD-10-PCS | Mod: HCNC,S$GLB,, | Performed by: FAMILY MEDICINE

## 2021-05-26 PROCEDURE — 86140 C-REACTIVE PROTEIN: CPT | Mod: HCNC | Performed by: FAMILY MEDICINE

## 2021-05-26 PROCEDURE — 1157F PR ADVANCE CARE PLAN OR EQUIV PRESENT IN MEDICAL RECORD: ICD-10-PCS | Mod: HCNC,S$GLB,, | Performed by: FAMILY MEDICINE

## 2021-05-26 PROCEDURE — 1126F AMNT PAIN NOTED NONE PRSNT: CPT | Mod: HCNC,S$GLB,, | Performed by: FAMILY MEDICINE

## 2021-05-26 PROCEDURE — 85652 RBC SED RATE AUTOMATED: CPT | Mod: HCNC | Performed by: FAMILY MEDICINE

## 2021-05-26 PROCEDURE — 99214 PR OFFICE/OUTPT VISIT, EST, LEVL IV, 30-39 MIN: ICD-10-PCS | Mod: HCNC,S$GLB,, | Performed by: FAMILY MEDICINE

## 2021-05-26 PROCEDURE — 99499 UNLISTED E&M SERVICE: CPT | Mod: HCNC,S$GLB,, | Performed by: FAMILY MEDICINE

## 2021-05-26 PROCEDURE — 1157F ADVNC CARE PLAN IN RCRD: CPT | Mod: HCNC,S$GLB,, | Performed by: FAMILY MEDICINE

## 2021-05-26 PROCEDURE — 80053 COMPREHEN METABOLIC PANEL: CPT | Mod: HCNC | Performed by: FAMILY MEDICINE

## 2021-05-26 PROCEDURE — 1159F MED LIST DOCD IN RCRD: CPT | Mod: HCNC,S$GLB,, | Performed by: FAMILY MEDICINE

## 2021-05-26 PROCEDURE — 99214 OFFICE O/P EST MOD 30 MIN: CPT | Mod: HCNC,S$GLB,, | Performed by: FAMILY MEDICINE

## 2021-05-26 RX ORDER — ESCITALOPRAM OXALATE 10 MG/1
10 TABLET ORAL DAILY
Qty: 30 TABLET | Refills: 1 | Status: SHIPPED | OUTPATIENT
Start: 2021-05-26 | End: 2021-06-11

## 2021-05-27 ENCOUNTER — PATIENT MESSAGE (OUTPATIENT)
Dept: FAMILY MEDICINE | Facility: CLINIC | Age: 81
End: 2021-05-27

## 2021-05-27 LAB
ALBUMIN SERPL BCP-MCNC: 3.9 G/DL (ref 3.5–5.2)
ALP SERPL-CCNC: 74 U/L (ref 55–135)
ALT SERPL W/O P-5'-P-CCNC: 12 U/L (ref 10–44)
ANION GAP SERPL CALC-SCNC: 10 MMOL/L (ref 8–16)
AST SERPL-CCNC: 21 U/L (ref 10–40)
BILIRUB SERPL-MCNC: 0.3 MG/DL (ref 0.1–1)
BUN SERPL-MCNC: 13 MG/DL (ref 8–23)
CALCIUM SERPL-MCNC: 10.2 MG/DL (ref 8.7–10.5)
CHLORIDE SERPL-SCNC: 106 MMOL/L (ref 95–110)
CO2 SERPL-SCNC: 25 MMOL/L (ref 23–29)
CREAT SERPL-MCNC: 0.7 MG/DL (ref 0.5–1.4)
CRP SERPL-MCNC: 1.6 MG/L (ref 0–8.2)
EST. GFR  (AFRICAN AMERICAN): >60 ML/MIN/1.73 M^2
EST. GFR  (NON AFRICAN AMERICAN): >60 ML/MIN/1.73 M^2
GLUCOSE SERPL-MCNC: 85 MG/DL (ref 70–110)
POTASSIUM SERPL-SCNC: 4.7 MMOL/L (ref 3.5–5.1)
PROT SERPL-MCNC: 7.7 G/DL (ref 6–8.4)
SODIUM SERPL-SCNC: 141 MMOL/L (ref 136–145)

## 2021-06-02 ENCOUNTER — TELEPHONE (OUTPATIENT)
Dept: FAMILY MEDICINE | Facility: CLINIC | Age: 81
End: 2021-06-02

## 2021-06-04 ENCOUNTER — TELEPHONE (OUTPATIENT)
Dept: FAMILY MEDICINE | Facility: CLINIC | Age: 81
End: 2021-06-04

## 2021-06-04 RX ORDER — TRAZODONE HYDROCHLORIDE 50 MG/1
50 TABLET ORAL NIGHTLY
Qty: 30 TABLET | Refills: 1 | Status: SHIPPED | OUTPATIENT
Start: 2021-06-04 | End: 2021-06-11

## 2021-06-08 ENCOUNTER — TELEPHONE (OUTPATIENT)
Dept: FAMILY MEDICINE | Facility: CLINIC | Age: 81
End: 2021-06-08

## 2021-06-08 ENCOUNTER — PATIENT MESSAGE (OUTPATIENT)
Dept: PSYCHIATRY | Facility: CLINIC | Age: 81
End: 2021-06-08

## 2021-06-08 ENCOUNTER — OFFICE VISIT (OUTPATIENT)
Dept: PSYCHIATRY | Facility: CLINIC | Age: 81
End: 2021-06-08
Payer: MEDICARE

## 2021-06-08 DIAGNOSIS — F32.A DEPRESSION, UNSPECIFIED DEPRESSION TYPE: Primary | ICD-10-CM

## 2021-06-08 DIAGNOSIS — F41.9 ANXIETY DISORDER, UNSPECIFIED TYPE: ICD-10-CM

## 2021-06-08 PROCEDURE — 90791 PSYCH DIAGNOSTIC EVALUATION: CPT | Mod: HCNC,S$GLB,, | Performed by: PSYCHOLOGIST

## 2021-06-08 PROCEDURE — 1157F ADVNC CARE PLAN IN RCRD: CPT | Mod: HCNC,S$GLB,, | Performed by: PSYCHOLOGIST

## 2021-06-08 PROCEDURE — 99999 PR PBB SHADOW E&M-EST. PATIENT-LVL II: ICD-10-PCS | Mod: PBBFAC,HCNC,, | Performed by: PSYCHOLOGIST

## 2021-06-08 PROCEDURE — 99999 PR PBB SHADOW E&M-EST. PATIENT-LVL II: CPT | Mod: PBBFAC,HCNC,, | Performed by: PSYCHOLOGIST

## 2021-06-08 PROCEDURE — 1157F PR ADVANCE CARE PLAN OR EQUIV PRESENT IN MEDICAL RECORD: ICD-10-PCS | Mod: HCNC,S$GLB,, | Performed by: PSYCHOLOGIST

## 2021-06-08 PROCEDURE — 90791 PR PSYCHIATRIC DIAGNOSTIC EVALUATION: ICD-10-PCS | Mod: HCNC,S$GLB,, | Performed by: PSYCHOLOGIST

## 2021-06-11 ENCOUNTER — OFFICE VISIT (OUTPATIENT)
Dept: FAMILY MEDICINE | Facility: CLINIC | Age: 81
End: 2021-06-11
Payer: MEDICARE

## 2021-06-11 VITALS
BODY MASS INDEX: 23.97 KG/M2 | HEIGHT: 70 IN | WEIGHT: 167.44 LBS | HEART RATE: 74 BPM | OXYGEN SATURATION: 97 % | DIASTOLIC BLOOD PRESSURE: 72 MMHG | SYSTOLIC BLOOD PRESSURE: 128 MMHG | TEMPERATURE: 98 F

## 2021-06-11 DIAGNOSIS — G47.00 INSOMNIA, UNSPECIFIED TYPE: ICD-10-CM

## 2021-06-11 DIAGNOSIS — I25.10 ASCVD (ARTERIOSCLEROTIC CARDIOVASCULAR DISEASE): ICD-10-CM

## 2021-06-11 DIAGNOSIS — F33.1 MODERATE EPISODE OF RECURRENT MAJOR DEPRESSIVE DISORDER: Primary | ICD-10-CM

## 2021-06-11 PROCEDURE — 99215 OFFICE O/P EST HI 40 MIN: CPT | Mod: S$GLB,,, | Performed by: FAMILY MEDICINE

## 2021-06-11 PROCEDURE — 99215 PR OFFICE/OUTPT VISIT, EST, LEVL V, 40-54 MIN: ICD-10-PCS | Mod: S$GLB,,, | Performed by: FAMILY MEDICINE

## 2021-06-11 PROCEDURE — 1126F AMNT PAIN NOTED NONE PRSNT: CPT | Mod: S$GLB,,, | Performed by: FAMILY MEDICINE

## 2021-06-11 PROCEDURE — 1159F PR MEDICATION LIST DOCUMENTED IN MEDICAL RECORD: ICD-10-PCS | Mod: S$GLB,,, | Performed by: FAMILY MEDICINE

## 2021-06-11 PROCEDURE — 1157F PR ADVANCE CARE PLAN OR EQUIV PRESENT IN MEDICAL RECORD: ICD-10-PCS | Mod: S$GLB,,, | Performed by: FAMILY MEDICINE

## 2021-06-11 PROCEDURE — 1157F ADVNC CARE PLAN IN RCRD: CPT | Mod: S$GLB,,, | Performed by: FAMILY MEDICINE

## 2021-06-11 PROCEDURE — 1126F PR PAIN SEVERITY QUANTIFIED, NO PAIN PRESENT: ICD-10-PCS | Mod: S$GLB,,, | Performed by: FAMILY MEDICINE

## 2021-06-11 PROCEDURE — 1159F MED LIST DOCD IN RCRD: CPT | Mod: S$GLB,,, | Performed by: FAMILY MEDICINE

## 2021-06-11 RX ORDER — OXYBUTYNIN CHLORIDE 15 MG/1
15 TABLET, EXTENDED RELEASE ORAL DAILY
Qty: 30 TABLET | Refills: 1 | Status: SHIPPED | OUTPATIENT
Start: 2021-06-11 | End: 2021-09-09

## 2021-06-11 RX ORDER — TRAZODONE HYDROCHLORIDE 150 MG/1
150 TABLET ORAL NIGHTLY
Qty: 30 TABLET | Refills: 1 | Status: SHIPPED | OUTPATIENT
Start: 2021-06-11 | End: 2021-07-21 | Stop reason: SDUPTHER

## 2021-06-11 RX ORDER — LOSARTAN POTASSIUM 25 MG/1
25 TABLET ORAL DAILY
Qty: 90 TABLET | Refills: 1 | Status: SHIPPED | OUTPATIENT
Start: 2021-06-11 | End: 2021-06-25

## 2021-06-11 RX ORDER — ESCITALOPRAM OXALATE 20 MG/1
20 TABLET ORAL DAILY
Qty: 30 TABLET | Refills: 1 | Status: SHIPPED | OUTPATIENT
Start: 2021-06-11 | End: 2021-07-21 | Stop reason: SDUPTHER

## 2021-06-14 ENCOUNTER — PES CALL (OUTPATIENT)
Dept: ADMINISTRATIVE | Facility: CLINIC | Age: 81
End: 2021-06-14

## 2021-06-15 ENCOUNTER — LAB VISIT (OUTPATIENT)
Dept: LAB | Facility: HOSPITAL | Age: 81
End: 2021-06-15
Attending: UROLOGY
Payer: MEDICARE

## 2021-06-15 ENCOUNTER — TELEPHONE (OUTPATIENT)
Dept: HOME HEALTH SERVICES | Facility: CLINIC | Age: 81
End: 2021-06-15

## 2021-06-15 ENCOUNTER — OFFICE VISIT (OUTPATIENT)
Dept: HOME HEALTH SERVICES | Facility: CLINIC | Age: 81
End: 2021-06-15
Payer: MEDICARE

## 2021-06-15 VITALS
DIASTOLIC BLOOD PRESSURE: 70 MMHG | OXYGEN SATURATION: 93 % | HEART RATE: 75 BPM | BODY MASS INDEX: 23.91 KG/M2 | SYSTOLIC BLOOD PRESSURE: 121 MMHG | TEMPERATURE: 98 F | WEIGHT: 167 LBS | HEIGHT: 70 IN

## 2021-06-15 DIAGNOSIS — F33.1 MODERATE RECURRENT MAJOR DEPRESSION: ICD-10-CM

## 2021-06-15 DIAGNOSIS — H35.3132 NONEXUDATIVE AGE-RELATED MACULAR DEGENERATION, BILATERAL, INTERMEDIATE DRY STAGE: ICD-10-CM

## 2021-06-15 DIAGNOSIS — I10 ESSENTIAL HYPERTENSION: ICD-10-CM

## 2021-06-15 DIAGNOSIS — Z85.89 HISTORY OF SQUAMOUS CELL CARCINOMA: ICD-10-CM

## 2021-06-15 DIAGNOSIS — Z00.00 ENCOUNTER FOR PREVENTIVE HEALTH EXAMINATION: Primary | ICD-10-CM

## 2021-06-15 DIAGNOSIS — R97.20 ELEVATED PSA: ICD-10-CM

## 2021-06-15 DIAGNOSIS — E78.5 HYPERLIPIDEMIA, UNSPECIFIED HYPERLIPIDEMIA TYPE: ICD-10-CM

## 2021-06-15 DIAGNOSIS — I25.10 ATHEROSCLEROSIS OF NATIVE CORONARY ARTERY OF NATIVE HEART, ANGINA PRESENCE UNSPECIFIED: ICD-10-CM

## 2021-06-15 DIAGNOSIS — Z85.828 HISTORY OF BASAL CELL CARCINOMA (BCC): ICD-10-CM

## 2021-06-15 DIAGNOSIS — I70.0 ATHEROSCLEROSIS OF AORTA: ICD-10-CM

## 2021-06-15 PROBLEM — R93.5 ABNORMAL COMPUTED TOMOGRAPHY ANGIOGRAPHY (CTA) OF ABDOMEN AND PELVIS: Status: RESOLVED | Noted: 2021-04-02 | Resolved: 2021-06-15

## 2021-06-15 PROBLEM — H25.12 AGE-RELATED NUCLEAR CATARACT OF LEFT EYE: Status: RESOLVED | Noted: 2020-02-06 | Resolved: 2021-06-15

## 2021-06-15 PROBLEM — H25.11 AGE-RELATED NUCLEAR CATARACT OF RIGHT EYE: Status: RESOLVED | Noted: 2020-01-23 | Resolved: 2021-06-15

## 2021-06-15 PROBLEM — R94.39 POSITIVE CARDIAC STRESS TEST: Status: RESOLVED | Noted: 2020-11-30 | Resolved: 2021-06-15

## 2021-06-15 PROBLEM — N39.0 SEPSIS DUE TO GRAM-NEGATIVE UTI: Status: RESOLVED | Noted: 2021-04-02 | Resolved: 2021-06-15

## 2021-06-15 PROBLEM — D72.823 LEUKEMOID REACTION: Status: RESOLVED | Noted: 2019-02-18 | Resolved: 2021-06-15

## 2021-06-15 PROBLEM — A41.50 SEPSIS DUE TO GRAM-NEGATIVE UTI: Status: RESOLVED | Noted: 2021-04-02 | Resolved: 2021-06-15

## 2021-06-15 PROBLEM — R78.81 GRAM-NEGATIVE BACTEREMIA: Status: RESOLVED | Noted: 2021-04-04 | Resolved: 2021-06-15

## 2021-06-15 PROBLEM — N39.0 URINARY TRACT INFECTION WITHOUT HEMATURIA: Status: RESOLVED | Noted: 2021-04-02 | Resolved: 2021-06-15

## 2021-06-15 LAB — COMPLEXED PSA SERPL-MCNC: 8.2 NG/ML (ref 0–4)

## 2021-06-15 PROCEDURE — 1123F ACP DISCUSS/DSCN MKR DOCD: CPT | Mod: S$GLB,,, | Performed by: NURSE PRACTITIONER

## 2021-06-15 PROCEDURE — 1101F PT FALLS ASSESS-DOCD LE1/YR: CPT | Mod: CPTII,S$GLB,, | Performed by: NURSE PRACTITIONER

## 2021-06-15 PROCEDURE — 1101F PR PT FALLS ASSESS DOC 0-1 FALLS W/OUT INJ PAST YR: ICD-10-PCS | Mod: CPTII,S$GLB,, | Performed by: NURSE PRACTITIONER

## 2021-06-15 PROCEDURE — 3288F FALL RISK ASSESSMENT DOCD: CPT | Mod: CPTII,S$GLB,, | Performed by: NURSE PRACTITIONER

## 2021-06-15 PROCEDURE — 84153 ASSAY OF PSA TOTAL: CPT | Mod: HCNC | Performed by: UROLOGY

## 2021-06-15 PROCEDURE — 1123F PR ADV CARE PLAN DISCUSSED, PLAN OR SURROGATE DOCUMENTED: ICD-10-PCS | Mod: S$GLB,,, | Performed by: NURSE PRACTITIONER

## 2021-06-15 PROCEDURE — 36415 COLL VENOUS BLD VENIPUNCTURE: CPT | Mod: HCNC,PO | Performed by: UROLOGY

## 2021-06-15 PROCEDURE — G0439 PR MEDICARE ANNUAL WELLNESS SUBSEQUENT VISIT: ICD-10-PCS | Mod: S$GLB,,, | Performed by: NURSE PRACTITIONER

## 2021-06-15 PROCEDURE — 3288F PR FALLS RISK ASSESSMENT DOCUMENTED: ICD-10-PCS | Mod: CPTII,S$GLB,, | Performed by: NURSE PRACTITIONER

## 2021-06-15 PROCEDURE — G0439 PPPS, SUBSEQ VISIT: HCPCS | Mod: S$GLB,,, | Performed by: NURSE PRACTITIONER

## 2021-06-18 ENCOUNTER — TELEPHONE (OUTPATIENT)
Dept: FAMILY MEDICINE | Facility: CLINIC | Age: 81
End: 2021-06-18

## 2021-06-21 ENCOUNTER — OFFICE VISIT (OUTPATIENT)
Dept: PSYCHIATRY | Facility: CLINIC | Age: 81
End: 2021-06-21
Payer: MEDICARE

## 2021-06-21 ENCOUNTER — OFFICE VISIT (OUTPATIENT)
Dept: CARDIOLOGY | Facility: CLINIC | Age: 81
End: 2021-06-21
Payer: MEDICARE

## 2021-06-21 VITALS
WEIGHT: 167 LBS | HEART RATE: 60 BPM | SYSTOLIC BLOOD PRESSURE: 120 MMHG | BODY MASS INDEX: 23.91 KG/M2 | DIASTOLIC BLOOD PRESSURE: 60 MMHG | HEIGHT: 70 IN

## 2021-06-21 DIAGNOSIS — I10 ESSENTIAL HYPERTENSION: ICD-10-CM

## 2021-06-21 DIAGNOSIS — Z95.1 S/P CABG X 4: ICD-10-CM

## 2021-06-21 DIAGNOSIS — F32.A DEPRESSION, UNSPECIFIED DEPRESSION TYPE: ICD-10-CM

## 2021-06-21 DIAGNOSIS — E78.5 HYPERLIPIDEMIA, UNSPECIFIED HYPERLIPIDEMIA TYPE: ICD-10-CM

## 2021-06-21 DIAGNOSIS — F41.9 ANXIETY DISORDER, UNSPECIFIED TYPE: Primary | ICD-10-CM

## 2021-06-21 DIAGNOSIS — I25.10 CORONARY ARTERY DISEASE INVOLVING NATIVE CORONARY ARTERY OF NATIVE HEART WITHOUT ANGINA PECTORIS: Primary | ICD-10-CM

## 2021-06-21 PROCEDURE — 99999 PR PBB SHADOW E&M-EST. PATIENT-LVL III: CPT | Mod: PBBFAC,HCNC,, | Performed by: INTERNAL MEDICINE

## 2021-06-21 PROCEDURE — 99214 OFFICE O/P EST MOD 30 MIN: CPT | Mod: HCNC,S$GLB,, | Performed by: INTERNAL MEDICINE

## 2021-06-21 PROCEDURE — 1157F ADVNC CARE PLAN IN RCRD: CPT | Mod: HCNC,S$GLB,, | Performed by: INTERNAL MEDICINE

## 2021-06-21 PROCEDURE — 1157F ADVNC CARE PLAN IN RCRD: CPT | Mod: HCNC,95,, | Performed by: PSYCHOLOGIST

## 2021-06-21 PROCEDURE — 1157F PR ADVANCE CARE PLAN OR EQUIV PRESENT IN MEDICAL RECORD: ICD-10-PCS | Mod: HCNC,S$GLB,, | Performed by: INTERNAL MEDICINE

## 2021-06-21 PROCEDURE — 1159F PR MEDICATION LIST DOCUMENTED IN MEDICAL RECORD: ICD-10-PCS | Mod: HCNC,S$GLB,, | Performed by: INTERNAL MEDICINE

## 2021-06-21 PROCEDURE — 99999 PR PBB SHADOW E&M-EST. PATIENT-LVL III: ICD-10-PCS | Mod: PBBFAC,HCNC,, | Performed by: INTERNAL MEDICINE

## 2021-06-21 PROCEDURE — 99214 PR OFFICE/OUTPT VISIT, EST, LEVL IV, 30-39 MIN: ICD-10-PCS | Mod: HCNC,S$GLB,, | Performed by: INTERNAL MEDICINE

## 2021-06-21 PROCEDURE — 1157F PR ADVANCE CARE PLAN OR EQUIV PRESENT IN MEDICAL RECORD: ICD-10-PCS | Mod: HCNC,95,, | Performed by: PSYCHOLOGIST

## 2021-06-21 PROCEDURE — 1101F PR PT FALLS ASSESS DOC 0-1 FALLS W/OUT INJ PAST YR: ICD-10-PCS | Mod: HCNC,CPTII,S$GLB, | Performed by: INTERNAL MEDICINE

## 2021-06-21 PROCEDURE — 3288F FALL RISK ASSESSMENT DOCD: CPT | Mod: HCNC,CPTII,S$GLB, | Performed by: INTERNAL MEDICINE

## 2021-06-21 PROCEDURE — 1159F MED LIST DOCD IN RCRD: CPT | Mod: HCNC,S$GLB,, | Performed by: INTERNAL MEDICINE

## 2021-06-21 PROCEDURE — 90832 PR PSYCHOTHERAPY W/PATIENT, 30 MIN: ICD-10-PCS | Mod: HCNC,95,, | Performed by: PSYCHOLOGIST

## 2021-06-21 PROCEDURE — 3288F PR FALLS RISK ASSESSMENT DOCUMENTED: ICD-10-PCS | Mod: HCNC,CPTII,S$GLB, | Performed by: INTERNAL MEDICINE

## 2021-06-21 PROCEDURE — 1101F PT FALLS ASSESS-DOCD LE1/YR: CPT | Mod: HCNC,CPTII,S$GLB, | Performed by: INTERNAL MEDICINE

## 2021-06-21 PROCEDURE — 90832 PSYTX W PT 30 MINUTES: CPT | Mod: HCNC,95,, | Performed by: PSYCHOLOGIST

## 2021-06-22 ENCOUNTER — OUTPATIENT CASE MANAGEMENT (OUTPATIENT)
Dept: ADMINISTRATIVE | Facility: OTHER | Age: 81
End: 2021-06-22

## 2021-06-25 ENCOUNTER — OFFICE VISIT (OUTPATIENT)
Dept: FAMILY MEDICINE | Facility: CLINIC | Age: 81
End: 2021-06-25
Payer: MEDICARE

## 2021-06-25 VITALS
HEART RATE: 67 BPM | TEMPERATURE: 99 F | HEIGHT: 70 IN | RESPIRATION RATE: 16 BRPM | SYSTOLIC BLOOD PRESSURE: 100 MMHG | DIASTOLIC BLOOD PRESSURE: 50 MMHG | WEIGHT: 174.69 LBS | BODY MASS INDEX: 25.01 KG/M2 | OXYGEN SATURATION: 98 %

## 2021-06-25 DIAGNOSIS — I25.10 ASCVD (ARTERIOSCLEROTIC CARDIOVASCULAR DISEASE): ICD-10-CM

## 2021-06-25 DIAGNOSIS — F32.5 MAJOR DEPRESSIVE DISORDER IN REMISSION, UNSPECIFIED WHETHER RECURRENT: Primary | ICD-10-CM

## 2021-06-25 DIAGNOSIS — I95.9 HYPOTENSION, UNSPECIFIED HYPOTENSION TYPE: ICD-10-CM

## 2021-06-25 DIAGNOSIS — I10 HYPERTENSION, ESSENTIAL: ICD-10-CM

## 2021-06-25 PROCEDURE — 1101F PT FALLS ASSESS-DOCD LE1/YR: CPT | Mod: CPTII,S$GLB,, | Performed by: FAMILY MEDICINE

## 2021-06-25 PROCEDURE — 1157F PR ADVANCE CARE PLAN OR EQUIV PRESENT IN MEDICAL RECORD: ICD-10-PCS | Mod: S$GLB,,, | Performed by: FAMILY MEDICINE

## 2021-06-25 PROCEDURE — 1126F PR PAIN SEVERITY QUANTIFIED, NO PAIN PRESENT: ICD-10-PCS | Mod: S$GLB,,, | Performed by: FAMILY MEDICINE

## 2021-06-25 PROCEDURE — 1159F MED LIST DOCD IN RCRD: CPT | Mod: S$GLB,,, | Performed by: FAMILY MEDICINE

## 2021-06-25 PROCEDURE — 1157F ADVNC CARE PLAN IN RCRD: CPT | Mod: S$GLB,,, | Performed by: FAMILY MEDICINE

## 2021-06-25 PROCEDURE — 1101F PR PT FALLS ASSESS DOC 0-1 FALLS W/OUT INJ PAST YR: ICD-10-PCS | Mod: CPTII,S$GLB,, | Performed by: FAMILY MEDICINE

## 2021-06-25 PROCEDURE — 99214 OFFICE O/P EST MOD 30 MIN: CPT | Mod: S$GLB,,, | Performed by: FAMILY MEDICINE

## 2021-06-25 PROCEDURE — 3288F PR FALLS RISK ASSESSMENT DOCUMENTED: ICD-10-PCS | Mod: CPTII,S$GLB,, | Performed by: FAMILY MEDICINE

## 2021-06-25 PROCEDURE — 1159F PR MEDICATION LIST DOCUMENTED IN MEDICAL RECORD: ICD-10-PCS | Mod: S$GLB,,, | Performed by: FAMILY MEDICINE

## 2021-06-25 PROCEDURE — 99214 PR OFFICE/OUTPT VISIT, EST, LEVL IV, 30-39 MIN: ICD-10-PCS | Mod: S$GLB,,, | Performed by: FAMILY MEDICINE

## 2021-06-25 PROCEDURE — 1126F AMNT PAIN NOTED NONE PRSNT: CPT | Mod: S$GLB,,, | Performed by: FAMILY MEDICINE

## 2021-06-25 PROCEDURE — 3288F FALL RISK ASSESSMENT DOCD: CPT | Mod: CPTII,S$GLB,, | Performed by: FAMILY MEDICINE

## 2021-06-30 ENCOUNTER — OUTPATIENT CASE MANAGEMENT (OUTPATIENT)
Dept: ADMINISTRATIVE | Facility: OTHER | Age: 81
End: 2021-06-30

## 2021-07-06 DIAGNOSIS — I65.23 BILATERAL CAROTID ARTERY STENOSIS: Primary | ICD-10-CM

## 2021-07-08 ENCOUNTER — OFFICE VISIT (OUTPATIENT)
Dept: PSYCHIATRY | Facility: CLINIC | Age: 81
End: 2021-07-08
Payer: MEDICARE

## 2021-07-08 DIAGNOSIS — F41.9 ANXIETY DISORDER, UNSPECIFIED TYPE: Primary | ICD-10-CM

## 2021-07-08 DIAGNOSIS — F32.A DEPRESSION, UNSPECIFIED DEPRESSION TYPE: ICD-10-CM

## 2021-07-08 PROCEDURE — 99499 UNLISTED E&M SERVICE: CPT | Mod: HCNC,95,, | Performed by: PSYCHOLOGIST

## 2021-07-08 PROCEDURE — 1157F PR ADVANCE CARE PLAN OR EQUIV PRESENT IN MEDICAL RECORD: ICD-10-PCS | Mod: HCNC,95,, | Performed by: PSYCHOLOGIST

## 2021-07-08 PROCEDURE — 99499 NO LOS: ICD-10-PCS | Mod: HCNC,95,, | Performed by: PSYCHOLOGIST

## 2021-07-08 PROCEDURE — 1157F ADVNC CARE PLAN IN RCRD: CPT | Mod: HCNC,95,, | Performed by: PSYCHOLOGIST

## 2021-07-21 ENCOUNTER — TELEPHONE (OUTPATIENT)
Dept: FAMILY MEDICINE | Facility: CLINIC | Age: 81
End: 2021-07-21

## 2021-07-21 RX ORDER — TRAZODONE HYDROCHLORIDE 150 MG/1
150 TABLET ORAL NIGHTLY
Qty: 30 TABLET | Refills: 1 | Status: SHIPPED | OUTPATIENT
Start: 2021-07-21 | End: 2021-08-23 | Stop reason: SDUPTHER

## 2021-07-21 RX ORDER — ESCITALOPRAM OXALATE 20 MG/1
20 TABLET ORAL DAILY
Qty: 30 TABLET | Refills: 1 | Status: SHIPPED | OUTPATIENT
Start: 2021-07-21 | End: 2021-09-08 | Stop reason: SDUPTHER

## 2021-07-22 ENCOUNTER — HOSPITAL ENCOUNTER (OUTPATIENT)
Dept: RADIOLOGY | Facility: HOSPITAL | Age: 81
Discharge: HOME | End: 2021-07-22
Attending: THORACIC SURGERY (CARDIOTHORACIC VASCULAR SURGERY)
Payer: MEDICARE

## 2021-07-22 DIAGNOSIS — I65.23 BILATERAL CAROTID ARTERY STENOSIS: ICD-10-CM

## 2021-07-22 PROCEDURE — 93880 EXTRACRANIAL BILAT STUDY: CPT | Mod: 26,HCNC,, | Performed by: RADIOLOGY

## 2021-07-22 PROCEDURE — 93880 EXTRACRANIAL BILAT STUDY: CPT | Mod: TC,HCNC

## 2021-07-22 PROCEDURE — 93880 US CAROTID BILATERAL: ICD-10-PCS | Mod: 26,HCNC,, | Performed by: RADIOLOGY

## 2021-07-26 ENCOUNTER — TELEPHONE (OUTPATIENT)
Dept: VASCULAR SURGERY | Facility: CLINIC | Age: 81
End: 2021-07-26

## 2021-07-26 ENCOUNTER — TELEPHONE (OUTPATIENT)
Dept: FAMILY MEDICINE | Facility: CLINIC | Age: 81
End: 2021-07-26

## 2021-08-09 ENCOUNTER — PATIENT OUTREACH (OUTPATIENT)
Dept: ADMINISTRATIVE | Facility: OTHER | Age: 81
End: 2021-08-09

## 2021-08-10 ENCOUNTER — OUTPATIENT CASE MANAGEMENT (OUTPATIENT)
Dept: ADMINISTRATIVE | Facility: OTHER | Age: 81
End: 2021-08-10

## 2021-08-12 ENCOUNTER — TELEPHONE (OUTPATIENT)
Dept: FAMILY MEDICINE | Facility: CLINIC | Age: 81
End: 2021-08-12

## 2021-08-13 ENCOUNTER — TELEPHONE (OUTPATIENT)
Dept: FAMILY MEDICINE | Facility: CLINIC | Age: 81
End: 2021-08-13

## 2021-08-13 ENCOUNTER — PATIENT MESSAGE (OUTPATIENT)
Dept: PSYCHIATRY | Facility: CLINIC | Age: 81
End: 2021-08-13

## 2021-08-23 ENCOUNTER — OFFICE VISIT (OUTPATIENT)
Dept: PSYCHIATRY | Facility: CLINIC | Age: 81
End: 2021-08-23
Payer: MEDICARE

## 2021-08-23 ENCOUNTER — PATIENT MESSAGE (OUTPATIENT)
Dept: FAMILY MEDICINE | Facility: CLINIC | Age: 81
End: 2021-08-23

## 2021-08-23 ENCOUNTER — PATIENT MESSAGE (OUTPATIENT)
Dept: PSYCHIATRY | Facility: CLINIC | Age: 81
End: 2021-08-23

## 2021-08-23 ENCOUNTER — OFFICE VISIT (OUTPATIENT)
Dept: FAMILY MEDICINE | Facility: CLINIC | Age: 81
End: 2021-08-23
Payer: MEDICARE

## 2021-08-23 VITALS
OXYGEN SATURATION: 96 % | WEIGHT: 180.56 LBS | SYSTOLIC BLOOD PRESSURE: 112 MMHG | TEMPERATURE: 98 F | RESPIRATION RATE: 14 BRPM | BODY MASS INDEX: 25.91 KG/M2 | DIASTOLIC BLOOD PRESSURE: 64 MMHG

## 2021-08-23 DIAGNOSIS — I10 HYPERTENSION, ESSENTIAL: ICD-10-CM

## 2021-08-23 DIAGNOSIS — F32.5 MAJOR DEPRESSIVE DISORDER IN REMISSION, UNSPECIFIED WHETHER RECURRENT: Primary | ICD-10-CM

## 2021-08-23 DIAGNOSIS — F32.A DEPRESSION, UNSPECIFIED DEPRESSION TYPE: ICD-10-CM

## 2021-08-23 DIAGNOSIS — M70.20 OLECRANON BURSITIS, UNSPECIFIED LATERALITY: ICD-10-CM

## 2021-08-23 DIAGNOSIS — F41.9 ANXIETY DISORDER, UNSPECIFIED TYPE: Primary | ICD-10-CM

## 2021-08-23 PROCEDURE — 1126F AMNT PAIN NOTED NONE PRSNT: CPT | Mod: CPTII,S$GLB,, | Performed by: FAMILY MEDICINE

## 2021-08-23 PROCEDURE — 3078F PR MOST RECENT DIASTOLIC BLOOD PRESSURE < 80 MM HG: ICD-10-PCS | Mod: CPTII,S$GLB,, | Performed by: FAMILY MEDICINE

## 2021-08-23 PROCEDURE — 3078F DIAST BP <80 MM HG: CPT | Mod: CPTII,S$GLB,, | Performed by: FAMILY MEDICINE

## 2021-08-23 PROCEDURE — 99499 RISK ADDL DX/OHS AUDIT: ICD-10-PCS | Mod: S$GLB,,, | Performed by: FAMILY MEDICINE

## 2021-08-23 PROCEDURE — 1101F PT FALLS ASSESS-DOCD LE1/YR: CPT | Mod: CPTII,S$GLB,, | Performed by: FAMILY MEDICINE

## 2021-08-23 PROCEDURE — 3074F PR MOST RECENT SYSTOLIC BLOOD PRESSURE < 130 MM HG: ICD-10-PCS | Mod: CPTII,S$GLB,, | Performed by: FAMILY MEDICINE

## 2021-08-23 PROCEDURE — 3288F FALL RISK ASSESSMENT DOCD: CPT | Mod: CPTII,S$GLB,, | Performed by: FAMILY MEDICINE

## 2021-08-23 PROCEDURE — 99499 NO LOS: ICD-10-PCS | Mod: HCNC,95,, | Performed by: PSYCHOLOGIST

## 2021-08-23 PROCEDURE — 1157F PR ADVANCE CARE PLAN OR EQUIV PRESENT IN MEDICAL RECORD: ICD-10-PCS | Mod: HCNC,CPTII,95, | Performed by: PSYCHOLOGIST

## 2021-08-23 PROCEDURE — 1157F ADVNC CARE PLAN IN RCRD: CPT | Mod: CPTII,S$GLB,, | Performed by: FAMILY MEDICINE

## 2021-08-23 PROCEDURE — 99214 PR OFFICE/OUTPT VISIT, EST, LEVL IV, 30-39 MIN: ICD-10-PCS | Mod: S$GLB,,, | Performed by: FAMILY MEDICINE

## 2021-08-23 PROCEDURE — 99499 UNLISTED E&M SERVICE: CPT | Mod: HCNC,95,, | Performed by: PSYCHOLOGIST

## 2021-08-23 PROCEDURE — 1157F ADVNC CARE PLAN IN RCRD: CPT | Mod: HCNC,CPTII,95, | Performed by: PSYCHOLOGIST

## 2021-08-23 PROCEDURE — 1160F RVW MEDS BY RX/DR IN RCRD: CPT | Mod: CPTII,S$GLB,, | Performed by: FAMILY MEDICINE

## 2021-08-23 PROCEDURE — 1159F MED LIST DOCD IN RCRD: CPT | Mod: CPTII,S$GLB,, | Performed by: FAMILY MEDICINE

## 2021-08-23 PROCEDURE — 3288F PR FALLS RISK ASSESSMENT DOCUMENTED: ICD-10-PCS | Mod: CPTII,S$GLB,, | Performed by: FAMILY MEDICINE

## 2021-08-23 PROCEDURE — 99214 OFFICE O/P EST MOD 30 MIN: CPT | Mod: S$GLB,,, | Performed by: FAMILY MEDICINE

## 2021-08-23 PROCEDURE — 1157F PR ADVANCE CARE PLAN OR EQUIV PRESENT IN MEDICAL RECORD: ICD-10-PCS | Mod: CPTII,S$GLB,, | Performed by: FAMILY MEDICINE

## 2021-08-23 PROCEDURE — 1101F PR PT FALLS ASSESS DOC 0-1 FALLS W/OUT INJ PAST YR: ICD-10-PCS | Mod: CPTII,S$GLB,, | Performed by: FAMILY MEDICINE

## 2021-08-23 PROCEDURE — 1126F PR PAIN SEVERITY QUANTIFIED, NO PAIN PRESENT: ICD-10-PCS | Mod: CPTII,S$GLB,, | Performed by: FAMILY MEDICINE

## 2021-08-23 PROCEDURE — 99499 UNLISTED E&M SERVICE: CPT | Mod: S$GLB,,, | Performed by: FAMILY MEDICINE

## 2021-08-23 PROCEDURE — 3074F SYST BP LT 130 MM HG: CPT | Mod: CPTII,S$GLB,, | Performed by: FAMILY MEDICINE

## 2021-08-23 PROCEDURE — 1160F PR REVIEW ALL MEDS BY PRESCRIBER/CLIN PHARMACIST DOCUMENTED: ICD-10-PCS | Mod: CPTII,S$GLB,, | Performed by: FAMILY MEDICINE

## 2021-08-23 PROCEDURE — 1159F PR MEDICATION LIST DOCUMENTED IN MEDICAL RECORD: ICD-10-PCS | Mod: CPTII,S$GLB,, | Performed by: FAMILY MEDICINE

## 2021-08-23 RX ORDER — TRAZODONE HYDROCHLORIDE 150 MG/1
150 TABLET ORAL NIGHTLY
Qty: 30 TABLET | Refills: 2 | Status: SHIPPED | OUTPATIENT
Start: 2021-08-23 | End: 2021-09-20 | Stop reason: SDUPTHER

## 2021-08-24 DIAGNOSIS — M25.521 ELBOW PAIN, RIGHT: Primary | ICD-10-CM

## 2021-08-25 ENCOUNTER — OFFICE VISIT (OUTPATIENT)
Dept: ORTHOPEDICS | Facility: CLINIC | Age: 81
End: 2021-08-25
Payer: MEDICARE

## 2021-08-25 ENCOUNTER — HOSPITAL ENCOUNTER (OUTPATIENT)
Dept: RADIOLOGY | Facility: HOSPITAL | Age: 81
Discharge: HOME | End: 2021-08-25
Attending: ORTHOPAEDIC SURGERY
Payer: MEDICARE

## 2021-08-25 VITALS — RESPIRATION RATE: 18 BRPM | WEIGHT: 180 LBS | HEIGHT: 70 IN | BODY MASS INDEX: 25.77 KG/M2

## 2021-08-25 DIAGNOSIS — M25.522 ELBOW PAIN, LEFT: ICD-10-CM

## 2021-08-25 DIAGNOSIS — M70.20 OLECRANON BURSITIS, UNSPECIFIED LATERALITY: ICD-10-CM

## 2021-08-25 DIAGNOSIS — M25.521 ELBOW PAIN, RIGHT: ICD-10-CM

## 2021-08-25 PROCEDURE — 99213 PR OFFICE/OUTPT VISIT, EST, LEVL III, 20-29 MIN: ICD-10-PCS | Mod: 25,HCNC,S$GLB, | Performed by: ORTHOPAEDIC SURGERY

## 2021-08-25 PROCEDURE — 3288F PR FALLS RISK ASSESSMENT DOCUMENTED: ICD-10-PCS | Mod: HCNC,CPTII,S$GLB, | Performed by: ORTHOPAEDIC SURGERY

## 2021-08-25 PROCEDURE — 1125F PR PAIN SEVERITY QUANTIFIED, PAIN PRESENT: ICD-10-PCS | Mod: HCNC,CPTII,S$GLB, | Performed by: ORTHOPAEDIC SURGERY

## 2021-08-25 PROCEDURE — 99999 PR PBB SHADOW E&M-EST. PATIENT-LVL III: ICD-10-PCS | Mod: PBBFAC,HCNC,, | Performed by: ORTHOPAEDIC SURGERY

## 2021-08-25 PROCEDURE — 73080 X-RAY EXAM OF ELBOW: CPT | Mod: 26,HCNC,LT, | Performed by: RADIOLOGY

## 2021-08-25 PROCEDURE — 1160F PR REVIEW ALL MEDS BY PRESCRIBER/CLIN PHARMACIST DOCUMENTED: ICD-10-PCS | Mod: HCNC,CPTII,S$GLB, | Performed by: ORTHOPAEDIC SURGERY

## 2021-08-25 PROCEDURE — 1157F PR ADVANCE CARE PLAN OR EQUIV PRESENT IN MEDICAL RECORD: ICD-10-PCS | Mod: HCNC,CPTII,S$GLB, | Performed by: ORTHOPAEDIC SURGERY

## 2021-08-25 PROCEDURE — 1159F PR MEDICATION LIST DOCUMENTED IN MEDICAL RECORD: ICD-10-PCS | Mod: HCNC,CPTII,S$GLB, | Performed by: ORTHOPAEDIC SURGERY

## 2021-08-25 PROCEDURE — 73080 XR ELBOW COMPLETE 3 VIEW LEFT: ICD-10-PCS | Mod: 26,HCNC,LT, | Performed by: RADIOLOGY

## 2021-08-25 PROCEDURE — 99999 PR PBB SHADOW E&M-EST. PATIENT-LVL III: CPT | Mod: PBBFAC,HCNC,, | Performed by: ORTHOPAEDIC SURGERY

## 2021-08-25 PROCEDURE — 1160F RVW MEDS BY RX/DR IN RCRD: CPT | Mod: HCNC,CPTII,S$GLB, | Performed by: ORTHOPAEDIC SURGERY

## 2021-08-25 PROCEDURE — 1125F AMNT PAIN NOTED PAIN PRSNT: CPT | Mod: HCNC,CPTII,S$GLB, | Performed by: ORTHOPAEDIC SURGERY

## 2021-08-25 PROCEDURE — 99213 OFFICE O/P EST LOW 20 MIN: CPT | Mod: 25,HCNC,S$GLB, | Performed by: ORTHOPAEDIC SURGERY

## 2021-08-25 PROCEDURE — 73080 X-RAY EXAM OF ELBOW: CPT | Mod: TC,HCNC,PO,LT

## 2021-08-25 PROCEDURE — 3288F FALL RISK ASSESSMENT DOCD: CPT | Mod: HCNC,CPTII,S$GLB, | Performed by: ORTHOPAEDIC SURGERY

## 2021-08-25 PROCEDURE — 1101F PT FALLS ASSESS-DOCD LE1/YR: CPT | Mod: HCNC,CPTII,S$GLB, | Performed by: ORTHOPAEDIC SURGERY

## 2021-08-25 PROCEDURE — 1101F PR PT FALLS ASSESS DOC 0-1 FALLS W/OUT INJ PAST YR: ICD-10-PCS | Mod: HCNC,CPTII,S$GLB, | Performed by: ORTHOPAEDIC SURGERY

## 2021-08-25 PROCEDURE — 20605 INTERMEDIATE JOINT ASPIRATION/INJECTION: L OLECRANON BURSA: ICD-10-PCS | Mod: HCNC,LT,S$GLB, | Performed by: ORTHOPAEDIC SURGERY

## 2021-08-25 PROCEDURE — 1159F MED LIST DOCD IN RCRD: CPT | Mod: HCNC,CPTII,S$GLB, | Performed by: ORTHOPAEDIC SURGERY

## 2021-08-25 PROCEDURE — 1157F ADVNC CARE PLAN IN RCRD: CPT | Mod: HCNC,CPTII,S$GLB, | Performed by: ORTHOPAEDIC SURGERY

## 2021-08-25 PROCEDURE — 20605 DRAIN/INJ JOINT/BURSA W/O US: CPT | Mod: HCNC,LT,S$GLB, | Performed by: ORTHOPAEDIC SURGERY

## 2021-09-01 ENCOUNTER — OUTPATIENT CASE MANAGEMENT (OUTPATIENT)
Dept: ADMINISTRATIVE | Facility: OTHER | Age: 81
End: 2021-09-01

## 2021-09-09 ENCOUNTER — OFFICE VISIT (OUTPATIENT)
Dept: FAMILY MEDICINE | Facility: CLINIC | Age: 81
End: 2021-09-09
Payer: MEDICARE

## 2021-09-09 VITALS
SYSTOLIC BLOOD PRESSURE: 122 MMHG | OXYGEN SATURATION: 96 % | DIASTOLIC BLOOD PRESSURE: 76 MMHG | RESPIRATION RATE: 12 BRPM | TEMPERATURE: 99 F | WEIGHT: 173.63 LBS | BODY MASS INDEX: 24.91 KG/M2 | HEART RATE: 75 BPM

## 2021-09-09 DIAGNOSIS — R21 RASH: Primary | ICD-10-CM

## 2021-09-09 DIAGNOSIS — R10.11 RUQ PAIN: ICD-10-CM

## 2021-09-09 DIAGNOSIS — R68.2 DRY MOUTH: ICD-10-CM

## 2021-09-09 PROCEDURE — 3074F PR MOST RECENT SYSTOLIC BLOOD PRESSURE < 130 MM HG: ICD-10-PCS | Mod: CPTII,S$GLB,, | Performed by: FAMILY MEDICINE

## 2021-09-09 PROCEDURE — 3288F FALL RISK ASSESSMENT DOCD: CPT | Mod: CPTII,S$GLB,, | Performed by: FAMILY MEDICINE

## 2021-09-09 PROCEDURE — 1159F MED LIST DOCD IN RCRD: CPT | Mod: CPTII,S$GLB,, | Performed by: FAMILY MEDICINE

## 2021-09-09 PROCEDURE — 1157F PR ADVANCE CARE PLAN OR EQUIV PRESENT IN MEDICAL RECORD: ICD-10-PCS | Mod: CPTII,S$GLB,, | Performed by: FAMILY MEDICINE

## 2021-09-09 PROCEDURE — 1160F PR REVIEW ALL MEDS BY PRESCRIBER/CLIN PHARMACIST DOCUMENTED: ICD-10-PCS | Mod: CPTII,S$GLB,, | Performed by: FAMILY MEDICINE

## 2021-09-09 PROCEDURE — 1159F PR MEDICATION LIST DOCUMENTED IN MEDICAL RECORD: ICD-10-PCS | Mod: CPTII,S$GLB,, | Performed by: FAMILY MEDICINE

## 2021-09-09 PROCEDURE — 1160F RVW MEDS BY RX/DR IN RCRD: CPT | Mod: CPTII,S$GLB,, | Performed by: FAMILY MEDICINE

## 2021-09-09 PROCEDURE — 1126F PR PAIN SEVERITY QUANTIFIED, NO PAIN PRESENT: ICD-10-PCS | Mod: CPTII,S$GLB,, | Performed by: FAMILY MEDICINE

## 2021-09-09 PROCEDURE — 3288F PR FALLS RISK ASSESSMENT DOCUMENTED: ICD-10-PCS | Mod: CPTII,S$GLB,, | Performed by: FAMILY MEDICINE

## 2021-09-09 PROCEDURE — 3078F DIAST BP <80 MM HG: CPT | Mod: CPTII,S$GLB,, | Performed by: FAMILY MEDICINE

## 2021-09-09 PROCEDURE — 3074F SYST BP LT 130 MM HG: CPT | Mod: CPTII,S$GLB,, | Performed by: FAMILY MEDICINE

## 2021-09-09 PROCEDURE — 99214 OFFICE O/P EST MOD 30 MIN: CPT | Mod: S$GLB,,, | Performed by: FAMILY MEDICINE

## 2021-09-09 PROCEDURE — 1157F ADVNC CARE PLAN IN RCRD: CPT | Mod: CPTII,S$GLB,, | Performed by: FAMILY MEDICINE

## 2021-09-09 PROCEDURE — 99214 PR OFFICE/OUTPT VISIT, EST, LEVL IV, 30-39 MIN: ICD-10-PCS | Mod: S$GLB,,, | Performed by: FAMILY MEDICINE

## 2021-09-09 PROCEDURE — 1101F PT FALLS ASSESS-DOCD LE1/YR: CPT | Mod: CPTII,S$GLB,, | Performed by: FAMILY MEDICINE

## 2021-09-09 PROCEDURE — 3078F PR MOST RECENT DIASTOLIC BLOOD PRESSURE < 80 MM HG: ICD-10-PCS | Mod: CPTII,S$GLB,, | Performed by: FAMILY MEDICINE

## 2021-09-09 PROCEDURE — 1126F AMNT PAIN NOTED NONE PRSNT: CPT | Mod: CPTII,S$GLB,, | Performed by: FAMILY MEDICINE

## 2021-09-09 PROCEDURE — 1101F PR PT FALLS ASSESS DOC 0-1 FALLS W/OUT INJ PAST YR: ICD-10-PCS | Mod: CPTII,S$GLB,, | Performed by: FAMILY MEDICINE

## 2021-09-09 RX ORDER — OXYBUTYNIN CHLORIDE 10 MG/1
10 TABLET, EXTENDED RELEASE ORAL DAILY
Qty: 30 TABLET | Refills: 1 | Status: SHIPPED | OUTPATIENT
Start: 2021-09-09 | End: 2022-02-04

## 2021-09-09 RX ORDER — ESCITALOPRAM OXALATE 20 MG/1
20 TABLET ORAL DAILY
Qty: 30 TABLET | Refills: 1 | Status: CANCELLED | OUTPATIENT
Start: 2021-09-09 | End: 2022-09-09

## 2021-09-10 ENCOUNTER — PATIENT MESSAGE (OUTPATIENT)
Dept: FAMILY MEDICINE | Facility: CLINIC | Age: 81
End: 2021-09-10

## 2021-09-10 RX ORDER — ESCITALOPRAM OXALATE 20 MG/1
20 TABLET ORAL DAILY
Qty: 30 TABLET | Refills: 5 | Status: SHIPPED | OUTPATIENT
Start: 2021-09-10 | End: 2021-09-20 | Stop reason: SDUPTHER

## 2021-09-20 ENCOUNTER — TELEPHONE (OUTPATIENT)
Dept: RHEUMATOLOGY | Facility: CLINIC | Age: 81
End: 2021-09-20

## 2021-09-20 RX ORDER — ESCITALOPRAM OXALATE 20 MG/1
20 TABLET ORAL DAILY
Qty: 30 TABLET | Refills: 5 | Status: SHIPPED | OUTPATIENT
Start: 2021-09-20 | End: 2021-12-16 | Stop reason: SDUPTHER

## 2021-09-20 RX ORDER — TRAZODONE HYDROCHLORIDE 150 MG/1
150 TABLET ORAL NIGHTLY
Qty: 30 TABLET | Refills: 2 | Status: SHIPPED | OUTPATIENT
Start: 2021-09-20 | End: 2021-12-16 | Stop reason: SDUPTHER

## 2021-09-23 ENCOUNTER — PATIENT OUTREACH (OUTPATIENT)
Dept: ADMINISTRATIVE | Facility: OTHER | Age: 81
End: 2021-09-23

## 2021-09-23 ENCOUNTER — OFFICE VISIT (OUTPATIENT)
Dept: UROLOGY | Facility: CLINIC | Age: 81
End: 2021-09-23
Payer: MEDICARE

## 2021-09-23 VITALS
HEART RATE: 71 BPM | BODY MASS INDEX: 24.87 KG/M2 | WEIGHT: 173.75 LBS | HEIGHT: 70 IN | DIASTOLIC BLOOD PRESSURE: 75 MMHG | SYSTOLIC BLOOD PRESSURE: 132 MMHG

## 2021-09-23 DIAGNOSIS — R97.20 ELEVATED PSA: ICD-10-CM

## 2021-09-23 DIAGNOSIS — R39.12 BENIGN PROSTATIC HYPERPLASIA WITH WEAK URINARY STREAM: ICD-10-CM

## 2021-09-23 DIAGNOSIS — N40.1 BENIGN PROSTATIC HYPERPLASIA WITH WEAK URINARY STREAM: ICD-10-CM

## 2021-09-23 DIAGNOSIS — N41.0 ACUTE PROSTATITIS: Primary | ICD-10-CM

## 2021-09-23 DIAGNOSIS — R35.1 NOCTURIA: ICD-10-CM

## 2021-09-23 DIAGNOSIS — R35.0 URINARY FREQUENCY: ICD-10-CM

## 2021-09-23 PROCEDURE — 1126F AMNT PAIN NOTED NONE PRSNT: CPT | Mod: HCNC,CPTII,S$GLB, | Performed by: UROLOGY

## 2021-09-23 PROCEDURE — 3078F DIAST BP <80 MM HG: CPT | Mod: HCNC,CPTII,S$GLB, | Performed by: UROLOGY

## 2021-09-23 PROCEDURE — 1160F RVW MEDS BY RX/DR IN RCRD: CPT | Mod: HCNC,CPTII,S$GLB, | Performed by: UROLOGY

## 2021-09-23 PROCEDURE — 3288F PR FALLS RISK ASSESSMENT DOCUMENTED: ICD-10-PCS | Mod: HCNC,CPTII,S$GLB, | Performed by: UROLOGY

## 2021-09-23 PROCEDURE — 1126F PR PAIN SEVERITY QUANTIFIED, NO PAIN PRESENT: ICD-10-PCS | Mod: HCNC,CPTII,S$GLB, | Performed by: UROLOGY

## 2021-09-23 PROCEDURE — 99215 OFFICE O/P EST HI 40 MIN: CPT | Mod: HCNC,S$GLB,, | Performed by: UROLOGY

## 2021-09-23 PROCEDURE — 3075F PR MOST RECENT SYSTOLIC BLOOD PRESS GE 130-139MM HG: ICD-10-PCS | Mod: HCNC,CPTII,S$GLB, | Performed by: UROLOGY

## 2021-09-23 PROCEDURE — 1159F MED LIST DOCD IN RCRD: CPT | Mod: HCNC,CPTII,S$GLB, | Performed by: UROLOGY

## 2021-09-23 PROCEDURE — 1157F PR ADVANCE CARE PLAN OR EQUIV PRESENT IN MEDICAL RECORD: ICD-10-PCS | Mod: HCNC,CPTII,S$GLB, | Performed by: UROLOGY

## 2021-09-23 PROCEDURE — 1101F PR PT FALLS ASSESS DOC 0-1 FALLS W/OUT INJ PAST YR: ICD-10-PCS | Mod: HCNC,CPTII,S$GLB, | Performed by: UROLOGY

## 2021-09-23 PROCEDURE — 99215 PR OFFICE/OUTPT VISIT, EST, LEVL V, 40-54 MIN: ICD-10-PCS | Mod: HCNC,S$GLB,, | Performed by: UROLOGY

## 2021-09-23 PROCEDURE — 3075F SYST BP GE 130 - 139MM HG: CPT | Mod: HCNC,CPTII,S$GLB, | Performed by: UROLOGY

## 2021-09-23 PROCEDURE — 1101F PT FALLS ASSESS-DOCD LE1/YR: CPT | Mod: HCNC,CPTII,S$GLB, | Performed by: UROLOGY

## 2021-09-23 PROCEDURE — 99999 PR PBB SHADOW E&M-EST. PATIENT-LVL III: ICD-10-PCS | Mod: PBBFAC,HCNC,, | Performed by: UROLOGY

## 2021-09-23 PROCEDURE — 3078F PR MOST RECENT DIASTOLIC BLOOD PRESSURE < 80 MM HG: ICD-10-PCS | Mod: HCNC,CPTII,S$GLB, | Performed by: UROLOGY

## 2021-09-23 PROCEDURE — 1159F PR MEDICATION LIST DOCUMENTED IN MEDICAL RECORD: ICD-10-PCS | Mod: HCNC,CPTII,S$GLB, | Performed by: UROLOGY

## 2021-09-23 PROCEDURE — 1157F ADVNC CARE PLAN IN RCRD: CPT | Mod: HCNC,CPTII,S$GLB, | Performed by: UROLOGY

## 2021-09-23 PROCEDURE — 99999 PR PBB SHADOW E&M-EST. PATIENT-LVL III: CPT | Mod: PBBFAC,HCNC,, | Performed by: UROLOGY

## 2021-09-23 PROCEDURE — 3288F FALL RISK ASSESSMENT DOCD: CPT | Mod: HCNC,CPTII,S$GLB, | Performed by: UROLOGY

## 2021-09-23 PROCEDURE — 1160F PR REVIEW ALL MEDS BY PRESCRIBER/CLIN PHARMACIST DOCUMENTED: ICD-10-PCS | Mod: HCNC,CPTII,S$GLB, | Performed by: UROLOGY

## 2021-09-23 RX ORDER — LOSARTAN POTASSIUM 25 MG/1
TABLET ORAL
COMMUNITY
Start: 2021-09-08 | End: 2022-02-04 | Stop reason: SDUPTHER

## 2021-10-01 ENCOUNTER — TELEPHONE (OUTPATIENT)
Dept: ORTHOPEDICS | Facility: CLINIC | Age: 81
End: 2021-10-01

## 2021-10-22 NOTE — TELEPHONE ENCOUNTER
----- Message from Maria Dolores Dobbins sent at 9/14/2018 12:11 PM CDT -----  Contact: pt  Pt had a nurse visit with nurse Paniagua today to remove the sutures he is wanting to know if still come....[phone] (home)      Dr. Yoder (Attending Physician)  Pt. with syncope vs. seizure event today in the car. Has ho endocarditis will place in CDU for echo cards consult. Seen as Code Stroke in ED, low suspicion for seizure on their assessment.

## 2021-12-21 RX ORDER — TRAZODONE HYDROCHLORIDE 150 MG/1
150 TABLET ORAL NIGHTLY
Qty: 90 TABLET | Refills: 2 | Status: SHIPPED | OUTPATIENT
Start: 2021-12-21 | End: 2022-02-04

## 2021-12-21 RX ORDER — ESCITALOPRAM OXALATE 20 MG/1
20 TABLET ORAL DAILY
Qty: 90 TABLET | Refills: 2 | Status: SHIPPED | OUTPATIENT
Start: 2021-12-21 | End: 2022-02-04

## 2022-01-25 ENCOUNTER — TELEPHONE (OUTPATIENT)
Dept: FAMILY MEDICINE | Facility: CLINIC | Age: 82
End: 2022-01-25
Payer: MEDICARE

## 2022-01-25 NOTE — TELEPHONE ENCOUNTER
----- Message from Rina Patel sent at 1/25/2022  4:21 PM CST -----  Contact: patient  Type: Needs Medical Advice  Who Called:  patient  Best Call Back Number: [phone] (home)   Additional Information: patient wants to know if he should get another booster shot before he moves to [place]- he has an on with you all on Monday. Please advise

## 2022-01-31 ENCOUNTER — TELEPHONE (OUTPATIENT)
Dept: FAMILY MEDICINE | Facility: CLINIC | Age: 82
End: 2022-01-31
Payer: MEDICARE

## 2022-01-31 NOTE — TELEPHONE ENCOUNTER
Patient is moving to [place] on 2/15/2022 and would like to see you before he moves.  Next available is not until 2/15/2022. He has requested to see if you can fit him in somewhere.  Please advise.

## 2022-02-03 NOTE — TELEPHONE ENCOUNTER
----- Message from Peri Slade sent at 2/3/2022  9:54 AM CST -----  Contact: pt  Type: Needs Medical Advice    Who Called: pt  Best Call Back Number: [phone]   Requesting a call back regarding - pt is asking for a call back please he is moving and would like to be seen before 02/14. Please call    Please Advise- Thank you

## 2022-02-04 ENCOUNTER — OFFICE VISIT (OUTPATIENT)
Dept: FAMILY MEDICINE | Facility: CLINIC | Age: 82
End: 2022-02-04
Payer: MEDICARE

## 2022-02-04 ENCOUNTER — IMMUNIZATION (OUTPATIENT)
Dept: FAMILY MEDICINE | Facility: CLINIC | Age: 82
End: 2022-02-04
Payer: MEDICARE

## 2022-02-04 VITALS
OXYGEN SATURATION: 97 % | DIASTOLIC BLOOD PRESSURE: 78 MMHG | WEIGHT: 174.5 LBS | TEMPERATURE: 98 F | BODY MASS INDEX: 24.98 KG/M2 | HEART RATE: 80 BPM | HEIGHT: 70 IN | SYSTOLIC BLOOD PRESSURE: 144 MMHG | RESPIRATION RATE: 16 BRPM

## 2022-02-04 DIAGNOSIS — Z23 NEED FOR VACCINATION: Primary | ICD-10-CM

## 2022-02-04 DIAGNOSIS — I70.0 ATHEROSCLEROSIS OF AORTA: ICD-10-CM

## 2022-02-04 DIAGNOSIS — E78.5 HYPERLIPIDEMIA, UNSPECIFIED HYPERLIPIDEMIA TYPE: ICD-10-CM

## 2022-02-04 DIAGNOSIS — I25.118 ATHEROSCLEROTIC HEART DISEASE OF NATIVE CORONARY ARTERY WITH OTHER FORMS OF ANGINA PECTORIS: ICD-10-CM

## 2022-02-04 DIAGNOSIS — I10 HYPERTENSION, ESSENTIAL: Primary | ICD-10-CM

## 2022-02-04 PROCEDURE — 1157F ADVNC CARE PLAN IN RCRD: CPT | Mod: CPTII,S$GLB,, | Performed by: FAMILY MEDICINE

## 2022-02-04 PROCEDURE — 3077F SYST BP >= 140 MM HG: CPT | Mod: CPTII,S$GLB,, | Performed by: FAMILY MEDICINE

## 2022-02-04 PROCEDURE — 91300 COVID-19, MRNA, LNP-S, PF, 30 MCG/0.3 ML DOSE VACCINE: CPT | Mod: HCNC,PBBFAC | Performed by: FAMILY MEDICINE

## 2022-02-04 PROCEDURE — 3288F PR FALLS RISK ASSESSMENT DOCUMENTED: ICD-10-PCS | Mod: CPTII,S$GLB,, | Performed by: FAMILY MEDICINE

## 2022-02-04 PROCEDURE — 1160F PR REVIEW ALL MEDS BY PRESCRIBER/CLIN PHARMACIST DOCUMENTED: ICD-10-PCS | Mod: CPTII,S$GLB,, | Performed by: FAMILY MEDICINE

## 2022-02-04 PROCEDURE — 3078F DIAST BP <80 MM HG: CPT | Mod: CPTII,S$GLB,, | Performed by: FAMILY MEDICINE

## 2022-02-04 PROCEDURE — 1160F RVW MEDS BY RX/DR IN RCRD: CPT | Mod: CPTII,S$GLB,, | Performed by: FAMILY MEDICINE

## 2022-02-04 PROCEDURE — 1159F PR MEDICATION LIST DOCUMENTED IN MEDICAL RECORD: ICD-10-PCS | Mod: CPTII,S$GLB,, | Performed by: FAMILY MEDICINE

## 2022-02-04 PROCEDURE — 99214 OFFICE O/P EST MOD 30 MIN: CPT | Mod: S$GLB,,, | Performed by: FAMILY MEDICINE

## 2022-02-04 PROCEDURE — 3077F PR MOST RECENT SYSTOLIC BLOOD PRESSURE >= 140 MM HG: ICD-10-PCS | Mod: CPTII,S$GLB,, | Performed by: FAMILY MEDICINE

## 2022-02-04 PROCEDURE — 1101F PT FALLS ASSESS-DOCD LE1/YR: CPT | Mod: CPTII,S$GLB,, | Performed by: FAMILY MEDICINE

## 2022-02-04 PROCEDURE — 99499 RISK ADDL DX/OHS AUDIT: ICD-10-PCS | Mod: S$GLB,,, | Performed by: FAMILY MEDICINE

## 2022-02-04 PROCEDURE — 99499 UNLISTED E&M SERVICE: CPT | Mod: S$GLB,,, | Performed by: FAMILY MEDICINE

## 2022-02-04 PROCEDURE — 3078F PR MOST RECENT DIASTOLIC BLOOD PRESSURE < 80 MM HG: ICD-10-PCS | Mod: CPTII,S$GLB,, | Performed by: FAMILY MEDICINE

## 2022-02-04 PROCEDURE — 1159F MED LIST DOCD IN RCRD: CPT | Mod: CPTII,S$GLB,, | Performed by: FAMILY MEDICINE

## 2022-02-04 PROCEDURE — 1126F PR PAIN SEVERITY QUANTIFIED, NO PAIN PRESENT: ICD-10-PCS | Mod: CPTII,S$GLB,, | Performed by: FAMILY MEDICINE

## 2022-02-04 PROCEDURE — 1157F PR ADVANCE CARE PLAN OR EQUIV PRESENT IN MEDICAL RECORD: ICD-10-PCS | Mod: CPTII,S$GLB,, | Performed by: FAMILY MEDICINE

## 2022-02-04 PROCEDURE — 1101F PR PT FALLS ASSESS DOC 0-1 FALLS W/OUT INJ PAST YR: ICD-10-PCS | Mod: CPTII,S$GLB,, | Performed by: FAMILY MEDICINE

## 2022-02-04 PROCEDURE — 3288F FALL RISK ASSESSMENT DOCD: CPT | Mod: CPTII,S$GLB,, | Performed by: FAMILY MEDICINE

## 2022-02-04 PROCEDURE — 99214 PR OFFICE/OUTPT VISIT, EST, LEVL IV, 30-39 MIN: ICD-10-PCS | Mod: S$GLB,,, | Performed by: FAMILY MEDICINE

## 2022-02-04 PROCEDURE — 1126F AMNT PAIN NOTED NONE PRSNT: CPT | Mod: CPTII,S$GLB,, | Performed by: FAMILY MEDICINE

## 2022-02-04 RX ORDER — CLOPIDOGREL BISULFATE 75 MG/1
75 TABLET ORAL DAILY
Qty: 90 TABLET | Refills: 4 | Status: SHIPPED | OUTPATIENT
Start: 2022-02-04 | End: 2022-03-25 | Stop reason: SDUPTHER

## 2022-02-04 RX ORDER — LOSARTAN POTASSIUM 25 MG/1
25 TABLET ORAL DAILY
Qty: 90 TABLET | Refills: 1 | Status: SHIPPED | OUTPATIENT
Start: 2022-02-04 | End: 2022-03-25 | Stop reason: SDUPTHER

## 2022-02-04 RX ORDER — ROSUVASTATIN CALCIUM 10 MG/1
10 TABLET, COATED ORAL DAILY
Qty: 90 TABLET | Refills: 1 | Status: SHIPPED | OUTPATIENT
Start: 2022-02-04 | End: 2022-03-25 | Stop reason: SDUPTHER

## 2022-02-08 ENCOUNTER — TELEPHONE (OUTPATIENT)
Dept: UROLOGY | Facility: CLINIC | Age: 82
End: 2022-02-08
Payer: MEDICARE

## 2022-02-08 NOTE — TELEPHONE ENCOUNTER
----- Message from Kalyn Arias, Patient Care Assistant sent at 2/8/2022 12:56 PM CST -----  Regarding: advice  Contact: pt  Type: Needs Medical Advice  Who Called:  pt   Best Call Back Number: [phone] (home)     Additional Information: pt states he would like a callback regarding advice. Please call pt to advise. Thanks!

## 2022-02-11 NOTE — TELEPHONE ENCOUNTER
I called pt but there was no answer and I could talk to an answering machine. I left a message that I will be available next week.

## 2022-02-12 PROBLEM — F33.1 MODERATE RECURRENT MAJOR DEPRESSION: Status: RESOLVED | Noted: 2021-06-15 | Resolved: 2022-02-12

## 2022-02-12 PROBLEM — I25.118 ATHEROSCLEROTIC HEART DISEASE OF NATIVE CORONARY ARTERY WITH OTHER FORMS OF ANGINA PECTORIS: Status: ACTIVE | Noted: 2021-03-09

## 2022-02-13 NOTE — PROGRESS NOTES
Subjective:       Patient ID: Cedric Gupta Jr. is a 81 y.o. male.    Chief Complaint: Follow-up    HPI   The patient is an 81-year-old who is here today for follow-up.  He wanted to come in and tell me good bye before he moves to [place].  He has been invited by close friends to come and live on their property in [place] and he is really looking forward to that.  He feels as if he has a new outlook on life.  He is really very happy and looking forward to the future.  He has talked with his family about his plans and they are 100% supportive.  He is going to be selling his house here and moving full-time to [place].  He is feeling great.  He does tell me he has stopped all of his medications but wanted to discuss that with me further.    Regarding his ASCVD, he denies any anginal symptoms or exercise intolerance.  He has not been taking his aspirin or his Plavix    Regarding his hypertension, today hisblood pressure is 140/72.  He has not been taking his Cozaar    Regarding his hyperlipidemia, he has not been taking his Crestor    Review of Systems   Constitutional: Negative for appetite change, chills, diaphoresis, fatigue, fever and unexpected weight change.   HENT: Negative for congestion, ear pain, postnasal drip, rhinorrhea, sinus pressure, sneezing, sore throat and trouble swallowing.    Eyes: Negative for pain, discharge and visual disturbance.   Respiratory: Negative for cough, chest tightness, shortness of breath and wheezing.    Cardiovascular: Negative for chest pain, palpitations and leg swelling.   Gastrointestinal: Negative for abdominal distention, abdominal pain, blood in stool, constipation, diarrhea, nausea and vomiting.   Skin: Negative for rash.       Objective:      Physical Exam  Constitutional:       General: He is not in acute distress.     Appearance: Normal appearance. He is well-developed.   HENT:      Head: Normocephalic and atraumatic.      Right Ear: Hearing, tympanic membrane, ear  canal and external ear normal.      Left Ear: Hearing, tympanic membrane, ear canal and external ear normal.      Nose: Nose normal.      Mouth/Throat:      Mouth: No oral lesions.      Pharynx: No oropharyngeal exudate or posterior oropharyngeal erythema.   Eyes:      General: Lids are normal. No scleral icterus.     Extraocular Movements: Extraocular movements intact.      Conjunctiva/sclera: Conjunctivae normal.      Pupils: Pupils are equal, round, and reactive to light.   Neck:      Thyroid: No thyroid mass or thyromegaly.      Vascular: No carotid bruit.   Cardiovascular:      Rate and Rhythm: Normal rate and regular rhythm.  No extrasystoles are present.     Chest Wall: PMI is not displaced.      Heart sounds: Normal heart sounds. No murmur heard.  No gallop.    Pulmonary:      Effort: Pulmonary effort is normal. No accessory muscle usage or respiratory distress.      Breath sounds: Normal breath sounds.   Chest:   Breasts:      Right: No supraclavicular adenopathy.      Left: No supraclavicular adenopathy.       Abdominal:      General: Bowel sounds are normal. There is no abdominal bruit.      Palpations: Abdomen is soft.      Tenderness: There is no abdominal tenderness. There is no rebound.   Musculoskeletal:      Cervical back: Normal range of motion and neck supple.   Lymphadenopathy:      Head:      Right side of head: No submental or submandibular adenopathy.      Left side of head: No submental or submandibular adenopathy.      Cervical:      Right cervical: No superficial, deep or posterior cervical adenopathy.     Left cervical: No superficial, deep or posterior cervical adenopathy.      Upper Body:      Right upper body: No supraclavicular adenopathy.      Left upper body: No supraclavicular adenopathy.   Skin:     General: Skin is warm and dry.   Neurological:      Mental Status: He is alert and oriented to person, place, and time.   Psychiatric:         Attention and Perception: Attention and  "perception normal.         Mood and Affect: Mood and affect normal.         Speech: Speech normal.         Behavior: Behavior normal. Behavior is cooperative.         Thought Content: Thought content normal.         Cognition and Memory: Cognition and memory normal.         Judgment: Judgment normal.      Comments: He is very happy and joyful today but tearful as he tells me good bye       Blood pressure (!) 144/78, pulse 80, temperature 98.4 °F (36.9 °C), temperature source Oral, resp. rate 16, height 5' 10" (1.778 m), weight 79.2 kg (174 lb 7.9 oz), SpO2 97 %.Body mass index is 25.04 kg/m².          A/P:  1)   ASCVD.  Asymptomatic.  I did ask him to resume his aspirin and Plavix which he will do.  He will establish with a new cardiologist in [place]  2) hypertension.  Uncontrolled without medication.  He is going to resume his Hyzaar.  He will establish with a new physician in [place]  3) hyperlipidemia.  Status unknown.  He is going to resume his Cozaar.  He will establish with a new provider and have repeat labs in 3 months   4) aortic atherosclerosis.  Asymptomatic.  He does need to modify risk factors for the for the progression of  Atherosclerosis  5) depression.  Resolved.      As he is moving to [place], I will see him back as needed  "

## 2022-02-23 ENCOUNTER — TELEPHONE (OUTPATIENT)
Dept: FAMILY MEDICINE | Facility: CLINIC | Age: 82
End: 2022-02-23
Payer: MEDICARE

## 2022-02-23 DIAGNOSIS — M79.673 PAIN OF FOOT, UNSPECIFIED LATERALITY: Primary | ICD-10-CM

## 2022-02-23 NOTE — TELEPHONE ENCOUNTER
----- Message from Mulu Vera sent at 2/22/2022  5:23 PM CST -----  Regarding: Medical Advice  Contact: [phone]  Medical Advice    Who Called:  Antelmo    Would the patient rather a call back or a response via [facility]? Call back    Best Call Back Number: [phone]    Additional Information: The pt need medical advice, regarding a lump on his right foot.

## 2022-02-23 NOTE — TELEPHONE ENCOUNTER
Returned call to patient.  Reports a lump on the bottom of his foot just below the toes.  Denies any redness or swelling but states that it is painful to touch.  Patient would like to see someone soon as he has been delayed in moving.  Please advise.

## 2022-02-27 ENCOUNTER — TELEPHONE (OUTPATIENT)
Dept: UROLOGY | Facility: CLINIC | Age: 82
End: 2022-02-27
Payer: MEDICARE

## 2022-02-28 NOTE — TELEPHONE ENCOUNTER
Pt had called and left a message to call him as he had something to discuss. I called back but the only answer was the answering machine. I left a message that I will be available the next three days in the [facility].

## 2022-02-28 NOTE — TELEPHONE ENCOUNTER
Pt finally called back and we were able to have a conversation. He is very happy that he is moving to [place] and has plans to establish there. He is also smoking marijuana and that is allowing him to sleep very well.    Urologically he is doing fine and will let us know if there is anything he wants us to do for him.

## 2022-03-03 ENCOUNTER — OFFICE VISIT (OUTPATIENT)
Dept: PODIATRY | Facility: CLINIC | Age: 82
End: 2022-03-03
Payer: MEDICARE

## 2022-03-03 VITALS — HEIGHT: 70 IN | BODY MASS INDEX: 24.91 KG/M2 | OXYGEN SATURATION: 96 % | WEIGHT: 174 LBS | HEART RATE: 69 BPM

## 2022-03-03 DIAGNOSIS — M79.672 LEFT FOOT PAIN: ICD-10-CM

## 2022-03-03 DIAGNOSIS — D23.72 BENIGN NEOPLASM OF SKIN OF LEFT LOWER EXTREMITY, INCLUDING HIP: Primary | ICD-10-CM

## 2022-03-03 PROCEDURE — 1157F PR ADVANCE CARE PLAN OR EQUIV PRESENT IN MEDICAL RECORD: ICD-10-PCS | Mod: CPTII,S$GLB,, | Performed by: PODIATRIST

## 2022-03-03 PROCEDURE — 99203 OFFICE O/P NEW LOW 30 MIN: CPT | Mod: 25,S$GLB,, | Performed by: PODIATRIST

## 2022-03-03 PROCEDURE — 1157F ADVNC CARE PLAN IN RCRD: CPT | Mod: CPTII,S$GLB,, | Performed by: PODIATRIST

## 2022-03-03 PROCEDURE — 1125F PR PAIN SEVERITY QUANTIFIED, PAIN PRESENT: ICD-10-PCS | Mod: CPTII,S$GLB,, | Performed by: PODIATRIST

## 2022-03-03 PROCEDURE — 1159F MED LIST DOCD IN RCRD: CPT | Mod: CPTII,S$GLB,, | Performed by: PODIATRIST

## 2022-03-03 PROCEDURE — 3288F PR FALLS RISK ASSESSMENT DOCUMENTED: ICD-10-PCS | Mod: CPTII,S$GLB,, | Performed by: PODIATRIST

## 2022-03-03 PROCEDURE — 1101F PR PT FALLS ASSESS DOC 0-1 FALLS W/OUT INJ PAST YR: ICD-10-PCS | Mod: CPTII,S$GLB,, | Performed by: PODIATRIST

## 2022-03-03 PROCEDURE — 17110 DESTRUCTION B9 LES UP TO 14: CPT | Mod: S$GLB,,, | Performed by: PODIATRIST

## 2022-03-03 PROCEDURE — 1159F PR MEDICATION LIST DOCUMENTED IN MEDICAL RECORD: ICD-10-PCS | Mod: CPTII,S$GLB,, | Performed by: PODIATRIST

## 2022-03-03 PROCEDURE — 1160F RVW MEDS BY RX/DR IN RCRD: CPT | Mod: CPTII,S$GLB,, | Performed by: PODIATRIST

## 2022-03-03 PROCEDURE — 1160F PR REVIEW ALL MEDS BY PRESCRIBER/CLIN PHARMACIST DOCUMENTED: ICD-10-PCS | Mod: CPTII,S$GLB,, | Performed by: PODIATRIST

## 2022-03-03 PROCEDURE — 1125F AMNT PAIN NOTED PAIN PRSNT: CPT | Mod: CPTII,S$GLB,, | Performed by: PODIATRIST

## 2022-03-03 PROCEDURE — 1101F PT FALLS ASSESS-DOCD LE1/YR: CPT | Mod: CPTII,S$GLB,, | Performed by: PODIATRIST

## 2022-03-03 PROCEDURE — 3288F FALL RISK ASSESSMENT DOCD: CPT | Mod: CPTII,S$GLB,, | Performed by: PODIATRIST

## 2022-03-03 PROCEDURE — 17110 PR DESTRUCTION BENIGN LESIONS UP TO 14: ICD-10-PCS | Mod: S$GLB,,, | Performed by: PODIATRIST

## 2022-03-03 PROCEDURE — 99203 PR OFFICE/OUTPT VISIT, NEW, LEVL III, 30-44 MIN: ICD-10-PCS | Mod: 25,S$GLB,, | Performed by: PODIATRIST

## 2022-03-03 NOTE — PROGRESS NOTES
"  [address]  [place] [address]  Phone: [phone]   [phone]    Patient's PCP:Tracy Stein MD  Referring Provider: Dr. Tracy Stein    Subjective:      Chief Complaint:: Foot Pain (Ball of left foot) and Callouses (Left ball)    HPI  Cedric Gupta Jr. is a 81 y.o. male who presents today with a complaint of pain in the ball of the left foot lasting for about 4 months. Onset of symptoms was walking and felt pressure and reports no trauma.  Current symptoms include pain.  Aggravating factors are walking and barefoot. Symptoms have progressed. Treatment to date have included used pumice stone to take off dead skin.      Vitals:    03/03/22 1431   Pulse: 69   SpO2: 96%   Weight: 78.9 kg (174 lb)   Height: 5' 10" (1.778 m)   PainSc:   6      Shoe Size: 10.5    Past Surgical History:   Procedure Laterality Date    ANGIOGRAM, CORONARY, WITH LEFT HEART CATHETERIZATION  12/11/2020    Procedure: Angiogram, Coronary, with Left Heart Cath;  Surgeon: Edy Hills MD;  Location: [facility] CATH;  Service: Cardiology;;    CATARACT EXTRACTION W/  INTRAOCULAR LENS IMPLANT Right 01/23/2020    Dr Bone    CATARACT EXTRACTION W/  INTRAOCULAR LENS IMPLANT Left 02/06/2020    Dr Bone    CHOLECYSTECTOMY      COLONOSCOPY N/A 10/14/2020    Procedure: COLONOSCOPY;  Surgeon: Kasi Mckoy MD;  Location: [facility] ENDO;  Service: Endoscopy;  Laterality: N/A;    CORONARY ARTERY BYPASS GRAFT (CABG) N/A 3/9/2021    Procedure: CORONARY ARTERY BYPASS GRAFT (CABG) x 4;  Surgeon: Drake Kwan MD;  Location: [facility] OR;  Service: Cardiovascular;  Laterality: N/A;    coronary bipass  03/09/2021    ENDOSCOPIC HARVEST OF VEIN Left 3/9/2021    Procedure: HARVEST-VEIN-ENDOVASCULAR -;  Surgeon: Drake Kwan MD;  Location: [facility] OR;  Service: Cardiovascular;  Laterality: Left;    FRACTURE SURGERY      Right ankle and leg    ORCHIECTOMY      due to undescended testicle/ Left    " PHACOEMULSIFICATION OF CATARACT Right 1/23/2020    Procedure: PHACOEMULSIFICATION, CATARACT;  Surgeon: Xu Bone Jr., MD;  Location: [facility] OR;  Service: Ophthalmology;  Laterality: Right;  Right    PHACOEMULSIFICATION OF CATARACT Left 2/6/2020    Procedure: PHACOEMULSIFICATION, CATARACT;  Surgeon: Xu Bone Jr., MD;  Location: [facility] OR;  Service: Ophthalmology;  Laterality: Left;  Left    PROSTATE BIOPSY  2014 2014, 2018-negative    TONSILLECTOMY      TRANSRECTAL BIOPSY OF PROSTATE WITH ULTRASOUND GUIDANCE N/A 8/20/2018    Procedure: BIOPSY, PROSTATE, TRANSRECTAL APPROACH, WITH US GUIDANCE;  Surgeon: Joselo Green MD;  Location: [facility] OR;  Service: Urology;  Laterality: N/A;  SITE PROSTATE     Past Medical History:   Diagnosis Date    Anticoagulant long-term use     Atherosclerosis of native coronary artery of native heart without angina pectoris 01/2021    BPH (benign prostatic hypertrophy)     Cataract     done [facility]    Elevated PSA     Epiretinal membrane     OS    Fatty liver     noted on usg    History of basal cell carcinoma     History of colon polyps     History of duodenal ulcer     x 2 8/13 and 8/14    History of nephrolithiasis 2008    passed stone    History of prediabetes     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    Hypertension     Macular degeneration     dry -- [facility]    Marijuana smoker     helps with insomnia    Osteoarthritis     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    S/P colonoscopy     8/15; 8/20     Family History   Problem Relation Age of Onset    Cataracts Mother     Glaucoma Mother     Lupus Mother     Heart disease Mother     Kidney cancer Father     Diabetes Maternal Aunt     Diabetes Maternal Uncle     No Known Problems Sister     No Known Problems Daughter     Cancer Sister         breast cancer    Melanoma Neg Hx     Psoriasis Neg Hx     Eczema Neg Hx     Amblyopia Neg Hx     Hypertension Neg Hx     Macular degeneration Neg  Hx     Retinal detachment Neg Hx     Stroke Neg Hx     Strabismus Neg Hx     Thyroid disease Neg Hx     Blindness Neg Hx         Social History:   Marital Status:   Alcohol History:  reports current alcohol use of about 7.0 standard drinks of alcohol per week.  Tobacco History:  reports that he has never smoked. He has never used smokeless tobacco.  Drug History:  reports current drug use. Frequency: 7.00 times per week. Drug: Marijuana.    Review of patient's allergies indicates:   Allergen Reactions    Amantadine Other (See Comments)     Depression       Current Outpatient Medications   Medication Sig Dispense Refill    clopidogreL (PLAVIX) 75 mg tablet Take 1 tablet (75 mg total) by mouth once daily. 90 tablet 4    losartan (COZAAR) 25 MG tablet Take 1 tablet (25 mg total) by mouth once daily. 90 tablet 1    polyethylene glycol (GLYCOLAX) 17 gram/dose powder Take 17 g by mouth daily as needed.      rosuvastatin (CRESTOR) 10 MG tablet Take 1 tablet (10 mg total) by mouth once daily. 90 tablet 1    vit C-E-zinc cit-lutein-zeaxan ([facility]) 50 mg-15 unit- 4.5 mg-2.5 mg Chew Take 1 tablet by mouth once daily.      acetaminophen (TYLENOL) 325 MG tablet Take 1 tablet (325 mg total) by mouth every 6 (six) hours as needed for Pain. (Patient not taking: No sig reported)      aspirin (ECOTRIN) 81 MG EC tablet Take 1 tablet (81 mg total) by mouth once daily. (Patient not taking: Reported on 2/4/2022) 30 tablet 11     No current facility-administered medications for this visit.       Review of Systems   Constitutional: Negative for chills, fatigue, fever and unexpected weight change.   HENT: Negative for hearing loss and trouble swallowing.    Eyes: Negative for photophobia and visual disturbance.   Respiratory: Negative for cough, shortness of breath and wheezing.    Cardiovascular: Negative for chest pain, palpitations and leg swelling.   Gastrointestinal: Negative for abdominal pain and  nausea.   Genitourinary: Negative for dysuria and frequency.   Musculoskeletal: Negative for arthralgias, back pain, gait problem, joint swelling and myalgias.   Skin: Negative for rash and wound.   Neurological: Negative for seizures, weakness, numbness and headaches.   Hematological: Bruises/bleeds easily.         Objective:        Physical Exam:   Foot Exam    General  General Appearance: appears stated age and healthy   Orientation: alert and oriented to person, place, and time   Affect: appropriate   Gait: unimpaired       Left Foot/Ankle      Inspection and Palpation  Ecchymosis: none  Tenderness: lesser metatarsophalangeal joints   Swelling: none   Arch: normal  Skin Exam: callus; no drainage, no ulcer and no erythema   Neurovascular  Dorsalis pedis: 2+  Posterior tibial: 2+  Capillary refill: 2+  Varicose veins: not present  Saphenous nerve sensation: normal  Tibial nerve sensation: normal  Superficial peroneal nerve sensation: normal  Deep peroneal nerve sensation: normal  Sural nerve sensation: normal    Edema  Type of edema: non-pitting    Muscle Strength  Ankle dorsiflexion: 5  Ankle plantar flexion: 5  Ankle inversion: 5  Ankle eversion: 5  Great toe extension: 5  Great toe flexion: 5    Range of Motion    Normal left ankle ROM    Tests  Anterior drawer: negative   Talar tilt: negative   PT Tinel's sign: negative  Paresthesia: negative        Physical Exam  Cardiovascular:      Pulses:           Dorsalis pedis pulses are 2+ on the left side.        Posterior tibial pulses are 2+ on the left side.   Musculoskeletal:        Feet:    Feet:      Left foot:      Skin integrity: Callus present. No ulcer or erythema.         Imaging:  None           Assessment:       1. Benign neoplasm of skin of left lower extremity, including hip    2. Left foot pain      Plan:   Benign neoplasm of skin of left lower extremity, including hip    Left foot pain      Follow up in about 2 weeks (around 3/17/2022), or if symptoms  worsen or fail to improve.    Procedures trichloracetic treatment of benign skin lesions - single lesion plantar left foot. Informed consent was obtained, a time-out was called, hyperkeratotic skin was debrided from each lesion utilizing a scapel, trichloracetic acid was applied, and was covered with a Band-Aid. Written and verbal instructions were given to the patient. Patient tolerated the procedure well.     Instructions After trichloracetic Acid Treatment    1. Keep dry for 3 days  2. If a blister forms, pop it  3. After 3rd day, begin wart stick twice daily for two week  4. Follow-up appointment 2 weeks      Wart Treatment: Twice Daily    1. Bathe area at night  2. File with pumice stone or nail file  3. Apply wart stick to affected area  4. Cover with a bad aid              Counseling:     I provided patient education verbally regarding:   Patient diagnosis, treatment options, as well as alternatives, risks, and benefits.     benign skin lesion:  I explained the lesion to the pt. and the treatment options of 1)  periodic debridement and off loading of the lesion.  2)  Canthrone treatment plan,  3)  Curretage of the lesion.  I explained there was no guarantee with any of the treatments that the lesion would comletely resolve or not reoccur.      This note was created using Dragon voice recognition software that occasionally misinterpreted phrases or words.

## 2022-03-03 NOTE — PATIENT INSTRUCTIONS
What is a Corn? What is a Callus?    Corns and calluses are areas of thickened skin that develop to protect that area from irritation. They occur when something rubs against the foot repeatedly or causes excess pressure against part of the foot. The term callus commonly is used if the thickening of skin occurs on the bottom of the foot, and if thickening occurs on the top of the foot (or toe), it's called a corn. However, the location of the thickened skin is less important than the pattern of thickening: flat, widespread skin thickening indicates a callus, and skin lesions that are thicker or deeper indicate a corn.    Corns and calluses are not contagious but may become painful if they get too thick. In people with diabetes or decreased circulation, they can lead to more serious foot problems.      Causes  Corns often occur where a toe rubs against the interior of a shoe. Excessive pressure at the balls of the feet--common in women who regularly wear high heels--may cause calluses to develop on the balls of the feet.    People with certain deformities of the foot, such as hammer toes, are prone to corns and calluses.      Symptoms  Corns and calluses typically have a rough, dull appearance. They may be raised or rounded, and they can be hard to differentiate from warts. Corns or calluses sometimes cause pain.      Home Care  Mild corns and calluses may not require treatment. If the corn or callus isn't bothering you, it can probably be left alone. It's a good idea, though, to investigate possible causes of the corn or callus. If your footwear is contributing to the development of a corn or callus, it's time to look for other shoes.    Over-the-counter treatments can do more harm than good, especially if you have any medical conditions such as diabetes. Some over-the-counter treatments contain harsh chemicals, which can lead to burns or even foot ulcers.       When to Visit a Podiatrist  If corns or calluses are  causing pain and discomfort or inhibiting your daily life in any way, see a podiatrist. Also, people with diabetes, poor circulation, or other serious illnesses should have their feet checked.      Diagnosis and Treatment  The podiatrist will conduct a complete examination of your feet. X-rays may be taken; your podiatrist may also want to inspect your shoes and watch you walk. He or she will also take a complete medical history. Corns and calluses are diagnosed based on appearance and history.    If you have mild corns or calluses, your podiatrist may suggest changing your shoes and/or adding padding to your shoes. Larger corns and calluses are most effectively reduced (made smaller) with a surgical blade. A podiatrist can use the blade to carefully shave away the thickened, dead skin--right in the office. The procedure is painless because the skin is already dead. Additional treatments may be needed if the corn or callus recurs.    Cortisone injections into the foot or toe may be given if the corn or callus is causing significant pain. Surgery may be necessary in cases that do not respond to conservative treatment.      Prevention  Wear properly fitted shoes. If you have any deformities of the toe or foot, talk to your podiatrist to find out what shoes are best for you.  Gel pad inserts may decrease friction points and pressure. Your podiatrist can help you determine where pads might be useful.

## 2022-03-08 ENCOUNTER — PATIENT MESSAGE (OUTPATIENT)
Dept: DERMATOLOGY | Facility: CLINIC | Age: 82
End: 2022-03-08
Payer: MEDICARE

## 2022-03-10 ENCOUNTER — PATIENT MESSAGE (OUTPATIENT)
Dept: RHEUMATOLOGY | Facility: CLINIC | Age: 82
End: 2022-03-10
Payer: MEDICARE

## 2022-03-11 ENCOUNTER — PATIENT OUTREACH (OUTPATIENT)
Dept: ADMINISTRATIVE | Facility: OTHER | Age: 82
End: 2022-03-11
Payer: MEDICARE

## 2022-03-12 NOTE — PROGRESS NOTES
Health Maintenance Due   Topic Date Due    Shingles Vaccine (3 of 3) 03/24/2020    Influenza Vaccine (1) 09/01/2021     Updates were requested from care everywhere.  Chart was reviewed for overdue Proactive [facility] Encounters ([facility]) topics (CRS, Breast Cancer Screening, Eye exam)  Health Maintenance has been updated.  LINKS immunization registry triggered.  Immunizations were reconciled.

## 2022-03-15 ENCOUNTER — TELEPHONE (OUTPATIENT)
Dept: CARDIOLOGY | Facility: CLINIC | Age: 82
End: 2022-03-15
Payer: MEDICARE

## 2022-03-15 ENCOUNTER — OFFICE VISIT (OUTPATIENT)
Dept: PODIATRY | Facility: CLINIC | Age: 82
End: 2022-03-15
Payer: MEDICARE

## 2022-03-15 VITALS — WEIGHT: 173.94 LBS | BODY MASS INDEX: 24.9 KG/M2 | HEIGHT: 70 IN

## 2022-03-15 DIAGNOSIS — M79.673 PAIN OF FOOT, UNSPECIFIED LATERALITY: ICD-10-CM

## 2022-03-15 PROCEDURE — 1101F PT FALLS ASSESS-DOCD LE1/YR: CPT | Mod: HCNC,CPTII,S$GLB, | Performed by: STUDENT IN AN ORGANIZED HEALTH CARE EDUCATION/TRAINING PROGRAM

## 2022-03-15 PROCEDURE — 1159F PR MEDICATION LIST DOCUMENTED IN MEDICAL RECORD: ICD-10-PCS | Mod: HCNC,CPTII,S$GLB, | Performed by: STUDENT IN AN ORGANIZED HEALTH CARE EDUCATION/TRAINING PROGRAM

## 2022-03-15 PROCEDURE — 1126F PR PAIN SEVERITY QUANTIFIED, NO PAIN PRESENT: ICD-10-PCS | Mod: HCNC,CPTII,S$GLB, | Performed by: STUDENT IN AN ORGANIZED HEALTH CARE EDUCATION/TRAINING PROGRAM

## 2022-03-15 PROCEDURE — 1101F PR PT FALLS ASSESS DOC 0-1 FALLS W/OUT INJ PAST YR: ICD-10-PCS | Mod: HCNC,CPTII,S$GLB, | Performed by: STUDENT IN AN ORGANIZED HEALTH CARE EDUCATION/TRAINING PROGRAM

## 2022-03-15 PROCEDURE — 99999 PR PBB SHADOW E&M-EST. PATIENT-LVL III: ICD-10-PCS | Mod: PBBFAC,HCNC,, | Performed by: STUDENT IN AN ORGANIZED HEALTH CARE EDUCATION/TRAINING PROGRAM

## 2022-03-15 PROCEDURE — 99999 PR PBB SHADOW E&M-EST. PATIENT-LVL III: CPT | Mod: PBBFAC,HCNC,, | Performed by: STUDENT IN AN ORGANIZED HEALTH CARE EDUCATION/TRAINING PROGRAM

## 2022-03-15 PROCEDURE — 1126F AMNT PAIN NOTED NONE PRSNT: CPT | Mod: HCNC,CPTII,S$GLB, | Performed by: STUDENT IN AN ORGANIZED HEALTH CARE EDUCATION/TRAINING PROGRAM

## 2022-03-15 PROCEDURE — 3288F PR FALLS RISK ASSESSMENT DOCUMENTED: ICD-10-PCS | Mod: HCNC,CPTII,S$GLB, | Performed by: STUDENT IN AN ORGANIZED HEALTH CARE EDUCATION/TRAINING PROGRAM

## 2022-03-15 PROCEDURE — 99213 PR OFFICE/OUTPT VISIT, EST, LEVL III, 20-29 MIN: ICD-10-PCS | Mod: HCNC,S$GLB,, | Performed by: STUDENT IN AN ORGANIZED HEALTH CARE EDUCATION/TRAINING PROGRAM

## 2022-03-15 PROCEDURE — 1160F RVW MEDS BY RX/DR IN RCRD: CPT | Mod: HCNC,CPTII,S$GLB, | Performed by: STUDENT IN AN ORGANIZED HEALTH CARE EDUCATION/TRAINING PROGRAM

## 2022-03-15 PROCEDURE — 3288F FALL RISK ASSESSMENT DOCD: CPT | Mod: HCNC,CPTII,S$GLB, | Performed by: STUDENT IN AN ORGANIZED HEALTH CARE EDUCATION/TRAINING PROGRAM

## 2022-03-15 PROCEDURE — 1157F PR ADVANCE CARE PLAN OR EQUIV PRESENT IN MEDICAL RECORD: ICD-10-PCS | Mod: HCNC,CPTII,S$GLB, | Performed by: STUDENT IN AN ORGANIZED HEALTH CARE EDUCATION/TRAINING PROGRAM

## 2022-03-15 PROCEDURE — 99213 OFFICE O/P EST LOW 20 MIN: CPT | Mod: HCNC,S$GLB,, | Performed by: STUDENT IN AN ORGANIZED HEALTH CARE EDUCATION/TRAINING PROGRAM

## 2022-03-15 PROCEDURE — 1157F ADVNC CARE PLAN IN RCRD: CPT | Mod: HCNC,CPTII,S$GLB, | Performed by: STUDENT IN AN ORGANIZED HEALTH CARE EDUCATION/TRAINING PROGRAM

## 2022-03-15 PROCEDURE — 1160F PR REVIEW ALL MEDS BY PRESCRIBER/CLIN PHARMACIST DOCUMENTED: ICD-10-PCS | Mod: HCNC,CPTII,S$GLB, | Performed by: STUDENT IN AN ORGANIZED HEALTH CARE EDUCATION/TRAINING PROGRAM

## 2022-03-15 PROCEDURE — 1159F MED LIST DOCD IN RCRD: CPT | Mod: HCNC,CPTII,S$GLB, | Performed by: STUDENT IN AN ORGANIZED HEALTH CARE EDUCATION/TRAINING PROGRAM

## 2022-03-15 NOTE — PROGRESS NOTES
Subjective:      Patient ID: Cedric Gupta Jr. is a 82 y.o. male.    Chief Complaint: Foot Pain (Left foot pain/Started a couple months ago. Patient seen another dr that stated he may have plantar fasciitis )    Patient saw Dr. Bhta for left plantar foot wart. He was treated with triacetic acid and home treatment with sal acid. Follows up with continued pain left foot.     Review of Systems   Skin:        Plantar wart, left   All other systems reviewed and are negative.          Objective:      Physical Exam  Skin:     Comments: Plantar wart bottom of the left foot. There is loss of skin lines and pain with direct and lateral pressure.               Assessment:       Encounter Diagnosis   Name Primary?    Pain of foot, unspecified laterality          Plan:       Cedric was seen today for foot pain.    Diagnoses and all orders for this visit:    Pain of foot, unspecified laterality  -     Ambulatory referral/consult to Podiatry      I counseled the patient on his conditions, their implications and medical management.    Continue treatment with sal acid. Allow skin to peel away and re-evaluate for skin lines. F/u in about 6 weeks.     Bird Lama DPM

## 2022-03-21 ENCOUNTER — OFFICE VISIT (OUTPATIENT)
Dept: DERMATOLOGY | Facility: CLINIC | Age: 82
End: 2022-03-21
Payer: MEDICARE

## 2022-03-21 VITALS — HEIGHT: 70 IN | WEIGHT: 173 LBS | BODY MASS INDEX: 24.77 KG/M2

## 2022-03-21 DIAGNOSIS — L82.1 SEBORRHEIC KERATOSES: ICD-10-CM

## 2022-03-21 DIAGNOSIS — Z85.828 HISTORY OF NONMELANOMA SKIN CANCER: ICD-10-CM

## 2022-03-21 DIAGNOSIS — D18.01 CHERRY ANGIOMA: ICD-10-CM

## 2022-03-21 DIAGNOSIS — L90.5 SCAR: ICD-10-CM

## 2022-03-21 DIAGNOSIS — L72.0 EPIDERMAL INCLUSION CYST: ICD-10-CM

## 2022-03-21 DIAGNOSIS — D48.5 NEOPLASM OF UNCERTAIN BEHAVIOR OF SKIN: Primary | ICD-10-CM

## 2022-03-21 DIAGNOSIS — D22.9 MULTIPLE BENIGN NEVI: ICD-10-CM

## 2022-03-21 PROCEDURE — 1160F RVW MEDS BY RX/DR IN RCRD: CPT | Mod: HCNC,CPTII,S$GLB, | Performed by: DERMATOLOGY

## 2022-03-21 PROCEDURE — 3288F FALL RISK ASSESSMENT DOCD: CPT | Mod: HCNC,CPTII,S$GLB, | Performed by: DERMATOLOGY

## 2022-03-21 PROCEDURE — 1101F PR PT FALLS ASSESS DOC 0-1 FALLS W/OUT INJ PAST YR: ICD-10-PCS | Mod: HCNC,CPTII,S$GLB, | Performed by: DERMATOLOGY

## 2022-03-21 PROCEDURE — 88305 TISSUE EXAM BY PATHOLOGIST: CPT | Performed by: PATHOLOGY

## 2022-03-21 PROCEDURE — 1126F AMNT PAIN NOTED NONE PRSNT: CPT | Mod: HCNC,CPTII,S$GLB, | Performed by: DERMATOLOGY

## 2022-03-21 PROCEDURE — 88305 TISSUE EXAM BY PATHOLOGIST: CPT | Mod: 26,,, | Performed by: PATHOLOGY

## 2022-03-21 PROCEDURE — 3288F PR FALLS RISK ASSESSMENT DOCUMENTED: ICD-10-PCS | Mod: HCNC,CPTII,S$GLB, | Performed by: DERMATOLOGY

## 2022-03-21 PROCEDURE — 1126F PR PAIN SEVERITY QUANTIFIED, NO PAIN PRESENT: ICD-10-PCS | Mod: HCNC,CPTII,S$GLB, | Performed by: DERMATOLOGY

## 2022-03-21 PROCEDURE — 1101F PT FALLS ASSESS-DOCD LE1/YR: CPT | Mod: HCNC,CPTII,S$GLB, | Performed by: DERMATOLOGY

## 2022-03-21 PROCEDURE — 88305 TISSUE EXAM BY PATHOLOGIST: ICD-10-PCS | Mod: 26,,, | Performed by: PATHOLOGY

## 2022-03-21 PROCEDURE — 99999 PR PBB SHADOW E&M-EST. PATIENT-LVL III: ICD-10-PCS | Mod: PBBFAC,HCNC,, | Performed by: DERMATOLOGY

## 2022-03-21 PROCEDURE — 99203 OFFICE O/P NEW LOW 30 MIN: CPT | Mod: 25,HCNC,S$GLB, | Performed by: DERMATOLOGY

## 2022-03-21 PROCEDURE — 11102 PR TANGENTIAL BIOPSY, SKIN, SINGLE LESION: ICD-10-PCS | Mod: HCNC,S$GLB,, | Performed by: DERMATOLOGY

## 2022-03-21 PROCEDURE — 11102 TANGNTL BX SKIN SINGLE LES: CPT | Mod: HCNC,S$GLB,, | Performed by: DERMATOLOGY

## 2022-03-21 PROCEDURE — 1157F PR ADVANCE CARE PLAN OR EQUIV PRESENT IN MEDICAL RECORD: ICD-10-PCS | Mod: HCNC,CPTII,S$GLB, | Performed by: DERMATOLOGY

## 2022-03-21 PROCEDURE — 1160F PR REVIEW ALL MEDS BY PRESCRIBER/CLIN PHARMACIST DOCUMENTED: ICD-10-PCS | Mod: HCNC,CPTII,S$GLB, | Performed by: DERMATOLOGY

## 2022-03-21 PROCEDURE — 99203 PR OFFICE/OUTPT VISIT, NEW, LEVL III, 30-44 MIN: ICD-10-PCS | Mod: 25,HCNC,S$GLB, | Performed by: DERMATOLOGY

## 2022-03-21 PROCEDURE — 1159F PR MEDICATION LIST DOCUMENTED IN MEDICAL RECORD: ICD-10-PCS | Mod: HCNC,CPTII,S$GLB, | Performed by: DERMATOLOGY

## 2022-03-21 PROCEDURE — 1159F MED LIST DOCD IN RCRD: CPT | Mod: HCNC,CPTII,S$GLB, | Performed by: DERMATOLOGY

## 2022-03-21 PROCEDURE — 1157F ADVNC CARE PLAN IN RCRD: CPT | Mod: HCNC,CPTII,S$GLB, | Performed by: DERMATOLOGY

## 2022-03-21 PROCEDURE — 99999 PR PBB SHADOW E&M-EST. PATIENT-LVL III: CPT | Mod: PBBFAC,HCNC,, | Performed by: DERMATOLOGY

## 2022-03-21 NOTE — PATIENT INSTRUCTIONS
Shave Biopsy Wound Care    Your doctor has performed a shave biopsy today.  A band aid and vaseline ointment has been placed over the site.  This should remain in place for 24 hours.  It is recommended that you keep the area dry for the first 24 hours.  After 24 hours, you may remove the band aid and wash the area with warm soap and water and apply Vaseline jelly.  Many patients prefer to use Neosporin or Bacitracin ointment.  This is acceptable; however, know that you can develop an allergy to this medication even if you have used it safely for years.  It is important to keep the area moist.  Letting it dry out and get air slows healing time, and will worsen the scar.  Band aid is optional after first 24 hours.      If you notice increasing redness, tenderness, pain, or yellow drainage at the biopsy site, please notify your doctor.  These are signs of an infection.    If your biopsy site is bleeding, apply firm pressure for 15 minutes straight.  Repeat for another 15 minutes, if it is still bleeding.   If the surgical site continues to bleed, then please contact your doctor.       [facility]  [facility] - DERMATOLOGY  [address] [place] [address], [address] [address]  Dept: [phone]

## 2022-03-21 NOTE — PROGRESS NOTES
Subjective:       Patient ID:  Cedric Gupta Jr. is a 82 y.o. male who presents for   Chief Complaint   Patient presents with    Skin Check     Full body    Spot     Side of left nostril     LOV 8/14/18-EIC, SK, ISK, h/o skin cancer, solar lentigo, multiple benign nevi    Patient here for c/o a spot on his left nostril x 3-4 weeks, no c/o  Also spots on his back that itch.    Requesting a FBSC.    Derm hx.  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 (Patient not taking: Reported on 2/4/2022), Disp: 30 tablet, Rfl: 11    clopidogreL (PLAVIX) 75 mg tablet, Take 1 tablet (75 mg total) by mouth once daily. (Patient not taking: Reported on 3/21/2022), Disp: 90 tablet, Rfl: 4    losartan (COZAAR) 25 MG tablet, Take 1 tablet (25 mg total) by mouth once daily., Disp: 90 tablet, Rfl: 1    polyethylene glycol (GLYCOLAX) 17 gram/dose powder, Take 17 g by mouth daily as needed., Disp: , Rfl:     rosuvastatin (CRESTOR) 10 MG tablet, Take 1 tablet (10 mg total) by mouth once daily., Disp: 90 tablet, Rfl: 1    vit C-E-zinc cit-lutein-zeaxan ([facility] EYE [facility]) 50 mg-15 unit- 4.5 mg-2.5 mg Chew, Take 1 tablet by mouth once daily., Disp: , Rfl:         Review of Systems   Constitutional: Negative for fever, chills and fatigue.   Respiratory: Negative for cough and shortness of breath.    Skin: Positive for itching and wears hat. Negative for rash, dry skin and dry lips.        Objective:    Physical Exam   Constitutional: He appears well-developed and well-nourished. No distress.   Neurological: He is alert and oriented to person, place, and time. He is not disoriented.    Psychiatric: He has a normal mood and affect.   Skin:   Areas Examined (abnormalities noted in diagram):   Scalp / Hair Palpated and Inspected  Head / Face Inspection Performed  Neck Inspection Performed  Chest / Axilla Inspection Performed  Abdomen Inspection Performed  Genitals / Buttocks / Groin Inspection Performed  Back Inspection Performed  RUE Inspected  LUE Inspection Performed  RLE Inspected  LLE Inspection Performed  Nails and Digits Inspection Performed                       Diagram Legend     Erythematous scaling macule/papule c/w actinic keratosis       Vascular papule c/w angioma      Pigmented verrucoid papule/plaque c/w seborrheic keratosis      Yellow umbilicated papule c/w sebaceous hyperplasia      Irregularly shaped tan macule c/w lentigo     1-2 mm smooth white papules consistent with Milia      Movable subcutaneous cyst with punctum c/w epidermal inclusion cyst      Subcutaneous movable cyst c/w pilar cyst      Firm pink to brown papule c/w dermatofibroma      Pedunculated fleshy papule(s) c/w skin tag(s)      Evenly pigmented macule c/w junctional nevus     Mildly variegated pigmented, slightly irregular-bordered macule c/w mildly atypical nevus      Flesh colored to evenly pigmented papule c/w intradermal nevus       Pink pearly papule/plaque c/w basal cell carcinoma      Erythematous hyperkeratotic cursted plaque c/w SCC      Surgical scar with no sign of skin cancer recurrence      Open and closed comedones      Inflammatory papules and pustules      Verrucoid papule consistent consistent with wart     Erythematous eczematous patches and plaques     Dystrophic onycholytic nail with subungual debris c/w onychomycosis     Umbilicated papule    Erythematous-base heme-crusted tan verrucoid plaque consistent with inflamed seborrheic keratosis     Erythematous Silvery Scaling Plaque c/w Psoriasis     See annotation          Assessment / Plan:      Pathology Orders:     Normal Orders This Visit     Specimen to Pathology, Dermatology     Questions:    Procedure Type: Dermatology and skin neoplasms    Number of Specimens: 1    ------------------------: -------------------------    Spec 1 Procedure: Biopsy    Spec 1 Clinical Impression: r/o scc vs infundibular cyst    Spec 1 Source: left alar groove    Release to patient:         Neoplasm of uncertain behavior of skin  -     Specimen to Pathology, Dermatology  Shave biopsy procedure note:    Shave biopsy performed after verbal consent including risk of infection, scar, recurrence, need for additional treatment of site. Area prepped with alcohol, anesthetized with approximately 1.0cc of 1% lidocaine with epinephrine. Lesional tissue shaved with razor blade. Hemostasis achieved with application of aluminum chloride followed by hyfrecation. No complications. Dressing applied. Wound care explained.    History of nonmelanoma skin cancer  Scar  Area of previous NMSCs examined. Site well healed with no signs of recurrence.    Total body skin examination performed today including at least 12 points as noted in physical examination. 1 lesion suspicious for malignancy noted.    Seborrheic keratoses  These are benign inherited growths without a malignant potential. Reassurance given to patient. No treatment is necessary.     Multiple benign nevi  Careful dermoscopy evaluation of nevi performed with none identified as needing biopsy today  Monitor for new mole or moles that are becoming bigger, darker, irritated, or developing irregular borders.     Cherry angioma  This is a benign vascular lesion. Reassurance given. No treatment required.     Patient instructed in importance in daily broad spectrum sun protection of at least spf 30. Mineral sunscreen ingredients preferred (Zinc +/- Titanium) and can be found OTC.   Recommend Elta MD for daily use on face and neck.  Patient encouraged to wear hat for all outdoor exposure.   Also discussed sun avoidance and use of  protective clothing.             Follow up if symptoms worsen or fail to improve.

## 2022-03-25 ENCOUNTER — OFFICE VISIT (OUTPATIENT)
Dept: FAMILY MEDICINE | Facility: CLINIC | Age: 82
End: 2022-03-25
Payer: MEDICARE

## 2022-03-25 VITALS
OXYGEN SATURATION: 96 % | SYSTOLIC BLOOD PRESSURE: 122 MMHG | HEIGHT: 70 IN | HEART RATE: 83 BPM | DIASTOLIC BLOOD PRESSURE: 70 MMHG | TEMPERATURE: 98 F | BODY MASS INDEX: 24.37 KG/M2 | WEIGHT: 170.19 LBS | RESPIRATION RATE: 18 BRPM

## 2022-03-25 DIAGNOSIS — I10 ESSENTIAL HYPERTENSION: ICD-10-CM

## 2022-03-25 DIAGNOSIS — G47.00 INSOMNIA, UNSPECIFIED TYPE: ICD-10-CM

## 2022-03-25 DIAGNOSIS — I25.10 CORONARY ARTERY DISEASE INVOLVING NATIVE CORONARY ARTERY OF NATIVE HEART WITHOUT ANGINA PECTORIS: Primary | ICD-10-CM

## 2022-03-25 DIAGNOSIS — F32.5 MAJOR DEPRESSIVE DISORDER IN REMISSION, UNSPECIFIED WHETHER RECURRENT: ICD-10-CM

## 2022-03-25 DIAGNOSIS — Z95.1 S/P CABG X 4: ICD-10-CM

## 2022-03-25 DIAGNOSIS — E78.5 HYPERLIPIDEMIA, UNSPECIFIED HYPERLIPIDEMIA TYPE: ICD-10-CM

## 2022-03-25 PROCEDURE — 99214 PR OFFICE/OUTPT VISIT, EST, LEVL IV, 30-39 MIN: ICD-10-PCS | Mod: S$GLB,,, | Performed by: NURSE PRACTITIONER

## 2022-03-25 PROCEDURE — 1126F PR PAIN SEVERITY QUANTIFIED, NO PAIN PRESENT: ICD-10-PCS | Mod: CPTII,S$GLB,, | Performed by: NURSE PRACTITIONER

## 2022-03-25 PROCEDURE — 1157F PR ADVANCE CARE PLAN OR EQUIV PRESENT IN MEDICAL RECORD: ICD-10-PCS | Mod: CPTII,S$GLB,, | Performed by: NURSE PRACTITIONER

## 2022-03-25 PROCEDURE — 3288F PR FALLS RISK ASSESSMENT DOCUMENTED: ICD-10-PCS | Mod: CPTII,S$GLB,, | Performed by: NURSE PRACTITIONER

## 2022-03-25 PROCEDURE — 3074F PR MOST RECENT SYSTOLIC BLOOD PRESSURE < 130 MM HG: ICD-10-PCS | Mod: CPTII,S$GLB,, | Performed by: NURSE PRACTITIONER

## 2022-03-25 PROCEDURE — 1126F AMNT PAIN NOTED NONE PRSNT: CPT | Mod: CPTII,S$GLB,, | Performed by: NURSE PRACTITIONER

## 2022-03-25 PROCEDURE — 99214 OFFICE O/P EST MOD 30 MIN: CPT | Mod: S$GLB,,, | Performed by: NURSE PRACTITIONER

## 2022-03-25 PROCEDURE — 1101F PR PT FALLS ASSESS DOC 0-1 FALLS W/OUT INJ PAST YR: ICD-10-PCS | Mod: CPTII,S$GLB,, | Performed by: NURSE PRACTITIONER

## 2022-03-25 PROCEDURE — 3074F SYST BP LT 130 MM HG: CPT | Mod: CPTII,S$GLB,, | Performed by: NURSE PRACTITIONER

## 2022-03-25 PROCEDURE — 3078F PR MOST RECENT DIASTOLIC BLOOD PRESSURE < 80 MM HG: ICD-10-PCS | Mod: CPTII,S$GLB,, | Performed by: NURSE PRACTITIONER

## 2022-03-25 PROCEDURE — 1157F ADVNC CARE PLAN IN RCRD: CPT | Mod: CPTII,S$GLB,, | Performed by: NURSE PRACTITIONER

## 2022-03-25 PROCEDURE — 3078F DIAST BP <80 MM HG: CPT | Mod: CPTII,S$GLB,, | Performed by: NURSE PRACTITIONER

## 2022-03-25 PROCEDURE — 1159F PR MEDICATION LIST DOCUMENTED IN MEDICAL RECORD: ICD-10-PCS | Mod: CPTII,S$GLB,, | Performed by: NURSE PRACTITIONER

## 2022-03-25 PROCEDURE — 3288F FALL RISK ASSESSMENT DOCD: CPT | Mod: CPTII,S$GLB,, | Performed by: NURSE PRACTITIONER

## 2022-03-25 PROCEDURE — 1101F PT FALLS ASSESS-DOCD LE1/YR: CPT | Mod: CPTII,S$GLB,, | Performed by: NURSE PRACTITIONER

## 2022-03-25 PROCEDURE — 1159F MED LIST DOCD IN RCRD: CPT | Mod: CPTII,S$GLB,, | Performed by: NURSE PRACTITIONER

## 2022-03-25 RX ORDER — TRAZODONE HYDROCHLORIDE 150 MG/1
150 TABLET ORAL NIGHTLY
Qty: 90 TABLET | Refills: 2 | Status: SHIPPED | OUTPATIENT
Start: 2022-03-25 | End: 2023-04-20 | Stop reason: SDUPTHER

## 2022-03-25 RX ORDER — CLOPIDOGREL BISULFATE 75 MG/1
75 TABLET ORAL DAILY
Qty: 90 TABLET | Refills: 2 | Status: SHIPPED | OUTPATIENT
Start: 2022-03-25 | End: 2023-07-10 | Stop reason: SDUPTHER

## 2022-03-25 RX ORDER — LOSARTAN POTASSIUM 25 MG/1
25 TABLET ORAL DAILY
Qty: 90 TABLET | Refills: 2 | Status: SHIPPED | OUTPATIENT
Start: 2022-03-25 | End: 2023-07-10 | Stop reason: SDUPTHER

## 2022-03-25 RX ORDER — ROSUVASTATIN CALCIUM 10 MG/1
10 TABLET, COATED ORAL DAILY
Qty: 90 TABLET | Refills: 2 | Status: SHIPPED | OUTPATIENT
Start: 2022-03-25 | End: 2022-10-17

## 2022-03-25 RX ORDER — ESCITALOPRAM OXALATE 10 MG/1
10 TABLET ORAL DAILY
Qty: 90 TABLET | Refills: 2 | Status: SHIPPED | OUTPATIENT
Start: 2022-03-25 | End: 2023-04-20

## 2022-03-28 ENCOUNTER — PATIENT MESSAGE (OUTPATIENT)
Dept: CARDIOLOGY | Facility: CLINIC | Age: 82
End: 2022-03-28
Payer: MEDICARE

## 2022-03-28 ENCOUNTER — TELEPHONE (OUTPATIENT)
Dept: FAMILY MEDICINE | Facility: CLINIC | Age: 82
End: 2022-03-28
Payer: MEDICARE

## 2022-03-28 NOTE — TELEPHONE ENCOUNTER
----- Message from Mariam Alvarez, Patient Care Assistant sent at 3/28/2022  9:41 AM CDT -----  Contact: Pt  Type: Needs Medical Advice    Who Called: Pt  Best Call Back Number: [phone]    Inquiry/Question: Pt is calling to see if he can have his dosage changed on his Plavix. Please call back and advise. Thank you~

## 2022-03-28 NOTE — TELEPHONE ENCOUNTER
Patient states he will be leaving on 4/11 travelling to [place] to stay with his daughter and grandchild, then 4/17 will be his date to leave for [place]. Patient verbalized understanding and states he will contact cardiology today for follow up.

## 2022-03-28 NOTE — TELEPHONE ENCOUNTER
"Patient reports he has developed "red areas and bruising and difficulty stopping the bleeding whenever I cut myself so I'm wondering if she would consider decreasing the dosage of it" Patient reports he did stop taking this medication recently due to a dental procedure, once he resumed the prescription "the skin splotches started to appear"   "

## 2022-03-30 LAB
FINAL PATHOLOGIC DIAGNOSIS: NORMAL
GROSS: NORMAL
Lab: NORMAL
MICROSCOPIC EXAM: NORMAL

## 2022-03-30 NOTE — PROGRESS NOTES
This lesion is benign and no further intervention is warranted. Please call us if you have any question.  Thank you!   Dr Vila    Skin, left alar groove, shave biopsy:   -VERRUCOUS KERATOSIS, EXCORIATED AND INFLAMED, see comment

## 2022-04-03 NOTE — PROGRESS NOTES
"Subjective:       Patient ID: Cedric Gupta Jr. is a 82 y.o. male.    Chief Complaint: Follow-up  the pt is here today to review his medications. He wants to make sure he is taking the correct medications with him when he leaves for [place]. He is moving there soon. He does not have any new complaints today, he states that he is feeling well.   HPI  Review of Systems   Constitutional: Negative for activity change and appetite change.   HENT: Negative for congestion, postnasal drip, rhinorrhea and sinus pressure.    Eyes: Negative for pain and redness.   Respiratory: Negative for choking and chest tightness.    Gastrointestinal: Negative for abdominal distention, abdominal pain, blood in stool, constipation, diarrhea, nausea and vomiting.   Endocrine: Negative for polydipsia and polyphagia.   Genitourinary: Negative for dysuria and hematuria.   Musculoskeletal: Negative for arthralgias and myalgias.   Skin: Negative for color change and rash.   Neurological: Negative for dizziness and headaches.   Psychiatric/Behavioral: Negative for agitation and behavioral problems.       Past medical, surgical, family and social history reviewed.  Objective:     Vitals:    03/25/22 0854   BP: 122/70   Pulse: 83   Resp: 18   Temp: 98.3 °F (36.8 °C)   TempSrc: Temporal   SpO2: 96%   Weight: 77.2 kg (170 lb 3.1 oz)   Height: 5' 10" (1.778 m)   PainSc: 0-No pain     Body mass index is 24.42 kg/m².     Physical Exam  Constitutional:       General: He is not in acute distress.     Appearance: He is well-developed. He is not diaphoretic.   HENT:      Head: Normocephalic and atraumatic.      Right Ear: Hearing, tympanic membrane, ear canal and external ear normal.      Left Ear: Hearing, tympanic membrane, ear canal and external ear normal.      Nose: Nose normal.      Mouth/Throat:      Pharynx: Uvula midline.   Eyes:      General:         Right eye: No discharge.         Left eye: No discharge.      Conjunctiva/sclera: Conjunctivae " normal.      Pupils: Pupils are equal, round, and reactive to light.   Neck:      Thyroid: No thyromegaly.      Vascular: No carotid bruit or JVD.      Trachea: Trachea normal.   Cardiovascular:      Rate and Rhythm: Normal rate and regular rhythm.      Heart sounds: No murmur heard.    No friction rub. No gallop.   Pulmonary:      Effort: Pulmonary effort is normal. No respiratory distress.      Breath sounds: Normal breath sounds. No wheezing or rales.   Chest:      Chest wall: No tenderness.   Abdominal:      General: Bowel sounds are normal. There is no distension.      Palpations: Abdomen is soft. There is no mass.      Tenderness: There is no abdominal tenderness. There is no guarding or rebound.   Musculoskeletal:         General: Normal range of motion.      Cervical back: Normal range of motion and neck supple.   Skin:     General: Skin is warm and dry.   Neurological:      Mental Status: He is alert and oriented to person, place, and time.      Coordination: Coordination normal.   Psychiatric:         Behavior: Behavior normal.         Thought Content: Thought content normal.         Judgment: Judgment normal.         Assessment:       1. Coronary artery disease involving native coronary artery of native heart without angina pectoris    2. Hyperlipidemia, unspecified hyperlipidemia type    3. S/P CABG x 4    4. Essential hypertension    5. Insomnia, unspecified type    6. Major depressive disorder in remission, unspecified whether recurrent        Plan:       Cedric was seen today for follow-up.    Diagnoses and all orders for this visit:    Coronary artery disease involving native coronary artery of native heart without angina pectoris  -     clopidogreL (PLAVIX) 75 mg tablet; Take 1 tablet (75 mg total) by mouth once daily.    Hyperlipidemia, unspecified hyperlipidemia type  -     rosuvastatin (CRESTOR) 10 MG tablet; Take 1 tablet (10 mg total) by mouth once daily.    S/P CABG x 4    Essential  hypertension  -     losartan (COZAAR) 25 MG tablet; Take 1 tablet (25 mg total) by mouth once daily.    Insomnia, unspecified type  -     traZODone (DESYREL) 150 MG tablet; Take 1 tablet (150 mg total) by mouth every evening.    Major depressive disorder in remission, unspecified whether recurrent  -     EScitalopram oxalate (LEXAPRO) 10 MG tablet; Take 1 tablet (10 mg total) by mouth once daily.    I spent 30 minutes on this encounter, time includes face-to-face, chart review, documentation, test review and orders.   WE REVIEWED ALL MEDICATIONS AND THE PATIENT UNDERSTANDS WHICH MEDICATIONS HE NEEDS TO TAKE DAILY AND I HAVE GIVEN HIM WRITTEN [facility]

## 2022-04-13 ENCOUNTER — PES CALL (OUTPATIENT)
Dept: ADMINISTRATIVE | Facility: CLINIC | Age: 82
End: 2022-04-13
Payer: MEDICARE

## 2022-07-07 ENCOUNTER — TELEPHONE (OUTPATIENT)
Dept: FAMILY MEDICINE | Facility: CLINIC | Age: 82
End: 2022-07-07
Payer: MEDICARE

## 2022-07-07 NOTE — TELEPHONE ENCOUNTER
"Patient has moved to [place] and will remain here until at least March. States he is "feeling really good and I have gotten everything straight [employer] for mailing my meds and I just wanted to relay all this information to Dr. Stein" Denies any needs at this time.   "

## 2022-07-07 NOTE — TELEPHONE ENCOUNTER
----- Message from Yessenia Melton sent at 7/7/2022  3:21 PM CDT -----  Patient Call Back    Who Called: PT     What is the reqeust in detail: pt calling to speak with someone regarding his medication. The pt state that he now live in [place]. Pls advise.    Can the clinic reply by [facility]?    Best Call Back Number: [phone]

## 2022-09-14 DIAGNOSIS — E78.5 HYPERLIPIDEMIA, UNSPECIFIED HYPERLIPIDEMIA TYPE: ICD-10-CM

## 2022-09-15 NOTE — TELEPHONE ENCOUNTER
Refill Routing Note   Medication(s) are not appropriate for processing by [facility] for the following reason(s):      - Required laboratory values are outdated    [facility] action(s):  Defer          Medication reconciliation completed: No     Appointments  past 12m or future 3m with PCP    Date Provider   Last Visit   2/4/2022 Tracy Stein MD   Next Visit   Visit date not found Tracy Stein MD   ED visits in past 90 days: 0        Note composed:10:52 PM 09/14/2022

## 2022-09-15 NOTE — TELEPHONE ENCOUNTER
No new care gaps identified.  [facility] Embedded Care Gaps. Reference number: [id number]. 9/14/2022   10:24:26 PM CDT

## 2022-09-16 RX ORDER — ROSUVASTATIN CALCIUM 10 MG/1
TABLET, COATED ORAL
Qty: 90 TABLET | Refills: 2 | OUTPATIENT
Start: 2022-09-16

## 2022-10-12 DIAGNOSIS — E78.5 HYPERLIPIDEMIA, UNSPECIFIED HYPERLIPIDEMIA TYPE: ICD-10-CM

## 2022-10-12 NOTE — TELEPHONE ENCOUNTER
No new care gaps identified.  [facility] Embedded Care Gaps. Reference number: [id number]. 10/12/2022   5:49:33 PM CDT

## 2022-10-12 NOTE — TELEPHONE ENCOUNTER
Refill Routing Note   Medication(s) are not appropriate for processing by [facility] for the following reason(s):      - Required laboratory values are outdated    [facility] action(s):  Defer Medication-related problems identified: Requires labs     Medication Therapy Plan:  Labs (CMP, Lipid panel) outdated  Medication reconciliation completed: No     Appointments  past 12m or future 3m with PCP    Date Provider   Last Visit   2/4/2022 Tracy Stein MD   Next Visit   Visit date not found Tracy Stein MD   ED visits in past 90 days: 0        Note composed:6:36 PM 10/12/2022

## 2022-10-17 RX ORDER — ROSUVASTATIN CALCIUM 10 MG/1
10 TABLET, COATED ORAL DAILY
Qty: 90 TABLET | Refills: 0 | Status: SHIPPED | OUTPATIENT
Start: 2022-10-17 | End: 2023-07-10 | Stop reason: SDUPTHER

## 2022-10-17 NOTE — TELEPHONE ENCOUNTER
"Patient reports he is still in [place], and will be returning to [place] for jesús holidays, but will return to [place] to stay. He has filled out forms and applied for being able to see local [place] providers, but is still waiting for an appointment.     Patient would also like to know if he should continue taking plavix 75mg at this time, he does report "a few brown, blood spots on my arms that are ok, I'm just not sure how long I should take this and if the doctor wants me to continue"    Also - "Please tell Dr. Stein hi, she has helped me so much. I feel great and everything is going so well here in [place]"   "

## 2022-10-17 NOTE — TELEPHONE ENCOUNTER
Great!  I refilled the crestor  If no do yet in [place], I would recommend labs here and a visit when he's here for xmas  Do continue with the plavix

## 2022-10-27 ENCOUNTER — TELEPHONE (OUTPATIENT)
Dept: FAMILY MEDICINE | Facility: CLINIC | Age: 82
End: 2022-10-27
Payer: MEDICARE

## 2022-10-27 NOTE — LETTER
October 27, 2022    Cedric Gupta Jr.  [address]  [place] [address]             [facility]  [address]  [facility] [address]  Phone: [phone]  Fax: [phone] Dear  Alonso:     [facility]  .   THese are the dates you are looking   COVID-19, mRNA, LNP-S, PF, 30 mcg/0.3 mL dose (COVID-19, MRNA, LN-S, PF (Pfizer) (Purple Cap)) 2/4/2022, 2/1/2021, 1/11/2021       If you have any questions or concerns, please don't hesitate to call.    Sincerely,        Ial Montiel LPN

## 2022-10-27 NOTE — LETTER
October 27, 2022    Cedric Gupta Jr.  [address]  [place] [address]             [facility]  [address]  [facility] [address]  Phone: [phone]  Fax: [phone] Dear {MR/MRS/MS/DR:66583} Alonso:     [facility]   THese are the dates you are looking   COVID-19, mRNA, LNP-S, PF, 30 mcg/0.3 mL dose (COVID-19, MRNA, LN-S, PF (Pfizer) (Purple Cap)) 2/4/2022, 2/1/2021, 1/11/2021       If you have any questions or concerns, please don't hesitate to call.    Sincerely,        Ila Montiel LPN

## 2022-10-28 NOTE — TELEPHONE ENCOUNTER
----- Message from Pam Stevenson sent at 10/27/2022  3:38 PM CDT -----  Contact: [phone]  Type: Needs Medical Advice  Who Called:  Roosevelt with [employer]     Best Call Back Number: [phone]  Additional Information: Roosevelt calling to see if you can fax pts Covid vax record. Pt in clinic for vax now. Fax [phone]

## 2023-03-16 NOTE — TELEPHONE ENCOUNTER
Lmom for patient to call back   Xolair Pregnancy And Lactation Text: This medication is Pregnancy Category B and is considered safe during pregnancy. This medication is excreted in breast milk.

## 2023-03-30 ENCOUNTER — PATIENT MESSAGE (OUTPATIENT)
Dept: ADMINISTRATIVE | Facility: HOSPITAL | Age: 83
End: 2023-03-30
Payer: MEDICARE

## 2023-03-30 ENCOUNTER — PATIENT OUTREACH (OUTPATIENT)
Dept: ADMINISTRATIVE | Facility: HOSPITAL | Age: 83
End: 2023-03-30
Payer: MEDICARE

## 2023-03-30 NOTE — PROGRESS NOTES
Uncontrolled BP REPORT  BP Readings from Last 3 Encounters:   03/25/22 122/70   02/04/22 (!) 144/78   09/23/21 132/75       Non-compliant report chart audits for HYPERTENSION MANAGEMENT     Outreach to patient in reference to hypertension management       BLOOD PRESSURE FOLLOW UP NEEDED WITH A CARE TEAM MEMBER    ACTIVE PORTAL MESSAGE OR LETTER SENT

## 2023-04-20 ENCOUNTER — OFFICE VISIT (OUTPATIENT)
Dept: FAMILY MEDICINE | Facility: CLINIC | Age: 83
End: 2023-04-20
Payer: MEDICARE

## 2023-04-20 VITALS
HEART RATE: 67 BPM | BODY MASS INDEX: 22.52 KG/M2 | HEIGHT: 70 IN | DIASTOLIC BLOOD PRESSURE: 70 MMHG | OXYGEN SATURATION: 97 % | WEIGHT: 157.31 LBS | SYSTOLIC BLOOD PRESSURE: 124 MMHG

## 2023-04-20 DIAGNOSIS — G47.00 INSOMNIA, UNSPECIFIED TYPE: ICD-10-CM

## 2023-04-20 DIAGNOSIS — F32.5 MAJOR DEPRESSIVE DISORDER IN REMISSION, UNSPECIFIED WHETHER RECURRENT: Primary | ICD-10-CM

## 2023-04-20 PROCEDURE — 3078F PR MOST RECENT DIASTOLIC BLOOD PRESSURE < 80 MM HG: ICD-10-PCS | Mod: CPTII,S$GLB,, | Performed by: FAMILY MEDICINE

## 2023-04-20 PROCEDURE — 3288F PR FALLS RISK ASSESSMENT DOCUMENTED: ICD-10-PCS | Mod: CPTII,S$GLB,, | Performed by: FAMILY MEDICINE

## 2023-04-20 PROCEDURE — 99999 PR PBB SHADOW E&M-EST. PATIENT-LVL IV: CPT | Mod: PBBFAC,,, | Performed by: FAMILY MEDICINE

## 2023-04-20 PROCEDURE — 1159F MED LIST DOCD IN RCRD: CPT | Mod: CPTII,S$GLB,, | Performed by: FAMILY MEDICINE

## 2023-04-20 PROCEDURE — 1157F ADVNC CARE PLAN IN RCRD: CPT | Mod: CPTII,S$GLB,, | Performed by: FAMILY MEDICINE

## 2023-04-20 PROCEDURE — 1101F PT FALLS ASSESS-DOCD LE1/YR: CPT | Mod: CPTII,S$GLB,, | Performed by: FAMILY MEDICINE

## 2023-04-20 PROCEDURE — 3078F DIAST BP <80 MM HG: CPT | Mod: CPTII,S$GLB,, | Performed by: FAMILY MEDICINE

## 2023-04-20 PROCEDURE — 3288F FALL RISK ASSESSMENT DOCD: CPT | Mod: CPTII,S$GLB,, | Performed by: FAMILY MEDICINE

## 2023-04-20 PROCEDURE — 3074F PR MOST RECENT SYSTOLIC BLOOD PRESSURE < 130 MM HG: ICD-10-PCS | Mod: CPTII,S$GLB,, | Performed by: FAMILY MEDICINE

## 2023-04-20 PROCEDURE — 1126F AMNT PAIN NOTED NONE PRSNT: CPT | Mod: CPTII,S$GLB,, | Performed by: FAMILY MEDICINE

## 2023-04-20 PROCEDURE — 99214 OFFICE O/P EST MOD 30 MIN: CPT | Mod: S$GLB,,, | Performed by: FAMILY MEDICINE

## 2023-04-20 PROCEDURE — 1159F PR MEDICATION LIST DOCUMENTED IN MEDICAL RECORD: ICD-10-PCS | Mod: CPTII,S$GLB,, | Performed by: FAMILY MEDICINE

## 2023-04-20 PROCEDURE — 3074F SYST BP LT 130 MM HG: CPT | Mod: CPTII,S$GLB,, | Performed by: FAMILY MEDICINE

## 2023-04-20 PROCEDURE — 99999 PR PBB SHADOW E&M-EST. PATIENT-LVL IV: ICD-10-PCS | Mod: PBBFAC,,, | Performed by: FAMILY MEDICINE

## 2023-04-20 PROCEDURE — 1126F PR PAIN SEVERITY QUANTIFIED, NO PAIN PRESENT: ICD-10-PCS | Mod: CPTII,S$GLB,, | Performed by: FAMILY MEDICINE

## 2023-04-20 PROCEDURE — 99214 PR OFFICE/OUTPT VISIT, EST, LEVL IV, 30-39 MIN: ICD-10-PCS | Mod: S$GLB,,, | Performed by: FAMILY MEDICINE

## 2023-04-20 PROCEDURE — 1157F PR ADVANCE CARE PLAN OR EQUIV PRESENT IN MEDICAL RECORD: ICD-10-PCS | Mod: CPTII,S$GLB,, | Performed by: FAMILY MEDICINE

## 2023-04-20 PROCEDURE — 1101F PR PT FALLS ASSESS DOC 0-1 FALLS W/OUT INJ PAST YR: ICD-10-PCS | Mod: CPTII,S$GLB,, | Performed by: FAMILY MEDICINE

## 2023-04-20 RX ORDER — ESCITALOPRAM OXALATE 20 MG/1
20 TABLET ORAL DAILY
Qty: 30 TABLET | Refills: 11 | Status: SHIPPED | OUTPATIENT
Start: 2023-04-20 | End: 2023-07-10 | Stop reason: SDUPTHER

## 2023-04-20 RX ORDER — TRAZODONE HYDROCHLORIDE 150 MG/1
150 TABLET ORAL NIGHTLY
Qty: 90 TABLET | Refills: 2 | Status: SHIPPED | OUTPATIENT
Start: 2023-04-20 | End: 2023-04-24 | Stop reason: SDUPTHER

## 2023-04-20 NOTE — Clinical Note
Hi Dr Stein,  I met this patient earlier today.  Recently moved back from [place].  Had severe financial issues and subsequent depression, especially being off of his medication.  Flew back in town yesterday and plans on living here permanently.  I did restart his Lexapro and trazodone which seem to be effective in the past.  Referred him to counseling.  I am reaching out to you to see if your office would be able to get him scheduled for a follow-up on his depression in the next 3-6 weeks.  ER precautions given to him, his sister and daughter.  Thanks   Caleb Mendenhall MD

## 2023-04-20 NOTE — PROGRESS NOTES
THIS DOCUMENT WAS MADE IN PART WITH VOICE RECOGNITION SOFTWARE.  OCCASIONALLY THIS SOFTWARE WILL MISINTERPRET WORDS OR PHRASES.    Assessment and Plan:    1. Major depressive disorder in remission, unspecified whether recurrent  Ambulatory referral/consult to Outpatient Case Management    Ambulatory referral/consult to Psychology    EScitalopram oxalate (LEXAPRO) 20 MG tablet      2. Insomnia, unspecified type  traZODone (DESYREL) 150 MG tablet          Social issues-reached out to case management for assistance with patient with financial issues, social issues    Severe depression-restart Lexapro, titrate from 10-20 mg over 1 week.  Sent referral to psychology for counseling purposes.  Gave ER precautions for suicidal ideation, extreme debility    Insomnia-restart trazodone, may help out with depression as well    Will send message to primary care physician for assistance with reestablishing and follow-up with depression per his request    ______________________________________________________________________  Subjective:    Chief Complaint:  Chief Complaint   Patient presents with    Depression     Pt states he been having some extreme depression, been going on for about 3+ months         HPI:  Cedric is a 83 y.o. year old     83-year-old male with multiple medical conditions presents complaining of severe episode of depression   Recently moved back from [place]    Depressed mood  Duration: 3 months   Prev Rx : lexapro 10 mg (ran out several months ago) + Trazodone 150 mg QHS (ran out)  Stressors : financials - unsure if he would be able to afford food, electric bill at this time  Feeling hopeless; denies SI  No history inpatient; prev with Dr Nieves for counseling  Living with Sister currently for finite amount of time until he has to move back to his private home in [place], has minimal furnishings      Past Medical History:  Past Medical History:   Diagnosis Date    Anticoagulant long-term use      Atherosclerosis of native coronary artery of native heart without angina pectoris 01/2021    BPH (benign prostatic hypertrophy)     Cataract     done OU    Elevated PSA     Epiretinal membrane     OS    Fatty liver     noted on usg    History of basal cell carcinoma     History of colon polyps     History of duodenal ulcer     x 2 8/13 and 8/14    History of nephrolithiasis 2008    passed stone    History of prediabetes     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    Hypertension     Macular degeneration     dry -- OU    Marijuana smoker     helps with insomnia    Osteoarthritis     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    S/P colonoscopy     8/15; 8/20       Past Surgical History:  Past Surgical History:   Procedure Laterality Date    ANGIOGRAM, CORONARY, WITH LEFT HEART CATHETERIZATION  12/11/2020    Procedure: Angiogram, Coronary, with Left Heart Cath;  Surgeon: Edy Hills MD;  Location: [facility] CATH;  Service: Cardiology;;    CATARACT EXTRACTION W/  INTRAOCULAR LENS IMPLANT Right 01/23/2020    Dr Bone    CATARACT EXTRACTION W/  INTRAOCULAR LENS IMPLANT Left 02/06/2020    Dr Bone    CHOLECYSTECTOMY      COLONOSCOPY N/A 10/14/2020    Procedure: COLONOSCOPY;  Surgeon: Kasi Mckoy MD;  Location: [facility] ENDO;  Service: Endoscopy;  Laterality: N/A;    CORONARY ARTERY BYPASS GRAFT (CABG) N/A 3/9/2021    Procedure: CORONARY ARTERY BYPASS GRAFT (CABG) x 4;  Surgeon: Drake Kwan MD;  Location: [facility] OR;  Service: Cardiovascular;  Laterality: N/A;    coronary bipass  03/09/2021    ENDOSCOPIC HARVEST OF VEIN Left 3/9/2021    Procedure: HARVEST-VEIN-ENDOVASCULAR -;  Surgeon: Drake Kwan MD;  Location: [facility] OR;  Service: Cardiovascular;  Laterality: Left;    FRACTURE SURGERY      Right ankle and leg    ORCHIECTOMY      due to undescended testicle/ Left    PHACOEMULSIFICATION OF CATARACT Right 1/23/2020    Procedure: PHACOEMULSIFICATION, CATARACT;  Surgeon: Xu Bone Jr., MD;   Location: [facility] OR;  Service: Ophthalmology;  Laterality: Right;  Right    PHACOEMULSIFICATION OF CATARACT Left 2/6/2020    Procedure: PHACOEMULSIFICATION, CATARACT;  Surgeon: Xu Bone Jr., MD;  Location: [facility] OR;  Service: Ophthalmology;  Laterality: Left;  Left    PROSTATE BIOPSY  2014 2014, 2018-negative    TONSILLECTOMY      TRANSRECTAL BIOPSY OF PROSTATE WITH ULTRASOUND GUIDANCE N/A 8/20/2018    Procedure: BIOPSY, PROSTATE, TRANSRECTAL APPROACH, WITH US GUIDANCE;  Surgeon: Joselo Green MD;  Location: [facility] OR;  Service: Urology;  Laterality: N/A;  SITE PROSTATE       Family History:  Family History   Problem Relation Age of Onset    Cataracts Mother     Glaucoma Mother     Lupus Mother     Heart disease Mother     Kidney cancer Father     Diabetes Maternal Aunt     Diabetes Maternal Uncle     No Known Problems Sister     No Known Problems Daughter     Cancer Sister         breast cancer    Melanoma Neg Hx     Psoriasis Neg Hx     Eczema Neg Hx     Amblyopia Neg Hx     Hypertension Neg Hx     Macular degeneration Neg Hx     Retinal detachment Neg Hx     Stroke Neg Hx     Strabismus Neg Hx     Thyroid disease Neg Hx     Blindness Neg Hx        Social History:  Social History     Socioeconomic History    Marital status:    Occupational History    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, science    Occupation: traveled [place] [employer]    Occupation: lived [place] x 1 year   Tobacco Use    Smoking status: Never    Smokeless tobacco: Never   Substance and Sexual Activity    Alcohol use: Yes     Alcohol/week: 7.0 standard drinks     Types: 7 Glasses of wine per week    Drug use: Yes     Frequency: 7.0 times per week     Types: Marijuana     Comment: thc    Sexual activity: Yes     Partners: Female     Birth control/protection: None       Medications:  Current Outpatient Medications on File Prior to Visit   Medication Sig Dispense Refill    acetaminophen (TYLENOL) 325 MG  tablet Take 1 tablet (325 mg total) by mouth every 6 (six) hours as needed for Pain.      aspirin (ECOTRIN) 81 MG EC tablet Take 1 tablet (81 mg total) by mouth once daily. 30 tablet 11    clopidogreL (PLAVIX) 75 mg tablet Take 1 tablet (75 mg total) by mouth once daily. 90 tablet 2    losartan (COZAAR) 25 MG tablet Take 1 tablet (25 mg total) by mouth once daily. 90 tablet 2    polyethylene glycol (GLYCOLAX) 17 gram/dose powder Take 17 g by mouth daily as needed.      rosuvastatin (CRESTOR) 10 MG tablet Take 1 tablet (10 mg total) by mouth once daily. 90 tablet 0    vit C-E-zinc cit-lutein-zeaxan (UVITE EYE [facility]) 50 mg-15 unit- 4.5 mg-2.5 mg Chew Take 1 tablet by mouth once daily.      [DISCONTINUED] EScitalopram oxalate (LEXAPRO) 10 MG tablet Take 1 tablet (10 mg total) by mouth once daily. 90 tablet 2    [DISCONTINUED] traZODone (DESYREL) 150 MG tablet Take 1 tablet (150 mg total) by mouth every evening. 90 tablet 2     No current facility-administered medications on file prior to visit.       Allergies:  Amantadine    Immunizations:  Immunization History   Administered Date(s) Administered    COVID-19, MRNA, LN-S, PF (Pfizer) (Purple Cap) 01/11/2021, 02/01/2021, 02/04/2022    Hepatitis A 01/25/2013    Hepatitis A, Adult 08/11/2015    Hepatitis A, Pediatric/Adolescent, 2 Dose 01/25/2013    Influenza (FLUAD) - Quadrivalent - Adjuvanted - PF *Preferred* (65+) 09/23/2020    Influenza - High Dose - PF (65 years and older) 12/10/2012, 10/28/2014, 02/27/2017, 10/23/2017, 01/04/2019, 10/23/2019    Pneumococcal Conjugate - 13 Valent 08/17/2016    Pneumococcal Conjugate - 7 Valent 05/13/2008    Pneumococcal Polysaccharide - 23 Valent 10/23/2017    Tdap 05/13/2008, 01/28/2020    Zoster 05/13/2008    Zoster Recombinant 01/28/2020       Review of Systems:  Review of Systems   All other systems reviewed and are negative.    Objective:    Vitals:  Vitals:    04/20/23 1537   BP: 124/70   Pulse: 67   SpO2: 97%   Weight: 71.3  "kg (157 lb 4.8 oz)   Height: 5' 10" (1.778 m)   PainSc: 0-No pain       Physical Exam  Vitals reviewed.   Constitutional:       Appearance: He is well-developed.   HENT:      Head: Normocephalic and atraumatic.   Pulmonary:      Effort: Pulmonary effort is normal. No respiratory distress.   Musculoskeletal:      Cervical back: Normal range of motion.   Psychiatric:         Mood and Affect: Mood is depressed. Affect is tearful.         Behavior: Behavior normal.         Thought Content: Thought content normal.         Judgment: Judgment normal.           Andrea Mendenhall MD  Family Medicine      "

## 2023-04-22 ENCOUNTER — TELEPHONE (OUTPATIENT)
Dept: FAMILY MEDICINE | Facility: CLINIC | Age: 83
End: 2023-04-22
Payer: MEDICARE

## 2023-04-22 NOTE — TELEPHONE ENCOUNTER
----- Message from Tracy Stein MD sent at 4/20/2023  5:29 PM CDT -----  Thanks for seeing him!  We will get him scheduled back here.  Tracy  ----- Message -----  From: Andrea Mendenhall MD  Sent: 4/20/2023   4:06 PM CDT  To: Tracy Stein MD    Hi Dr Stein,   I met this patient earlier today.  Recently moved back from [place].  Had severe financial issues and subsequent depression, especially being off of his medication.  Flew back in town yesterday and plans on living here permanently.  I did restart his Lexapro and trazodone which seem to be effective in the past.  Referred him to counseling.  I am reaching out to you to see if your office would be able to get him scheduled for a follow-up on his depression in the next 3-6 weeks.  ER precautions given to him, his sister and daughter.    Thanks     Caleb Mendenhall MD

## 2023-04-24 DIAGNOSIS — G47.00 INSOMNIA, UNSPECIFIED TYPE: ICD-10-CM

## 2023-04-24 RX ORDER — TRAZODONE HYDROCHLORIDE 150 MG/1
150 TABLET ORAL NIGHTLY
Qty: 90 TABLET | Refills: 2 | Status: SHIPPED | OUTPATIENT
Start: 2023-04-24 | End: 2023-07-10 | Stop reason: SDUPTHER

## 2023-04-24 NOTE — TELEPHONE ENCOUNTER
----- Message from Shefali Washington sent at 4/24/2023 12:15 PM CDT -----  Contact: Pt's sister/ Lorie @ [phone]  Type:  Needs Medical Advice    Who Called: Pt's sister/ Lorie  Pharmacy name and phone #:      [id number] - [place] [id number] [place] [address] AT [facility] [address]  [address] [address]  Phone: [phone] Fax: [phone]    Would the patient rather a call back or a response via [facility]? call  Best Call Back Number: [phone]  Additional Information: Pt's sister states that Dr. Mendenhall told the pt that he was going to send over a sleeping medication, and the medication hasn't been sent yet. Please call pt's medication in to [facility] on [address] in [place].

## 2023-04-24 NOTE — TELEPHONE ENCOUNTER
Care Due:                  Date            Visit Type   Department     Provider  --------------------------------------------------------------------------------                                EP -                              PRIMARY      [facility] Tracy Stein  Last Visit: 02-      CARE ([facility])   MEDICINE       Anger                              EP -                              PRIMARY      [facility]    Tracy Stein  Next Visit: 06-      CARE ([facility])   MEDICINE       Anger                                                            Last  Test          Frequency    Reason                     Performed    Due Date  --------------------------------------------------------------------------------    CMP.........  12 months..  rosuvastatin.............  05- 05-    Lipid Panel.  12 months..  rosuvastatin.............  Not Found    Overdue    [employer] Embedded Care Gaps. Reference number: [id number]. 4/24/2023   2:43:32 PM CDT

## 2023-04-24 NOTE — TELEPHONE ENCOUNTER
Spoke w/ pt. His trazodone went to [facility]. Pt would like this sent to [facility]. Rx pended. Please approve.

## 2023-05-02 ENCOUNTER — OFFICE VISIT (OUTPATIENT)
Dept: PSYCHIATRY | Facility: CLINIC | Age: 83
End: 2023-05-02
Payer: MEDICARE

## 2023-05-02 DIAGNOSIS — F33.1 MDD (MAJOR DEPRESSIVE DISORDER), RECURRENT EPISODE, MODERATE: Primary | ICD-10-CM

## 2023-05-02 PROCEDURE — 99999 PR PBB SHADOW E&M-EST. PATIENT-LVL I: ICD-10-PCS | Mod: PBBFAC,,,

## 2023-05-02 PROCEDURE — 1157F ADVNC CARE PLAN IN RCRD: CPT | Mod: CPTII,S$GLB,,

## 2023-05-02 PROCEDURE — 99999 PR PBB SHADOW E&M-EST. PATIENT-LVL I: CPT | Mod: PBBFAC,,,

## 2023-05-02 PROCEDURE — 90791 PSYCH DIAGNOSTIC EVALUATION: CPT | Mod: S$GLB,,,

## 2023-05-02 PROCEDURE — 90791 PR PSYCHIATRIC DIAGNOSTIC EVALUATION: ICD-10-PCS | Mod: S$GLB,,,

## 2023-05-02 PROCEDURE — 1157F PR ADVANCE CARE PLAN OR EQUIV PRESENT IN MEDICAL RECORD: ICD-10-PCS | Mod: CPTII,S$GLB,,

## 2023-05-02 NOTE — Clinical Note
Hi, can we schedule a follow up on a Thursday for 3-4 weeks. He can be reached at [phone] or his sister's number on file.

## 2023-05-02 NOTE — PROGRESS NOTES
Primary Care Behavioral Health: Initial  Patient Name: Cedric Gupta Jr.  Date:  05/02/2023  Site:  [facility]  Referral source: Andrea Mendenhall MD    Chief complaint/reason for encounter:  depression and anxiety    History of present illness:  Mr. Cedric Gupta Jr. is a 83 y.o. White male referred by Andrea Mendenhall MD.  Patient was seen, examined and chart was reviewed. Patient reviewed and agreed to informed consent and limits of confidentiality. Patient's family members, Heather, Ana, and Jim, were present for part of the appointment with consent from Mr. Gupta. Mr. Gupta shares that he has been severely depressed for the last 3-4 months. He notes that he recently moved back to [place] after living in [place] for the last year. He shares that prior to his move to [place], he sold all of his possessions and placed his home on the market for sale. Patient left to [place] with the hopes that he would receive a large amount of money from the sale of his home. However, his home never sold. He lived mostly in a [facility], essentially homeless, while in [place]. Patient notes he ran out of his psychotropic medication while in [place] but felt good until the last 2-3 months while there. Since returning to [place], he has been staying with his older sister. He is unable to financially support himself at this time. He notes little interest in hobbies or activities. He has been more sedentary than usually. Admits that he was much more active (e.g. hiking, walking, etc) while in [place]. Notes poor sleep and appetite. Family expresses some concern about memory, concentration, and money management. The family also notes that prior to his move to [place], Mr. Gupta was exhibiting some manic like symptoms. They note this was soon after starting Lexapro that he began to engage in impulsive behaviors. They also note he was more angry and irritable, and less receptive to feedback from family. The family is  concerned that Lexapro activated a manic episode which resulted in patient's abrupt move to [place].     Past Medical History:   Diagnosis Date    Anticoagulant long-term use     Atherosclerosis of native coronary artery of native heart without angina pectoris 01/2021    BPH (benign prostatic hypertrophy)     Cataract     done OU    Elevated PSA     Epiretinal membrane     OS    Fatty liver     noted on usg    History of basal cell carcinoma     History of colon polyps     History of duodenal ulcer     x 2 8/13 and 8/14    History of nephrolithiasis 2008    passed stone    History of prediabetes     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    Hypertension     Macular degeneration     dry -- OU    Marijuana smoker     helps with insomnia    Osteoarthritis     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    S/P colonoscopy     8/15; 8/20         Current Outpatient Medications:     acetaminophen (TYLENOL) 325 MG tablet, Take 1 tablet (325 mg total) by mouth every 6 (six) hours as needed for Pain., Disp: , Rfl:     aspirin (ECOTRIN) 81 MG EC tablet, Take 1 tablet (81 mg total) by mouth once daily., Disp: 30 tablet, Rfl: 11    clopidogreL (PLAVIX) 75 mg tablet, Take 1 tablet (75 mg total) by mouth once daily., Disp: 90 tablet, Rfl: 2    EScitalopram oxalate (LEXAPRO) 20 MG tablet, Take 1 tablet (20 mg total) by mouth once daily., Disp: 30 tablet, Rfl: 11    losartan (COZAAR) 25 MG tablet, Take 1 tablet (25 mg total) by mouth once daily., Disp: 90 tablet, Rfl: 2    polyethylene glycol (GLYCOLAX) 17 gram/dose powder, Take 17 g by mouth daily as needed., Disp: , Rfl:     rosuvastatin (CRESTOR) 10 MG tablet, Take 1 tablet (10 mg total) by mouth once daily., Disp: 90 tablet, Rfl: 0    traZODone (DESYREL) 150 MG tablet, Take 1 tablet (150 mg total) by mouth every evening., Disp: 90 tablet, Rfl: 2    vit C-E-zinc cit-lutein-zeaxan (UVITE EYE HEALTH) 50 mg-15 unit- 4.5 mg-2.5 mg Chew, Take 1 tablet by mouth once daily., Disp:  , Rfl:     Psychiatric history:  Previous diagnosis: XIOMARA, MDD  Psychiatric medication: Patient was previously prescribed lexapro and trazodone. Recently re-prescribed same medications.  Previous hospitalizations: Patient denies  History of outpatient treatment: Patient was in treatment with Dr. Nieves in 2021  Previous suicide attempt:  Patient denies.  Family history of psychiatric illness: Paternal Great-Uncles: Depression; Maternal Aunt: Depression    Current and past substance use:  Alcohol:  Denied current use.  Denied history of abuse or dependency.  Drugs:  hx of marijuana use. Intermittent use for sleep.   Tobacco:  Denied current use.  Caffeine:  coffee in the morning      Psychiatric symptoms:  Depression:  Patient endorses depressed mood/depression-related anhedonia, lack of motivation, lethargy, difficulty concentrating, feelings of worthlessness or guilt, hopelessness, appetite changes, or psychomotor changes.  He endorses thoughts that he would be better off dead.   [place]/Hypomania:  Patient and family report a specific period of elevated mood, increased energy, impulsive and risky behaviors approximately 1 year ago.  Anxiety:  patient endorses nervousness, restlessness and excessive worry. Notes irritability.  Thoughts:  Denied delusions, hallucinations.  Suicidal thoughts/behaviors:  Patient notes experiencing intermittent, fleeting thoughts that he would be better off dead. He denies any desire to die or end his own life. He denies intent or plan to end his own life. He recently moved in with his sister. He has no access to firearms at this time. At the time of appointment, patient denied suicidal and homicidal ideation, plan, or intent.  Patient was advised and noted agreement to call 911/and or present to the ED if he experienced suicidal or homicidal ideation, plan or intent.    Self-injury:  Denied.  Sleep: patient notes getting in bed around 1030 and falling asleep by 11. Reports waking 1-2  times during the night, for periods lasting 10-30mins. Notes early morning awakening most days, 330/4am. With no return to sleep.  Trauma: none reported.      Mental Status Exam:  General appearance:  appears stated age, casually dressed, adequately groomed  Speech:  normal rate, normal tone, normal pitch, normal volume  Level of cooperation:  cooperative  Thought processes:  logical, goal-directed  Mood:  euthymic  Thought content:  no illusions, no visual hallucinations, no auditory hallucinations, no delusions, no active or passive homicidal thoughts, no active or passive suicidal ideation, no obsessions, no compulsions, no violence  Affect:  appropriate  Orientation:  oriented to person, place, situation and date  Memory/Attention and Concentration:  No gross deficits made evident during conversation.  Judgment and insight: fair  Language:  intact        PHQ9 5/2/2023   Total Score 20     GAD7 5/2/2023 6/21/2021 10/23/2019   1. Feeling nervous, anxious, or on edge? 3 2 0   2. Not being able to stop or control worrying? 3 0 0   3. Worrying too much about different things? 3 2 -   4. Trouble relaxing? 3 0 -   5. Being so restless that it is hard to sit still? 2 1 -   6. Becoming easily annoyed or irritable? 1 0 -   7. Feeling afraid as if something awful might happen? 1 0 -   8. If you checked off any problems, how difficult have these problems made it for you to do your work, take care of things at home, or get along with other people? 1 - -   XIOMARA-7 Score 16 5 -       Impression: Patient is a pleasant 83 year old who presents with intermittent depression and anxiety. He recently moved back to [place] with no way of supporting himself financially. He finds himself with little interest or motivation to much of anything despite being physically capable. Patient's family is concerned about his mental state given his behaviors over the last year. A MMSE was completed during this appointment. Patient obtained a score  of 29 out of 30 (no impairment). Patient symptoms are consistent with depression and anxiety. He was recently prescribed an antidepressant however there is concern that this medication has negative side effects. Discussed with family referral for psychiatry to follow medication management. He has an upcoming appointment with neurology (June 2023) and it was encouraged to the family to voice there cognitive concerns at that time.     Diagnosis:  1. MDD (major depressive disorder), recurrent episode, moderate  Ambulatory referral/consult to Psychology           Plan:    Patient advised to call 911/and or present to the ED if she experienced suicidal or homicidal ideation, plan or intent.   Will provide psychoeducation on depression.   Discussed briefly behavior activation and pleasant event scheduling. Identified goal of engaging in activity 1x week  Will follow up as scheduled.    Length of Appointment: 50mins         Jah Rees PsyD

## 2023-05-25 ENCOUNTER — OFFICE VISIT (OUTPATIENT)
Dept: PSYCHIATRY | Facility: CLINIC | Age: 83
End: 2023-05-25
Payer: MEDICARE

## 2023-05-25 DIAGNOSIS — F33.1 MDD (MAJOR DEPRESSIVE DISORDER), RECURRENT EPISODE, MODERATE: Primary | ICD-10-CM

## 2023-05-25 DIAGNOSIS — F41.9 ANXIETY DISORDER, UNSPECIFIED TYPE: ICD-10-CM

## 2023-05-25 PROCEDURE — 1157F ADVNC CARE PLAN IN RCRD: CPT | Mod: CPTII,95,,

## 2023-05-25 PROCEDURE — 90834 PSYTX W PT 45 MINUTES: CPT | Mod: 95,,,

## 2023-05-25 PROCEDURE — 90834 PR PSYCHOTHERAPY W/PATIENT, 45 MIN: ICD-10-PCS | Mod: 95,,,

## 2023-05-25 PROCEDURE — 1157F PR ADVANCE CARE PLAN OR EQUIV PRESENT IN MEDICAL RECORD: ICD-10-PCS | Mod: CPTII,95,,

## 2023-05-25 NOTE — PROGRESS NOTES
"The patient location is:  [address] [place], [place] [address] [address]  The patient location [place] is: [place]  The patient phone number is: [phone]   Visit type: Virtual visit with audio only  Each patient to whom he or she provides medical services by telemedicine is:  (1) informed of the relationship between the provider and patient and the respective role of any other health care provider with respect to management of the patient; and (2) notified that he or she may decline to receive medical services by telemedicine and may withdraw from such care at any time.    Crisis Disclaimer: Patient was informed that due to the virtual nature of the visit, that if a crisis develops, protocols will be implemented to ensure patient safety, including but not limited to: 1) Initiating a welfare check with local Law Enforcement, 2) Calling 1/National Crisis Hotline, and/or 3) Initiating PEC/CEC procedures.    Time (Face-to-face): 31 minutes    Time (Non-face-to-face): 10 minutes     Primary Care Behavioral Health: Follow-up  Patient Name: Cedric Gupta Jr.  Date:  05/25/2023  Site:  [facility]  Referral source: Andrea Mendenhall MD    Chief complaint/reason for encounter:  depression and anxiety    Interval history: Patient notes many changes in the last month including staying with his sister and nephew [place] and reconnecting with family. He shares that he has plans to move into his home in the next couple of weeks. Notes he will possible have a tenant living in the home but he is okay with that. He shares a some improvement with his mood and sleep. Expresses concerns about early morning awakenings however is getting roughly 7 hours of sleep per night. Patient continues to have some "low days". During these days he reflects on the strained relationship with his daughter and grandchildren. Reports feeling like an embarrassment to the family. Interested in reestablishing a relationship with his " daughter in the future.    History of present illness:  Mr. Cedric Gupta Jr. is a 83 y.o. White male referred by Andrea Mendenhall MD.  Patient was seen, examined and chart was reviewed. Patient reviewed and agreed to informed consent and limits of confidentiality. Patient's family members, Heather, Ana, and Jim, were present for part of the appointment with consent from Mr. Gupta. Mr. Gupta shares that he has been severely depressed for the last 3-4 months. He notes that he recently moved back to [place] after living in [place] for the last year. He shares that prior to his move to [place], he sold all of his possessions and placed his home on the market for sale. Patient left to [place] with the hopes that he would receive a large amount of money from the sale of his home. However, his home never sold. He lived mostly in a yu, essentially homeless, while in [place]. Patient notes he ran out of his psychotropic medication while in [place] but felt good until the last 2-3 months while there. Since returning to [place], he has been staying with his older sister. He is unable to financially support himself at this time. He notes little interest in hobbies or activities. He has been more sedentary than usually. Admits that he was much more active (e.g. hiking, walking, etc) while in [place]. Notes poor sleep and appetite. Family expresses some concern about memory, concentration, and money management. The family also notes that prior to his move to [place], Mr. Gupta was exhibiting some manic like symptoms. They note this was soon after starting Lexapro that he began to engage in impulsive behaviors. They also note he was more angry and irritable, and less receptive to feedback from family. The family is concerned that Lexapro activated a manic episode which resulted in patient's abrupt move to [place].     Past Medical History:   Diagnosis Date    Anticoagulant long-term use     Atherosclerosis of native  coronary artery of native heart without angina pectoris 01/2021    BPH (benign prostatic hypertrophy)     Cataract     done [facility]    Elevated PSA     Epiretinal membrane     OS    Fatty liver     noted on usg    History of basal cell carcinoma     History of colon polyps     History of duodenal ulcer     x 2 8/13 and 8/14    History of nephrolithiasis 2008    passed stone    History of prediabetes     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    Hypertension     Macular degeneration     dry -- [facility]    Marijuana smoker     helps with insomnia    Osteoarthritis     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    S/P colonoscopy     8/15; 8/20         Current Outpatient Medications:     acetaminophen (TYLENOL) 325 MG tablet, Take 1 tablet (325 mg total) by mouth every 6 (six) hours as needed for Pain., Disp: , Rfl:     aspirin (ECOTRIN) 81 MG EC tablet, Take 1 tablet (81 mg total) by mouth once daily., Disp: 30 tablet, Rfl: 11    clopidogreL (PLAVIX) 75 mg tablet, Take 1 tablet (75 mg total) by mouth once daily., Disp: 90 tablet, Rfl: 2    EScitalopram oxalate (LEXAPRO) 20 MG tablet, Take 1 tablet (20 mg total) by mouth once daily., Disp: 30 tablet, Rfl: 11    losartan (COZAAR) 25 MG tablet, Take 1 tablet (25 mg total) by mouth once daily., Disp: 90 tablet, Rfl: 2    polyethylene glycol (GLYCOLAX) 17 gram/dose powder, Take 17 g by mouth daily as needed., Disp: , Rfl:     rosuvastatin (CRESTOR) 10 MG tablet, Take 1 tablet (10 mg total) by mouth once daily., Disp: 90 tablet, Rfl: 0    traZODone (DESYREL) 150 MG tablet, Take 1 tablet (150 mg total) by mouth every evening., Disp: 90 tablet, Rfl: 2    vit C-E-zinc cit-lutein-zeaxan (OCUVITE EYE HEALTH) 50 mg-15 unit- 4.5 mg-2.5 mg Chew, Take 1 tablet by mouth once daily., Disp: , Rfl:       Psychiatric symptoms:  Depression:  Patient endorses depressed mood/depression-related anhedonia, lack of motivation, lethargy, difficulty concentrating, feelings of worthlessness or guilt,  hopelessness, appetite changes, or psychomotor changes.     [place]/Hypomania:  Patient and family report a specific period of elevated mood, increased energy, impulsive and risky behaviors over 1 year ago.   Anxiety:  patient endorses restlessness and excessive worry. Notes irritability.  Thoughts:  Denied delusions, hallucinations.  Suicidal thoughts/behaviors:  At the time of appointment, patient denied suicidal and homicidal ideation, plan, or intent.  Patient was advised and noted agreement to call 911/and or present to the ED if he experienced suicidal or homicidal ideation, plan or intent.    Self-injury:  Denied.  Sleep: Patient reports improved sleep with sleep aid. Sleep from /6.   Trauma: none reported.      Mental Status Exam:  General appearance:  unable to assess due to audio only visit  Speech:  Clear and intelligible, normal rate, normal tone, normal pitch, normal volume  Level of cooperation:  cooperative  Thought processes:  Linear, logical, goal-directed  Mood:  euthymic  Thought content:  Relevant and appropriate, no illusions, no visual hallucinations, no auditory hallucinations, no delusions, no active or passive homicidal thoughts, no active or passive suicidal ideation, no obsessions, no compulsions, no violence  Affect:  unable to assess  Orientation:  oriented to person, place, situation and date  Memory/Attention/Concentration:  No gross cognitive deficits made evident during conversation.  Judgment and insight: fair  Language:  intact      Results of the PHQ8 5/24/2023   1. Little interest or pleasure in doing things Nearly every day   2. Feeling down, depressed, or hopeless Nearly every day   3. Trouble falling or staying asleep, or sleeping too much Several days   4. Feeling tired or having little energy Several days   5. poor appetite or overeating Not at all   6. Feeling bad about yourself - or that you are a failure or have let yourself or your family down Nearly every day   7.  Trouble concentrating on things, such as reading the newspaper or watching television Nearly every day   8. Moving or speaking so slowly that other people could have noticed? Or the opposite - being so fidgety or restless that you have been moving around a lot more than usual Nearly every day   Total Score  17              GAD7 5/24/2023 5/2/2023 6/21/2021   1. Feeling nervous, anxious, or on edge? 3 3 2   2. Not being able to stop or control worrying? 3 3 0   3. Worrying too much about different things? 3 3 2   4. Trouble relaxing? 3 3 0   5. Being so restless that it is hard to sit still? 2 2 1   6. Becoming easily annoyed or irritable? 3 1 0   7. Feeling afraid as if something awful might happen? 3 1 0   8. If you checked off any problems, how difficult have these problems made it for you to do your work, take care of things at home, or get along with other people? - 1 -   XIOMARA-7 Score 20 16 5       Impression: Contrary to scores on the PHQ-8 and XIOMARA-7, patient verbally reports improved mood and anxiety. He notes more social engagement. Physically he is feeling good and has been more active. He has refrained from smoking or consuming marijuana. His sleep has improved despite feeling like he wakes too early. Explained that he is getting about 7-7.5 hours of sleep per night. He does feel rested but feels unmotivated to get out of bed. Encouraged patient to get out of the bed after more than 20mins of not being able to return back to sleep. Discussed setting 1-2 goals for the day each morning and identifying what steps are needed to achieve goals. Patient appeared motivated and hopeful for continued improvement.     Diagnosis:  1. MDD (major depressive disorder), recurrent episode, moderate        2. Anxiety disorder, unspecified type             Interventions and Plan:    Discussed sleep hygiene.   Setting 1-2 daily goals and creating an action plan  Continued social and family engagement  Will follow up as  scheduled    Length of Session: 31 mins audio, 10mins clinical work         Jah Rees Psy.D  Clinical Health Psychology Fellow

## 2023-05-26 ENCOUNTER — PATIENT OUTREACH (OUTPATIENT)
Dept: ADMINISTRATIVE | Facility: HOSPITAL | Age: 83
End: 2023-05-26
Payer: MEDICARE

## 2023-05-26 NOTE — PROGRESS NOTES
2023 Care Everywhere updates requested and reviewed.  Immunizations reconciled. Media reports reviewed.  Duplicate HM overrides and  orders removed.  Overdue HM topic chart audit and/or requested.  Overdue lab testing linked to upcoming lab appointments if applies.    Future Appointments   Date Time Provider Department Center   2023 10:30 AM Tracy Stein MD [facility] Maintenance Due   Topic Date Due    Aspirin/Antiplatelet Therapy  Never done    Shingles Vaccine (2 of 2) 2020    Lipid Panel  2021    COVID-19 Vaccine (4 - Pfizer series) 2022

## 2023-06-09 ENCOUNTER — TELEPHONE (OUTPATIENT)
Dept: FAMILY MEDICINE | Facility: CLINIC | Age: 83
End: 2023-06-09

## 2023-06-09 ENCOUNTER — OFFICE VISIT (OUTPATIENT)
Dept: FAMILY MEDICINE | Facility: CLINIC | Age: 83
End: 2023-06-09
Payer: MEDICARE

## 2023-06-09 VITALS
OXYGEN SATURATION: 97 % | RESPIRATION RATE: 20 BRPM | TEMPERATURE: 98 F | HEART RATE: 66 BPM | BODY MASS INDEX: 23.41 KG/M2 | SYSTOLIC BLOOD PRESSURE: 132 MMHG | HEIGHT: 70 IN | WEIGHT: 163.5 LBS | DIASTOLIC BLOOD PRESSURE: 78 MMHG

## 2023-06-09 DIAGNOSIS — H61.20 IMPACTED CERUMEN, UNSPECIFIED LATERALITY: ICD-10-CM

## 2023-06-09 DIAGNOSIS — I70.0 ATHEROSCLEROSIS OF AORTA: ICD-10-CM

## 2023-06-09 DIAGNOSIS — E78.5 HYPERLIPIDEMIA, UNSPECIFIED HYPERLIPIDEMIA TYPE: ICD-10-CM

## 2023-06-09 DIAGNOSIS — H91.90 DECREASED HEARING, UNSPECIFIED LATERALITY: ICD-10-CM

## 2023-06-09 DIAGNOSIS — R79.9 ABNORMAL FINDING OF BLOOD CHEMISTRY, UNSPECIFIED: ICD-10-CM

## 2023-06-09 DIAGNOSIS — F32.A DEPRESSION, UNSPECIFIED DEPRESSION TYPE: Primary | ICD-10-CM

## 2023-06-09 DIAGNOSIS — I25.118 ATHEROSCLEROTIC HEART DISEASE OF NATIVE CORONARY ARTERY WITH OTHER FORMS OF ANGINA PECTORIS: ICD-10-CM

## 2023-06-09 DIAGNOSIS — I25.10 ASCVD (ARTERIOSCLEROTIC CARDIOVASCULAR DISEASE): ICD-10-CM

## 2023-06-09 DIAGNOSIS — R97.20 ELEVATED PSA: ICD-10-CM

## 2023-06-09 PROCEDURE — 3288F FALL RISK ASSESSMENT DOCD: CPT | Mod: CPTII,S$GLB,, | Performed by: FAMILY MEDICINE

## 2023-06-09 PROCEDURE — 3075F PR MOST RECENT SYSTOLIC BLOOD PRESS GE 130-139MM HG: ICD-10-PCS | Mod: CPTII,S$GLB,, | Performed by: FAMILY MEDICINE

## 2023-06-09 PROCEDURE — 1126F AMNT PAIN NOTED NONE PRSNT: CPT | Mod: CPTII,S$GLB,, | Performed by: FAMILY MEDICINE

## 2023-06-09 PROCEDURE — 1159F PR MEDICATION LIST DOCUMENTED IN MEDICAL RECORD: ICD-10-PCS | Mod: CPTII,S$GLB,, | Performed by: FAMILY MEDICINE

## 2023-06-09 PROCEDURE — 3078F PR MOST RECENT DIASTOLIC BLOOD PRESSURE < 80 MM HG: ICD-10-PCS | Mod: CPTII,S$GLB,, | Performed by: FAMILY MEDICINE

## 2023-06-09 PROCEDURE — 1126F PR PAIN SEVERITY QUANTIFIED, NO PAIN PRESENT: ICD-10-PCS | Mod: CPTII,S$GLB,, | Performed by: FAMILY MEDICINE

## 2023-06-09 PROCEDURE — 1101F PR PT FALLS ASSESS DOC 0-1 FALLS W/OUT INJ PAST YR: ICD-10-PCS | Mod: CPTII,S$GLB,, | Performed by: FAMILY MEDICINE

## 2023-06-09 PROCEDURE — 99214 PR OFFICE/OUTPT VISIT, EST, LEVL IV, 30-39 MIN: ICD-10-PCS | Mod: S$GLB,,, | Performed by: FAMILY MEDICINE

## 2023-06-09 PROCEDURE — 1160F PR REVIEW ALL MEDS BY PRESCRIBER/CLIN PHARMACIST DOCUMENTED: ICD-10-PCS | Mod: CPTII,S$GLB,, | Performed by: FAMILY MEDICINE

## 2023-06-09 PROCEDURE — 3288F PR FALLS RISK ASSESSMENT DOCUMENTED: ICD-10-PCS | Mod: CPTII,S$GLB,, | Performed by: FAMILY MEDICINE

## 2023-06-09 PROCEDURE — 3075F SYST BP GE 130 - 139MM HG: CPT | Mod: CPTII,S$GLB,, | Performed by: FAMILY MEDICINE

## 2023-06-09 PROCEDURE — 1101F PT FALLS ASSESS-DOCD LE1/YR: CPT | Mod: CPTII,S$GLB,, | Performed by: FAMILY MEDICINE

## 2023-06-09 PROCEDURE — 1157F ADVNC CARE PLAN IN RCRD: CPT | Mod: CPTII,S$GLB,, | Performed by: FAMILY MEDICINE

## 2023-06-09 PROCEDURE — 1157F PR ADVANCE CARE PLAN OR EQUIV PRESENT IN MEDICAL RECORD: ICD-10-PCS | Mod: CPTII,S$GLB,, | Performed by: FAMILY MEDICINE

## 2023-06-09 PROCEDURE — 1159F MED LIST DOCD IN RCRD: CPT | Mod: CPTII,S$GLB,, | Performed by: FAMILY MEDICINE

## 2023-06-09 PROCEDURE — 99214 OFFICE O/P EST MOD 30 MIN: CPT | Mod: S$GLB,,, | Performed by: FAMILY MEDICINE

## 2023-06-09 PROCEDURE — 3078F DIAST BP <80 MM HG: CPT | Mod: CPTII,S$GLB,, | Performed by: FAMILY MEDICINE

## 2023-06-09 PROCEDURE — 1160F RVW MEDS BY RX/DR IN RCRD: CPT | Mod: CPTII,S$GLB,, | Performed by: FAMILY MEDICINE

## 2023-06-10 NOTE — PROGRESS NOTES
Subjective:       Patient ID: Cedric Gupta Jr. is a 83 y.o. male.    Chief Complaint: Follow-up    HPI  The patient is an 83-year-old who is here today for a follow-up visit.  I last saw him in February of 2022.  After having a [employer] experience and an offer from the mother of his god child to come for a visit, he decided to go to [place].  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   initially, he was working 7 days a week.  For the 1st 4 months, everything was good.  During these 1st 4 months, he felt good and was enjoying [place] a lot.  After 4 months, he left that job because he could never get a day off.  He then was living in his car in beautiful places and eventually got a tent.  During the time in the tent and the car, he felt like everything was going good.  He then stopped getting his social security [employer] money and ran out of money.  He eventually developed significant depression again.  His sister brought him a plane ticket home and he came home.  Before he left [place] , he had not slept in 2 days and when he got home he literally collapsed.  He stayed with his 1 sister in [facility] for a month or 2.  For the past 2 weeks, he has been living with his sister and nephew in the [facility].  He is going to be moving back to his small house in [place] once the current tenannt moves out.  He is in the process of selling his bigger home and his daughter is going to help him with finances moving forward.  His niece did help figure out where his [employer] in social security funds were going and they were going to pay bills that he had but did not realize he had.  He is getting food stamps.  He has applied to live in assisted living facility and hopes to get in there soon.       During the 1st 4 months in [place], he did stop his Lexapro but recently resumed it.  He does feel that he is doing better on the Lexapro.  He is also  using trazodone now for sleep and he is able to sleep consistently from 11:00 p.m. until 5:00 a.m..  He has been meeting with a psychologist which has been helpful.  He does feel he has a lot of support.  He is anxious about living on his own but believes he can do it especially with his support system.    Since he has been home, he has not reestablished with his cardiologist or his urologist but would be willing to do so soon.    He also is having trouble with his hearing.    He and his family also believe he is had trouble with his memory.  He has an appointment to see the neurologist Dr. Ugalde to discuss this further    Review of Systems   Constitutional:  Negative for appetite change, chills, diaphoresis, fatigue, fever and unexpected weight change.   HENT:  Positive for hearing loss. Negative for congestion, ear pain, postnasal drip, rhinorrhea, sinus pressure, sneezing, sore throat and trouble swallowing.    Eyes:  Negative for pain, discharge and visual disturbance.   Respiratory:  Negative for cough, chest tightness, shortness of breath and wheezing.    Cardiovascular:  Negative for chest pain, palpitations and leg swelling.   Gastrointestinal:  Negative for abdominal distention, abdominal pain, blood in stool, constipation, diarrhea, nausea and vomiting.   Skin:  Negative for rash.   Psychiatric/Behavioral:  Positive for dysphoric mood. Negative for confusion, self-injury, sleep disturbance and suicidal ideas. The patient is nervous/anxious. The patient is not hyperactive.         +forgetful       Objective:      Physical Exam  Constitutional:       General: He is not in acute distress.     Appearance: Normal appearance. He is well-developed.   HENT:      Head: Normocephalic and atraumatic.      Right Ear: Decreased hearing noted. There is impacted cerumen.      Left Ear: Tympanic membrane, ear canal and external ear normal. Decreased hearing noted.      Nose: Nose normal.      Mouth/Throat:      Mouth: No  oral lesions.      Pharynx: No oropharyngeal exudate or posterior oropharyngeal erythema.   Eyes:      General: Lids are normal. No scleral icterus.     Extraocular Movements: Extraocular movements intact.      Conjunctiva/sclera: Conjunctivae normal.      Pupils: Pupils are equal, round, and reactive to light.   Neck:      Thyroid: No thyroid mass or thyromegaly.      Vascular: No carotid bruit.   Cardiovascular:      Rate and Rhythm: Normal rate and regular rhythm. No extrasystoles are present.     Chest Wall: PMI is not displaced.      Heart sounds: Normal heart sounds. No murmur heard.    No gallop.   Pulmonary:      Effort: Pulmonary effort is normal. No accessory muscle usage or respiratory distress.      Breath sounds: Normal breath sounds.   Abdominal:      General: Bowel sounds are normal. There is no abdominal bruit.      Palpations: Abdomen is soft.      Tenderness: There is no abdominal tenderness. There is no rebound.   Musculoskeletal:      Cervical back: Normal range of motion and neck supple.   Lymphadenopathy:      Head:      Right side of head: No submental or submandibular adenopathy.      Left side of head: No submental or submandibular adenopathy.      Cervical:      Right cervical: No superficial, deep or posterior cervical adenopathy.     Left cervical: No superficial, deep or posterior cervical adenopathy.      Upper Body:      Right upper body: No supraclavicular adenopathy.      Left upper body: No supraclavicular adenopathy.   Skin:     General: Skin is warm and dry.   Neurological:      Mental Status: He is alert and oriented to person, place, and time.   Psychiatric:         Attention and Perception: Attention and perception normal.         Mood and Affect: Mood and affect normal.         Speech: Speech normal.         Behavior: Behavior normal. Behavior is cooperative.         Thought Content: Thought content normal.         Cognition and Memory: Cognition and memory normal.          "Judgment: Judgment normal.     Blood pressure 132/78, pulse 66, temperature 98.2 °F (36.8 °C), resp. rate 20, height 5' 10" (1.778 m), weight 74.2 kg (163 lb 7.5 oz), SpO2 97 %.Body mass index is 23.46 kg/m².            A/P:  1) depression with possible li.  Currently well controlled.  Continue with Lexapro.  I am going to refer him to Psychiatry for evaluation and medication management  2) insomnia.  Well controlled.  Continue with trazodone   3)   Decreased hearing with right cerumen impaction.  We will schedule him with ENT and audiology  4)   ASCVD.  Asymptomatic.  He will schedule follow up with his cardiologist   5)  elevated PSA.  Status unknown.  We will check a PSA level and refer him back to Urology if needed   6)  Hyperlipidemia.  Status unknown.  Continue with Crestor.  We will check fasting labs soon.    7)  Aortic atherosclerosis.  Asymptomatic.  Continue with risk factor modifications    I will see him back in 4 weeks or sooner if needed  "

## 2023-06-12 ENCOUNTER — TELEPHONE (OUTPATIENT)
Dept: FAMILY MEDICINE | Facility: CLINIC | Age: 83
End: 2023-06-12

## 2023-06-12 DIAGNOSIS — F33.1 MDD (MAJOR DEPRESSIVE DISORDER), RECURRENT EPISODE, MODERATE: Primary | ICD-10-CM

## 2023-06-12 DIAGNOSIS — F41.9 ANXIETY DISORDER, UNSPECIFIED TYPE: ICD-10-CM

## 2023-06-12 NOTE — TELEPHONE ENCOUNTER
Orders are in for ENT referral with Audiology,  I am unable to schedule.  Please assist with scheduling.

## 2023-06-13 ENCOUNTER — TELEPHONE (OUTPATIENT)
Dept: FAMILY MEDICINE | Facility: CLINIC | Age: 83
End: 2023-06-13
Payer: MEDICARE

## 2023-06-13 NOTE — TELEPHONE ENCOUNTER
Lab orders sent to [facility]   Attempted to contact pt. No answer, left message for pt to call clinic back.

## 2023-06-13 NOTE — TELEPHONE ENCOUNTER
----- Message from Radha Joseph sent at 6/13/2023  8:49 AM CDT -----  Patient is schedule for Fasting lab on 6/14 but does not have a way to get there would like to know if an order can be sent to [facility] instead. Patient can be reached at [phone]

## 2023-06-14 ENCOUNTER — TELEPHONE (OUTPATIENT)
Dept: FAMILY MEDICINE | Facility: CLINIC | Age: 83
End: 2023-06-14
Payer: MEDICARE

## 2023-06-14 ENCOUNTER — LAB VISIT (OUTPATIENT)
Dept: LAB | Facility: HOSPITAL | Age: 83
End: 2023-06-14
Attending: FAMILY MEDICINE
Payer: MEDICARE

## 2023-06-14 DIAGNOSIS — R79.9 ABNORMAL FINDING OF BLOOD CHEMISTRY, UNSPECIFIED: ICD-10-CM

## 2023-06-14 DIAGNOSIS — E78.5 HYPERLIPIDEMIA, UNSPECIFIED HYPERLIPIDEMIA TYPE: ICD-10-CM

## 2023-06-14 DIAGNOSIS — R97.20 ELEVATED PSA: ICD-10-CM

## 2023-06-14 DIAGNOSIS — I25.10 ASCVD (ARTERIOSCLEROTIC CARDIOVASCULAR DISEASE): ICD-10-CM

## 2023-06-14 DIAGNOSIS — R97.20 ELEVATED PSA: Primary | ICD-10-CM

## 2023-06-14 DIAGNOSIS — I10 ESSENTIAL HYPERTENSION: ICD-10-CM

## 2023-06-14 DIAGNOSIS — Z00.00 ANNUAL PHYSICAL EXAM: ICD-10-CM

## 2023-06-14 LAB
ALBUMIN SERPL BCP-MCNC: 3.9 G/DL (ref 3.5–5.2)
ALP SERPL-CCNC: 52 U/L (ref 55–135)
ALT SERPL W/O P-5'-P-CCNC: 14 U/L (ref 10–44)
ANION GAP SERPL CALC-SCNC: 10 MMOL/L (ref 8–16)
AST SERPL-CCNC: 18 U/L (ref 10–40)
BASOPHILS # BLD AUTO: 0.05 K/UL (ref 0–0.2)
BASOPHILS NFR BLD: 0.8 % (ref 0–1.9)
BILIRUB SERPL-MCNC: 0.4 MG/DL (ref 0.1–1)
BUN SERPL-MCNC: 13 MG/DL (ref 8–23)
CALCIUM SERPL-MCNC: 9.8 MG/DL (ref 8.7–10.5)
CHLORIDE SERPL-SCNC: 105 MMOL/L (ref 95–110)
CHOLEST SERPL-MCNC: 211 MG/DL (ref 120–199)
CHOLEST/HDLC SERPL: 3 {RATIO} (ref 2–5)
CO2 SERPL-SCNC: 27 MMOL/L (ref 23–29)
COMPLEXED PSA SERPL-MCNC: 8.1 NG/ML (ref 0–4)
CREAT SERPL-MCNC: 1 MG/DL (ref 0.5–1.4)
DIFFERENTIAL METHOD: ABNORMAL
EOSINOPHIL # BLD AUTO: 0.4 K/UL (ref 0–0.5)
EOSINOPHIL NFR BLD: 5.9 % (ref 0–8)
ERYTHROCYTE [DISTWIDTH] IN BLOOD BY AUTOMATED COUNT: 13.2 % (ref 11.5–14.5)
EST. GFR  (NO RACE VARIABLE): >60 ML/MIN/1.73 M^2
ESTIMATED AVG GLUCOSE: 108 MG/DL (ref 68–131)
GLUCOSE SERPL-MCNC: 94 MG/DL (ref 70–110)
HBA1C MFR BLD: 5.4 % (ref 4–5.6)
HCT VFR BLD AUTO: 39.3 % (ref 40–54)
HDLC SERPL-MCNC: 71 MG/DL (ref 40–75)
HDLC SERPL: 33.6 % (ref 20–50)
HGB BLD-MCNC: 12.8 G/DL (ref 14–18)
IMM GRANULOCYTES # BLD AUTO: 0.05 K/UL (ref 0–0.04)
IMM GRANULOCYTES NFR BLD AUTO: 0.8 % (ref 0–0.5)
LDLC SERPL CALC-MCNC: 126.4 MG/DL (ref 63–159)
LYMPHOCYTES # BLD AUTO: 1.8 K/UL (ref 1–4.8)
LYMPHOCYTES NFR BLD: 27.6 % (ref 18–48)
MCH RBC QN AUTO: 31.8 PG (ref 27–31)
MCHC RBC AUTO-ENTMCNC: 32.6 G/DL (ref 32–36)
MCV RBC AUTO: 98 FL (ref 82–98)
MONOCYTES # BLD AUTO: 0.6 K/UL (ref 0.3–1)
MONOCYTES NFR BLD: 9 % (ref 4–15)
NEUTROPHILS # BLD AUTO: 3.7 K/UL (ref 1.8–7.7)
NEUTROPHILS NFR BLD: 55.9 % (ref 38–73)
NONHDLC SERPL-MCNC: 140 MG/DL
NRBC BLD-RTO: 0 /100 WBC
PLATELET # BLD AUTO: 226 K/UL (ref 150–450)
PMV BLD AUTO: 10.6 FL (ref 9.2–12.9)
POTASSIUM SERPL-SCNC: 4.3 MMOL/L (ref 3.5–5.1)
PROT SERPL-MCNC: 6.8 G/DL (ref 6–8.4)
RBC # BLD AUTO: 4.03 M/UL (ref 4.6–6.2)
SODIUM SERPL-SCNC: 142 MMOL/L (ref 136–145)
TRIGL SERPL-MCNC: 68 MG/DL (ref 30–150)
TSH SERPL DL<=0.005 MIU/L-ACNC: 1.08 UIU/ML (ref 0.4–4)
WBC # BLD AUTO: 6.59 K/UL (ref 3.9–12.7)

## 2023-06-14 PROCEDURE — 80053 COMPREHEN METABOLIC PANEL: CPT | Performed by: FAMILY MEDICINE

## 2023-06-14 PROCEDURE — 84153 ASSAY OF PSA TOTAL: CPT | Performed by: FAMILY MEDICINE

## 2023-06-14 PROCEDURE — 84443 ASSAY THYROID STIM HORMONE: CPT | Performed by: FAMILY MEDICINE

## 2023-06-14 PROCEDURE — 80061 LIPID PANEL: CPT | Performed by: FAMILY MEDICINE

## 2023-06-14 PROCEDURE — 85025 COMPLETE CBC W/AUTO DIFF WBC: CPT | Performed by: FAMILY MEDICINE

## 2023-06-14 PROCEDURE — 83036 HEMOGLOBIN GLYCOSYLATED A1C: CPT | Performed by: FAMILY MEDICINE

## 2023-06-14 PROCEDURE — 36415 COLL VENOUS BLD VENIPUNCTURE: CPT | Mod: PN | Performed by: FAMILY MEDICINE

## 2023-06-14 NOTE — TELEPHONE ENCOUNTER
Had to re-enter labs, due to cancelled appt, labs were cancelled.  Notified pt, pt able to check in at the lab.

## 2023-06-14 NOTE — TELEPHONE ENCOUNTER
----- Message from Filippo Quevedo sent at 6/14/2023  8:05 AM CDT -----  Who Called: Patient          What is the reqeust in detail: Requesting call back to discuss pt states he had blood work scheduled in [place] he called the other day and scheduled them for [place], the orders were cancelled, tried to reschedule but there are no orders, Please call pt asap to reschedule. Pt states he is at the lab as we speak.          Can the clinic reply by [facility]? No         Best Call Back Number: [phone]           Additional Information:

## 2023-06-15 ENCOUNTER — TELEPHONE (OUTPATIENT)
Dept: FAMILY MEDICINE | Facility: CLINIC | Age: 83
End: 2023-06-15
Payer: MEDICARE

## 2023-06-15 ENCOUNTER — PATIENT MESSAGE (OUTPATIENT)
Dept: FAMILY MEDICINE | Facility: CLINIC | Age: 83
End: 2023-06-15
Payer: MEDICARE

## 2023-06-16 ENCOUNTER — TELEPHONE (OUTPATIENT)
Dept: FAMILY MEDICINE | Facility: CLINIC | Age: 83
End: 2023-06-16
Payer: MEDICARE

## 2023-06-16 NOTE — TELEPHONE ENCOUNTER
----- Message from Jovany Rodriguez sent at 6/16/2023 10:03 AM CDT -----  Regarding: results  Contact: andrea at [phone]  Type:  Test Results    Who Called:  Andrea    Name of Test (Lab/Mammo/Etc):  labs    Date of Test:  6/14    Ordering Provider:  Dr Stein    Where the test was performed:  [facility] [place]    Best Call Back Number:  [phone]    Additional Information:

## 2023-06-16 NOTE — TELEPHONE ENCOUNTER
Pt was informed that results have not been reviewed and we will call him with results as soon as they are.

## 2023-06-17 ENCOUNTER — PATIENT MESSAGE (OUTPATIENT)
Dept: FAMILY MEDICINE | Facility: CLINIC | Age: 83
End: 2023-06-17
Payer: MEDICARE

## 2023-06-17 DIAGNOSIS — R97.20 ELEVATED PSA: ICD-10-CM

## 2023-06-17 DIAGNOSIS — I25.10 ASCVD (ARTERIOSCLEROTIC CARDIOVASCULAR DISEASE): Primary | ICD-10-CM

## 2023-06-19 NOTE — TELEPHONE ENCOUNTER
Left message for pt to read his [employer] message that Dr. Stein sent and reply to it, or call us at [phone].

## 2023-07-01 ENCOUNTER — TELEPHONE (OUTPATIENT)
Dept: UROLOGY | Facility: CLINIC | Age: 83
End: 2023-07-01
Payer: MEDICARE

## 2023-07-06 ENCOUNTER — PATIENT MESSAGE (OUTPATIENT)
Dept: OTOLARYNGOLOGY | Facility: CLINIC | Age: 83
End: 2023-07-06
Payer: MEDICARE

## 2023-07-10 ENCOUNTER — OFFICE VISIT (OUTPATIENT)
Dept: FAMILY MEDICINE | Facility: CLINIC | Age: 83
End: 2023-07-10
Payer: MEDICARE

## 2023-07-10 VITALS
HEART RATE: 68 BPM | TEMPERATURE: 99 F | SYSTOLIC BLOOD PRESSURE: 136 MMHG | WEIGHT: 175.25 LBS | BODY MASS INDEX: 25.09 KG/M2 | RESPIRATION RATE: 20 BRPM | DIASTOLIC BLOOD PRESSURE: 78 MMHG | OXYGEN SATURATION: 95 % | HEIGHT: 70 IN

## 2023-07-10 DIAGNOSIS — I25.10 CORONARY ARTERY DISEASE INVOLVING NATIVE CORONARY ARTERY OF NATIVE HEART WITHOUT ANGINA PECTORIS: ICD-10-CM

## 2023-07-10 DIAGNOSIS — F32.5 MAJOR DEPRESSIVE DISORDER IN REMISSION, UNSPECIFIED WHETHER RECURRENT: Primary | ICD-10-CM

## 2023-07-10 DIAGNOSIS — G47.00 INSOMNIA, UNSPECIFIED TYPE: ICD-10-CM

## 2023-07-10 DIAGNOSIS — E78.5 HYPERLIPIDEMIA, UNSPECIFIED HYPERLIPIDEMIA TYPE: ICD-10-CM

## 2023-07-10 DIAGNOSIS — I10 ESSENTIAL HYPERTENSION: ICD-10-CM

## 2023-07-10 PROCEDURE — 1126F PR PAIN SEVERITY QUANTIFIED, NO PAIN PRESENT: ICD-10-PCS | Mod: CPTII,S$GLB,, | Performed by: FAMILY MEDICINE

## 2023-07-10 PROCEDURE — 3075F SYST BP GE 130 - 139MM HG: CPT | Mod: CPTII,S$GLB,, | Performed by: FAMILY MEDICINE

## 2023-07-10 PROCEDURE — 3075F PR MOST RECENT SYSTOLIC BLOOD PRESS GE 130-139MM HG: ICD-10-PCS | Mod: CPTII,S$GLB,, | Performed by: FAMILY MEDICINE

## 2023-07-10 PROCEDURE — 3288F FALL RISK ASSESSMENT DOCD: CPT | Mod: CPTII,S$GLB,, | Performed by: FAMILY MEDICINE

## 2023-07-10 PROCEDURE — 99214 OFFICE O/P EST MOD 30 MIN: CPT | Mod: S$GLB,,, | Performed by: FAMILY MEDICINE

## 2023-07-10 PROCEDURE — 1126F AMNT PAIN NOTED NONE PRSNT: CPT | Mod: CPTII,S$GLB,, | Performed by: FAMILY MEDICINE

## 2023-07-10 PROCEDURE — 99214 PR OFFICE/OUTPT VISIT, EST, LEVL IV, 30-39 MIN: ICD-10-PCS | Mod: S$GLB,,, | Performed by: FAMILY MEDICINE

## 2023-07-10 PROCEDURE — 1159F MED LIST DOCD IN RCRD: CPT | Mod: CPTII,S$GLB,, | Performed by: FAMILY MEDICINE

## 2023-07-10 PROCEDURE — 3078F PR MOST RECENT DIASTOLIC BLOOD PRESSURE < 80 MM HG: ICD-10-PCS | Mod: CPTII,S$GLB,, | Performed by: FAMILY MEDICINE

## 2023-07-10 PROCEDURE — 1157F ADVNC CARE PLAN IN RCRD: CPT | Mod: CPTII,S$GLB,, | Performed by: FAMILY MEDICINE

## 2023-07-10 PROCEDURE — 1159F PR MEDICATION LIST DOCUMENTED IN MEDICAL RECORD: ICD-10-PCS | Mod: CPTII,S$GLB,, | Performed by: FAMILY MEDICINE

## 2023-07-10 PROCEDURE — 1101F PR PT FALLS ASSESS DOC 0-1 FALLS W/OUT INJ PAST YR: ICD-10-PCS | Mod: CPTII,S$GLB,, | Performed by: FAMILY MEDICINE

## 2023-07-10 PROCEDURE — 1101F PT FALLS ASSESS-DOCD LE1/YR: CPT | Mod: CPTII,S$GLB,, | Performed by: FAMILY MEDICINE

## 2023-07-10 PROCEDURE — 1160F PR REVIEW ALL MEDS BY PRESCRIBER/CLIN PHARMACIST DOCUMENTED: ICD-10-PCS | Mod: CPTII,S$GLB,, | Performed by: FAMILY MEDICINE

## 2023-07-10 PROCEDURE — 3078F DIAST BP <80 MM HG: CPT | Mod: CPTII,S$GLB,, | Performed by: FAMILY MEDICINE

## 2023-07-10 PROCEDURE — 1160F RVW MEDS BY RX/DR IN RCRD: CPT | Mod: CPTII,S$GLB,, | Performed by: FAMILY MEDICINE

## 2023-07-10 PROCEDURE — 1157F PR ADVANCE CARE PLAN OR EQUIV PRESENT IN MEDICAL RECORD: ICD-10-PCS | Mod: CPTII,S$GLB,, | Performed by: FAMILY MEDICINE

## 2023-07-10 PROCEDURE — 3288F PR FALLS RISK ASSESSMENT DOCUMENTED: ICD-10-PCS | Mod: CPTII,S$GLB,, | Performed by: FAMILY MEDICINE

## 2023-07-10 RX ORDER — ROSUVASTATIN CALCIUM 10 MG/1
10 TABLET, COATED ORAL DAILY
Qty: 90 TABLET | Refills: 0 | Status: SHIPPED | OUTPATIENT
Start: 2023-07-10

## 2023-07-10 RX ORDER — OMEPRAZOLE 20 MG/1
20 CAPSULE, DELAYED RELEASE ORAL
COMMUNITY
End: 2023-09-23

## 2023-07-10 RX ORDER — ESCITALOPRAM OXALATE 20 MG/1
20 TABLET ORAL DAILY
Qty: 90 TABLET | Refills: 1 | Status: SHIPPED | OUTPATIENT
Start: 2023-07-10 | End: 2023-07-29 | Stop reason: SDUPTHER

## 2023-07-10 RX ORDER — CLOPIDOGREL BISULFATE 75 MG/1
75 TABLET ORAL DAILY
Qty: 90 TABLET | Refills: 2 | Status: SHIPPED | OUTPATIENT
Start: 2023-07-10 | End: 2024-07-09

## 2023-07-10 RX ORDER — TRAZODONE HYDROCHLORIDE 150 MG/1
150 TABLET ORAL NIGHTLY
Qty: 90 TABLET | Refills: 2 | Status: SHIPPED | OUTPATIENT
Start: 2023-07-10 | End: 2023-08-05 | Stop reason: SDUPTHER

## 2023-07-10 RX ORDER — LOSARTAN POTASSIUM 25 MG/1
25 TABLET ORAL DAILY
Qty: 90 TABLET | Refills: 2 | Status: SHIPPED | OUTPATIENT
Start: 2023-07-10

## 2023-07-10 NOTE — PROGRESS NOTES
Subjective:       Patient ID: Cedric Gupat Jr. is a 83 y.o. male.    Chief Complaint: Follow-up and Foreign Body (Glass in foot)    HPI  The patient is an 83-year-old who is here today for short-term follow-up.  Tomorrow, he is scheduled to move back into his cottage in [facility].  He plans to stay there for the foreseeable future.  He is on waiting list for [facility] homes and particularly likes 1 in [place].  His daughter is managing his finances.  His family has been very supportive.  Overall, he is feeling good.  He does have an upcoming trip to [place] with his family later this month    Today we discussed the followin)  Depression and anxiety.  He is doing well with his Lexapro.  He feels this is a good dose of medicine for him.  He has had a little bit of anxiety as he thinks about moving into his house and being alone with no car but he is now looking forward to this.  2) hyperlipidemia.  His recent total cholesterol was 211 and his LDL was 126.  He has been taking his Crestor consistently.  3) elevated PSA.  His most recent PSA was 8.1.  He is not yet been rescheduled with Urology but would like to do so    While he is here, he would like for me look at t his right heel.  He believes he stepped on a piece of glass and did try to dig it out himself.      Review of Systems   Constitutional:  Negative for appetite change, chills, diaphoresis, fatigue, fever and unexpected weight change.   HENT:  Negative for congestion, ear pain, postnasal drip, rhinorrhea, sinus pressure, sneezing, sore throat and trouble swallowing.    Eyes:  Negative for pain, discharge and visual disturbance.   Respiratory:  Negative for cough, chest tightness, shortness of breath and wheezing.    Cardiovascular:  Negative for chest pain, palpitations and leg swelling.   Gastrointestinal:  Negative for abdominal distention, abdominal pain, blood in stool, constipation, diarrhea, nausea and vomiting.   Skin:  Negative for  rash.   Psychiatric/Behavioral:  Negative for confusion, dysphoric mood, self-injury, sleep disturbance and suicidal ideas. The patient is not nervous/anxious and is not hyperactive.        Objective:      Physical Exam  Constitutional:       General: He is not in acute distress.     Appearance: Normal appearance. He is well-developed.   HENT:      Head: Normocephalic and atraumatic.      Right Ear: Hearing, tympanic membrane, ear canal and external ear normal.      Left Ear: Hearing, tympanic membrane, ear canal and external ear normal.      Nose: Nose normal.      Mouth/Throat:      Mouth: No oral lesions.      Pharynx: No oropharyngeal exudate or posterior oropharyngeal erythema.   Eyes:      General: Lids are normal. No scleral icterus.     Extraocular Movements: Extraocular movements intact.      Conjunctiva/sclera: Conjunctivae normal.      Pupils: Pupils are equal, round, and reactive to light.   Neck:      Thyroid: No thyroid mass or thyromegaly.      Vascular: No carotid bruit.   Cardiovascular:      Rate and Rhythm: Normal rate and regular rhythm. No extrasystoles are present.     Chest Wall: PMI is not displaced.      Heart sounds: Normal heart sounds. No murmur heard.    No gallop.   Pulmonary:      Effort: Pulmonary effort is normal. No accessory muscle usage or respiratory distress.      Breath sounds: Normal breath sounds.   Abdominal:      General: Bowel sounds are normal. There is no abdominal bruit.      Palpations: Abdomen is soft.      Tenderness: There is no abdominal tenderness. There is no rebound.   Musculoskeletal:      Cervical back: Normal range of motion and neck supple.        Feet:    Lymphadenopathy:      Head:      Right side of head: No submental or submandibular adenopathy.      Left side of head: No submental or submandibular adenopathy.      Cervical:      Right cervical: No superficial, deep or posterior cervical adenopathy.     Left cervical: No superficial, deep or posterior  "cervical adenopathy.      Upper Body:      Right upper body: No supraclavicular adenopathy.      Left upper body: No supraclavicular adenopathy.   Skin:     General: Skin is warm and dry.   Neurological:      Mental Status: He is alert and oriented to person, place, and time.     Blood pressure 136/78, pulse 68, temperature 99 °F (37.2 °C), resp. rate 20, height 5' 10" (1.778 m), weight 79.5 kg (175 lb 4.3 oz), SpO2 95 %.Body mass index is 25.15 kg/m².            A/P:  1)  Depression and anxiety.  Well controlled.  Continue with Lexapro.  I would like him to meet with Psychiatry as I do wonder about the diagnosis of bipolar and would like him to have specialist access if needed in the future.  If he develops any new or worsening symptoms, he will let me know  2) hyperlipidemia.  Uncontrolled.  Continue with Crestor.  We will check a fasting lipid profile in 3 months  3) elevated PSA.  Persistent.  We will get him reestablished with urology  4)  ASCVD status post CABG.  Asymptomatic.  We are going to get him reestablished with Cardiology  5) right cerumen impaction.  Persistent.  Follow up with ENT  6) recent injury to the right heel.  I do not see any foreign body present.  I did recommend soaks 2 to 3 times a day this week.  If it is not better by Friday, he will let me know when I will get him in to see Podiatry    As long as he does well, I will see him back in 2 months or sooner if needed        "

## 2023-07-13 DIAGNOSIS — E78.5 HYPERLIPIDEMIA, UNSPECIFIED HYPERLIPIDEMIA TYPE: Primary | ICD-10-CM

## 2023-07-14 NOTE — TELEPHONE ENCOUNTER
Called pt to give instructions and schedule repeat lipid.  Pt states that he has not been on the Crestor for a year now.  Please advise if you would still like to do the increased dose?

## 2023-07-19 PROBLEM — I25.118 ATHEROSCLEROTIC HEART DISEASE OF NATIVE CORONARY ARTERY WITH OTHER FORMS OF ANGINA PECTORIS: Status: RESOLVED | Noted: 2021-03-09 | Resolved: 2023-07-19

## 2023-07-19 PROBLEM — E78.2 MIXED HYPERLIPIDEMIA: Status: ACTIVE | Noted: 2018-07-09

## 2023-07-29 DIAGNOSIS — F32.5 MAJOR DEPRESSIVE DISORDER IN REMISSION, UNSPECIFIED WHETHER RECURRENT: ICD-10-CM

## 2023-07-29 NOTE — TELEPHONE ENCOUNTER
No care due was identified.  [facility] Embedded Care Due Messages. Reference number: [id number].   7/29/2023 12:48:44 PM CDT

## 2023-07-30 RX ORDER — ESCITALOPRAM OXALATE 20 MG/1
20 TABLET ORAL DAILY
Qty: 90 TABLET | Refills: 3 | Status: SHIPPED | OUTPATIENT
Start: 2023-07-30 | End: 2024-03-20 | Stop reason: SDUPTHER

## 2023-07-30 NOTE — TELEPHONE ENCOUNTER
Refill Decision Note   Cedric Gupta  is requesting a refill authorization.  Brief Assessment and Rationale for Refill:  Approve     Medication Therapy Plan:       Medication Reconciliation Completed: No   Comments:     No Care Gaps recommended.     Note composed:11:28 AM 07/30/2023           Refilled medication and e-scribed to pharmacy. Confirm prescription was due. Make sure ELIDA and urine drug screen is up to date.

## 2023-08-05 DIAGNOSIS — G47.00 INSOMNIA, UNSPECIFIED TYPE: ICD-10-CM

## 2023-08-05 DIAGNOSIS — F32.5 MAJOR DEPRESSIVE DISORDER IN REMISSION, UNSPECIFIED WHETHER RECURRENT: ICD-10-CM

## 2023-08-05 RX ORDER — ESCITALOPRAM OXALATE 20 MG/1
20 TABLET ORAL DAILY
Qty: 90 TABLET | Refills: 3 | Status: CANCELLED | OUTPATIENT
Start: 2023-08-05 | End: 2024-07-30

## 2023-08-05 NOTE — TELEPHONE ENCOUNTER
No care due was identified.  [facility] Embedded Care Due Messages. Reference number: [id number].   8/05/2023 1:29:51 PM CDT

## 2023-08-07 RX ORDER — TRAZODONE HYDROCHLORIDE 150 MG/1
150 TABLET ORAL NIGHTLY
Qty: 90 TABLET | Refills: 2 | Status: SHIPPED | OUTPATIENT
Start: 2023-08-07 | End: 2024-03-20 | Stop reason: SDUPTHER

## 2023-08-17 ENCOUNTER — PATIENT MESSAGE (OUTPATIENT)
Dept: OPTOMETRY | Facility: CLINIC | Age: 83
End: 2023-08-17
Payer: MEDICARE

## 2023-08-24 ENCOUNTER — TELEPHONE (OUTPATIENT)
Dept: FAMILY MEDICINE | Facility: CLINIC | Age: 83
End: 2023-08-24
Payer: MEDICARE

## 2023-08-24 NOTE — TELEPHONE ENCOUNTER
Lvm with call back number.  Pt needs to be rescheduled.  Dr. Stein is out of the office on 9/14 at the time of his visit.  Offered 9/22 at 11:15 am and held that slot.

## 2023-08-28 ENCOUNTER — TELEPHONE (OUTPATIENT)
Dept: PSYCHIATRY | Facility: CLINIC | Age: 83
End: 2023-08-28
Payer: MEDICARE

## 2023-08-28 NOTE — TELEPHONE ENCOUNTER
Called patient trying to schedule NP appt based off referral   For med man     Patient didn't answer.   Lvm   Skycrosst message will be sent

## 2023-08-29 ENCOUNTER — OFFICE VISIT (OUTPATIENT)
Dept: UROLOGY | Facility: CLINIC | Age: 83
End: 2023-08-29
Payer: MEDICARE

## 2023-08-29 VITALS — HEIGHT: 70 IN | WEIGHT: 186.75 LBS | BODY MASS INDEX: 26.74 KG/M2

## 2023-08-29 DIAGNOSIS — R97.20 ELEVATED PSA: Primary | ICD-10-CM

## 2023-08-29 DIAGNOSIS — N13.8 BENIGN PROSTATIC HYPERPLASIA WITH URINARY OBSTRUCTION: ICD-10-CM

## 2023-08-29 DIAGNOSIS — N40.1 BENIGN PROSTATIC HYPERPLASIA WITH URINARY OBSTRUCTION: ICD-10-CM

## 2023-08-29 LAB
BILIRUBIN, UA POC OHS: NEGATIVE
BLOOD, UA POC OHS: NEGATIVE
CLARITY, UA POC OHS: CLEAR
COLOR, UA POC OHS: YELLOW
GLUCOSE, UA POC OHS: NEGATIVE
KETONES, UA POC OHS: NEGATIVE
LEUKOCYTES, UA POC OHS: NEGATIVE
NITRITE, UA POC OHS: NEGATIVE
PH, UA POC OHS: 7
PROTEIN, UA POC OHS: NEGATIVE
SPECIFIC GRAVITY, UA POC OHS: 1.02
UROBILINOGEN, UA POC OHS: 0.2

## 2023-08-29 PROCEDURE — 1160F PR REVIEW ALL MEDS BY PRESCRIBER/CLIN PHARMACIST DOCUMENTED: ICD-10-PCS | Mod: CPTII,S$GLB,, | Performed by: STUDENT IN AN ORGANIZED HEALTH CARE EDUCATION/TRAINING PROGRAM

## 2023-08-29 PROCEDURE — 81003 URINALYSIS AUTO W/O SCOPE: CPT | Mod: QW,S$GLB,, | Performed by: STUDENT IN AN ORGANIZED HEALTH CARE EDUCATION/TRAINING PROGRAM

## 2023-08-29 PROCEDURE — 81003 POCT URINALYSIS(INSTRUMENT): ICD-10-PCS | Mod: QW,S$GLB,, | Performed by: STUDENT IN AN ORGANIZED HEALTH CARE EDUCATION/TRAINING PROGRAM

## 2023-08-29 PROCEDURE — 1160F RVW MEDS BY RX/DR IN RCRD: CPT | Mod: CPTII,S$GLB,, | Performed by: STUDENT IN AN ORGANIZED HEALTH CARE EDUCATION/TRAINING PROGRAM

## 2023-08-29 PROCEDURE — 99214 OFFICE O/P EST MOD 30 MIN: CPT | Mod: S$GLB,,, | Performed by: STUDENT IN AN ORGANIZED HEALTH CARE EDUCATION/TRAINING PROGRAM

## 2023-08-29 PROCEDURE — 3288F PR FALLS RISK ASSESSMENT DOCUMENTED: ICD-10-PCS | Mod: CPTII,S$GLB,, | Performed by: STUDENT IN AN ORGANIZED HEALTH CARE EDUCATION/TRAINING PROGRAM

## 2023-08-29 PROCEDURE — 99999 PR PBB SHADOW E&M-EST. PATIENT-LVL III: CPT | Mod: PBBFAC,,, | Performed by: STUDENT IN AN ORGANIZED HEALTH CARE EDUCATION/TRAINING PROGRAM

## 2023-08-29 PROCEDURE — 1157F PR ADVANCE CARE PLAN OR EQUIV PRESENT IN MEDICAL RECORD: ICD-10-PCS | Mod: CPTII,S$GLB,, | Performed by: STUDENT IN AN ORGANIZED HEALTH CARE EDUCATION/TRAINING PROGRAM

## 2023-08-29 PROCEDURE — 1101F PT FALLS ASSESS-DOCD LE1/YR: CPT | Mod: CPTII,S$GLB,, | Performed by: STUDENT IN AN ORGANIZED HEALTH CARE EDUCATION/TRAINING PROGRAM

## 2023-08-29 PROCEDURE — 99999 PR PBB SHADOW E&M-EST. PATIENT-LVL III: ICD-10-PCS | Mod: PBBFAC,,, | Performed by: STUDENT IN AN ORGANIZED HEALTH CARE EDUCATION/TRAINING PROGRAM

## 2023-08-29 PROCEDURE — 1157F ADVNC CARE PLAN IN RCRD: CPT | Mod: CPTII,S$GLB,, | Performed by: STUDENT IN AN ORGANIZED HEALTH CARE EDUCATION/TRAINING PROGRAM

## 2023-08-29 PROCEDURE — 99214 PR OFFICE/OUTPT VISIT, EST, LEVL IV, 30-39 MIN: ICD-10-PCS | Mod: S$GLB,,, | Performed by: STUDENT IN AN ORGANIZED HEALTH CARE EDUCATION/TRAINING PROGRAM

## 2023-08-29 PROCEDURE — 1159F MED LIST DOCD IN RCRD: CPT | Mod: CPTII,S$GLB,, | Performed by: STUDENT IN AN ORGANIZED HEALTH CARE EDUCATION/TRAINING PROGRAM

## 2023-08-29 PROCEDURE — 1159F PR MEDICATION LIST DOCUMENTED IN MEDICAL RECORD: ICD-10-PCS | Mod: CPTII,S$GLB,, | Performed by: STUDENT IN AN ORGANIZED HEALTH CARE EDUCATION/TRAINING PROGRAM

## 2023-08-29 PROCEDURE — 3288F FALL RISK ASSESSMENT DOCD: CPT | Mod: CPTII,S$GLB,, | Performed by: STUDENT IN AN ORGANIZED HEALTH CARE EDUCATION/TRAINING PROGRAM

## 2023-08-29 PROCEDURE — 1101F PR PT FALLS ASSESS DOC 0-1 FALLS W/OUT INJ PAST YR: ICD-10-PCS | Mod: CPTII,S$GLB,, | Performed by: STUDENT IN AN ORGANIZED HEALTH CARE EDUCATION/TRAINING PROGRAM

## 2023-08-29 NOTE — PROGRESS NOTES
"[place] - Urology   Clinic Note    Subjective:     Chief Complaint: Elevated PSA      History of Present Illness:  Cedric Gupta Jr. is a 83 y.o. male who presents to clinic for evaluation and management of an elevated PSA. He is established to our clinic    He has a history of an elevated PSA.  He underwent previous prostate biopsy in 2014 and 2018.  Prostate volumes were 80-90 cc. PSA density based on those values are 0.09. I reviewed the previous pathology and op notes.     He denies a family history of prostate cancer    He has no urinary complaints. He has a moderate stream. Nocturia 2x. Not particularly bothered by urinary symptoms.     Past medical, family, surgical and social history reviewed as documented in chart with pertinent positive medical, family, surgical and social history detailed in HPI.    A review systems was conducted with pertinent positive and negative findings documented in HPI.    Objective:     Estimated body mass index is 26.79 kg/m² as calculated from the following:    Height as of this encounter: 5' 10" (1.778 m).    Weight as of this encounter: 84.7 kg (186 lb 11.7 oz).    Vital Signs (Most Recent)       Physical Exam  Constitutional:       General: He is not in acute distress.     Appearance: He is not ill-appearing or toxic-appearing.   Pulmonary:      Effort: Pulmonary effort is normal. No accessory muscle usage or respiratory distress.   Neurological:      Mental Status: He is alert.         Labs reviewed below:  Lab Results   Component Value Date    BUN 13 06/14/2023    CREATININE 1.0 06/14/2023    WBC 6.59 06/14/2023    HGB 12.8 (L) 06/14/2023    HCT 39.3 (L) 06/14/2023     06/14/2023    AST 18 06/14/2023    ALT 14 06/14/2023    ALKPHOS 52 (L) 06/14/2023    ALBUMIN 3.9 06/14/2023    HGBA1C 5.4 06/14/2023        PSA trend reviewed below:  Lab Results   Component Value Date    PSA 5.6 (H) 05/23/2016    PSA 5.9 (H) 09/22/2015    PSA 4.19 (H) 01/25/2013    PSA 3.6 " 08/12/2010    PSA 3.1 05/19/2008    PSA 3.0 06/14/2007    PSA 2.4 12/09/2005    PSA 2.7 03/09/2004    PSADIAG 8.1 (H) 06/14/2023    PSADIAG 8.2 (H) 06/15/2021    PSADIAG 28.3 (H) 04/02/2021    PSADIAG 9.8 (H) 10/09/2019    PSADIAG 7.6 (H) 03/26/2019    PSADIAG 6.7 (H) 07/18/2018    PSADIAG 6.2 (H) 11/13/2017    PSADIAG 5.6 (H) 03/03/2017    PSADIAG 4.8 (H) 02/12/2014    PSAFREE 4.98 (H) 04/07/2021    PSAFREE 2.53 (H) 04/13/2020    PSAFREEPCT 22.43 04/07/2021    PSAFREEPCT 30.48 04/13/2020      Urine dipstick today was negative for all components.     Assessment:     1. Elevated PSA    2. Benign prostatic hyperplasia with urinary obstruction      Plan:     PSA values were discussed. PSA can be elevated in both benign and malignant conditions. We discussed the most common differential diagnosis of an elevated PSA including BPH, prostatitis (chronic and acute), and prostate cancer. The issues of sensitivity, specificity and the predictive values of PSA were discussed in detail. We discussed the benefits and limitations of PSA testing/screening, and the utility of PSA screening, specifically in identifying patients at risk for a significant prostate cancer. We discussed the decreased utility in screening as patients age. In the setting a 2 previous benign biopsies, low PSA density, relatively stability of the PSA trend - I do not recommend further workup.     Additionally given his age and previous work-up, we discussed cessation of PSA screening. He was amenable to that.     Discussed options for the urinary symptoms. Should symptoms worsen, we could consider medications or procedures.     Follow up as needed.     Miguel Le MD

## 2023-09-11 ENCOUNTER — PATIENT MESSAGE (OUTPATIENT)
Dept: OPTOMETRY | Facility: CLINIC | Age: 83
End: 2023-09-11
Payer: MEDICARE

## 2023-09-11 ENCOUNTER — TELEPHONE (OUTPATIENT)
Dept: OPTOMETRY | Facility: CLINIC | Age: 83
End: 2023-09-11
Payer: MEDICARE

## 2023-09-12 ENCOUNTER — TELEPHONE (OUTPATIENT)
Dept: FAMILY MEDICINE | Facility: CLINIC | Age: 83
End: 2023-09-12
Payer: MEDICARE

## 2023-09-12 NOTE — TELEPHONE ENCOUNTER
----- Message from Julio Kaur sent at 9/12/2023 11:28 AM CDT -----  Name Of Caller: Cedric        Provider Name:  Tracy Stein        Does patient feel the need to be seen today? no        Relationship to the Pt?: patient        Contact Preference?: [phone]        What is the nature of the call?:  Patient has an appointment scheduled for Thursday 9- at 3pm, patient would like to know if it was okay for him to change this to a virtual visit due to transportation issues.

## 2023-09-12 NOTE — TELEPHONE ENCOUNTER
MERRYI      Pt left message advising that he needed a virtual visit due to transportation issues.  Unable to reach pt when returned call to different number that was given.  Left message advising that appt was changed to virtual and moved to 9/22.  Requested that pt call back to confirm appt.

## 2023-09-13 ENCOUNTER — TELEPHONE (OUTPATIENT)
Dept: FAMILY MEDICINE | Facility: CLINIC | Age: 83
End: 2023-09-13
Payer: MEDICARE

## 2023-09-22 ENCOUNTER — OFFICE VISIT (OUTPATIENT)
Dept: FAMILY MEDICINE | Facility: CLINIC | Age: 83
End: 2023-09-22
Payer: MEDICARE

## 2023-09-22 DIAGNOSIS — I25.10 ASCVD (ARTERIOSCLEROTIC CARDIOVASCULAR DISEASE): ICD-10-CM

## 2023-09-22 DIAGNOSIS — E78.5 HYPERLIPIDEMIA, UNSPECIFIED HYPERLIPIDEMIA TYPE: ICD-10-CM

## 2023-09-22 DIAGNOSIS — G47.00 INSOMNIA, UNSPECIFIED TYPE: ICD-10-CM

## 2023-09-22 DIAGNOSIS — F32.5 MAJOR DEPRESSIVE DISORDER IN REMISSION, UNSPECIFIED WHETHER RECURRENT: Primary | ICD-10-CM

## 2023-09-22 PROCEDURE — 1157F PR ADVANCE CARE PLAN OR EQUIV PRESENT IN MEDICAL RECORD: ICD-10-PCS | Mod: CPTII,95,, | Performed by: FAMILY MEDICINE

## 2023-09-22 PROCEDURE — 1159F MED LIST DOCD IN RCRD: CPT | Mod: CPTII,95,, | Performed by: FAMILY MEDICINE

## 2023-09-22 PROCEDURE — 99213 OFFICE O/P EST LOW 20 MIN: CPT | Mod: 95,,, | Performed by: FAMILY MEDICINE

## 2023-09-22 PROCEDURE — 1160F PR REVIEW ALL MEDS BY PRESCRIBER/CLIN PHARMACIST DOCUMENTED: ICD-10-PCS | Mod: CPTII,95,, | Performed by: FAMILY MEDICINE

## 2023-09-22 PROCEDURE — 99213 PR OFFICE/OUTPT VISIT, EST, LEVL III, 20-29 MIN: ICD-10-PCS | Mod: 95,,, | Performed by: FAMILY MEDICINE

## 2023-09-22 PROCEDURE — 1160F RVW MEDS BY RX/DR IN RCRD: CPT | Mod: CPTII,95,, | Performed by: FAMILY MEDICINE

## 2023-09-22 PROCEDURE — 1157F ADVNC CARE PLAN IN RCRD: CPT | Mod: CPTII,95,, | Performed by: FAMILY MEDICINE

## 2023-09-22 PROCEDURE — 1159F PR MEDICATION LIST DOCUMENTED IN MEDICAL RECORD: ICD-10-PCS | Mod: CPTII,95,, | Performed by: FAMILY MEDICINE

## 2023-09-23 ENCOUNTER — TELEPHONE (OUTPATIENT)
Dept: FAMILY MEDICINE | Facility: CLINIC | Age: 83
End: 2023-09-23
Payer: MEDICARE

## 2023-09-23 NOTE — TELEPHONE ENCOUNTER
Pls marycarmen previously order lipid to day he goes to cardiology    Also, marycarmen fu with me in 4 wks

## 2023-09-23 NOTE — TELEPHONE ENCOUNTER
----- Message from Akanksha Figueroa sent at 9/22/2023 12:57 PM CDT -----  Regarding: Needs Medical Advice  Contact: patient at [phone]  Type: Needs Medical Advice  Who Called:  patient at [phone]    Additional Information: patient would like to discuss lab work and next appointment. Please call and advise. Thank you
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
## 2024-02-26 ENCOUNTER — TELEPHONE (OUTPATIENT)
Dept: OPHTHALMOLOGY | Facility: CLINIC | Age: 84
End: 2024-02-26
Payer: MEDICARE

## 2024-02-26 NOTE — TELEPHONE ENCOUNTER
Spoke to pt and scheduled routine eye exam       ----- Message from Diamone Speed sent at 2/24/2024  9:54 AM CST -----  Regarding: self  Type: Patient Call Back       Who called: self        What is the request in detail: pt wouldl like a call back to get an appt schedule        Can the clinic reply by [facility]? Yes       Would the patient rather a call back or a response via [facility]? Call [facility]       Best call back number [phone]        Additional Information:

## 2024-05-22 ENCOUNTER — TELEPHONE (OUTPATIENT)
Dept: OPHTHALMOLOGY | Facility: CLINIC | Age: 84
End: 2024-05-22
Payer: MEDICARE

## 2024-05-28 ENCOUNTER — OFFICE VISIT (OUTPATIENT)
Dept: OPTOMETRY | Facility: CLINIC | Age: 84
End: 2024-05-28
Payer: MEDICARE

## 2024-05-28 DIAGNOSIS — H35.3134 ADVANCED ATROPHIC NONEXUDATIVE AGE-RELATED MACULAR DEGENERATION OF BOTH EYES WITH SUBFOVEAL INVOLVEMENT: Primary | ICD-10-CM

## 2024-05-28 DIAGNOSIS — H52.7 REFRACTIVE ERROR: ICD-10-CM

## 2024-05-28 DIAGNOSIS — Z96.1 PSEUDOPHAKIA: ICD-10-CM

## 2024-05-28 PROCEDURE — 1126F AMNT PAIN NOTED NONE PRSNT: CPT | Mod: CPTII,S$GLB,, | Performed by: OPTOMETRIST

## 2024-05-28 PROCEDURE — 92134 CPTRZ OPH DX IMG PST SGM RTA: CPT | Mod: S$GLB,,, | Performed by: OPTOMETRIST

## 2024-05-28 PROCEDURE — 1157F ADVNC CARE PLAN IN RCRD: CPT | Mod: CPTII,S$GLB,, | Performed by: OPTOMETRIST

## 2024-05-28 PROCEDURE — 1159F MED LIST DOCD IN RCRD: CPT | Mod: CPTII,S$GLB,, | Performed by: OPTOMETRIST

## 2024-05-28 PROCEDURE — 92004 COMPRE OPH EXAM NEW PT 1/>: CPT | Mod: S$GLB,,, | Performed by: OPTOMETRIST

## 2024-05-28 PROCEDURE — 99999 PR PBB SHADOW E&M-EST. PATIENT-LVL III: CPT | Mod: PBBFAC,,, | Performed by: OPTOMETRIST

## 2024-05-28 PROCEDURE — 3288F FALL RISK ASSESSMENT DOCD: CPT | Mod: CPTII,S$GLB,, | Performed by: OPTOMETRIST

## 2024-05-28 PROCEDURE — 1101F PT FALLS ASSESS-DOCD LE1/YR: CPT | Mod: CPTII,S$GLB,, | Performed by: OPTOMETRIST

## 2024-05-28 PROCEDURE — 92015 DETERMINE REFRACTIVE STATE: CPT | Mod: S$GLB,,, | Performed by: OPTOMETRIST

## 2024-05-28 PROCEDURE — 1160F RVW MEDS BY RX/DR IN RCRD: CPT | Mod: CPTII,S$GLB,, | Performed by: OPTOMETRIST

## 2024-05-28 NOTE — PROGRESS NOTES
HPI     Annual Exam     Additional comments: DLE            Comments    Pt here today for ocular health exam.   States blurred vision at both near   & distance.   Did not pass vision screening at [facility].    Would like specs rx today.    Denies any headaches or eye pain.    (+) dry eyes   (+) ATS OU prn  (-) floaters or light flashes    Family hx of glaucoma -- mother          Last edited by Mere Ramsey on 5/28/2024  1:47 PM.            Assessment /Plan     For exam results, see Encounter Report.    Advanced atrophic nonexudative age-related macular degeneration of both eyes with subfoveal involvement  -     Ambulatory referral/consult to Ophthalmology; Future; Expected date: 06/28/2024    Pseudophakia    Refractive error      1. Advanced atrophic nonexudative age-related macular degeneration of both eyes with subfoveal involvement  Longstanding ARMD OU  Recommend starting otc AREDS vitamins as directed  Updated mac OCT today -- reviewed with pt  Schedule for retinal eval with Dr. Wagner    - Ambulatory referral/consult to Ophthalmology; Future    2. Pseudophakia  S/p cataract extraction, open capsule  Observe     3. Refractive error  Dispensed updated spectacle Rx. Discussed various spectacle lens options. Discussed adaptation period to new specs.   Demonstrated new spec Rx vs uncorrected vision in phoropter with patient satisfaction  Discussed reduced vision secondary to ARMD -- pt reports good understanding

## 2024-07-15 ENCOUNTER — TELEPHONE (OUTPATIENT)
Dept: FAMILY MEDICINE | Facility: CLINIC | Age: 84
End: 2024-07-15

## 2024-07-15 DIAGNOSIS — R79.9 ABNORMAL FINDING OF BLOOD CHEMISTRY, UNSPECIFIED: ICD-10-CM

## 2024-07-15 DIAGNOSIS — Z00.00 ANNUAL PHYSICAL EXAM: Primary | ICD-10-CM

## 2024-07-15 DIAGNOSIS — I10 HYPERTENSION, ESSENTIAL: ICD-10-CM

## 2024-07-15 DIAGNOSIS — N42.9 DISORDER OF PROSTATE, UNSPECIFIED: ICD-10-CM

## 2024-07-15 DIAGNOSIS — E78.5 HYPERLIPIDEMIA, UNSPECIFIED HYPERLIPIDEMIA TYPE: ICD-10-CM

## 2024-07-15 NOTE — TELEPHONE ENCOUNTER
----- Message from Purnima Bennett sent at 7/12/2024  3:44 PM CDT -----  Type : Patient Call          Who Called :  Patient          Does the patient know what this is regarding?: Pt is wanting to schedule an annual apt with lab orders ; please advise              Would the patient rather a call back or a response via [facility]? Call                Best Call Back Number: [phone]            Additional Information:

## 2024-07-15 NOTE — TELEPHONE ENCOUNTER
Left message to [facility] .   Lab orders pended , previously has had Diag PSA , pls check to make sure that is correct .

## 2024-10-14 ENCOUNTER — TELEPHONE (OUTPATIENT)
Dept: FAMILY MEDICINE | Facility: CLINIC | Age: 84
End: 2024-10-14

## 2024-10-14 NOTE — LETTER
October 14, 2024    Cedric Gupta Jr.  [address] [place] [address]             [facility]  [address]  [place] [place] [facility] [address]  Phone: [phone]  Fax: [phone] Dear Mr. Cedric Gupta:    We are sorry that you missed your appointment with Tracy Stein on 10/14/2024. Your health and follow-up medical care are important to us. Please call our office as soon as possible so that we may reschedule your appointment. If you have already rescheduled your appointment, please disregard this letter.    Sincerely,        Melisa Mendez MA

## 2024-10-14 NOTE — TELEPHONE ENCOUNTER
----- Message from Rosie sent at 10/14/2024  9:14 AM CDT -----  Regarding: Appt  Contact: [phone]  Patient is calling to get appointment reschedule and virtual for his annual due to he has no ride. Please contact pt

## 2024-10-14 NOTE — TELEPHONE ENCOUNTER
Called and spoke to pt after discussing with provider,  pt can do a chronic follow up virtually.  Scheduled labs for the end of this week.  Scheduled vv for next week.

## 2024-10-17 ENCOUNTER — LAB VISIT (OUTPATIENT)
Dept: LAB | Facility: HOSPITAL | Age: 84
End: 2024-10-17
Attending: FAMILY MEDICINE
Payer: MEDICARE

## 2024-10-17 DIAGNOSIS — E78.5 HYPERLIPIDEMIA, UNSPECIFIED HYPERLIPIDEMIA TYPE: ICD-10-CM

## 2024-10-17 DIAGNOSIS — N42.9 DISORDER OF PROSTATE, UNSPECIFIED: ICD-10-CM

## 2024-10-17 DIAGNOSIS — Z00.00 ANNUAL PHYSICAL EXAM: ICD-10-CM

## 2024-10-17 DIAGNOSIS — R79.9 ABNORMAL FINDING OF BLOOD CHEMISTRY, UNSPECIFIED: ICD-10-CM

## 2024-10-17 DIAGNOSIS — I10 HYPERTENSION, ESSENTIAL: ICD-10-CM

## 2024-10-17 LAB
ALBUMIN SERPL BCP-MCNC: 4.2 G/DL (ref 3.5–5.2)
ALP SERPL-CCNC: 64 U/L (ref 40–150)
ALT SERPL W/O P-5'-P-CCNC: 18 U/L (ref 10–44)
ANION GAP SERPL CALC-SCNC: 9 MMOL/L (ref 8–16)
AST SERPL-CCNC: 28 U/L (ref 10–40)
BASOPHILS # BLD AUTO: 0.05 K/UL (ref 0–0.2)
BASOPHILS NFR BLD: 0.7 % (ref 0–1.9)
BILIRUB SERPL-MCNC: 0.7 MG/DL (ref 0.1–1)
BUN SERPL-MCNC: 15 MG/DL (ref 8–23)
CALCIUM SERPL-MCNC: 9.6 MG/DL (ref 8.7–10.5)
CHLORIDE SERPL-SCNC: 104 MMOL/L (ref 95–110)
CHOLEST SERPL-MCNC: 195 MG/DL (ref 120–199)
CHOLEST/HDLC SERPL: 3.8 {RATIO} (ref 2–5)
CO2 SERPL-SCNC: 25 MMOL/L (ref 23–29)
COMPLEXED PSA SERPL-MCNC: 14.7 NG/ML (ref 0–4)
CREAT SERPL-MCNC: 0.9 MG/DL (ref 0.5–1.4)
DIFFERENTIAL METHOD BLD: ABNORMAL
EOSINOPHIL # BLD AUTO: 0.6 K/UL (ref 0–0.5)
EOSINOPHIL NFR BLD: 8.5 % (ref 0–8)
ERYTHROCYTE [DISTWIDTH] IN BLOOD BY AUTOMATED COUNT: 13.1 % (ref 11.5–14.5)
EST. GFR  (NO RACE VARIABLE): >60 ML/MIN/1.73 M^2
ESTIMATED AVG GLUCOSE: 105 MG/DL (ref 68–131)
GLUCOSE SERPL-MCNC: 100 MG/DL (ref 70–110)
HBA1C MFR BLD: 5.3 % (ref 4–5.6)
HCT VFR BLD AUTO: 40.8 % (ref 40–54)
HDLC SERPL-MCNC: 52 MG/DL (ref 40–75)
HDLC SERPL: 26.7 % (ref 20–50)
HGB BLD-MCNC: 13.8 G/DL (ref 14–18)
IMM GRANULOCYTES # BLD AUTO: 0.02 K/UL (ref 0–0.04)
IMM GRANULOCYTES NFR BLD AUTO: 0.3 % (ref 0–0.5)
LDLC SERPL CALC-MCNC: 131.4 MG/DL (ref 63–159)
LYMPHOCYTES # BLD AUTO: 2 K/UL (ref 1–4.8)
LYMPHOCYTES NFR BLD: 29 % (ref 18–48)
MCH RBC QN AUTO: 32.5 PG (ref 27–31)
MCHC RBC AUTO-ENTMCNC: 33.8 G/DL (ref 32–36)
MCV RBC AUTO: 96 FL (ref 82–98)
MONOCYTES # BLD AUTO: 0.6 K/UL (ref 0.3–1)
MONOCYTES NFR BLD: 8.2 % (ref 4–15)
NEUTROPHILS # BLD AUTO: 3.7 K/UL (ref 1.8–7.7)
NEUTROPHILS NFR BLD: 53.3 % (ref 38–73)
NONHDLC SERPL-MCNC: 143 MG/DL
NRBC BLD-RTO: 0 /100 WBC
PLATELET # BLD AUTO: 258 K/UL (ref 150–450)
PMV BLD AUTO: 10.4 FL (ref 9.2–12.9)
POTASSIUM SERPL-SCNC: 4 MMOL/L (ref 3.5–5.1)
PROT SERPL-MCNC: 7.3 G/DL (ref 6–8.4)
RBC # BLD AUTO: 4.25 M/UL (ref 4.6–6.2)
SODIUM SERPL-SCNC: 138 MMOL/L (ref 136–145)
TRIGL SERPL-MCNC: 58 MG/DL (ref 30–150)
TSH SERPL DL<=0.005 MIU/L-ACNC: 0.35 UIU/ML (ref 0.4–4)
WBC # BLD AUTO: 6.97 K/UL (ref 3.9–12.7)

## 2024-10-17 PROCEDURE — 84443 ASSAY THYROID STIM HORMONE: CPT | Performed by: FAMILY MEDICINE

## 2024-10-17 PROCEDURE — 84439 ASSAY OF FREE THYROXINE: CPT | Performed by: FAMILY MEDICINE

## 2024-10-17 PROCEDURE — 83036 HEMOGLOBIN GLYCOSYLATED A1C: CPT | Performed by: FAMILY MEDICINE

## 2024-10-17 PROCEDURE — 84153 ASSAY OF PSA TOTAL: CPT | Performed by: FAMILY MEDICINE

## 2024-10-17 PROCEDURE — 85025 COMPLETE CBC W/AUTO DIFF WBC: CPT | Performed by: FAMILY MEDICINE

## 2024-10-17 PROCEDURE — 80061 LIPID PANEL: CPT | Performed by: FAMILY MEDICINE

## 2024-10-17 PROCEDURE — 80053 COMPREHEN METABOLIC PANEL: CPT | Performed by: FAMILY MEDICINE

## 2024-10-18 LAB — T4 FREE SERPL-MCNC: 0.81 NG/DL (ref 0.71–1.51)

## 2024-10-20 ENCOUNTER — PATIENT MESSAGE (OUTPATIENT)
Dept: FAMILY MEDICINE | Facility: CLINIC | Age: 84
End: 2024-10-20
Payer: MEDICARE

## 2024-10-22 ENCOUNTER — TELEPHONE (OUTPATIENT)
Dept: FAMILY MEDICINE | Facility: CLINIC | Age: 84
End: 2024-10-22
Payer: MEDICARE

## 2024-10-22 NOTE — TELEPHONE ENCOUNTER
----- Message from Med Assistant Smith sent at 10/22/2024  1:47 PM CDT -----  Type: Patient Call Back    Who called: Self    What is the request in detail: pt. Is asking for a call regarding transportation to his appt.     Can the clinic reply by [facility]?NO    Would the patient rather a call back or a response via [facility]? Yes, call     Best call back number: [phone] (home)

## 2025-01-06 ENCOUNTER — HOSPITAL ENCOUNTER (EMERGENCY)
Facility: HOSPITAL | Age: 85
Discharge: HOME | End: 2025-01-06
Attending: EMERGENCY MEDICINE
Payer: MEDICARE

## 2025-01-06 VITALS
HEIGHT: 70 IN | OXYGEN SATURATION: 100 % | SYSTOLIC BLOOD PRESSURE: 156 MMHG | WEIGHT: 170 LBS | BODY MASS INDEX: 24.34 KG/M2 | RESPIRATION RATE: 16 BRPM | TEMPERATURE: 99 F | HEART RATE: 58 BPM | DIASTOLIC BLOOD PRESSURE: 74 MMHG

## 2025-01-06 DIAGNOSIS — K56.7 ILEUS: ICD-10-CM

## 2025-01-06 DIAGNOSIS — E87.5 HYPERKALEMIA: ICD-10-CM

## 2025-01-06 DIAGNOSIS — K56.41 FECAL IMPACTION IN RECTUM: Primary | ICD-10-CM

## 2025-01-06 DIAGNOSIS — K59.00 CONSTIPATION: ICD-10-CM

## 2025-01-06 LAB
ALBUMIN SERPL BCP-MCNC: 4.2 G/DL (ref 3.5–5.2)
ALP SERPL-CCNC: 69 U/L (ref 40–150)
ALT SERPL W/O P-5'-P-CCNC: 19 U/L (ref 10–44)
ANION GAP SERPL CALC-SCNC: 7 MMOL/L (ref 8–16)
AST SERPL-CCNC: 23 U/L (ref 10–40)
BASOPHILS # BLD AUTO: 0.06 K/UL (ref 0–0.2)
BASOPHILS NFR BLD: 0.8 % (ref 0–1.9)
BILIRUB SERPL-MCNC: 0.5 MG/DL (ref 0.1–1)
BILIRUB UR QL STRIP: NEGATIVE
BUN SERPL-MCNC: 11 MG/DL (ref 8–23)
CALCIUM SERPL-MCNC: 10.3 MG/DL (ref 8.7–10.5)
CHLORIDE SERPL-SCNC: 104 MMOL/L (ref 95–110)
CLARITY UR: CLEAR
CO2 SERPL-SCNC: 29 MMOL/L (ref 23–29)
COLOR UR: YELLOW
CREAT SERPL-MCNC: 1 MG/DL (ref 0.5–1.4)
DIFFERENTIAL METHOD BLD: ABNORMAL
EOSINOPHIL # BLD AUTO: 0.3 K/UL (ref 0–0.5)
EOSINOPHIL NFR BLD: 4 % (ref 0–8)
ERYTHROCYTE [DISTWIDTH] IN BLOOD BY AUTOMATED COUNT: 12.8 % (ref 11.5–14.5)
EST. GFR  (NO RACE VARIABLE): >60 ML/MIN/1.73 M^2
GLUCOSE SERPL-MCNC: 98 MG/DL (ref 70–110)
GLUCOSE UR QL STRIP: NEGATIVE
HCT VFR BLD AUTO: 40.8 % (ref 40–54)
HCV AB SERPL QL IA: NEGATIVE
HGB BLD-MCNC: 13.5 G/DL (ref 14–18)
HGB UR QL STRIP: NEGATIVE
HIV 1+2 AB+HIV1 P24 AG SERPL QL IA: NEGATIVE
IMM GRANULOCYTES # BLD AUTO: 0.02 K/UL (ref 0–0.04)
IMM GRANULOCYTES NFR BLD AUTO: 0.3 % (ref 0–0.5)
KETONES UR QL STRIP: NEGATIVE
LEUKOCYTE ESTERASE UR QL STRIP: NEGATIVE
LYMPHOCYTES # BLD AUTO: 2.2 K/UL (ref 1–4.8)
LYMPHOCYTES NFR BLD: 30 % (ref 18–48)
MCH RBC QN AUTO: 31.9 PG (ref 27–31)
MCHC RBC AUTO-ENTMCNC: 33.1 G/DL (ref 32–36)
MCV RBC AUTO: 97 FL (ref 82–98)
MONOCYTES # BLD AUTO: 0.7 K/UL (ref 0.3–1)
MONOCYTES NFR BLD: 8.8 % (ref 4–15)
NEUTROPHILS # BLD AUTO: 4.2 K/UL (ref 1.8–7.7)
NEUTROPHILS NFR BLD: 56.1 % (ref 38–73)
NITRITE UR QL STRIP: NEGATIVE
NRBC BLD-RTO: 0 /100 WBC
PH UR STRIP: 7 [PH] (ref 5–8)
PLATELET # BLD AUTO: 263 K/UL (ref 150–450)
PMV BLD AUTO: 9.6 FL (ref 9.2–12.9)
POTASSIUM SERPL-SCNC: 5.9 MMOL/L (ref 3.5–5.1)
PROT SERPL-MCNC: 7.4 G/DL (ref 6–8.4)
PROT UR QL STRIP: NEGATIVE
RBC # BLD AUTO: 4.23 M/UL (ref 4.6–6.2)
SODIUM SERPL-SCNC: 140 MMOL/L (ref 136–145)
SP GR UR STRIP: 1.01 (ref 1–1.03)
URN SPEC COLLECT METH UR: NORMAL
UROBILINOGEN UR STRIP-ACNC: NEGATIVE EU/DL
WBC # BLD AUTO: 7.47 K/UL (ref 3.9–12.7)

## 2025-01-06 PROCEDURE — 93010 ELECTROCARDIOGRAM REPORT: CPT | Mod: ,,, | Performed by: INTERNAL MEDICINE

## 2025-01-06 PROCEDURE — 86803 HEPATITIS C AB TEST: CPT | Performed by: EMERGENCY MEDICINE

## 2025-01-06 PROCEDURE — 81003 URINALYSIS AUTO W/O SCOPE: CPT

## 2025-01-06 PROCEDURE — 25500020 PHARM REV CODE 255

## 2025-01-06 PROCEDURE — 36415 COLL VENOUS BLD VENIPUNCTURE: CPT | Performed by: EMERGENCY MEDICINE

## 2025-01-06 PROCEDURE — 80053 COMPREHEN METABOLIC PANEL: CPT

## 2025-01-06 PROCEDURE — 87389 HIV-1 AG W/HIV-1&-2 AB AG IA: CPT | Performed by: EMERGENCY MEDICINE

## 2025-01-06 PROCEDURE — 99285 EMERGENCY DEPT VISIT HI MDM: CPT | Mod: 25

## 2025-01-06 PROCEDURE — 93005 ELECTROCARDIOGRAM TRACING: CPT

## 2025-01-06 PROCEDURE — 85025 COMPLETE CBC W/AUTO DIFF WBC: CPT

## 2025-01-06 PROCEDURE — 25000003 PHARM REV CODE 250

## 2025-01-06 RX ORDER — GLYCERIN 1 G/1
1 SUPPOSITORY RECTAL ONCE
Status: COMPLETED | OUTPATIENT
Start: 2025-01-06 | End: 2025-01-06

## 2025-01-06 RX ORDER — GLYCERIN 1 G/1
1 SUPPOSITORY RECTAL
Qty: 12 SUPPOSITORY | Refills: 0 | Status: SHIPPED | OUTPATIENT
Start: 2025-01-06 | End: 2025-01-06

## 2025-01-06 RX ORDER — GLYCERIN 1 G/1
1 SUPPOSITORY RECTAL
Qty: 12 SUPPOSITORY | Refills: 0 | Status: SHIPPED | OUTPATIENT
Start: 2025-01-06

## 2025-01-06 RX ORDER — POLYETHYLENE GLYCOL 3350 17 G/17G
17 POWDER, FOR SOLUTION ORAL DAILY PRN
Qty: 116 G | Refills: 0 | Status: SHIPPED | OUTPATIENT
Start: 2025-01-06

## 2025-01-06 RX ORDER — POLYETHYLENE GLYCOL 3350 17 G/17G
17 POWDER, FOR SOLUTION ORAL DAILY PRN
Qty: 116 G | Refills: 0 | Status: SHIPPED | OUTPATIENT
Start: 2025-01-06 | End: 2025-01-06

## 2025-01-06 RX ADMIN — GLYCERIN 1 SUPPOSITORY: 2 SUPPOSITORY RECTAL at 02:01

## 2025-01-06 RX ADMIN — MAGNESIUM CITRATE: 1.75 LIQUID ORAL at 03:01

## 2025-01-06 RX ADMIN — IOHEXOL 75 ML: 350 INJECTION, SOLUTION INTRAVENOUS at 01:01

## 2025-01-06 NOTE — ED NOTES
Patient reports that he attempted to have a BM and was unsuccessful. He states that the PA was at the bedside and is aware.

## 2025-01-06 NOTE — ED NOTES
Patient tolerated enema. Bedside commode in room with wipes. Call light within reach. Patient asked to use call light if needing assistance to bedside commode.

## 2025-01-06 NOTE — FIRST PROVIDER EVALUATION
Emergency Department TeleTriage Encounter Note      CHIEF COMPLAINT    Chief Complaint   Patient presents with    Constipation     Constipation x 4 days, states he has tried laxative and milk of mag, and an enema with no relief        VITAL SIGNS   Initial Vitals [01/06/25 1056]   BP Pulse Resp Temp SpO2   (!) 166/91 78 19 98.5 °F (36.9 °C) 97 %      MAP       --            ALLERGIES    Review of patient's allergies indicates:   Allergen Reactions    Amantadine Other (See Comments)     Depression       PROVIDER TRIAGE NOTE  This is a teletriage evaluation of a 84 y.o. male presenting to the ED complaining of constipation. Patient reports constipation for 4 days. He has tried OTC medications without relief.     Patient is alert and oriented. He speaks in complete sentences. He is sitting upright in the chair in no distress.     Initial orders will be placed and care will be transferred to an alternate provider when patient is roomed for a full evaluation. Any additional orders and the final disposition will be determined by that provider.         ORDERS  Labs Reviewed   HEPATITIS C ANTIBODY   HIV 1 / 2 ANTIBODY       ED Orders (720h ago, onward)      Start Ordered     Status Ordering Provider    01/06/25 1105 01/06/25 1104  X-Ray Abdomen AP 1 View (KUB)  1 time imaging         Ordered DARRICK, JEANA G.    01/06/25 1058 01/06/25 1057  Hepatitis C Antibody  STAT         Pending Collection FABIOLA DAVILA    01/06/25 1058 01/06/25 1057  HIV 1/2 Ag/Ab (4th Gen)  STAT         Pending Collection FABIOLA DAVILA              Virtual Visit Note: The provider triage portion of this emergency department evaluation and documentation was performed via [employer], a HIPAA-compliant telemedicine application, in concert with a tele-presenter in the room. A face to face patient evaluation with one of my colleagues will occur once the patient is placed in an emergency department room.      DISCLAIMER: This note was prepared with  M*Modal voice recognition transcription software. Garbled syntax, mangled pronouns, and other bizarre constructions may be attributed to that software system.

## 2025-01-06 NOTE — DISCHARGE INSTRUCTIONS
Increase your daily fiber intake to at least 20 g a day and your daily water intake to at least 60 oz a day to help with constipation.     Thank you for coming to our Emergency Department today. It is important to remember that some problems or medical conditions are difficult to diagnose and may not be found or addressed during your Emergency Department visit.  These conditions often start with non-specific symptoms and can only be diagnosed on follow up visits with your primary care physician or specialist when the symptoms continue or change. Please remember that all medical conditions can change, and we cannot predict how you will be feeling tomorrow or the next day. Return to the ER with any questions/concerns, new/concerning symptoms, worsening or failure to improve.       Be sure to follow up with your primary care doctor and review all labs/imaging/tests that were performed during your ER visit with them. It is very common for us to identify non-emergent incidental findings which must be followed up with your primary care physician.  Some labs/imaging/tests may be outside of the normal range, and require non-emergent follow-up and/or further investigation/treatment/procedures/testing to help diagnose/exclude/prevent complications or other potentially serious medical conditions. Some abnormalities may not have been discussed or addressed during your ER visit. Some lab results may not return during your ER visit but can be accessible by downloading the free [facility] Mychart juliette or by visiting [email or web address] . It is important for you to review all labs/imaging/tests which are outside of the normal range with your physician.    An ER visit does not replace a primary care visit, and many screening tests or follow-up tests cannot be ordered by an ER doctor or performed by the ER. Some tests may even require pre-approval.    If you do not have a primary care doctor, you may contact the one listed on your  discharge paperwork or you may also call the [facility] Appointment Desk at [phone] , or [facility] at  [phone] to schedule an appointment, or establish care with a primary care doctor or even a specialist and to obtain information about local resources. It is important to your health that you have a primary care doctor.    Please take all medications as directed. We have done our best to select a medication for you that will treat your condition however, all medications may potentially have side-effects and it is impossible to predict which medications may give you side-effects or what those side-effects (if any) those medications may give you.  If you feel that you are having a negative effect or side-effect of any medication you should stop taking those medications immediately and seek medical attention. If you feel that you are having a life-threatening reaction call 911.        Do not drive, swim, climb to height, take a bath, operate heavy machinery, drink alcohol or take potentially sedating medications, sign any legal documents or make any important decisions for 24 hours if you have received any pain medications, sedatives or mood altering drugs during your ER visit or within 24 hours of taking them if they have been prescribed to you.     You can find additional resources for Dentists, hearing aids, durable medical equipment, low cost pharmacies and other resources at [email or web address]

## 2025-01-06 NOTE — NURSING
Discharge paper reviewed with pt and Rx sent to prefer pharmacy, verbalized understanding. Discharge pt in stable condition.

## 2025-01-06 NOTE — ED NOTES
Cedric Gupta  presents to the ED with c/o constipation. Patient reports that his last BM was 4 days ago. He has attempted OTC meds without any relief. He only has abdominal cramping when he needs to have a BM. No nausea or vomiting.    Mucous membranes are pink and moist. Skin is warm, dry and intact.  MARVA VSS  Verified patient's name and date of birth.

## 2025-01-06 NOTE — ED NOTES
Patient has been updated on plan of care and lab results. No needs or questions at this time.      Yes

## 2025-01-06 NOTE — ED PROVIDER NOTES
Encounter Date: 1/6/2025       History     Chief Complaint   Patient presents with    Constipation     Constipation x 4 days, states he has tried laxative and milk of mag, and an enema with no relief      84-year-old male with a past medical history BPH, hypertension, osteoarthritis, coronary artery disease, CABG, skin cancer, and cataracts presents to ED for constipation.  Patient states it has been 4 days.  Patient complaining of a generalized abdominal intermittent 8/10 pain.  He has tried milk of magnesia and over-the-counter suppositories with no relief.  Patient states he has a history of constipation 3 years ago that he had to come to the ER for.  Patient also admits to urinary frequency and urgency.  Patient denies fever, sweats, chills, sob, cp, rectal pain, nausea, vomiting, diarrhea, hematochezia, hematemesis, dysuria, hematuria, headaches, dizziness, and lightheadedness.      Review of patient's allergies indicates:   Allergen Reactions    Amantadine Other (See Comments)     Depression     Past Medical History:   Diagnosis Date    Anticoagulant long-term use     Atherosclerosis of native coronary artery of native heart without angina pectoris 01/2021    BPH (benign prostatic hypertrophy)     Cataract     done OU    Elevated PSA     no further fu    Epiretinal membrane     OS    Fatty liver     noted on usg    History of basal cell carcinoma     History of colon polyps     History of duodenal ulcer     x 2 8/13 and 8/14    History of nephrolithiasis 2008    passed stone    History of prediabetes     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    Hypertension     Macular degeneration     dry -- [facility]U    Marijuana smoker     helps with insomnia    Osteoarthritis     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     Past Surgical History:   Procedure Laterality Date    ANGIOGRAM, CORONARY, WITH LEFT HEART CATHETERIZATION  12/11/2020    Procedure: Angiogram, Coronary, with Left Heart Cath;  Surgeon: Edy Hills,  MD;  [email or web address] CATH;  Service: Cardiology;;    CATARACT EXTRACTION W/  INTRAOCULAR LENS IMPLANT Right 01/23/2020    Dr Bone    CATARACT EXTRACTION W/  INTRAOCULAR LENS IMPLANT Left 02/06/2020    Dr Bone    CHOLECYSTECTOMY      COLONOSCOPY N/A 10/14/2020    Procedure: COLONOSCOPY;  Surgeon: Kasi Mckoy MD;  Location: [facility] ENDO;  Service: Endoscopy;  Laterality: N/A;    CORONARY ARTERY BYPASS GRAFT (CABG) N/A 3/9/2021    Procedure: CORONARY ARTERY BYPASS GRAFT (CABG) x 4;  Surgeon: Drake Kwan MD;  Location: [email or web address] OR;  Service: Cardiovascular;  Laterality: N/A;    coronary bipass  03/09/2021    ENDOSCOPIC HARVEST OF VEIN Left 3/9/2021    Procedure: HARVEST-VEIN-ENDOVASCULAR -;  Surgeon: Drake Kwan MD;  Location: [email or web address] OR;  Service: Cardiovascular;  Laterality: Left;    FRACTURE SURGERY      Right ankle and leg    ORCHIECTOMY      due to undescended testicle/ Left    PHACOEMULSIFICATION OF CATARACT Right 1/23/2020    Procedure: PHACOEMULSIFICATION, CATARACT;  Surgeon: Xu Bone Jr., MD;  Location: [facility] OR;  Service: Ophthalmology;  Laterality: Right;  Right    PHACOEMULSIFICATION OF CATARACT Left 2/6/2020    Procedure: PHACOEMULSIFICATION, CATARACT;  Surgeon: Xu Bone Jr., MD;  Location: [facility] OR;  Service: Ophthalmology;  Laterality: Left;  Left    PROSTATE BIOPSY  2014 2014, 2018-negative    TONSILLECTOMY      TRANSRECTAL BIOPSY OF PROSTATE WITH ULTRASOUND GUIDANCE N/A 8/20/2018    Procedure: BIOPSY, PROSTATE, TRANSRECTAL APPROACH, WITH US GUIDANCE;  Surgeon: Joselo Green MD;  Location: [facility] OR;  Service: Urology;  Laterality: N/A;  SITE PROSTATE     Family History   Problem Relation Name Age of Onset    Cataracts Mother      Glaucoma Mother      Lupus Mother      Heart disease Mother      Kidney cancer Father      Diabetes Maternal Aunt      Diabetes Maternal Uncle      No Known Problems Sister      No Known Problems Daughter      Cancer Sister           breast cancer    Melanoma Neg Hx      Psoriasis Neg Hx      Eczema Neg Hx      Amblyopia Neg Hx      Hypertension Neg Hx      Macular degeneration Neg Hx      Retinal detachment Neg Hx      Stroke Neg Hx      Strabismus Neg Hx      Thyroid disease Neg Hx      Blindness Neg Hx       Social History     Tobacco Use    Smoking status: Never    Smokeless tobacco: Never   Substance Use Topics    Alcohol use: Yes     Alcohol/week: 7.0 standard drinks of alcohol     Types: 7 Glasses of wine per week    Drug use: Yes     Frequency: 7.0 times per week     Types: Marijuana     Comment: thc     Review of Systems   Constitutional:  Negative for chills, diaphoresis and fever.   Respiratory:  Negative for shortness of breath.    Cardiovascular:  Negative for chest pain.   Gastrointestinal:  Positive for abdominal pain and constipation. Negative for anal bleeding, blood in stool, diarrhea, nausea, rectal pain and vomiting.   Genitourinary:  Positive for frequency and urgency. Negative for decreased urine volume, difficulty urinating, dysuria and hematuria.   Musculoskeletal:  Negative for myalgias.   Neurological:  Negative for dizziness, weakness, light-headedness and headaches.       Physical Exam     Initial Vitals [01/06/25 1056]   BP Pulse Resp Temp SpO2   (!) 166/91 78 19 98.5 °F (36.9 °C) 97 %      MAP       --         Physical Exam    Nursing note and vitals reviewed.  Constitutional: He appears well-developed and well-nourished. He is not diaphoretic. He is active. No distress.   HENT:   Head: Normocephalic and atraumatic.   Right Ear: External ear normal.   Left Ear: External ear normal.   Nose: Nose normal.   Eyes: Conjunctivae, EOM and lids are normal. Pupils are equal, round, and reactive to light. Right eye exhibits no discharge. Left eye exhibits no discharge. No scleral icterus.   Neck: Phonation normal. Neck supple.   Normal range of motion.   Full passive range of motion without pain.     Cardiovascular:  Normal  rate and regular rhythm.           Pulmonary/Chest: Effort normal and breath sounds normal. No respiratory distress.   Abdominal: Abdomen is soft. He exhibits no distension and no mass. There is no abdominal tenderness. There is no rebound and no guarding.   Genitourinary:    Genitourinary Comments: Fecal impaction     Nurse chaperone.      Musculoskeletal:         General: Normal range of motion.      Cervical back: Full passive range of motion without pain, normal range of motion and neck supple.     Neurological: He is alert and oriented to person, place, and time. He has normal strength. No sensory deficit. GCS eye subscore is 4. GCS verbal subscore is 5. GCS motor subscore is 6.   Skin: Skin is dry. Capillary refill takes less than 2 seconds.         ED Course   Procedures  Labs Reviewed   CBC W/ AUTO DIFFERENTIAL - Abnormal       Result Value    WBC 7.47      RBC 4.23 (*)     Hemoglobin 13.5 (*)     Hematocrit 40.8      MCV 97      MCH 31.9 (*)     MCHC 33.1      RDW 12.8      Platelets 263      MPV 9.6      Immature Granulocytes 0.3      Gran # (ANC) 4.2      Immature Grans (Abs) 0.02      Lymph # 2.2      Mono # 0.7      Eos # 0.3      Baso # 0.06      nRBC 0      Gran % 56.1      Lymph % 30.0      Mono % 8.8      Eosinophil % 4.0      Basophil % 0.8      Differential Method Automated     COMPREHENSIVE METABOLIC PANEL - Abnormal    Sodium 140      Potassium 5.9 (*)     Chloride 104      CO2 29      Glucose 98      BUN 11      Creatinine 1.0      Calcium 10.3      Total Protein 7.4      Albumin 4.2      Total Bilirubin 0.5      Alkaline Phosphatase 69      AST 23      ALT 19      eGFR >60      Anion Gap 7 (*)    HEPATITIS C ANTIBODY    Hepatitis C Ab Negative      Narrative:     Release to patient->Immediate   HIV 1 / 2 ANTIBODY    HIV 1/2 Ag/Ab Negative      Narrative:     Release to patient->Immediate   URINALYSIS, REFLEX TO URINE CULTURE    Specimen UA Urine, Clean Catch      Color, UA Yellow       Appearance, UA Clear      pH, UA 7.0      Specific Gravity, UA 1.015      Protein, UA Negative      Glucose, UA Negative      Ketones, UA Negative      Bilirubin (UA) Negative      Occult Blood UA Negative      Nitrite, UA Negative      Urobilinogen, UA Negative      Leukocytes, UA Negative      Narrative:     Specimen Source->Urine          Imaging Results              CT Abdomen Pelvis With IV Contrast NO Oral Contrast (Final result)  Result time 01/06/25 13:49:49      Final result by Kali Waller MD (01/06/25 13:49:49)                   Impression:      Mild dilatation of left lower quadrant fluid-filled small bowel, with overall pattern not distinctly indicative of obstruction.  Nonspecific enteritis and ileus are considerations.    Constipation including large rectal stool ball.    Severe prostatomegaly.    Pancreatic cystic lesion 1.3 cm.  Moderate chronic pancreatic ductal dilatation.  Further evaluation with MRCP is recommended on a nonemergent basis.    Two bladder stones.  Left nephrolithiasis.  No hydronephrosis.    Cholecystectomy.      Electronically signed by: Kali Waller MD  Date:    01/06/2025  Time:    13:49               Narrative:    EXAMINATION:  CT ABDOMEN PELVIS WITH IV CONTRAST    CLINICAL HISTORY:  Bowel obstruction suspected;    TECHNIQUE:  Low dose axial images, sagittal and coronal reformations were obtained from the lung bases to the pubic symphysis following the IV administration of 75 mL of Omnipaque 350 .  Oral contrast was not given.    COMPARISON:  05/21/2021    FINDINGS:  The liver, spleen, and adrenal glands are unremarkable.  There has been a cholecystectomy.    There is moderate dilatation of the main pancreatic duct which measures up to 8 mm, without significant change.  There is a cystic lesion of the posterior, distal body of the pancreas measuring 1.3 cm possible present previously but better defined on today's exam.  The common bile duct is normal caliber for  post cholecystectomy status at 9 mm.    There is mild dilatation of fluid-filled loops of small bowel primarily within the left lower quadrant, measuring up to 3.1 cm.  No distinct transition point is identified.  The appendix is normal.  Significant stool volume is present, including a large rectal stool ball, compatible with constipation.    The prostate gland is severely enlarged.  A 7 mm bladder stone is present near the left ureterovesical junction.  A 2nd 8 mm bladder stone is present in the posterior midline.  A 3 mm stone is present in the lower pole of the left kidney.  There is no hydronephrosis.  A few small bilateral renal cysts are present.    There is moderate calcification of the aorta without aneurysm.  There has been a sternotomy.  No significant bony abnormality.                                       X-Ray Abdomen AP 1 View (KUB) (Final result)  Result time 01/06/25 11:26:43      Final result by Ese Padgett MD (01/06/25 11:26:43)                   Impression:      Mild nonspecific, nonobstructive generalized increase in amount of small bowel gas.  Moderately prominent amount of stool within rectum with no large amount seen more proximally      Electronically signed by: Ese Padgett MD  Date:    01/06/2025  Time:    11:26               Narrative:    EXAMINATION:  XR ABDOMEN AP 1 VIEW    CLINICAL HISTORY:  Constipation, unspecified    TECHNIQUE:  AP View(s) of the abdomen was performed.    COMPARISON:  03/25/2021    FINDINGS:  Mild nonspecific, nonobstructive generalized increase in amount of small bowel gas.  Small amount of gas in nondistended colon.  Moderately prominent amount of stool within the rectum.  No large amounts of stool are seen more proximally.  Pelvic calcifications suggest phleboliths.  Cholecystectomy clips noted                                       Medications   iohexoL (OMNIPAQUE 350) 350 mg iodine/mL injection (75 mLs Intravenous Given 1/6/25 1458)   glycerin adult  suppository 1 suppository (1 suppository Rectal Given 1/6/25 1445)   Brown Bomb (magnesium citrate 300 mL, glycerin 100 mL,  mL) 900 mL enema ( Rectal Given 1/6/25 1555)     Medical Decision Making  84-year-old male with a past medical history BPH, hypertension, osteoarthritis, coronary artery disease, CABG, skin cancer, and cataracts presents to ED for constipation.   Patient's chart and medical history reviewed.    Ddx:  SBO  Constipation  Diverticulitis  UTI  Fecal impaction    Patient's vitals reviewed.  Afebrile, no respiratory distress, and nontoxic-appearing in the ED. patient's physical exam is overall unremarkable no abdominal tenderness. KUB x-ray ordered from triage was remarkable for moderate stool with no obstruction per my personal interpretation. Official x-ray interpretation showed  Mild nonspecific, nonobstructive generalized increase in amount of small bowel gas. Moderately prominent amount of stool within rectum with no large amount seen more proximally.  With shared decision-making we will get labs and CT today. CBC showed anemia of 4.23 with an H&H of 13.5 and 40.8 respectively, no need for transfusion at this time.  No leukocytosis.. CMP shared hyperkalemia of 5.9, otherwise overall unremarkable.  We will get EKG to be sure there is no EKG changes.  UA unremarkable. CT showed mild dilatation of left lower quadrant fluid-filled small bowel, with overall pattern not distinctly indicative of obstruction.  Nonspecific enteritis and ileus are considerations. Constipation including large rectal stool ball. Severe prostatomegaly. Pancreatic cystic lesion 1.3 cm.  Moderate chronic pancreatic ductal dilatation.  Further evaluation with MRCP is recommended on a nonemergent basis. Two bladder stones.  Left nephrolithiasis.  No hydronephrosis. Cholecystectomy.  Discussed this case with Dr. Snow. Attempted manual fecal disimpaction that was unsuccessful.  I could feel the fecal impaction but it was  too far to be able to be manually removed.  Glycerin suppository placed.  Patient still has not had a BM. Will attempt an enema. Enema was successful and patient had a bowel movement in the ED. Discussed with patient to increase water intake to at least 60 oz a day and fiber intake to at least 20 g a day. Patient will follow up with his PCP. Patient sent home with Miralax and glycerin suppositories. Patient agrees with this plan. Discussed with him strict return precautions, he verbalized understanding. Patient is stable for discharge.     Amount and/or Complexity of Data Reviewed  External Data Reviewed: labs, radiology and notes.  Labs: ordered.  Radiology: ordered.    Risk  OTC drugs.                                      Clinical Impression:  Final diagnoses:  [K59.00] Constipation  [E87.5] Hyperkalemia  [K56.41] Fecal impaction in rectum (Primary)  [K56.7] Ileus          ED Disposition Condition    Discharge Stable          ED Prescriptions       Medication Sig Dispense Start Date End Date Auth. Provider    glycerin adult suppository Place 1 suppository rectally as needed for Constipation. 12 suppository 1/6/2025 -- Holdsworth, Alayna, PA-C    polyethylene glycol (MIRALAX) 17 gram/dose powder Take 17 g by mouth daily as needed for Constipation. 116 g 1/6/2025 -- Holdsworth, Alayna, PA-C          Follow-up Information       Follow up With Specialties Details Why Contact Info    Tracy Stein MD Family Medicine   [id number] [address] [address]  [phone]               Holdsworth, Alayna, PA-C  01/06/25 2107

## 2025-01-08 LAB
OHS QRS DURATION: 88 MS
OHS QTC CALCULATION: 424 MS

## 2025-01-15 ENCOUNTER — HOSPITAL ENCOUNTER (EMERGENCY)
Facility: HOSPITAL | Age: 85
Discharge: HOME | End: 2025-01-15
Attending: EMERGENCY MEDICINE
Payer: MEDICARE

## 2025-01-15 VITALS
WEIGHT: 170 LBS | SYSTOLIC BLOOD PRESSURE: 170 MMHG | BODY MASS INDEX: 24.34 KG/M2 | DIASTOLIC BLOOD PRESSURE: 74 MMHG | OXYGEN SATURATION: 100 % | HEIGHT: 70 IN | HEART RATE: 74 BPM | RESPIRATION RATE: 18 BRPM | TEMPERATURE: 98 F

## 2025-01-15 DIAGNOSIS — R41.0 CONFUSION: Primary | ICD-10-CM

## 2025-01-15 LAB
ALBUMIN SERPL BCP-MCNC: 4.8 G/DL (ref 3.5–5.2)
ALP SERPL-CCNC: 61 U/L (ref 55–135)
ALT SERPL W/O P-5'-P-CCNC: 12 U/L (ref 10–44)
ANION GAP SERPL CALC-SCNC: 9 MMOL/L (ref 8–16)
AST SERPL-CCNC: 17 U/L (ref 10–40)
BASOPHILS # BLD AUTO: 0.02 K/UL (ref 0–0.2)
BASOPHILS NFR BLD: 0.2 % (ref 0–1.9)
BILIRUB SERPL-MCNC: 0.7 MG/DL (ref 0.1–1)
BNP SERPL-MCNC: 56 PG/ML (ref 0–99)
BUN SERPL-MCNC: 15 MG/DL (ref 8–23)
CALCIUM SERPL-MCNC: 10.1 MG/DL (ref 8.7–10.5)
CHLORIDE SERPL-SCNC: 105 MMOL/L (ref 95–110)
CO2 SERPL-SCNC: 25 MMOL/L (ref 23–29)
CREAT SERPL-MCNC: 0.9 MG/DL (ref 0.5–1.4)
DIFFERENTIAL METHOD BLD: ABNORMAL
EOSINOPHIL # BLD AUTO: 0 K/UL (ref 0–0.5)
EOSINOPHIL NFR BLD: 0.1 % (ref 0–8)
ERYTHROCYTE [DISTWIDTH] IN BLOOD BY AUTOMATED COUNT: 12.6 % (ref 11.5–14.5)
EST. GFR  (NO RACE VARIABLE): >60 ML/MIN/1.73 M^2
ETHANOL SERPL-MCNC: <10 MG/DL
GLUCOSE SERPL-MCNC: 111 MG/DL (ref 70–110)
HCT VFR BLD AUTO: 43.9 % (ref 40–54)
HGB BLD-MCNC: 14.8 G/DL (ref 14–18)
IMM GRANULOCYTES # BLD AUTO: 0.03 K/UL (ref 0–0.04)
IMM GRANULOCYTES NFR BLD AUTO: 0.4 % (ref 0–0.5)
LYMPHOCYTES # BLD AUTO: 1 K/UL (ref 1–4.8)
LYMPHOCYTES NFR BLD: 12.3 % (ref 18–48)
MCH RBC QN AUTO: 31.2 PG (ref 27–31)
MCHC RBC AUTO-ENTMCNC: 33.7 G/DL (ref 32–36)
MCV RBC AUTO: 92 FL (ref 82–98)
MONOCYTES # BLD AUTO: 0.4 K/UL (ref 0.3–1)
MONOCYTES NFR BLD: 4.3 % (ref 4–15)
NEUTROPHILS # BLD AUTO: 6.8 K/UL (ref 1.8–7.7)
NEUTROPHILS NFR BLD: 82.7 % (ref 38–73)
NRBC BLD-RTO: 0 /100 WBC
PLATELET # BLD AUTO: 318 K/UL (ref 150–450)
PMV BLD AUTO: 9.8 FL (ref 9.2–12.9)
POTASSIUM SERPL-SCNC: 4 MMOL/L (ref 3.5–5.1)
PROT SERPL-MCNC: 8 G/DL (ref 6–8.4)
RBC # BLD AUTO: 4.75 M/UL (ref 4.6–6.2)
SODIUM SERPL-SCNC: 139 MMOL/L (ref 136–145)
TROPONIN I SERPL HS-MCNC: 8.8 PG/ML (ref 0–14.9)
WBC # BLD AUTO: 8.21 K/UL (ref 3.9–12.7)

## 2025-01-15 PROCEDURE — 99285 EMERGENCY DEPT VISIT HI MDM: CPT | Mod: 25

## 2025-01-15 PROCEDURE — 93010 ELECTROCARDIOGRAM REPORT: CPT | Mod: ,,, | Performed by: GENERAL PRACTICE

## 2025-01-15 PROCEDURE — 82077 ASSAY SPEC XCP UR&BREATH IA: CPT | Performed by: EMERGENCY MEDICINE

## 2025-01-15 PROCEDURE — 85025 COMPLETE CBC W/AUTO DIFF WBC: CPT | Performed by: EMERGENCY MEDICINE

## 2025-01-15 PROCEDURE — 93005 ELECTROCARDIOGRAM TRACING: CPT | Performed by: GENERAL PRACTICE

## 2025-01-15 PROCEDURE — 84484 ASSAY OF TROPONIN QUANT: CPT | Performed by: EMERGENCY MEDICINE

## 2025-01-15 PROCEDURE — 83880 ASSAY OF NATRIURETIC PEPTIDE: CPT | Performed by: EMERGENCY MEDICINE

## 2025-01-15 PROCEDURE — 80053 COMPREHEN METABOLIC PANEL: CPT | Performed by: EMERGENCY MEDICINE

## 2025-01-15 PROCEDURE — 25000003 PHARM REV CODE 250: Performed by: EMERGENCY MEDICINE

## 2025-01-15 RX ORDER — ASPIRIN 325 MG
325 TABLET ORAL
Status: COMPLETED | OUTPATIENT
Start: 2025-01-15 | End: 2025-01-15

## 2025-01-15 RX ADMIN — ASPIRIN 325 MG ORAL TABLET 325 MG: 325 PILL ORAL at 07:01

## 2025-01-16 NOTE — DISCHARGE INSTRUCTIONS
Rest.  Do not use any marijuana.  Return to emergency department for worsening symptoms or any problems.  Follow-up with your primary care provider

## 2025-01-16 NOTE — ED PROVIDER NOTES
Encounter Date: 1/15/2025       History     Chief Complaint   Patient presents with    Altered Mental Status     Starting at 0500 when he woke with confusion, increased within last hour     84-year-old male presented emergency department saying he felt slightly confused at 5:00 a.m. this morning when he woke up and then even back to sleep and at 7:00 a.m. again when he woke up he was slightly confused.  That resolved however in the evening again he felt confused briefly so concerned and called EMS and came here.  Patient states he is not confused anymore and feels normal.  Patient denies any focal weakness or numbness.  Denies any chest pain or shortness of breath or abdominal pain or weakness or numbness or fever or chills.  Patient states he uses marijuana at times and used marijuana yesterday but did not use any marijuana today.      Review of patient's allergies indicates:   Allergen Reactions    Amantadine Other (See Comments)     Depression     Past Medical History:   Diagnosis Date    Anticoagulant long-term use     Atherosclerosis of native coronary artery of native heart without angina pectoris 01/2021    BPH (benign prostatic hypertrophy)     Cataract     done [facility]U    Elevated PSA     no further fu    Epiretinal membrane     OS    Fatty liver     noted on usg    History of basal cell carcinoma     History of colon polyps     History of duodenal ulcer     x 2 8/13 and 8/14    History of nephrolithiasis 2008    passed stone    History of prediabetes     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    Hypertension     Macular degeneration     dry -- [facility]U    Marijuana smoker     helps with insomnia    Osteoarthritis     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     Past Surgical History:   Procedure Laterality Date    ANGIOGRAM, CORONARY, WITH LEFT HEART CATHETERIZATION  12/11/2020    Procedure: Angiogram, Coronary, with Left Heart Cath;  Surgeon: Edy Hills MD;  Location: [facility];  Service: Cardiology;;     CATARACT EXTRACTION W/  INTRAOCULAR LENS IMPLANT Right 01/23/2020    Dr Bone    CATARACT EXTRACTION W/  INTRAOCULAR LENS IMPLANT Left 02/06/2020    Dr Bone    CHOLECYSTECTOMY      COLONOSCOPY N/A 10/14/2020    Procedure: COLONOSCOPY;  Surgeon: Kasi Mckoy MD;  Location: [facility];  Service: Endoscopy;  Laterality: N/A;    CORONARY ARTERY BYPASS GRAFT (CABG) N/A 3/9/2021    Procedure: CORONARY ARTERY BYPASS GRAFT (CABG) x 4;  Surgeon: Drake Kwan MD;  Location: [facility] OR;  Service: Cardiovascular;  Laterality: N/A;    coronary bipass  03/09/2021    ENDOSCOPIC HARVEST OF VEIN Left 3/9/2021    Procedure: HARVEST-VEIN-ENDOVASCULAR -;  Surgeon: Drake Kwan MD;  Location: [facility] OR;  Service: Cardiovascular;  Laterality: Left;    FRACTURE SURGERY      Right ankle and leg    ORCHIECTOMY      due to undescended testicle/ Left    PHACOEMULSIFICATION OF CATARACT Right 1/23/2020    Procedure: PHACOEMULSIFICATION, CATARACT;  Surgeon: Xu Bone Jr., MD;  Location: [facility] OR;  Service: Ophthalmology;  Laterality: Right;  Right    PHACOEMULSIFICATION OF CATARACT Left 2/6/2020    Procedure: PHACOEMULSIFICATION, CATARACT;  Surgeon: Xu Bone Jr., MD;  Location: [facility] OR;  Service: Ophthalmology;  Laterality: Left;  Left    PROSTATE BIOPSY  2014 2014, 2018-negative    TONSILLECTOMY      TRANSRECTAL BIOPSY OF PROSTATE WITH ULTRASOUND GUIDANCE N/A 8/20/2018    Procedure: BIOPSY, PROSTATE, TRANSRECTAL APPROACH, WITH US GUIDANCE;  Surgeon: Joselo Green MD;  Location: [facility] OR;  Service: Urology;  Laterality: N/A;  SITE PROSTATE     Family History   Problem Relation Name Age of Onset    Cataracts Mother      Glaucoma Mother      Lupus Mother      Heart disease Mother      Kidney cancer Father      Diabetes Maternal Aunt      Diabetes Maternal Uncle      No Known Problems Sister      No Known Problems Daughter      Cancer Sister          breast cancer    Melanoma Neg Hx      Psoriasis Neg  Hx      Eczema Neg Hx      Amblyopia Neg Hx      Hypertension Neg Hx      Macular degeneration Neg Hx      Retinal detachment Neg Hx      Stroke Neg Hx      Strabismus Neg Hx      Thyroid disease Neg Hx      Blindness Neg Hx       Social History     Tobacco Use    Smoking status: Never    Smokeless tobacco: Never   Substance Use Topics    Alcohol use: Yes     Alcohol/week: 7.0 standard drinks of alcohol     Types: 7 Glasses of wine per week    Drug use: Yes     Frequency: 7.0 times per week     Types: Marijuana     Comment: thc     Review of Systems   Constitutional: Negative.    HENT: Negative.     Eyes: Negative.    Respiratory: Negative.     Cardiovascular: Negative.    Gastrointestinal: Negative.    Endocrine: Negative.    Genitourinary: Negative.    Musculoskeletal: Negative.    Skin: Negative.    Allergic/Immunologic: Negative.    Neurological: Negative.    Hematological: Negative.    Psychiatric/Behavioral:  Positive for confusion.    All other systems reviewed and are negative.      Physical Exam     Initial Vitals [01/15/25 1547]   BP Pulse Resp Temp SpO2   (!) 175/81 74 18 98.3 °F (36.8 °C) 100 %      MAP       --         Physical Exam    Nursing note and vitals reviewed.  Constitutional: He appears well-developed and well-nourished.   HENT:   Head: Normocephalic and atraumatic.   Nose: Nose normal.   Eyes: Conjunctivae and EOM are normal.   Neck: Neck supple. No tracheal deviation present.   Normal range of motion.  Cardiovascular:  Normal rate, regular rhythm, normal heart sounds and intact distal pulses.     Exam reveals no friction rub.       No murmur heard.  Pulmonary/Chest: Breath sounds normal. No respiratory distress. He has no wheezes. He has no rales.   Abdominal: Abdomen is soft. He exhibits no distension. There is no abdominal tenderness.   Musculoskeletal:         General: Normal range of motion.      Cervical back: Normal range of motion and neck supple.     Neurological: He is alert and  oriented to person, place, and time. He has normal strength. No cranial nerve deficit or sensory deficit. GCS score is 15. GCS eye subscore is 4. GCS verbal subscore is 5. GCS motor subscore is 6.   Skin: Skin is warm and dry. Capillary refill takes less than 2 seconds.   Psychiatric: He has a normal mood and affect. Thought content normal.         ED Course   Procedures  Labs Reviewed   CBC W/ AUTO DIFFERENTIAL - Abnormal       Result Value    WBC 8.21      RBC 4.75      Hemoglobin 14.8      Hematocrit 43.9      MCV 92      MCH 31.2 (*)     MCHC 33.7      RDW 12.6      Platelets 318      MPV 9.8      Immature Granulocytes 0.4      Gran # (ANC) 6.8      Immature Grans (Abs) 0.03      Lymph # 1.0      Mono # 0.4      Eos # 0.0      Baso # 0.02      nRBC 0      Gran % 82.7 (*)     Lymph % 12.3 (*)     Mono % 4.3      Eosinophil % 0.1      Basophil % 0.2      Differential Method Automated     COMPREHENSIVE METABOLIC PANEL - Abnormal    Sodium 139      Potassium 4.0      Chloride 105      CO2 25      Glucose 111 (*)     BUN 15      Creatinine 0.9      Calcium 10.1      Total Protein 8.0      Albumin 4.8      Total Bilirubin 0.7      Alkaline Phosphatase 61      AST 17      ALT 12      eGFR >60.0      Anion Gap 9     TROPONIN I HIGH SENSITIVITY    Troponin I High Sensitivity 8.8     B-TYPE NATRIURETIC PEPTIDE    BNP 56     ALCOHOL,MEDICAL (ETHANOL)    Alcohol, Serum <10     URINALYSIS, REFLEX TO URINE CULTURE   DRUG SCREEN PANEL, URINE EMERGENCY     EKG Readings: (Independently Interpreted)   Initial Reading: No STEMI. Rhythm: Normal Sinus Rhythm. Ectopy: No Ectopy. Conduction: Normal.       Imaging Results              MRI Brain Without Contrast (In process)                      X-Ray Chest AP Portable (Final result)  Result time 01/15/25 16:34:26      Final result by Sadi Torres DO (01/15/25 16:34:26)                   Impression:      No acute cardiopulmonary abnormality.      Electronically signed by: Sadi  Melissa  Date:    01/15/2025  Time:    16:34               Narrative:    EXAMINATION:  XR CHEST AP PORTABLE    CLINICAL HISTORY:  CHF;    FINDINGS:  Portable chest with comparison chest x-ray 04/02/2021.  Normal cardiomediastinal silhouette.  Median sternotomy wires again observed.  Lungs are clear. Pulmonary vasculature is normal. No acute osseous abnormality.                                       Medications   aspirin tablet 325 mg (has no administration in time range)     Medical Decision Making  84-year-old male with confusion.  Patient currently has no confusion and currently back to baseline and unsure this could be related to recent marijuana use but given the new confusion will do MRI of the brain to rule out any acute infarcts.  Clinical presentation not consistent with an infarct and patient does not have any focal neurologic deficits.    Amount and/or Complexity of Data Reviewed  Labs: ordered. Decision-making details documented in ED Course.  Radiology: ordered. Decision-making details documented in ED Course.  ECG/medicine tests: independent interpretation performed. Decision-making details documented in ED Course.    Risk  OTC drugs.                                      Clinical Impression:  Final diagnoses:  [R41.0] Confusion (Primary)                 Jayant Pham MD  01/15/25 1828

## 2025-01-20 LAB
OHS QRS DURATION: 86 MS
OHS QTC CALCULATION: 441 MS

## 2025-03-20 ENCOUNTER — OFFICE VISIT (OUTPATIENT)
Dept: FAMILY MEDICINE | Facility: CLINIC | Age: 85
End: 2025-03-20
Payer: MEDICARE

## 2025-03-20 DIAGNOSIS — M25.519 SHOULDER PAIN, UNSPECIFIED CHRONICITY, UNSPECIFIED LATERALITY: Primary | ICD-10-CM
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
## 2025-03-20 NOTE — PROGRESS NOTES
Subjective:       Patient ID: Cedric Gupta Jr. is a 85 y.o. male.    Chief Complaint: Follow-up      The patient is an 85-year-old who is here today virtually to follow up from a fall.  Yesterday he was talking to a neighbor on his porch and fell on his porch tripping over something.  His neighbor witnessed the fall and brought him to the ER.  He did have testing in the ER which was unremarkable.  He scheduled this appointment today even though he did not have transportation.  When he did not arrive for his appointment, we converted his appointment to a virtual visit.  Aside from shoulder pain, he is doing well today.  He does use the hydrocodone at night to help him sleep    I have not seen him in awhile.  He is back [place] living [place] property [place].  He is close to the [facility].  He has stopped all of his medicine and tells me that he is feeling great.  He is not interested in resuming any of his medicines       Review of Systems   Constitutional:  Negative for appetite change, chills, diaphoresis, fatigue, fever and unexpected weight change.   HENT:  Negative for congestion, ear pain, postnasal drip, rhinorrhea, sinus pressure, sneezing, sore throat and trouble swallowing.    Eyes:  Negative for pain, discharge and visual disturbance.   Respiratory:  Negative for cough, chest tightness, shortness of breath and wheezing.    Cardiovascular:  Negative for chest pain, palpitations and leg swelling.   Gastrointestinal:  Negative for abdominal distention, abdominal pain, blood in stool, constipation, diarrhea, nausea and vomiting.   Musculoskeletal:         Per HPI   Skin:  Negative for rash.         Objective:      Physical Exam  Constitutional:       General: He is not in acute distress.     Appearance: Normal appearance.      Comments: He is a bit disheveled   Neurological:      Mental Status: He is alert.   Psychiatric:         Attention and Perception: Attention and perception normal.         Mood and  Affect: Affect normal. Mood is elated (Borderline manic).         Speech: Speech is rapid and pressured and tangential.         Behavior: Behavior normal. Behavior is cooperative.         Thought Content: Thought content normal.         Cognition and Memory: Cognition and memory normal.         Judgment: Judgment normal.       There were no vitals taken for this visit.There is no height or weight on file to calculate BMI.              A/P:  1) recent fall status post right shoulder injury.  He assures me he is safe at home and will do all he can to minimize future falls.  If his shoulder pain does not improve, he will let me know    I will plan to see him in person in 2-3 months or sooner if needed        The patient location is: home  The chief complaint leading to consultation is Follow-up     Visit type: Virtual visit with synchronous audio and video    Each patient to whom he or she provides medical services by telemedicine is:  (1) informed of the relationship between the physician and patient and the respective role of any other health care provider with respect to management of the patient; and (2) notified that he or she may decline to receive medical services by telemedicine and may withdraw from such care at any time.    I spent 19 minutes on this encounter.  This time includes face-to-face time and non-face to face time including previsit chart review, obtaining and/or reviewing separately obtained history, documenting clinical information in the electronic or other health record, independently interpreting results and communicating results to the patient/family/caregiver, or care coordinator.

## 2025-04-16 ENCOUNTER — TELEPHONE (OUTPATIENT)
Dept: FAMILY MEDICINE | Facility: CLINIC | Age: 85
End: 2025-04-16
Payer: MEDICARE

## 2025-04-16 NOTE — TELEPHONE ENCOUNTER
----- Message from Med Assistant Roselyn sent at 4/16/2025  1:15 PM CDT -----  Type: General Call Back  Name of Caller:pt Would the patient rather a call back or a response via [facility]? Call Best Call Back Number:[phone] Information: Pt is trying to get is marijuana card reinstated. Please give pt a call back to discuss further.

## 2025-04-17 ENCOUNTER — TELEPHONE (OUTPATIENT)
Dept: FAMILY MEDICINE | Facility: CLINIC | Age: 85
End: 2025-04-17
Payer: MEDICARE

## 2025-04-17 NOTE — TELEPHONE ENCOUNTER
Pt wanting to get his marijuana card renewed.  Advised him that is not something  does and he would have to find a provider or clinic that does that.  Pt verbalizes understanding.

## 2025-04-17 NOTE — TELEPHONE ENCOUNTER
----- Message from Lina sent at 4/17/2025 10:05 AM CDT -----  Regarding: Nurse  Contact: Pt  Type: Requesting to speak with nurseOswaldo Called: PTRegarding:  New  Marijuana CardWould the patient rather a call back or a response via [facility]? Call Randiest Call Back Number:  [phone] Information: Please call. Thank You

## 2025-04-17 NOTE — TELEPHONE ENCOUNTER
----- Message from Mariluz sent at 4/17/2025 10:12 AM CDT -----  Contact: self  Type:  Needs Medical AdviceWho Called: pt Symptoms (please be specific):  How long has patient had these symptoms:  Pharmacy name and phone #:  Would the patient rather a call back or a response via [facility]? callBest Call Back Number: [phone] Information: pt is asking for office to give a call   Please call back to advise. Thanks!

## 2025-04-17 NOTE — TELEPHONE ENCOUNTER
----- Message from Ariana sent at 4/16/2025  3:42 PM CDT -----  Type:  Patient Returning CallWho Called:pt Who Left Message for Patient:Nurse Does the patient know what this is regarding?:Meds for depression Would the patient rather a call back or a response via [facility]? Please call Best Call Back Number:[phone] Additional Information: please return call    Please call back to advise. Thanks!

## 2025-06-26 ENCOUNTER — TELEPHONE (OUTPATIENT)
Dept: FAMILY MEDICINE | Facility: CLINIC | Age: 85
End: 2025-06-26

## 2025-06-26 NOTE — LETTER
June 26, 2025    [address]  [address] [place] [address]             [facility]  [address]  [facility] [address]  Phone: [phone]  Fax: [phone] Dear Mr. Cedric Gupta:    We are sorry that you missed your appointment with Dr. Stein on 6/26/2025. Your health and follow-up medical care are important to us. Please call our office as soon as possible so that we may reschedule your appointment. If you have already rescheduled your appointment, please disregard this letter.    Sincerely,        Tracy Stein MD Shann Fayard, LPN

## (undated) DEVICE — TIP I/A CURVED SINGLE USE

## (undated) DEVICE — SPONGE WEC CEL SPEARS

## (undated) DEVICE — SHIELD COLLAGEN 12HR CORNEAL

## (undated) DEVICE — SYR DISP LL 5CC

## (undated) DEVICE — FORMALIN 60ML PREFILLED CONT

## (undated) DEVICE — CUP MEDICINE STERILE 2OZ

## (undated) DEVICE — SYR 10CC LUER LOCK

## (undated) DEVICE — COVER OVERHEAD SURG LT BLUE

## (undated) DEVICE — SYR 3CC LUER LOC

## (undated) DEVICE — SYR 50CC LL

## (undated) DEVICE — SEE MEDLINE ITEM 152474

## (undated) DEVICE — SOL BETADINE 5%

## (undated) DEVICE — SOL IRR STRL WATER 500ML

## (undated) DEVICE — NDL TISSUE BIOPSY MAGNUM

## (undated) DEVICE — PACK EYE CUSTOM COVINGTON.

## (undated) DEVICE — TOWEL OR NONABSORB ADH 17X26

## (undated) DEVICE — SOL IRR BSS OPHTH 500ML STRL

## (undated) DEVICE — NEEDLE HYPO 18X1.5 SG

## (undated) DEVICE — SEE MEDLINE ITEM 152622

## (undated) DEVICE — MARKER SKIN STND TIP BLUE BARR

## (undated) DEVICE — PACK OPHTHALMIC

## (undated) DEVICE — SEE MEDLINE ITEM 157128

## (undated) DEVICE — GLOVE BIOGEL ECLIPSE SZ 7.5

## (undated) DEVICE — SCRUB 10% POVIDONE IODINE 4OZ

## (undated) DEVICE — GLOVE PROTEXIS HYDROGEL SZ8

## (undated) DEVICE — SEE MEDLINE ITEM 146362

## (undated) DEVICE — COVER TRANSDUCER LATEX N/STERI

## (undated) DEVICE — PAD PREP 50/CA

## (undated) DEVICE — LUBRICANT SURGILUBE 2 OZ

## (undated) DEVICE — SYR LUER LOCK TIP 6CC

## (undated) DEVICE — GAUZE SPONGE 4'X4' 8PLY NS

## (undated) DEVICE — GOWN SURG 2XL DISP TIE BACK

## (undated) DEVICE — NDL SPINAL 22GX7 SPINOCAN